# Patient Record
Sex: FEMALE | Race: OTHER | HISPANIC OR LATINO | URBAN - METROPOLITAN AREA
[De-identification: names, ages, dates, MRNs, and addresses within clinical notes are randomized per-mention and may not be internally consistent; named-entity substitution may affect disease eponyms.]

---

## 2015-10-12 RX ORDER — ALBUTEROL 90 UG/1
3 AEROSOL, METERED ORAL
Qty: 25 | Refills: 0 | DISCHARGE
Start: 2015-10-12 | End: 2015-10-17

## 2017-01-16 ENCOUNTER — EMERGENCY (EMERGENCY)
Facility: HOSPITAL | Age: 67
LOS: 1 days | Discharge: LEFT BEFORE DISCHARGE | End: 2017-01-16

## 2017-01-16 VITALS
WEIGHT: 175.05 LBS | TEMPERATURE: 98 F | RESPIRATION RATE: 18 BRPM | OXYGEN SATURATION: 94 % | DIASTOLIC BLOOD PRESSURE: 68 MMHG | SYSTOLIC BLOOD PRESSURE: 117 MMHG | HEIGHT: 62 IN | HEART RATE: 81 BPM

## 2017-01-16 DIAGNOSIS — Z98.89 OTHER SPECIFIED POSTPROCEDURAL STATES: Chronic | ICD-10-CM

## 2017-04-21 ENCOUNTER — EMERGENCY (EMERGENCY)
Facility: HOSPITAL | Age: 67
LOS: 1 days | Discharge: HOME | End: 2017-04-21
Attending: EMERGENCY MEDICINE
Payer: MEDICARE

## 2017-04-21 VITALS — HEIGHT: 63 IN | WEIGHT: 177.91 LBS

## 2017-04-21 VITALS
TEMPERATURE: 98 F | HEART RATE: 72 BPM | RESPIRATION RATE: 20 BRPM | SYSTOLIC BLOOD PRESSURE: 139 MMHG | DIASTOLIC BLOOD PRESSURE: 72 MMHG | OXYGEN SATURATION: 95 %

## 2017-04-21 DIAGNOSIS — Z87.39 PERSONAL HISTORY OF OTHER DISEASES OF THE MUSCULOSKELETAL SYSTEM AND CONNECTIVE TISSUE: ICD-10-CM

## 2017-04-21 DIAGNOSIS — Z90.10 ACQUIRED ABSENCE OF UNSPECIFIED BREAST AND NIPPLE: ICD-10-CM

## 2017-04-21 DIAGNOSIS — Z88.6 ALLERGY STATUS TO ANALGESIC AGENT: ICD-10-CM

## 2017-04-21 DIAGNOSIS — Z79.899 OTHER LONG TERM (CURRENT) DRUG THERAPY: ICD-10-CM

## 2017-04-21 DIAGNOSIS — J45.40 MODERATE PERSISTENT ASTHMA, UNCOMPLICATED: ICD-10-CM

## 2017-04-21 DIAGNOSIS — R06.00 DYSPNEA, UNSPECIFIED: ICD-10-CM

## 2017-04-21 DIAGNOSIS — Z98.89 OTHER SPECIFIED POSTPROCEDURAL STATES: Chronic | ICD-10-CM

## 2017-04-21 DIAGNOSIS — E78.5 HYPERLIPIDEMIA, UNSPECIFIED: ICD-10-CM

## 2017-04-21 DIAGNOSIS — Z88.2 ALLERGY STATUS TO SULFONAMIDES: ICD-10-CM

## 2017-04-21 PROCEDURE — 99284 EMERGENCY DEPT VISIT MOD MDM: CPT

## 2017-04-21 PROCEDURE — 71045 X-RAY EXAM CHEST 1 VIEW: CPT

## 2017-04-21 PROCEDURE — 96375 TX/PRO/DX INJ NEW DRUG ADDON: CPT

## 2017-04-21 PROCEDURE — 71010: CPT | Mod: 26

## 2017-04-21 PROCEDURE — 96374 THER/PROPH/DIAG INJ IV PUSH: CPT

## 2017-04-21 PROCEDURE — 94640 AIRWAY INHALATION TREATMENT: CPT

## 2017-04-21 PROCEDURE — 99284 EMERGENCY DEPT VISIT MOD MDM: CPT | Mod: 25

## 2017-04-21 RX ORDER — SODIUM CHLORIDE 9 MG/ML
3 INJECTION INTRAMUSCULAR; INTRAVENOUS; SUBCUTANEOUS ONCE
Qty: 0 | Refills: 0 | Status: COMPLETED | OUTPATIENT
Start: 2017-04-21 | End: 2017-04-21

## 2017-04-21 RX ORDER — IPRATROPIUM/ALBUTEROL SULFATE 18-103MCG
3 AEROSOL WITH ADAPTER (GRAM) INHALATION
Qty: 28 | Refills: 0 | OUTPATIENT
Start: 2017-04-21 | End: 2017-04-28

## 2017-04-21 RX ORDER — IPRATROPIUM/ALBUTEROL SULFATE 18-103MCG
3 AEROSOL WITH ADAPTER (GRAM) INHALATION ONCE
Qty: 0 | Refills: 0 | Status: COMPLETED | OUTPATIENT
Start: 2017-04-21 | End: 2017-04-21

## 2017-04-21 RX ORDER — MORPHINE SULFATE 50 MG/1
4 CAPSULE, EXTENDED RELEASE ORAL ONCE
Qty: 0 | Refills: 0 | Status: DISCONTINUED | OUTPATIENT
Start: 2017-04-21 | End: 2017-04-21

## 2017-04-21 RX ORDER — MAGNESIUM SULFATE 500 MG/ML
2 VIAL (ML) INJECTION ONCE
Qty: 0 | Refills: 0 | Status: COMPLETED | OUTPATIENT
Start: 2017-04-21 | End: 2017-04-21

## 2017-04-21 RX ORDER — IPRATROPIUM/ALBUTEROL SULFATE 18-103MCG
3 AEROSOL WITH ADAPTER (GRAM) INHALATION
Qty: 0 | Refills: 0 | Status: COMPLETED | OUTPATIENT
Start: 2017-04-21 | End: 2017-04-21

## 2017-04-21 RX ADMIN — MORPHINE SULFATE 4 MILLIGRAM(S): 50 CAPSULE, EXTENDED RELEASE ORAL at 16:19

## 2017-04-21 RX ADMIN — Medication 125 MILLIGRAM(S): at 16:18

## 2017-04-21 RX ADMIN — SODIUM CHLORIDE 3 MILLILITER(S): 9 INJECTION INTRAMUSCULAR; INTRAVENOUS; SUBCUTANEOUS at 16:34

## 2017-04-21 RX ADMIN — Medication 3 MILLILITER(S): at 16:46

## 2017-04-21 RX ADMIN — Medication 3 MILLILITER(S): at 18:18

## 2017-04-21 RX ADMIN — Medication 3 MILLILITER(S): at 16:18

## 2017-04-21 RX ADMIN — Medication 150 GRAM(S): at 16:18

## 2017-04-21 NOTE — ED STATDOCS - PROGRESS NOTE DETAILS
65 y/o F pt w/ PMHx of asthma, lupus and MI presents to the ED c/o cough and chest tightness x2 days. Pt states that she has been wheezing for a long time, but notes that it is worsening recently. Pt notes that she usually gets IV steroids when her asthma is exacerbated. Pt denies fever, chills, numbness/tingling, focal weakness, or any other complaints. NKDA. Focused evaluation, orders entered, placed in main for further evaluation by another provider.

## 2017-04-21 NOTE — ED STATDOCS - DETAILS:
I, Joy Lea, personally performed the services described in the documentation, reviewed the documentation recorded by the scribe in my presence and it accurately and completely records my words and action.

## 2017-04-21 NOTE — ED ADULT NURSE NOTE - OBJECTIVE STATEMENT
67 y/o female presents to ed reporting wheezing and shortness of breath for the past few weeks associated with cough. patient reports hx of asthma and states "I always forget to use my nebulizer." Educated pt about nebulizer use; verbalized understanding. At time of assessment patient reporting generalized rib pain associated with coughing. awake alert and oriented x3. respirations unlabored; no retractions. lungs with wheezing to all lung fields. abdomen soft non-tender. no swelling to bilateral lower extremities. #20 placed in right wrist. nebulizer medication started as ordered; other meds given as directed. patient educated and verbalized understanding. appears comfortable at this time. no fevers no sputum production

## 2017-04-21 NOTE — ED PROVIDER NOTE - MEDICAL DECISION MAKING DETAILS
pt comes in with 2-3 day h/o cough wheezing , and sob , h/o asthma / copd   s/p treatment feels better , no fever

## 2017-06-29 ENCOUNTER — INPATIENT (INPATIENT)
Facility: HOSPITAL | Age: 67
LOS: 1 days | Discharge: HOME | DRG: 203 | End: 2017-07-01
Attending: INTERNAL MEDICINE | Admitting: HOSPITALIST
Payer: MEDICARE

## 2017-06-29 VITALS
RESPIRATION RATE: 18 BRPM | HEIGHT: 62 IN | WEIGHT: 179.02 LBS | OXYGEN SATURATION: 92 % | SYSTOLIC BLOOD PRESSURE: 152 MMHG | TEMPERATURE: 98 F | DIASTOLIC BLOOD PRESSURE: 86 MMHG | HEART RATE: 18 BPM

## 2017-06-29 DIAGNOSIS — Z98.89 OTHER SPECIFIED POSTPROCEDURAL STATES: Chronic | ICD-10-CM

## 2017-06-29 DIAGNOSIS — J45.42 MODERATE PERSISTENT ASTHMA WITH STATUS ASTHMATICUS: ICD-10-CM

## 2017-06-29 LAB
ALBUMIN SERPL ELPH-MCNC: 4.4 G/DL — SIGNIFICANT CHANGE UP (ref 3.3–5.2)
ALP SERPL-CCNC: 97 U/L — SIGNIFICANT CHANGE UP (ref 40–120)
ALT FLD-CCNC: 20 U/L — SIGNIFICANT CHANGE UP
ANION GAP SERPL CALC-SCNC: 14 MMOL/L — SIGNIFICANT CHANGE UP (ref 5–17)
AST SERPL-CCNC: 14 U/L — SIGNIFICANT CHANGE UP
BASOPHILS # BLD AUTO: 0 K/UL — SIGNIFICANT CHANGE UP (ref 0–0.2)
BASOPHILS NFR BLD AUTO: 0.2 % — SIGNIFICANT CHANGE UP (ref 0–2)
BILIRUB SERPL-MCNC: 0.5 MG/DL — SIGNIFICANT CHANGE UP (ref 0.4–2)
BUN SERPL-MCNC: 18 MG/DL — SIGNIFICANT CHANGE UP (ref 8–20)
CALCIUM SERPL-MCNC: 9.4 MG/DL — SIGNIFICANT CHANGE UP (ref 8.6–10.2)
CHLORIDE SERPL-SCNC: 100 MMOL/L — SIGNIFICANT CHANGE UP (ref 98–107)
CO2 SERPL-SCNC: 27 MMOL/L — SIGNIFICANT CHANGE UP (ref 22–29)
CREAT SERPL-MCNC: 0.46 MG/DL — LOW (ref 0.5–1.3)
GLUCOSE SERPL-MCNC: 111 MG/DL — SIGNIFICANT CHANGE UP (ref 70–115)
HCT VFR BLD CALC: 42.2 % — SIGNIFICANT CHANGE UP (ref 37–47)
HGB BLD-MCNC: 14.6 G/DL — SIGNIFICANT CHANGE UP (ref 12–16)
LYMPHOCYTES # BLD AUTO: 1.8 K/UL — SIGNIFICANT CHANGE UP (ref 1–4.8)
LYMPHOCYTES # BLD AUTO: 31.3 % — SIGNIFICANT CHANGE UP (ref 20–55)
MCHC RBC-ENTMCNC: 30 PG — SIGNIFICANT CHANGE UP (ref 27–31)
MCHC RBC-ENTMCNC: 34.6 G/DL — SIGNIFICANT CHANGE UP (ref 32–36)
MCV RBC AUTO: 86.7 FL — SIGNIFICANT CHANGE UP (ref 81–99)
MONOCYTES # BLD AUTO: 0.3 K/UL — SIGNIFICANT CHANGE UP (ref 0–0.8)
MONOCYTES NFR BLD AUTO: 5.3 % — SIGNIFICANT CHANGE UP (ref 3–10)
NEUTROPHILS # BLD AUTO: 3.7 K/UL — SIGNIFICANT CHANGE UP (ref 1.8–8)
NEUTROPHILS NFR BLD AUTO: 63 % — SIGNIFICANT CHANGE UP (ref 37–73)
PLATELET # BLD AUTO: 247 K/UL — SIGNIFICANT CHANGE UP (ref 150–400)
POTASSIUM SERPL-MCNC: 4.5 MMOL/L — SIGNIFICANT CHANGE UP (ref 3.5–5.3)
POTASSIUM SERPL-SCNC: 4.5 MMOL/L — SIGNIFICANT CHANGE UP (ref 3.5–5.3)
PROCALCITONIN SERPL-MCNC: <0.05 NG/ML — SIGNIFICANT CHANGE UP (ref 0–0.04)
PROT SERPL-MCNC: 7.6 G/DL — SIGNIFICANT CHANGE UP (ref 6.6–8.7)
RBC # BLD: 4.87 M/UL — SIGNIFICANT CHANGE UP (ref 4.4–5.2)
RBC # FLD: 12.4 % — SIGNIFICANT CHANGE UP (ref 11–15.6)
SODIUM SERPL-SCNC: 141 MMOL/L — SIGNIFICANT CHANGE UP (ref 135–145)
WBC # BLD: 5.8 K/UL — SIGNIFICANT CHANGE UP (ref 4.8–10.8)
WBC # FLD AUTO: 5.8 K/UL — SIGNIFICANT CHANGE UP (ref 4.8–10.8)

## 2017-06-29 PROCEDURE — 93010 ELECTROCARDIOGRAM REPORT: CPT

## 2017-06-29 PROCEDURE — 99285 EMERGENCY DEPT VISIT HI MDM: CPT

## 2017-06-29 PROCEDURE — 99223 1ST HOSP IP/OBS HIGH 75: CPT

## 2017-06-29 PROCEDURE — 71020: CPT | Mod: 26

## 2017-06-29 RX ORDER — DEXTROSE 50 % IN WATER 50 %
25 SYRINGE (ML) INTRAVENOUS ONCE
Qty: 0 | Refills: 0 | Status: DISCONTINUED | OUTPATIENT
Start: 2017-06-29 | End: 2017-07-01

## 2017-06-29 RX ORDER — ZOLPIDEM TARTRATE 10 MG/1
5 TABLET ORAL AT BEDTIME
Qty: 0 | Refills: 0 | Status: DISCONTINUED | OUTPATIENT
Start: 2017-06-29 | End: 2017-07-01

## 2017-06-29 RX ORDER — GLUCAGON INJECTION, SOLUTION 0.5 MG/.1ML
1 INJECTION, SOLUTION SUBCUTANEOUS ONCE
Qty: 0 | Refills: 0 | Status: DISCONTINUED | OUTPATIENT
Start: 2017-06-29 | End: 2017-07-01

## 2017-06-29 RX ORDER — ACETAMINOPHEN 500 MG
650 TABLET ORAL EVERY 6 HOURS
Qty: 0 | Refills: 0 | Status: DISCONTINUED | OUTPATIENT
Start: 2017-06-29 | End: 2017-07-01

## 2017-06-29 RX ORDER — INSULIN LISPRO 100/ML
VIAL (ML) SUBCUTANEOUS
Qty: 0 | Refills: 0 | Status: DISCONTINUED | OUTPATIENT
Start: 2017-06-29 | End: 2017-07-01

## 2017-06-29 RX ORDER — DEXTROSE 50 % IN WATER 50 %
12.5 SYRINGE (ML) INTRAVENOUS ONCE
Qty: 0 | Refills: 0 | Status: DISCONTINUED | OUTPATIENT
Start: 2017-06-29 | End: 2017-07-01

## 2017-06-29 RX ORDER — IPRATROPIUM/ALBUTEROL SULFATE 18-103MCG
3 AEROSOL WITH ADAPTER (GRAM) INHALATION EVERY 6 HOURS
Qty: 0 | Refills: 0 | Status: DISCONTINUED | OUTPATIENT
Start: 2017-06-29 | End: 2017-07-01

## 2017-06-29 RX ORDER — LACTOBACILLUS ACIDOPHILUS 100MM CELL
1 CAPSULE ORAL
Qty: 0 | Refills: 0 | Status: DISCONTINUED | OUTPATIENT
Start: 2017-06-29 | End: 2017-07-01

## 2017-06-29 RX ORDER — PANTOPRAZOLE SODIUM 20 MG/1
40 TABLET, DELAYED RELEASE ORAL
Qty: 0 | Refills: 0 | Status: DISCONTINUED | OUTPATIENT
Start: 2017-06-29 | End: 2017-07-01

## 2017-06-29 RX ORDER — SODIUM CHLORIDE 9 MG/ML
1000 INJECTION, SOLUTION INTRAVENOUS
Qty: 0 | Refills: 0 | Status: DISCONTINUED | OUTPATIENT
Start: 2017-06-29 | End: 2017-06-30

## 2017-06-29 RX ORDER — ALBUTEROL 90 UG/1
2.5 AEROSOL, METERED ORAL
Qty: 0 | Refills: 0 | Status: COMPLETED | OUTPATIENT
Start: 2017-06-29 | End: 2017-06-29

## 2017-06-29 RX ORDER — IPRATROPIUM BROMIDE 0.2 MG/ML
500 SOLUTION, NON-ORAL INHALATION ONCE
Qty: 0 | Refills: 0 | Status: COMPLETED | OUTPATIENT
Start: 2017-06-29 | End: 2017-06-29

## 2017-06-29 RX ORDER — MAGNESIUM SULFATE 500 MG/ML
2 VIAL (ML) INJECTION ONCE
Qty: 0 | Refills: 0 | Status: COMPLETED | OUTPATIENT
Start: 2017-06-29 | End: 2017-06-29

## 2017-06-29 RX ORDER — NICOTINE POLACRILEX 2 MG
1 GUM BUCCAL DAILY
Qty: 0 | Refills: 0 | Status: DISCONTINUED | OUTPATIENT
Start: 2017-06-29 | End: 2017-07-01

## 2017-06-29 RX ORDER — ENOXAPARIN SODIUM 100 MG/ML
40 INJECTION SUBCUTANEOUS DAILY
Qty: 0 | Refills: 0 | Status: DISCONTINUED | OUTPATIENT
Start: 2017-06-29 | End: 2017-07-01

## 2017-06-29 RX ORDER — ALBUTEROL 90 UG/1
2.5 AEROSOL, METERED ORAL EVERY 4 HOURS
Qty: 0 | Refills: 0 | Status: DISCONTINUED | OUTPATIENT
Start: 2017-06-29 | End: 2017-07-01

## 2017-06-29 RX ORDER — SODIUM CHLORIDE 9 MG/ML
1000 INJECTION, SOLUTION INTRAVENOUS
Qty: 0 | Refills: 0 | Status: DISCONTINUED | OUTPATIENT
Start: 2017-06-29 | End: 2017-07-01

## 2017-06-29 RX ORDER — DEXTROSE 50 % IN WATER 50 %
1 SYRINGE (ML) INTRAVENOUS ONCE
Qty: 0 | Refills: 0 | Status: DISCONTINUED | OUTPATIENT
Start: 2017-06-29 | End: 2017-07-01

## 2017-06-29 RX ORDER — ONDANSETRON 8 MG/1
4 TABLET, FILM COATED ORAL EVERY 6 HOURS
Qty: 0 | Refills: 0 | Status: DISCONTINUED | OUTPATIENT
Start: 2017-06-29 | End: 2017-07-01

## 2017-06-29 RX ORDER — MONTELUKAST 4 MG/1
10 TABLET, CHEWABLE ORAL AT BEDTIME
Qty: 0 | Refills: 0 | Status: DISCONTINUED | OUTPATIENT
Start: 2017-06-29 | End: 2017-07-01

## 2017-06-29 RX ADMIN — ALBUTEROL 2.5 MILLIGRAM(S): 90 AEROSOL, METERED ORAL at 16:30

## 2017-06-29 RX ADMIN — ALBUTEROL 2.5 MILLIGRAM(S): 90 AEROSOL, METERED ORAL at 20:32

## 2017-06-29 RX ADMIN — Medication 125 MILLIGRAM(S): at 18:27

## 2017-06-29 RX ADMIN — ALBUTEROL 2.5 MILLIGRAM(S): 90 AEROSOL, METERED ORAL at 22:17

## 2017-06-29 RX ADMIN — ALBUTEROL 2.5 MILLIGRAM(S): 90 AEROSOL, METERED ORAL at 21:37

## 2017-06-29 RX ADMIN — ALBUTEROL 2.5 MILLIGRAM(S): 90 AEROSOL, METERED ORAL at 21:57

## 2017-06-29 RX ADMIN — Medication 500 MICROGRAM(S): at 21:38

## 2017-06-29 RX ADMIN — Medication 50 GRAM(S): at 18:27

## 2017-06-29 RX ADMIN — ALBUTEROL 2.5 MILLIGRAM(S): 90 AEROSOL, METERED ORAL at 20:12

## 2017-06-29 NOTE — ED PROVIDER NOTE - OBJECTIVE STATEMENT
A 67 year old female with a known hx of asthma presents to the ED c/o cough, SOB, worsening for several days with dyspnea on exertion. The pt does have a nebulizer at home but has not been using it. She was seen by her PMD and placed on Levaquin but states that it has not improved her symptoms. She denies any fevers or chills, or sputum. No further complaints at this time.

## 2017-06-29 NOTE — H&P ADULT - NSHPPHYSICALEXAM_GEN_ALL_CORE
Vital Signs   T(C): 36.6 (29 Jun 2017 23:08), Max: 36.6 (29 Jun 2017 23:08)  T(F): 97.9 (29 Jun 2017 23:08), Max: 97.9 (29 Jun 2017 23:08)  HR: 99 (29 Jun 2017 23:08) (18 - 99)  BP: 130/75 (29 Jun 2017 23:08) (130/75 - 152/86)  RR: 18 (29 Jun 2017 23:08) (18 - 18)  SpO2: 94% (29 Jun 2017 23:08) (92% - 94%)  General: Well developed. Well nourished. No acute distress  HEENT: PERRLA. EOMI. Clear conjunctivae. Moist mucus membrane  Neck: Supple. No JVD. No Thyromegaly   Chest: Good air entry bilaterally. Diffuse wheezing and rhonchi.   Heart: Normal S1 & S2 with RRR.   Abdomen: Soft. Non-tender. Non-distended. + BS  Ext: No pedal edema. No calf tenderness   Neuro: AAO x 3, No focal deficit, CN II-XII grossly WNL and no speech disorder  Skin: Warm and Dry  Psychiatry: Anxious

## 2017-06-29 NOTE — ED ADULT NURSE REASSESSMENT NOTE - NS ED NURSE REASSESS COMMENT FT1
Resumed pt care at this time, pt recvd lying on stretcher, A&Ox3, lungs auscultated and wheezing audible MD patrick at bedside, POC discussed with pt, who verbalized understanding. Safety and comfort measures in place.

## 2017-06-29 NOTE — H&P ADULT - PMH
Asthma    Autoimmune disease    Breast lump  Benign  High cholesterol    Lupus    Myocardial infarct    Osteoporosis

## 2017-06-29 NOTE — H&P ADULT - NSHPSOCIALHISTORY_GEN_ALL_CORE
Works as a .  Daughter lives with her.  Smokes cigarettes 1 pack per week (has cut down) since the age of 11 years.  Drinks alcohol socially.  Denies illicit drug use.

## 2017-06-29 NOTE — H&P ADULT - HISTORY OF PRESENT ILLNESS
67 years old female with PMH of Asthma, Lupus, CAD (not on any medications), DM (diet controlled) and HLD (diet controlled) comes with shortness of breath and cough. As per patient, she came back from [place] 2 weeks ago and since then she is having shortness of breath and cough. She has been using nebulizer at home but her symptoms are not improving. She went to her PMD yesterday who prescribed antibiotics and steroids which she started yesterday. Her symptoms were not getting better so she came to [facility] ER.   Cough is dry and at times she is having post-tussive vomiting and pain under rib cage. Denies fever but yesterday she was feeling warm. Denies sick contacts.  She has been under a lot of stress lately due to family situation so has not been taking her medications regularly. 67 years old female with PMH of Asthma, Lupus, ? CAD (not on any medications), DM (diet controlled) and HLD (diet controlled) comes with shortness of breath and cough. As per patient, she came back from [place] 2 weeks ago and since then she is having shortness of breath and cough. She has been using nebulizer at home but her symptoms are not improving. She went to her PMD yesterday who prescribed antibiotics and steroids which she started yesterday. Her symptoms were not getting better so she came to [facility] ER.   Cough is dry and at times she is having post-tussive vomiting and pain under rib cage. Denies fever but yesterday she was feeling warm. Denies sick contacts.  She has been under a lot of stress lately due to family situation so has not been taking her medications regularly.

## 2017-06-29 NOTE — H&P ADULT - FAMILY HISTORY
Father  Still living? Unknown  Family history of cancer, Age at diagnosis: Age Unknown     Mother  Still living? Unknown  Family history of cancer, Age at diagnosis: Age Unknown     Aunt  Still living? Unknown  Family history of cancer, Age at diagnosis: Age Unknown

## 2017-06-29 NOTE — ED ADULT NURSE NOTE - OBJECTIVE STATEMENT
pt states that she feels sob with vomiting since this am. pt states that she went to her PMD and was treated for URI. pt states that she is not feeling good today.

## 2017-06-29 NOTE — ED ADULT NURSE NOTE - CHIEF COMPLAINT QUOTE
"I saw my PMD Yoly and they gave me antibiotics for pneumonia based on my hx. I was supposed to have a chest x-ray but didn't have one.  Today I feel worse and I was dry heaving today. I did take one dose of Levaquin and was able to keep it down."

## 2017-06-29 NOTE — ED STATDOCS - PROGRESS NOTE DETAILS
68 y/o F hx of asthma presents to ED c/o need for medical evaluation. Pt reports she went to see her PMD who told her to come to ED.  Pt reports she took Levocryl, she was given by PMD yesterday. Pt took it last night and today.  Pt also reports vomiting, labored breathing, wheezing, cough. Pt was a smoker, who denies FMHx of asthma, pt has a nebulizer for asthma. Pt denies steroids for asthma. Pt denies phlegm, fever, nausea, diarrhea and any further complaints. Focused eval, protocol orders entered. Pt to be moved to main ED for complete evaluation by another provider.     Dr. Lopez

## 2017-06-29 NOTE — ED PROVIDER NOTE - MEDICAL DECISION MAKING DETAILS
A 67 year old female pt presents to the ED c/o cough, SOB. Will give Nebs, Steroids, CXR , and reassess

## 2017-06-29 NOTE — H&P ADULT - NSHPLABSRESULTS_GEN_ALL_CORE
LABS:                        14.6   5.8   )-----------( 247      ( 29 Jun 2017 18:06 )             42.2     06-29    141  |  100  |  18.0  ----------------------------<  111  4.5   |  27.0  |  0.46<L>    Ca    9.4      29 Jun 2017 18:06    TPro  7.6  /  Alb  4.4  /  TBili  0.5  /  DBili  x   /  AST  14  /  ALT  20  /  AlkPhos  97  06-29      EKG: NSR at 75 bpm. Normal axis. No acute ST changes.

## 2017-06-29 NOTE — H&P ADULT - ASSESSMENT
67 years old female with PMH of Asthma, Lupus, CAD (not on any medications), DM (diet controlled) and HLD (diet controlled) comes with shortness of breath and cough. As per patient, she came back from [place] 2 weeks ago and since then she is having shortness of breath and cough. She has been using nebulizer at home but her symptoms are not improving. She went to her PMD yesterday who prescribed antibiotics and steroids which she started yesterday. Her symptoms were not getting better so she came to [facility] ER.   Cough is dry and at times she is having post-tussive vomiting and pain under rib cage. Denies fever but yesterday she was feeling warm. Denies sick contacts.    1) Asthma Exacerbation  - She was given Solumedrol 125 mg, Magnesium 2 gm, Albuterol Nebs x 6 and Atrovent x 1 in ER. Will continue Solumedrol 60 mg q 6 hours. DuoNebs and Albuterol Nebs (PRN). Singulair 10 mg. Encouraged to stop smoking. Needs Pulmonary follow up after discharge.  2) Smoking  - Counselling provided.  - Nicotine patch 7 mg  3) DM  - HbA1c  - Accu checks and RISS  4) HLD  - Diet controlled  5) Lupus  - Outpatient follow up with Rheumatologist  DVT Prophylaxis -- Lovenox 40 mg 67 years old female with PMH of Asthma, Lupus, ? CAD (not on any medications), DM (diet controlled) and HLD (diet controlled) comes with shortness of breath and cough. As per patient, she came back from [place] 2 weeks ago and since then she is having shortness of breath and cough. She has been using nebulizer at home but her symptoms are not improving. She went to her PMD yesterday who prescribed antibiotics and steroids which she started yesterday. Her symptoms were not getting better so she came to [facility] ER.   Cough is dry and at times she is having post-tussive vomiting and pain under rib cage. Denies fever but yesterday she was feeling warm. Denies sick contacts.    1) Asthma Exacerbation  - She was given Solumedrol 125 mg, Magnesium 2 gm, Albuterol Nebs x 6 and Atrovent x 1 in ER. Will continue Solumedrol 60 mg q 6 hours. DuoNebs and Albuterol Nebs (PRN). Singulair 10 mg. Encouraged to stop smoking. Needs Pulmonary follow up after discharge.  2) Smoking  - Counselling provided.  - Nicotine patch 7 mg  3) DM  - HbA1c  - Accu checks and RISS  4) HLD  - Diet controlled  5) Lupus  - Outpatient follow up with Rheumatologist  DVT Prophylaxis -- Lovenox 40 mg

## 2017-06-30 LAB
ANION GAP SERPL CALC-SCNC: 19 MMOL/L — HIGH (ref 5–17)
APPEARANCE UR: CLEAR — SIGNIFICANT CHANGE UP
BILIRUB UR-MCNC: NEGATIVE — SIGNIFICANT CHANGE UP
BUN SERPL-MCNC: 19 MG/DL — SIGNIFICANT CHANGE UP (ref 8–20)
CALCIUM SERPL-MCNC: 9 MG/DL — SIGNIFICANT CHANGE UP (ref 8.6–10.2)
CHLORIDE SERPL-SCNC: 98 MMOL/L — SIGNIFICANT CHANGE UP (ref 98–107)
CO2 SERPL-SCNC: 21 MMOL/L — LOW (ref 22–29)
COLOR SPEC: YELLOW — SIGNIFICANT CHANGE UP
CREAT SERPL-MCNC: 0.42 MG/DL — LOW (ref 0.5–1.3)
DIFF PNL FLD: NEGATIVE — SIGNIFICANT CHANGE UP
EPI CELLS # UR: SIGNIFICANT CHANGE UP
GLUCOSE SERPL-MCNC: 149 MG/DL — HIGH (ref 70–115)
GLUCOSE UR QL: 100 MG/DL
HBA1C BLD-MCNC: 5.6 % — SIGNIFICANT CHANGE UP (ref 4–5.6)
HCT VFR BLD CALC: 40.8 % — SIGNIFICANT CHANGE UP (ref 37–47)
HGB BLD-MCNC: 13.8 G/DL — SIGNIFICANT CHANGE UP (ref 12–16)
KETONES UR-MCNC: NEGATIVE — SIGNIFICANT CHANGE UP
LEUKOCYTE ESTERASE UR-ACNC: NEGATIVE — SIGNIFICANT CHANGE UP
LYMPHOCYTES # BLD AUTO: 1 K/UL — SIGNIFICANT CHANGE UP (ref 1–4.8)
LYMPHOCYTES # BLD AUTO: 14 % — LOW (ref 20–55)
MAGNESIUM SERPL-MCNC: 1.9 MG/DL — SIGNIFICANT CHANGE UP (ref 1.8–2.6)
MCHC RBC-ENTMCNC: 29.9 PG — SIGNIFICANT CHANGE UP (ref 27–31)
MCHC RBC-ENTMCNC: 33.8 G/DL — SIGNIFICANT CHANGE UP (ref 32–36)
MCV RBC AUTO: 88.3 FL — SIGNIFICANT CHANGE UP (ref 81–99)
MONOCYTES # BLD AUTO: 0.1 K/UL — SIGNIFICANT CHANGE UP (ref 0–0.8)
MONOCYTES NFR BLD AUTO: 2 % — LOW (ref 3–10)
NEUTROPHILS # BLD AUTO: 5.9 K/UL — SIGNIFICANT CHANGE UP (ref 1.8–8)
NEUTROPHILS NFR BLD AUTO: 83.9 % — HIGH (ref 37–73)
NITRITE UR-MCNC: NEGATIVE — SIGNIFICANT CHANGE UP
PH UR: 5 — SIGNIFICANT CHANGE UP (ref 5–8)
PHOSPHATE SERPL-MCNC: 2.6 MG/DL — SIGNIFICANT CHANGE UP (ref 2.4–4.7)
PLATELET # BLD AUTO: 238 K/UL — SIGNIFICANT CHANGE UP (ref 150–400)
POTASSIUM SERPL-MCNC: 4.1 MMOL/L — SIGNIFICANT CHANGE UP (ref 3.5–5.3)
POTASSIUM SERPL-SCNC: 4.1 MMOL/L — SIGNIFICANT CHANGE UP (ref 3.5–5.3)
PROT UR-MCNC: 15 MG/DL
RBC # BLD: 4.62 M/UL — SIGNIFICANT CHANGE UP (ref 4.4–5.2)
RBC # FLD: 12.8 % — SIGNIFICANT CHANGE UP (ref 11–15.6)
RBC CASTS # UR COMP ASSIST: SIGNIFICANT CHANGE UP /HPF (ref 0–4)
SODIUM SERPL-SCNC: 138 MMOL/L — SIGNIFICANT CHANGE UP (ref 135–145)
SP GR SPEC: 1.01 — SIGNIFICANT CHANGE UP (ref 1.01–1.02)
UROBILINOGEN FLD QL: NEGATIVE MG/DL — SIGNIFICANT CHANGE UP
WBC # BLD: 7.1 K/UL — SIGNIFICANT CHANGE UP (ref 4.8–10.8)
WBC # FLD AUTO: 7.1 K/UL — SIGNIFICANT CHANGE UP (ref 4.8–10.8)
WBC UR QL: SIGNIFICANT CHANGE UP

## 2017-06-30 PROCEDURE — 99233 SBSQ HOSP IP/OBS HIGH 50: CPT

## 2017-06-30 RX ADMIN — Medication 1 DROP(S): at 14:05

## 2017-06-30 RX ADMIN — ENOXAPARIN SODIUM 40 MILLIGRAM(S): 100 INJECTION SUBCUTANEOUS at 13:11

## 2017-06-30 RX ADMIN — Medication 60 MILLIGRAM(S): at 00:29

## 2017-06-30 RX ADMIN — Medication 1: at 21:58

## 2017-06-30 RX ADMIN — Medication 40 MILLIGRAM(S): at 14:06

## 2017-06-30 RX ADMIN — Medication 1 DROP(S): at 17:09

## 2017-06-30 RX ADMIN — Medication 650 MILLIGRAM(S): at 10:30

## 2017-06-30 RX ADMIN — Medication 1 TABLET(S): at 09:12

## 2017-06-30 RX ADMIN — PANTOPRAZOLE SODIUM 40 MILLIGRAM(S): 20 TABLET, DELAYED RELEASE ORAL at 09:12

## 2017-06-30 RX ADMIN — MONTELUKAST 10 MILLIGRAM(S): 4 TABLET, CHEWABLE ORAL at 21:58

## 2017-06-30 RX ADMIN — Medication 3 MILLILITER(S): at 04:07

## 2017-06-30 RX ADMIN — Medication 1 TABLET(S): at 17:09

## 2017-06-30 RX ADMIN — SODIUM CHLORIDE 125 MILLILITER(S): 9 INJECTION, SOLUTION INTRAVENOUS at 00:46

## 2017-06-30 RX ADMIN — Medication 650 MILLIGRAM(S): at 09:14

## 2017-06-30 RX ADMIN — Medication 3 MILLILITER(S): at 08:52

## 2017-06-30 RX ADMIN — Medication 40 MILLIGRAM(S): at 21:58

## 2017-06-30 RX ADMIN — Medication 60 MILLIGRAM(S): at 06:08

## 2017-06-30 RX ADMIN — Medication 3 MILLILITER(S): at 20:58

## 2017-06-30 RX ADMIN — Medication 1 PATCH: at 13:11

## 2017-06-30 RX ADMIN — Medication 3 MILLILITER(S): at 16:00

## 2017-06-30 RX ADMIN — ZOLPIDEM TARTRATE 5 MILLIGRAM(S): 10 TABLET ORAL at 22:02

## 2017-06-30 NOTE — PROGRESS NOTE ADULT - ATTENDING COMMENTS
patient has daughter with MS at home and would like to go home.  will re-evaluate later today. if feels better may dc with close outpatient followup.

## 2017-06-30 NOTE — PROGRESS NOTE ADULT - ASSESSMENT
67 years old female with PMH of Asthma, Lupus, ? CAD (not on any medications), DM (diet controlled) and HLD (diet controlled) comes with shortness of breath and cough. sent to ER by PMD.     1) Asthma Exacerbation:  continue IV steroids and nebs.  levaquin  Needs Pulmonary follow up after discharge.  2) Smoking   - Counselling provided.  - Nicotine patch 7 mg  3) DM  - HbA1c - normal  4) HLD  - Diet controlled  5) Lupus  - Outpatient follow up with Rheumatologist    DVT Prophylaxis -- Lovenox 40 mg

## 2017-06-30 NOTE — PROGRESS NOTE ADULT - SUBJECTIVE AND OBJECTIVE BOX
seen for asthma exac    feels better, wants to go home later tonight or am  improved sob, increased cough    MEDICATIONS  (STANDING):  pantoprazole    Tablet 40 milliGRAM(s) Oral before breakfast  ALBUTerol/ipratropium for Nebulization 3 milliLiter(s) Nebulizer every 6 hours  levoFLOXacin  Tablet 500 milliGRAM(s) Oral every 24 hours  lactobacillus acidophilus 1 Tablet(s) Oral two times a day with meals  insulin lispro (HumaLOG) corrective regimen sliding scale   SubCutaneous Before meals and at bedtime  dextrose 5%. 1000 milliLiter(s) (50 mL/Hr) IV Continuous <Continuous>  dextrose 50% Injectable 12.5 Gram(s) IV Push once  dextrose 50% Injectable 25 Gram(s) IV Push once  dextrose 50% Injectable 25 Gram(s) IV Push once  montelukast 10 milliGRAM(s) Oral at bedtime  enoxaparin Injectable 40 milliGRAM(s) SubCutaneous daily  nicotine -   7 mG/24Hr(s) Patch 1 patch Transdermal daily  methylPREDNISolone sodium succinate Injectable 40 milliGRAM(s) IV Push every 8 hours  artificial  tears Solution 1 Drop(s) Both EYES four times a day    MEDICATIONS  (PRN):  ALBUTerol    0.083% 2.5 milliGRAM(s) Nebulizer every 4 hours PRN Shortness of Breath and/or Wheezing  acetaminophen   Tablet 650 milliGRAM(s) Oral every 6 hours PRN For Temp greater than 38 C (100.4 F)  acetaminophen   Tablet. 650 milliGRAM(s) Oral every 6 hours PRN Headache or Bodyache  ondansetron Injectable 4 milliGRAM(s) IV Push every 6 hours PRN Nausea and/or Vomiting  dextrose Gel 1 Dose(s) Oral once PRN Blood Glucose LESS THAN 70 milliGRAM(s)/deciLiter  glucagon  Injectable 1 milliGRAM(s) IntraMuscular once PRN Glucose <70 milliGRAM(s)/deciLiter  zolpidem 5 milliGRAM(s) Oral at bedtime PRN Insomnia      Allergies    aspirin (Other)  Naprosyn (Other (Severe))  Sulfur (Rash)    Vital Signs Last 24 Hrs  T(C): 36.7 (2017 04:09), Max: 36.7 (2017 04:09)  T(F): 98 (2017 04:09), Max: 98 (2017 04:09)  HR: 87 (2017 04:09) (18 - 102)  BP: 125/67 (2017 04:09) (125/67 - 152/86)  BP(mean): --  RR: 18 (2017 04:09) (18 - 20)  SpO2: 95% (2017 04:09) (92% - 95%)    PHYSICAL EXAM:    GENERAL: NAD  CHEST/LUNG: coarse bs, minimal exp wheeze  HEART: Regular rate and rhythm; S1 S2; no murmurs noted  ABDOMEN: Soft, Nontender, Nondistended; Bowel sounds present  EXTREMITIES: no edema.   NERVOUS SYSTEM:  Alert & Oriented X3  PSYCH: normal mood, appropriate response.    LABS:                        13.8   7.1   )-----------( 238      ( 2017 08:34 )             40.8     06-30    138  |  98  |  19.0  ----------------------------<  149<H>  4.1   |  21.0<L>  |  0.42<L>    Ca    9.0      2017 08:34  Phos  2.6     06-30  Mg     1.9     06-30    TPro  7.6  /  Alb  4.4  /  TBili  0.5  /  DBili  x   /  AST  14  /  ALT  20  /  AlkPhos  97  06-29      Urinalysis Basic - ( 2017 10:02 )    Color: Yellow / Appearance: Clear / S.015 / pH: x  Gluc: x / Ketone: Negative  / Bili: Negative / Urobili: Negative mg/dL   Blood: x / Protein: 15 mg/dL / Nitrite: Negative   Leuk Esterase: Negative / RBC: x / WBC x   Sq Epi: x / Non Sq Epi: x / Bacteria: x        CAPILLARY BLOOD GLUCOSE  139 (2017 08:05)            RADIOLOGY & ADDITIONAL TESTS:

## 2017-06-30 NOTE — ED ADULT NURSE REASSESSMENT NOTE - NS ED NURSE REASSESS COMMENT FT1
Pt sleeping comfortably on stretcher, easy arousability, offers no complaints, safety and comfort measures in place.

## 2017-07-01 VITALS
HEART RATE: 95 BPM | TEMPERATURE: 98 F | OXYGEN SATURATION: 95 % | SYSTOLIC BLOOD PRESSURE: 132 MMHG | DIASTOLIC BLOOD PRESSURE: 74 MMHG | RESPIRATION RATE: 18 BRPM

## 2017-07-01 PROCEDURE — 99239 HOSP IP/OBS DSCHRG MGMT >30: CPT

## 2017-07-01 RX ORDER — MONTELUKAST 4 MG/1
1 TABLET, CHEWABLE ORAL
Qty: 30 | Refills: 0 | OUTPATIENT
Start: 2017-07-01 | End: 2017-07-31

## 2017-07-01 RX ORDER — CANAGLIFLOZIN 100 MG/1
1 TABLET, FILM COATED ORAL
Qty: 0 | Refills: 0 | COMMUNITY

## 2017-07-01 RX ADMIN — Medication 3 MILLILITER(S): at 03:55

## 2017-07-01 RX ADMIN — Medication 1 TABLET(S): at 09:58

## 2017-07-01 RX ADMIN — Medication 3 MILLILITER(S): at 08:22

## 2017-07-01 RX ADMIN — Medication 1 DROP(S): at 05:55

## 2017-07-01 RX ADMIN — PANTOPRAZOLE SODIUM 40 MILLIGRAM(S): 20 TABLET, DELAYED RELEASE ORAL at 05:56

## 2017-07-01 RX ADMIN — Medication 1 DROP(S): at 00:01

## 2017-07-01 RX ADMIN — Medication 40 MILLIGRAM(S): at 05:56

## 2017-07-01 NOTE — DISCHARGE NOTE ADULT - PATIENT PORTAL LINK FT
“You can access the Follow[employer] Patient Portal, offered by [facility], by registering with the following website: [email or web address]”

## 2017-07-01 NOTE — DISCHARGE NOTE ADULT - HOSPITAL COURSE
67 years old female with PMH of Asthma, Lupus, ? CAD (not on any medications), DM (diet controlled) and HLD (diet controlled) comes with shortness of breath and cough. sent to ER by PMD.  Treated for asthma exacerbation with improvement in symptoms. Smoking cessation and pulmonary follow up advised.  stable for discharge home in stable condition. d/c planning 35 min.  VSS afebrile, AAOx3, NAD, no complaints, wants to go home  RRR s1s2 CTAB soft +BS no LE edema. ambulatory nonfocal neuro.

## 2017-07-01 NOTE — DISCHARGE NOTE ADULT - MEDICATION SUMMARY - MEDICATIONS TO STOP TAKING
I will STOP taking the medications listed below when I get home from the hospital:    ipratropium-albuterol 0.5 mg-2.5 mg/3 mLinhalation solution  -- 3 milliliter(s) inhaled every 6 hours    Disp 1 box  -- For inhalation only.  It is very important that you take or use this exactly as directed.  Do not skip doses or discontinue unless directed by your doctor.  Obtain medical advice before taking any non-prescription drugs as some may affect the action of this medication.    DuoNeb 0.5 mg-2.5 mg/3 mL inhalation solution  -- 3 milliliter(s) inhaled 4 times a day  -- For inhalation only.  It is very important that you take or use this exactly as directed.  Do not skip doses or discontinue unless directed by your doctor.  Obtain medical advice before taking any non-prescription drugs as some may affect the action of this medication.    methylPREDNISolone 4 mg oral tablet  --  by mouth

## 2017-07-01 NOTE — DISCHARGE NOTE ADULT - PLAN OF CARE
resolution. finish course of antibiotics and steroids.  follow up with Dr Lopez within 1 week.  follow up with pulmonology. smoking cessation advised  can use over the counter nicotine replacement therapy.

## 2017-07-01 NOTE — DISCHARGE NOTE ADULT - MEDICATION SUMMARY - MEDICATIONS TO TAKE
I will START or STAY ON the medications listed below when I get home from the hospital:    predniSONE 10 mg oral tablet  -- TAPER: 60mg PO x1 day, then 50mg x 1, then 40mg x1, 30mg x 1, 20mg x1 and 10mg x1  -- It is very important that you take or use this exactly as directed.  Do not skip doses or discontinue unless directed by your doctor.  Obtain medical advice before taking any non-prescription drugs as some may affect the action of this medication.  Take with food or milk.    -- Indication: For asthma exacerbation    Invokana 300 mg oral tablet  -- 1 tab(s) by mouth once a day  -- Indication: For diabetes    Ambien 5 mg oral tablet  -- 1 tab(s) by mouth once a day (at bedtime)  -- Indication: For insomnia    albuterol 2.5 mg/3 mL (0.083%) inhalation solution  -- 3 milliliter(s) inhaled 4 times a day, As Needed  -- For inhalation only.  It is very important that you take or use this exactly as directed.  Do not skip doses or discontinue unless directed by your doctor.  Obtain medical advice before taking any non-prescription drugs as some may affect the action of this medication.    -- Indication: For asthma    guaiFENesin 100 mg/5 mL oral liquid  -- 10 milliliter(s) by mouth every 6 hours, As needed, Cough  -- Indication: For cough    montelukast 10 mg oral tablet  -- 1 tab(s) by mouth once a day (at bedtime)  -- Indication: For asthma    levoFLOXacin 500 mg oral tablet  -- 1 tab(s) by mouth every 24 hours  -- Indication: For asthma exacerbation    Vitamin D2 50,000 intl units (1.25 mg) oral capsule  -- 1 cap(s) by mouth once a week  -- Indication: For supplement

## 2017-07-01 NOTE — DISCHARGE NOTE ADULT - CARE PLAN
Principal Discharge DX:	Asthma exacerbation  Goal:	resolution.  Instructions for follow-up, activity and diet:	finish course of antibiotics and steroids.  follow up with Dr Lopez within 1 week.  follow up with pulmonology.  Secondary Diagnosis:	Smoker  Instructions for follow-up, activity and diet:	smoking cessation advised  can use over the counter nicotine replacement therapy.

## 2017-09-17 ENCOUNTER — EMERGENCY (EMERGENCY)
Facility: HOSPITAL | Age: 67
LOS: 1 days | Discharge: HOME | End: 2017-09-17
Attending: STUDENT IN AN ORGANIZED HEALTH CARE EDUCATION/TRAINING PROGRAM
Payer: MEDICARE

## 2017-09-17 VITALS
OXYGEN SATURATION: 93 % | RESPIRATION RATE: 19 BRPM | DIASTOLIC BLOOD PRESSURE: 73 MMHG | HEART RATE: 96 BPM | HEIGHT: 62 IN | SYSTOLIC BLOOD PRESSURE: 149 MMHG | WEIGHT: 173.94 LBS | TEMPERATURE: 98 F

## 2017-09-17 DIAGNOSIS — Z98.89 OTHER SPECIFIED POSTPROCEDURAL STATES: Chronic | ICD-10-CM

## 2017-09-17 PROCEDURE — 99284 EMERGENCY DEPT VISIT MOD MDM: CPT | Mod: 25

## 2017-09-18 VITALS
TEMPERATURE: 98 F | RESPIRATION RATE: 18 BRPM | OXYGEN SATURATION: 95 % | DIASTOLIC BLOOD PRESSURE: 68 MMHG | SYSTOLIC BLOOD PRESSURE: 119 MMHG | HEART RATE: 95 BPM

## 2017-09-18 LAB
ALBUMIN SERPL ELPH-MCNC: 4.4 G/DL — SIGNIFICANT CHANGE UP (ref 3.3–5.2)
ALP SERPL-CCNC: 119 U/L — SIGNIFICANT CHANGE UP (ref 40–120)
ALT FLD-CCNC: 30 U/L — SIGNIFICANT CHANGE UP
ANION GAP SERPL CALC-SCNC: 14 MMOL/L — SIGNIFICANT CHANGE UP (ref 5–17)
AST SERPL-CCNC: 26 U/L — SIGNIFICANT CHANGE UP
BASOPHILS # BLD AUTO: 0 K/UL — SIGNIFICANT CHANGE UP (ref 0–0.2)
BASOPHILS NFR BLD AUTO: 0.1 % — SIGNIFICANT CHANGE UP (ref 0–2)
BILIRUB SERPL-MCNC: 0.3 MG/DL — LOW (ref 0.4–2)
BUN SERPL-MCNC: 34 MG/DL — HIGH (ref 8–20)
CALCIUM SERPL-MCNC: 9.2 MG/DL — SIGNIFICANT CHANGE UP (ref 8.6–10.2)
CHLORIDE SERPL-SCNC: 101 MMOL/L — SIGNIFICANT CHANGE UP (ref 98–107)
CK MB CFR SERPL CALC: 5.3 NG/ML — SIGNIFICANT CHANGE UP (ref 0–6.7)
CK SERPL-CCNC: 203 U/L — HIGH (ref 25–170)
CO2 SERPL-SCNC: 24 MMOL/L — SIGNIFICANT CHANGE UP (ref 22–29)
CREAT SERPL-MCNC: 0.89 MG/DL — SIGNIFICANT CHANGE UP (ref 0.5–1.3)
GLUCOSE SERPL-MCNC: 177 MG/DL — HIGH (ref 70–115)
HCT VFR BLD CALC: 43.7 % — SIGNIFICANT CHANGE UP (ref 37–47)
HGB BLD-MCNC: 14.6 G/DL — SIGNIFICANT CHANGE UP (ref 12–16)
LYMPHOCYTES # BLD AUTO: 1.2 K/UL — SIGNIFICANT CHANGE UP (ref 1–4.8)
LYMPHOCYTES # BLD AUTO: 16.7 % — LOW (ref 20–55)
MAGNESIUM SERPL-MCNC: 1.9 MG/DL — SIGNIFICANT CHANGE UP (ref 1.6–2.6)
MCHC RBC-ENTMCNC: 29.1 PG — SIGNIFICANT CHANGE UP (ref 27–31)
MCHC RBC-ENTMCNC: 33.4 G/DL — SIGNIFICANT CHANGE UP (ref 32–36)
MCV RBC AUTO: 87.1 FL — SIGNIFICANT CHANGE UP (ref 81–99)
MONOCYTES # BLD AUTO: 0.5 K/UL — SIGNIFICANT CHANGE UP (ref 0–0.8)
MONOCYTES NFR BLD AUTO: 6.7 % — SIGNIFICANT CHANGE UP (ref 3–10)
NEUTROPHILS # BLD AUTO: 5.5 K/UL — SIGNIFICANT CHANGE UP (ref 1.8–8)
NEUTROPHILS NFR BLD AUTO: 76.4 % — HIGH (ref 37–73)
PLATELET # BLD AUTO: 283 K/UL — SIGNIFICANT CHANGE UP (ref 150–400)
POTASSIUM SERPL-MCNC: 5 MMOL/L — SIGNIFICANT CHANGE UP (ref 3.5–5.3)
POTASSIUM SERPL-SCNC: 5 MMOL/L — SIGNIFICANT CHANGE UP (ref 3.5–5.3)
PROT SERPL-MCNC: 7.8 G/DL — SIGNIFICANT CHANGE UP (ref 6.6–8.7)
RBC # BLD: 5.02 M/UL — SIGNIFICANT CHANGE UP (ref 4.4–5.2)
RBC # FLD: 12.9 % — SIGNIFICANT CHANGE UP (ref 11–15.6)
SODIUM SERPL-SCNC: 139 MMOL/L — SIGNIFICANT CHANGE UP (ref 135–145)
WBC # BLD: 7.2 K/UL — SIGNIFICANT CHANGE UP (ref 4.8–10.8)
WBC # FLD AUTO: 7.2 K/UL — SIGNIFICANT CHANGE UP (ref 4.8–10.8)

## 2017-09-18 PROCEDURE — 99283 EMERGENCY DEPT VISIT LOW MDM: CPT

## 2017-09-18 PROCEDURE — 80053 COMPREHEN METABOLIC PANEL: CPT

## 2017-09-18 PROCEDURE — 83735 ASSAY OF MAGNESIUM: CPT

## 2017-09-18 PROCEDURE — 82550 ASSAY OF CK (CPK): CPT

## 2017-09-18 PROCEDURE — 85027 COMPLETE CBC AUTOMATED: CPT

## 2017-09-18 PROCEDURE — 82553 CREATINE MB FRACTION: CPT

## 2017-09-18 PROCEDURE — 36415 COLL VENOUS BLD VENIPUNCTURE: CPT

## 2017-09-18 RX ORDER — OXYCODONE AND ACETAMINOPHEN 5; 325 MG/1; MG/1
1 TABLET ORAL ONCE
Qty: 0 | Refills: 0 | Status: DISCONTINUED | OUTPATIENT
Start: 2017-09-18 | End: 2017-09-18

## 2017-09-18 RX ADMIN — OXYCODONE AND ACETAMINOPHEN 1 TABLET(S): 5; 325 TABLET ORAL at 01:20

## 2017-09-18 RX ADMIN — OXYCODONE AND ACETAMINOPHEN 1 TABLET(S): 5; 325 TABLET ORAL at 00:49

## 2017-09-18 NOTE — ED ADULT NURSE REASSESSMENT NOTE - NS ED NURSE REASSESS COMMENT FT1
pt ambulating around ED eating and drinking without difficulty, pt states she called her  again and "he should be here soon he works overnights", resp even and no distress noted, will continue to monitor

## 2017-09-18 NOTE — ED PROVIDER NOTE - OBJECTIVE STATEMENT
66 y/o F pt with a PMHx of osteoporosis, MI, auto-immune disease, fibromyalgia, lupus and arthritis presents to the ED c/o b/l hand pain and b/l LE pain that onset 1 day ago. Reports that she has never had this issue in the past. Explains the pain as radiationg back and forth across her body. Denies fever, chills, NUMBNESS, TINGLING, back pain, headache, n/v/d, chest pain, SOB, difficulty walking/imblaance, urinary symptoms or any other complaints. Allergic to sulfur and aspirin. 66 y/o F pt with a PMHx of osteoporosis, MI, auto-immune disease, fibromyalgia, lupus and arthritis presents to the ED c/o b/l hand pain and b/l LE pain that onset 1 day ago. Reports that she has never had this issue in the past. Explains the pain as radiating back and forth across her body. Denies fever, chills, NUMBNESS, TINGLING, back pain, headache, n/v/d, chest pain, SOB, difficulty walking/imblaance, urinary symptoms or any other complaints. Allergic to sulfur and aspirin.

## 2017-09-18 NOTE — ED ADULT NURSE REASSESSMENT NOTE - NS ED NURSE REASSESS COMMENT FT1
pt states she is waiting for her  for ride home, IV removed and discharge instructions reviewed and given to pt, pt stable for discharge

## 2017-09-18 NOTE — ED ADULT NURSE NOTE - OBJECTIVE STATEMENT
pt A&OX4, c/o generalized body cramping and pain, resp even and unlabored no distress noted, abd soft NTND, pt denies sob, chest pain, abd pain n/v/d, dizziness, headache

## 2017-10-11 PROCEDURE — 99285 EMERGENCY DEPT VISIT HI MDM: CPT | Mod: 25

## 2017-10-11 PROCEDURE — 93005 ELECTROCARDIOGRAM TRACING: CPT

## 2017-10-11 PROCEDURE — 33225 L VENTRIC PACING LEAD ADD-ON: CPT

## 2017-10-11 PROCEDURE — 96374 THER/PROPH/DIAG INJ IV PUSH: CPT

## 2017-10-11 PROCEDURE — 83036 HEMOGLOBIN GLYCOSYLATED A1C: CPT

## 2017-10-11 PROCEDURE — 71046 X-RAY EXAM CHEST 2 VIEWS: CPT

## 2017-10-11 PROCEDURE — 80053 COMPREHEN METABOLIC PANEL: CPT

## 2017-10-11 PROCEDURE — 36415 COLL VENOUS BLD VENIPUNCTURE: CPT

## 2017-10-11 PROCEDURE — 33249 INSJ/RPLCMT DEFIB W/LEAD(S): CPT

## 2017-10-11 PROCEDURE — 85027 COMPLETE CBC AUTOMATED: CPT

## 2017-10-11 PROCEDURE — 84100 ASSAY OF PHOSPHORUS: CPT

## 2017-10-11 PROCEDURE — 84145 PROCALCITONIN (PCT): CPT

## 2017-10-11 PROCEDURE — 81001 URINALYSIS AUTO W/SCOPE: CPT

## 2017-10-11 PROCEDURE — 96375 TX/PRO/DX INJ NEW DRUG ADDON: CPT

## 2017-10-11 PROCEDURE — 94640 AIRWAY INHALATION TREATMENT: CPT

## 2017-10-11 PROCEDURE — 83735 ASSAY OF MAGNESIUM: CPT

## 2017-10-11 PROCEDURE — 80048 BASIC METABOLIC PNL TOTAL CA: CPT

## 2017-10-13 ENCOUNTER — EMERGENCY (EMERGENCY)
Facility: HOSPITAL | Age: 67
LOS: 1 days | Discharge: HOME | End: 2017-10-13
Attending: EMERGENCY MEDICINE
Payer: MEDICARE

## 2017-10-13 VITALS
DIASTOLIC BLOOD PRESSURE: 78 MMHG | HEART RATE: 78 BPM | RESPIRATION RATE: 20 BRPM | OXYGEN SATURATION: 97 % | SYSTOLIC BLOOD PRESSURE: 133 MMHG | TEMPERATURE: 99 F

## 2017-10-13 VITALS
RESPIRATION RATE: 20 BRPM | HEART RATE: 82 BPM | OXYGEN SATURATION: 95 % | TEMPERATURE: 98 F | WEIGHT: 171.96 LBS | HEIGHT: 62 IN

## 2017-10-13 DIAGNOSIS — Z98.89 OTHER SPECIFIED POSTPROCEDURAL STATES: Chronic | ICD-10-CM

## 2017-10-13 LAB
ALBUMIN SERPL ELPH-MCNC: 4.4 G/DL — SIGNIFICANT CHANGE UP (ref 3.3–5.2)
ALP SERPL-CCNC: 112 U/L — SIGNIFICANT CHANGE UP (ref 40–120)
ALT FLD-CCNC: 18 U/L — SIGNIFICANT CHANGE UP
ANION GAP SERPL CALC-SCNC: 14 MMOL/L — SIGNIFICANT CHANGE UP (ref 5–17)
AST SERPL-CCNC: 17 U/L — SIGNIFICANT CHANGE UP
BILIRUB SERPL-MCNC: 0.5 MG/DL — SIGNIFICANT CHANGE UP (ref 0.4–2)
BUN SERPL-MCNC: 21 MG/DL — HIGH (ref 8–20)
CALCIUM SERPL-MCNC: 8.9 MG/DL — SIGNIFICANT CHANGE UP (ref 8.6–10.2)
CHLORIDE SERPL-SCNC: 99 MMOL/L — SIGNIFICANT CHANGE UP (ref 98–107)
CO2 SERPL-SCNC: 29 MMOL/L — SIGNIFICANT CHANGE UP (ref 22–29)
CREAT SERPL-MCNC: 0.57 MG/DL — SIGNIFICANT CHANGE UP (ref 0.5–1.3)
GLUCOSE SERPL-MCNC: 82 MG/DL — SIGNIFICANT CHANGE UP (ref 70–115)
POTASSIUM SERPL-MCNC: 3.3 MMOL/L — LOW (ref 3.5–5.3)
POTASSIUM SERPL-SCNC: 3.3 MMOL/L — LOW (ref 3.5–5.3)
PROT SERPL-MCNC: 7.8 G/DL — SIGNIFICANT CHANGE UP (ref 6.6–8.7)
SODIUM SERPL-SCNC: 142 MMOL/L — SIGNIFICANT CHANGE UP (ref 135–145)

## 2017-10-13 PROCEDURE — 71045 X-RAY EXAM CHEST 1 VIEW: CPT

## 2017-10-13 PROCEDURE — 99284 EMERGENCY DEPT VISIT MOD MDM: CPT

## 2017-10-13 PROCEDURE — 80053 COMPREHEN METABOLIC PANEL: CPT

## 2017-10-13 PROCEDURE — 94640 AIRWAY INHALATION TREATMENT: CPT

## 2017-10-13 PROCEDURE — 36415 COLL VENOUS BLD VENIPUNCTURE: CPT

## 2017-10-13 PROCEDURE — 96375 TX/PRO/DX INJ NEW DRUG ADDON: CPT

## 2017-10-13 PROCEDURE — 96374 THER/PROPH/DIAG INJ IV PUSH: CPT

## 2017-10-13 PROCEDURE — 71010: CPT | Mod: 26

## 2017-10-13 PROCEDURE — 99285 EMERGENCY DEPT VISIT HI MDM: CPT | Mod: 25

## 2017-10-13 RX ORDER — ALBUTEROL 90 UG/1
2.5 AEROSOL, METERED ORAL
Qty: 0 | Refills: 0 | Status: COMPLETED | OUTPATIENT
Start: 2017-10-13 | End: 2017-10-13

## 2017-10-13 RX ORDER — OXYCODONE AND ACETAMINOPHEN 5; 325 MG/1; MG/1
1 TABLET ORAL ONCE
Qty: 0 | Refills: 0 | Status: DISCONTINUED | OUTPATIENT
Start: 2017-10-13 | End: 2017-10-13

## 2017-10-13 RX ORDER — IPRATROPIUM/ALBUTEROL SULFATE 18-103MCG
3 AEROSOL WITH ADAPTER (GRAM) INHALATION ONCE
Qty: 0 | Refills: 0 | Status: COMPLETED | OUTPATIENT
Start: 2017-10-13 | End: 2017-10-13

## 2017-10-13 RX ORDER — MAGNESIUM SULFATE 500 MG/ML
2 VIAL (ML) INJECTION ONCE
Qty: 0 | Refills: 0 | Status: COMPLETED | OUTPATIENT
Start: 2017-10-13 | End: 2017-10-13

## 2017-10-13 RX ADMIN — ALBUTEROL 2.5 MILLIGRAM(S): 90 AEROSOL, METERED ORAL at 17:13

## 2017-10-13 RX ADMIN — Medication 125 MILLIGRAM(S): at 17:14

## 2017-10-13 RX ADMIN — Medication 100 MILLIGRAM(S): at 19:58

## 2017-10-13 RX ADMIN — Medication 50 GRAM(S): at 17:14

## 2017-10-13 RX ADMIN — OXYCODONE AND ACETAMINOPHEN 1 TABLET(S): 5; 325 TABLET ORAL at 17:22

## 2017-10-13 RX ADMIN — ALBUTEROL 2.5 MILLIGRAM(S): 90 AEROSOL, METERED ORAL at 17:12

## 2017-10-13 RX ADMIN — ALBUTEROL 2.5 MILLIGRAM(S): 90 AEROSOL, METERED ORAL at 17:15

## 2017-10-13 RX ADMIN — Medication 3 MILLILITER(S): at 18:59

## 2017-10-13 NOTE — ED STATDOCS - PROGRESS NOTE DETAILS
66 y/o F with past hx of asthma and Lupus presents to the ED c/o SOB. She states she is wheezing and says she can't breathe. She is allergic to aspirin, sulfur and says she has had 3 caesareans. Pt has been admitted for asthma before. Pt is an occasional smoker. Pt denies chest pain, fever, and congestions.  Focused eval, protocol orders entered. Pt to be moved to main ED for complete evaluation by another provider.   PMD: DR. Renaldo Kirk

## 2017-10-13 NOTE — ED PROVIDER NOTE - OBJECTIVE STATEMENT
66 y/o F with PMHx of asthma, autoimmune disease, HLD, MI, CAD, and osteoporosis presents to ED c/o SOB. Pt reports she has had a week of dry cough and nasal cough without fever. She has a hx of PNA several times in the past. She notes that she has experienced wheezing. Pt denies fever, chills, CP, nausea, vomiting, abdominal pain, difficulty swallowing, and headaches. No further complaints at this time.  Allergy to Naprosyn, Aspirin, and Sulfur.

## 2018-01-22 ENCOUNTER — EMERGENCY (EMERGENCY)
Facility: HOSPITAL | Age: 68
LOS: 1 days | Discharge: HOME | End: 2018-01-22
Attending: EMERGENCY MEDICINE | Admitting: EMERGENCY MEDICINE
Payer: MEDICARE

## 2018-01-22 VITALS
TEMPERATURE: 98 F | SYSTOLIC BLOOD PRESSURE: 127 MMHG | WEIGHT: 177.91 LBS | DIASTOLIC BLOOD PRESSURE: 76 MMHG | OXYGEN SATURATION: 93 % | HEIGHT: 63 IN | HEART RATE: 92 BPM | RESPIRATION RATE: 20 BRPM

## 2018-01-22 DIAGNOSIS — Z98.89 OTHER SPECIFIED POSTPROCEDURAL STATES: Chronic | ICD-10-CM

## 2018-01-22 LAB
ALBUMIN SERPL ELPH-MCNC: 3.7 G/DL — SIGNIFICANT CHANGE UP (ref 3.3–5.2)
ALP SERPL-CCNC: 353 U/L — HIGH (ref 40–120)
ALT FLD-CCNC: 133 U/L — HIGH
ANION GAP SERPL CALC-SCNC: 12 MMOL/L — SIGNIFICANT CHANGE UP (ref 5–17)
AST SERPL-CCNC: 70 U/L — HIGH
BILIRUB SERPL-MCNC: 0.5 MG/DL — SIGNIFICANT CHANGE UP (ref 0.4–2)
BUN SERPL-MCNC: 14 MG/DL — SIGNIFICANT CHANGE UP (ref 8–20)
CALCIUM SERPL-MCNC: 8.9 MG/DL — SIGNIFICANT CHANGE UP (ref 8.6–10.2)
CHLORIDE SERPL-SCNC: 97 MMOL/L — LOW (ref 98–107)
CO2 SERPL-SCNC: 28 MMOL/L — SIGNIFICANT CHANGE UP (ref 22–29)
CREAT SERPL-MCNC: 0.55 MG/DL — SIGNIFICANT CHANGE UP (ref 0.5–1.3)
GLUCOSE SERPL-MCNC: 110 MG/DL — SIGNIFICANT CHANGE UP (ref 70–115)
HCT VFR BLD CALC: 41.2 % — SIGNIFICANT CHANGE UP (ref 37–47)
HGB BLD-MCNC: 13.9 G/DL — SIGNIFICANT CHANGE UP (ref 12–16)
MCHC RBC-ENTMCNC: 29.4 PG — SIGNIFICANT CHANGE UP (ref 27–31)
MCHC RBC-ENTMCNC: 33.7 G/DL — SIGNIFICANT CHANGE UP (ref 32–36)
MCV RBC AUTO: 87.3 FL — SIGNIFICANT CHANGE UP (ref 81–99)
NT-PROBNP SERPL-SCNC: 111 PG/ML — SIGNIFICANT CHANGE UP (ref 0–300)
PLATELET # BLD AUTO: 214 K/UL — SIGNIFICANT CHANGE UP (ref 150–400)
POTASSIUM SERPL-MCNC: 4.1 MMOL/L — SIGNIFICANT CHANGE UP (ref 3.5–5.3)
POTASSIUM SERPL-SCNC: 4.1 MMOL/L — SIGNIFICANT CHANGE UP (ref 3.5–5.3)
PROT SERPL-MCNC: 7.4 G/DL — SIGNIFICANT CHANGE UP (ref 6.6–8.7)
RBC # BLD: 4.72 M/UL — SIGNIFICANT CHANGE UP (ref 4.4–5.2)
RBC # FLD: 12.8 % — SIGNIFICANT CHANGE UP (ref 11–15.6)
SODIUM SERPL-SCNC: 137 MMOL/L — SIGNIFICANT CHANGE UP (ref 135–145)
TROPONIN T SERPL-MCNC: <0.01 NG/ML — SIGNIFICANT CHANGE UP (ref 0–0.06)
WBC # BLD: 3.6 K/UL — LOW (ref 4.8–10.8)
WBC # FLD AUTO: 3.6 K/UL — LOW (ref 4.8–10.8)

## 2018-01-22 PROCEDURE — 99285 EMERGENCY DEPT VISIT HI MDM: CPT

## 2018-01-22 PROCEDURE — 71045 X-RAY EXAM CHEST 1 VIEW: CPT | Mod: 26

## 2018-01-22 PROCEDURE — 84484 ASSAY OF TROPONIN QUANT: CPT

## 2018-01-22 PROCEDURE — 80053 COMPREHEN METABOLIC PANEL: CPT

## 2018-01-22 PROCEDURE — 85027 COMPLETE CBC AUTOMATED: CPT

## 2018-01-22 PROCEDURE — 99284 EMERGENCY DEPT VISIT MOD MDM: CPT | Mod: 25

## 2018-01-22 PROCEDURE — 94640 AIRWAY INHALATION TREATMENT: CPT

## 2018-01-22 PROCEDURE — 71045 X-RAY EXAM CHEST 1 VIEW: CPT

## 2018-01-22 PROCEDURE — 96374 THER/PROPH/DIAG INJ IV PUSH: CPT

## 2018-01-22 PROCEDURE — 36415 COLL VENOUS BLD VENIPUNCTURE: CPT

## 2018-01-22 PROCEDURE — 83880 ASSAY OF NATRIURETIC PEPTIDE: CPT

## 2018-01-22 PROCEDURE — 96375 TX/PRO/DX INJ NEW DRUG ADDON: CPT

## 2018-01-22 RX ORDER — ALBUTEROL 90 UG/1
2.5 AEROSOL, METERED ORAL
Qty: 0 | Refills: 0 | Status: COMPLETED | OUTPATIENT
Start: 2018-01-22 | End: 2018-01-22

## 2018-01-22 RX ORDER — MAGNESIUM SULFATE 500 MG/ML
2 VIAL (ML) INJECTION ONCE
Qty: 0 | Refills: 0 | Status: COMPLETED | OUTPATIENT
Start: 2018-01-22 | End: 2018-01-22

## 2018-01-22 RX ORDER — MORPHINE SULFATE 50 MG/1
4 CAPSULE, EXTENDED RELEASE ORAL ONCE
Qty: 0 | Refills: 0 | Status: DISCONTINUED | OUTPATIENT
Start: 2018-01-22 | End: 2018-01-22

## 2018-01-22 RX ORDER — IPRATROPIUM/ALBUTEROL SULFATE 18-103MCG
3 AEROSOL WITH ADAPTER (GRAM) INHALATION ONCE
Qty: 0 | Refills: 0 | Status: COMPLETED | OUTPATIENT
Start: 2018-01-22 | End: 2018-01-22

## 2018-01-22 RX ADMIN — MORPHINE SULFATE 4 MILLIGRAM(S): 50 CAPSULE, EXTENDED RELEASE ORAL at 17:30

## 2018-01-22 RX ADMIN — MORPHINE SULFATE 4 MILLIGRAM(S): 50 CAPSULE, EXTENDED RELEASE ORAL at 17:12

## 2018-01-22 RX ADMIN — ALBUTEROL 2.5 MILLIGRAM(S): 90 AEROSOL, METERED ORAL at 17:11

## 2018-01-22 RX ADMIN — Medication 125 MILLIGRAM(S): at 17:12

## 2018-01-22 RX ADMIN — Medication 3 MILLILITER(S): at 17:11

## 2018-01-22 RX ADMIN — Medication 50 GRAM(S): at 17:12

## 2018-01-22 NOTE — ED PROVIDER NOTE - MEDICAL DECISION MAKING DETAILS
reassessed after treatments, much improved, no acute findings on labs or xray, OK for d/c with steroids

## 2018-01-22 NOTE — ED ADULT NURSE NOTE - OBJECTIVE STATEMENT
67 year old female a&ox3 comes to ED c/o worsening difficulty breathing starting last Sunday, pt. went to her MD, was started on Levaquin, pt. got better. pt. states she is very tight, slight productive cough. pt. denies chest pain.

## 2018-01-22 NOTE — ED PROVIDER NOTE - OBJECTIVE STATEMENT
68 yo female hx of asthma, +tobacco use, lupus, with frequent asthma exacerbations; presents with 1 week of progressive cough and URI symptoms, now with worsening wheezing and SOB x 2 days, no relief with home nebs; +currently on levaquin day 8/10 from PMD; not currently on steroids, states that's usually what helps her most

## 2018-04-20 ENCOUNTER — EMERGENCY (EMERGENCY)
Facility: HOSPITAL | Age: 68
LOS: 1 days | Discharge: HOME | End: 2018-04-20
Attending: EMERGENCY MEDICINE
Payer: MEDICARE

## 2018-04-20 VITALS — WEIGHT: 175.93 LBS | HEIGHT: 62 IN

## 2018-04-20 DIAGNOSIS — Z98.89 OTHER SPECIFIED POSTPROCEDURAL STATES: Chronic | ICD-10-CM

## 2018-04-20 LAB
ALBUMIN SERPL ELPH-MCNC: 3.7 G/DL — SIGNIFICANT CHANGE UP (ref 3.3–5.2)
ALP SERPL-CCNC: 104 U/L — SIGNIFICANT CHANGE UP (ref 40–120)
ALT FLD-CCNC: 18 U/L — SIGNIFICANT CHANGE UP
ANION GAP SERPL CALC-SCNC: 11 MMOL/L — SIGNIFICANT CHANGE UP (ref 5–17)
ANISOCYTOSIS BLD QL: SLIGHT — SIGNIFICANT CHANGE UP
APTT BLD: 34.9 SEC — SIGNIFICANT CHANGE UP (ref 27.5–37.4)
AST SERPL-CCNC: 17 U/L — SIGNIFICANT CHANGE UP
BILIRUB SERPL-MCNC: 0.3 MG/DL — LOW (ref 0.4–2)
BUN SERPL-MCNC: 25 MG/DL — HIGH (ref 8–20)
CALCIUM SERPL-MCNC: 8.7 MG/DL — SIGNIFICANT CHANGE UP (ref 8.6–10.2)
CHLORIDE SERPL-SCNC: 100 MMOL/L — SIGNIFICANT CHANGE UP (ref 98–107)
CO2 SERPL-SCNC: 30 MMOL/L — HIGH (ref 22–29)
CREAT SERPL-MCNC: 0.7 MG/DL — SIGNIFICANT CHANGE UP (ref 0.5–1.3)
EOSINOPHIL NFR BLD AUTO: 3 % — SIGNIFICANT CHANGE UP (ref 0–5)
GLUCOSE SERPL-MCNC: 110 MG/DL — SIGNIFICANT CHANGE UP (ref 70–115)
HCT VFR BLD CALC: 40.2 % — SIGNIFICANT CHANGE UP (ref 37–47)
HGB BLD-MCNC: 13.6 G/DL — SIGNIFICANT CHANGE UP (ref 12–16)
INR BLD: 1.04 RATIO — SIGNIFICANT CHANGE UP (ref 0.88–1.16)
LYMPHOCYTES # BLD AUTO: 50 % — SIGNIFICANT CHANGE UP (ref 20–55)
MACROCYTES BLD QL: SLIGHT — SIGNIFICANT CHANGE UP
MAGNESIUM SERPL-MCNC: 1.7 MG/DL — SIGNIFICANT CHANGE UP (ref 1.6–2.6)
MCHC RBC-ENTMCNC: 29.5 PG — SIGNIFICANT CHANGE UP (ref 27–31)
MCHC RBC-ENTMCNC: 33.8 G/DL — SIGNIFICANT CHANGE UP (ref 32–36)
MCV RBC AUTO: 87.2 FL — SIGNIFICANT CHANGE UP (ref 81–99)
MICROCYTES BLD QL: SLIGHT — SIGNIFICANT CHANGE UP
MONOCYTES NFR BLD AUTO: 4 % — SIGNIFICANT CHANGE UP (ref 3–10)
NEUTROPHILS NFR BLD AUTO: 34 % — LOW (ref 37–73)
NT-PROBNP SERPL-SCNC: 95 PG/ML — SIGNIFICANT CHANGE UP (ref 0–300)
OVALOCYTES BLD QL SMEAR: SLIGHT — SIGNIFICANT CHANGE UP
PLAT MORPH BLD: NORMAL — SIGNIFICANT CHANGE UP
PLATELET # BLD AUTO: 286 K/UL — SIGNIFICANT CHANGE UP (ref 150–400)
POIKILOCYTOSIS BLD QL AUTO: SLIGHT — SIGNIFICANT CHANGE UP
POTASSIUM SERPL-MCNC: 3.9 MMOL/L — SIGNIFICANT CHANGE UP (ref 3.5–5.3)
POTASSIUM SERPL-SCNC: 3.9 MMOL/L — SIGNIFICANT CHANGE UP (ref 3.5–5.3)
PROT SERPL-MCNC: 6.9 G/DL — SIGNIFICANT CHANGE UP (ref 6.6–8.7)
PROTHROM AB SERPL-ACNC: 11.5 SEC — SIGNIFICANT CHANGE UP (ref 9.8–12.7)
RBC # BLD: 4.61 M/UL — SIGNIFICANT CHANGE UP (ref 4.4–5.2)
RBC # FLD: 13.2 % — SIGNIFICANT CHANGE UP (ref 11–15.6)
RBC BLD AUTO: ABNORMAL
SCHISTOCYTES BLD QL AUTO: SLIGHT — SIGNIFICANT CHANGE UP
SODIUM SERPL-SCNC: 141 MMOL/L — SIGNIFICANT CHANGE UP (ref 135–145)
TROPONIN T SERPL-MCNC: <0.01 NG/ML — SIGNIFICANT CHANGE UP (ref 0–0.06)
TSH SERPL-MCNC: 1.51 UIU/ML — SIGNIFICANT CHANGE UP (ref 0.27–4.2)
VARIANT LYMPHS # BLD: 9 % — HIGH (ref 0–6)
WBC # BLD: 8.5 K/UL — SIGNIFICANT CHANGE UP (ref 4.8–10.8)
WBC # FLD AUTO: 8.5 K/UL — SIGNIFICANT CHANGE UP (ref 4.8–10.8)

## 2018-04-20 PROCEDURE — 99284 EMERGENCY DEPT VISIT MOD MDM: CPT

## 2018-04-20 RX ORDER — IPRATROPIUM/ALBUTEROL SULFATE 18-103MCG
3 AEROSOL WITH ADAPTER (GRAM) INHALATION ONCE
Qty: 0 | Refills: 0 | Status: COMPLETED | OUTPATIENT
Start: 2018-04-20 | End: 2018-04-20

## 2018-04-20 RX ADMIN — Medication 3 MILLILITER(S): at 22:27

## 2018-04-20 NOTE — ED PROVIDER NOTE - PHYSICAL EXAMINATION
Constitutional : talking in short sentences, appears to be in mild to moderate distress, coughing with congestion  Head :NC AT , no swelling  Eyes :eomi, no swelling  Mouth :mm dry, minimal pharyngeal erythema,   Ears: Minimal TM erythema   Neck : supple, trachea in midline  Chest :Niko air entry, symm chest expansion, diffuse wheezing  Heart :S1 S2 distant  Abdomen :abd soft, non tender  Musc/Skel :ext no swelling, no deformity, no spine tenderness, distal pulses present, moving all extremities   Neuro  :AAO 3 no focal deficits

## 2018-04-20 NOTE — ED PROVIDER NOTE - OBJECTIVE STATEMENT
66 y/o F, with hx of asthma, MI, lupus, osteoporosis, and high cholesterol, presents to the ED c/o worsening cough, onset 2 weeks ago.  Associated sx include flank pain secondary to cough and chills.  Pt was seen by her PCP 1.5 weeks ago and prescribed Cipro with no relief.  Also self medicating with Mucinex, but is not producing any sputum.  Pt has been admitted to the hospital in the past for asthma exacerbation, last time was in May of 2017.  Hx of tobacco use.  No fever, chest pain, abd pain, N/V/D, visual changes, or rash. 66 y/o F, with hx of asthma, MI, lupus, osteoporosis, and high cholesterol, presents to the ED c/o worsening cough, onset 2 weeks ago.  Associated sx include flank pain secondary to cough, congestion, chest tightness, and chills.  Pt was seen by her PCP 1.5 weeks ago and prescribed Cipro with no relief.  Also self medicating with Mucinex, but is not producing any sputum.  Pt has been admitted to the hospital in the past for asthma exacerbation, last time was in May of 2017.  Hx of tobacco use.  No fever, chest pain, abd pain, N/V/D, visual changes, or rash.

## 2018-04-20 NOTE — ED PROVIDER NOTE - PROGRESS NOTE DETAILS
Pt refuses CXR, feels same way as previous asthma exacerbation.  Pt requesting pain medication through IV only patient monitored in ed  improved, refusing admission, states she can go, she knows herself, patient active smoker

## 2018-04-20 NOTE — ED ADULT TRIAGE NOTE - CHIEF COMPLAINT QUOTE
patient states that she has asthma, feeling TIGHT was seen by PMD a few days ago has not gotten better, does treatment at home without real relief

## 2018-04-20 NOTE — ED PROVIDER NOTE - MEDICAL DECISION MAKING DETAILS
66 y/o active smoker presents with cough, congestion, and bronchitis, seen by PMD and prescribed abx, presents with increasing cough and chest tightness, plan to do steroids, labs and reeval

## 2018-04-20 NOTE — ED PROVIDER NOTE - NS ED ROS FT
no weight change, no fever, +chills  no recent travel, + recent abox, no sick contacts  no recent change in medications  no rash, no bruises  no visual changes no eye discharge  +cough, no cold or congestion,   no sob, no chest pain  no orthopnea, no pnd  no abd pain, no n/v/d  +flank pain  no hematuria, no change in urinary habits  no joint pain, no deformity  no headache, no paresthesia no weight change, no fever, +chills  no recent travel, + recent abox, no sick contacts  no recent change in medications  no rash, no bruises  no visual changes no eye discharge  +cough and congestion, no cold,   no sob, +chest tightness  no orthopnea, no pnd  no abd pain, no n/v/d  +flank pain  no hematuria, no change in urinary habits  no joint pain, no deformity  no headache, no paresthesia

## 2018-04-20 NOTE — ED PROVIDER NOTE - CONDUCTED A DETAILED DISCUSSION WITH PATIENT AND/OR GUARDIAN REGARDING, MDM
lab results lab results/need for outpatient follow-up/return to ED if symptoms worsen, persist or questions arise

## 2018-04-21 VITALS
DIASTOLIC BLOOD PRESSURE: 72 MMHG | SYSTOLIC BLOOD PRESSURE: 125 MMHG | TEMPERATURE: 98 F | HEART RATE: 85 BPM | RESPIRATION RATE: 18 BRPM | OXYGEN SATURATION: 94 %

## 2018-04-21 PROCEDURE — 36415 COLL VENOUS BLD VENIPUNCTURE: CPT

## 2018-04-21 PROCEDURE — 84484 ASSAY OF TROPONIN QUANT: CPT

## 2018-04-21 PROCEDURE — 85027 COMPLETE CBC AUTOMATED: CPT

## 2018-04-21 PROCEDURE — 85730 THROMBOPLASTIN TIME PARTIAL: CPT

## 2018-04-21 PROCEDURE — 85610 PROTHROMBIN TIME: CPT

## 2018-04-21 PROCEDURE — 96374 THER/PROPH/DIAG INJ IV PUSH: CPT

## 2018-04-21 PROCEDURE — 99285 EMERGENCY DEPT VISIT HI MDM: CPT | Mod: 25

## 2018-04-21 PROCEDURE — 84443 ASSAY THYROID STIM HORMONE: CPT

## 2018-04-21 PROCEDURE — 83880 ASSAY OF NATRIURETIC PEPTIDE: CPT

## 2018-04-21 PROCEDURE — 83735 ASSAY OF MAGNESIUM: CPT

## 2018-04-21 PROCEDURE — 80053 COMPREHEN METABOLIC PANEL: CPT

## 2018-04-21 PROCEDURE — 94640 AIRWAY INHALATION TREATMENT: CPT

## 2018-04-21 RX ORDER — IPRATROPIUM/ALBUTEROL SULFATE 18-103MCG
3 AEROSOL WITH ADAPTER (GRAM) INHALATION ONCE
Qty: 0 | Refills: 0 | Status: COMPLETED | OUTPATIENT
Start: 2018-04-21 | End: 2018-04-21

## 2018-04-21 RX ORDER — TRAMADOL HYDROCHLORIDE 50 MG/1
25 TABLET ORAL ONCE
Qty: 0 | Refills: 0 | Status: DISCONTINUED | OUTPATIENT
Start: 2018-04-21 | End: 2018-04-21

## 2018-04-21 RX ADMIN — TRAMADOL HYDROCHLORIDE 25 MILLIGRAM(S): 50 TABLET ORAL at 00:45

## 2018-04-21 RX ADMIN — Medication 3 MILLILITER(S): at 05:27

## 2018-04-21 RX ADMIN — Medication 3 MILLILITER(S): at 00:45

## 2018-04-21 RX ADMIN — Medication 3 MILLILITER(S): at 06:00

## 2018-04-21 RX ADMIN — Medication 125 MILLIGRAM(S): at 00:45

## 2018-04-21 NOTE — ED ADULT NURSE REASSESSMENT NOTE - NS ED NURSE REASSESS COMMENT FT1
pt in no apparent distress, pt would like another treatment, MD Al made aware, awaiting further orders. pt not at bedside, dc by RN was waiting for a ride. will monitor again.

## 2018-05-30 ENCOUNTER — INPATIENT (INPATIENT)
Facility: HOSPITAL | Age: 68
LOS: 1 days | Discharge: HOME | DRG: 203 | End: 2018-06-01
Attending: HOSPITALIST | Admitting: HOSPITALIST
Payer: MEDICARE

## 2018-05-30 VITALS — WEIGHT: 175.05 LBS | HEIGHT: 62 IN

## 2018-05-30 DIAGNOSIS — Z98.89 OTHER SPECIFIED POSTPROCEDURAL STATES: Chronic | ICD-10-CM

## 2018-05-30 DIAGNOSIS — J45.901 UNSPECIFIED ASTHMA WITH (ACUTE) EXACERBATION: ICD-10-CM

## 2018-05-30 LAB
ALBUMIN SERPL ELPH-MCNC: 3.8 G/DL — SIGNIFICANT CHANGE UP (ref 3.3–5.2)
ALP SERPL-CCNC: 102 U/L — SIGNIFICANT CHANGE UP (ref 40–120)
ALT FLD-CCNC: 16 U/L — SIGNIFICANT CHANGE UP
ANION GAP SERPL CALC-SCNC: 12 MMOL/L — SIGNIFICANT CHANGE UP (ref 5–17)
AST SERPL-CCNC: 17 U/L — SIGNIFICANT CHANGE UP
BILIRUB SERPL-MCNC: 0.4 MG/DL — SIGNIFICANT CHANGE UP (ref 0.4–2)
BUN SERPL-MCNC: 24 MG/DL — HIGH (ref 8–20)
CALCIUM SERPL-MCNC: 9 MG/DL — SIGNIFICANT CHANGE UP (ref 8.6–10.2)
CHLORIDE SERPL-SCNC: 98 MMOL/L — SIGNIFICANT CHANGE UP (ref 98–107)
CK SERPL-CCNC: 62 U/L — SIGNIFICANT CHANGE UP (ref 25–170)
CO2 SERPL-SCNC: 28 MMOL/L — SIGNIFICANT CHANGE UP (ref 22–29)
CREAT SERPL-MCNC: 0.79 MG/DL — SIGNIFICANT CHANGE UP (ref 0.5–1.3)
GLUCOSE SERPL-MCNC: 96 MG/DL — SIGNIFICANT CHANGE UP (ref 70–115)
HCT VFR BLD CALC: 43.2 % — SIGNIFICANT CHANGE UP (ref 37–47)
HGB BLD-MCNC: 14.2 G/DL — SIGNIFICANT CHANGE UP (ref 12–16)
MCHC RBC-ENTMCNC: 29 PG — SIGNIFICANT CHANGE UP (ref 27–31)
MCHC RBC-ENTMCNC: 32.9 G/DL — SIGNIFICANT CHANGE UP (ref 32–36)
MCV RBC AUTO: 88.3 FL — SIGNIFICANT CHANGE UP (ref 81–99)
NT-PROBNP SERPL-SCNC: 139 PG/ML — SIGNIFICANT CHANGE UP (ref 0–300)
PLATELET # BLD AUTO: 266 K/UL — SIGNIFICANT CHANGE UP (ref 150–400)
POTASSIUM SERPL-MCNC: 3.6 MMOL/L — SIGNIFICANT CHANGE UP (ref 3.5–5.3)
POTASSIUM SERPL-SCNC: 3.6 MMOL/L — SIGNIFICANT CHANGE UP (ref 3.5–5.3)
PROT SERPL-MCNC: 7.2 G/DL — SIGNIFICANT CHANGE UP (ref 6.6–8.7)
RBC # BLD: 4.89 M/UL — SIGNIFICANT CHANGE UP (ref 4.4–5.2)
RBC # FLD: 13 % — SIGNIFICANT CHANGE UP (ref 11–15.6)
SODIUM SERPL-SCNC: 138 MMOL/L — SIGNIFICANT CHANGE UP (ref 135–145)
TROPONIN T SERPL-MCNC: <0.01 NG/ML — SIGNIFICANT CHANGE UP (ref 0–0.06)
WBC # BLD: 9.6 K/UL — SIGNIFICANT CHANGE UP (ref 4.8–10.8)
WBC # FLD AUTO: 9.6 K/UL — SIGNIFICANT CHANGE UP (ref 4.8–10.8)

## 2018-05-30 PROCEDURE — 99285 EMERGENCY DEPT VISIT HI MDM: CPT

## 2018-05-30 PROCEDURE — 71045 X-RAY EXAM CHEST 1 VIEW: CPT | Mod: 26

## 2018-05-30 PROCEDURE — 93010 ELECTROCARDIOGRAM REPORT: CPT

## 2018-05-30 PROCEDURE — 99223 1ST HOSP IP/OBS HIGH 75: CPT | Mod: AI

## 2018-05-30 RX ORDER — ALBUTEROL 90 UG/1
2.5 AEROSOL, METERED ORAL ONCE
Qty: 0 | Refills: 0 | Status: COMPLETED | OUTPATIENT
Start: 2018-05-30 | End: 2018-05-30

## 2018-05-30 RX ORDER — IPRATROPIUM/ALBUTEROL SULFATE 18-103MCG
3 AEROSOL WITH ADAPTER (GRAM) INHALATION ONCE
Qty: 0 | Refills: 0 | Status: COMPLETED | OUTPATIENT
Start: 2018-05-30 | End: 2018-05-30

## 2018-05-30 RX ORDER — ZOLPIDEM TARTRATE 10 MG/1
5 TABLET ORAL AT BEDTIME
Qty: 0 | Refills: 0 | Status: DISCONTINUED | OUTPATIENT
Start: 2018-05-30 | End: 2018-06-01

## 2018-05-30 RX ORDER — OXYCODONE AND ACETAMINOPHEN 5; 325 MG/1; MG/1
2 TABLET ORAL EVERY 6 HOURS
Qty: 0 | Refills: 0 | Status: DISCONTINUED | OUTPATIENT
Start: 2018-05-30 | End: 2018-06-01

## 2018-05-30 RX ORDER — IPRATROPIUM/ALBUTEROL SULFATE 18-103MCG
3 AEROSOL WITH ADAPTER (GRAM) INHALATION EVERY 4 HOURS
Qty: 0 | Refills: 0 | Status: DISCONTINUED | OUTPATIENT
Start: 2018-05-30 | End: 2018-06-01

## 2018-05-30 RX ORDER — FUROSEMIDE 40 MG
20 TABLET ORAL DAILY
Qty: 0 | Refills: 0 | Status: DISCONTINUED | OUTPATIENT
Start: 2018-05-30 | End: 2018-06-01

## 2018-05-30 RX ORDER — ALPRAZOLAM 0.25 MG
0.5 TABLET ORAL
Qty: 0 | Refills: 0 | Status: DISCONTINUED | OUTPATIENT
Start: 2018-05-30 | End: 2018-06-01

## 2018-05-30 RX ORDER — OXYCODONE AND ACETAMINOPHEN 5; 325 MG/1; MG/1
2 TABLET ORAL ONCE
Qty: 0 | Refills: 0 | Status: DISCONTINUED | OUTPATIENT
Start: 2018-05-30 | End: 2018-05-30

## 2018-05-30 RX ORDER — MAGNESIUM SULFATE 500 MG/ML
2 VIAL (ML) INJECTION ONCE
Qty: 0 | Refills: 0 | Status: COMPLETED | OUTPATIENT
Start: 2018-05-30 | End: 2018-05-30

## 2018-05-30 RX ADMIN — Medication 40 MILLIGRAM(S): at 17:31

## 2018-05-30 RX ADMIN — OXYCODONE AND ACETAMINOPHEN 2 TABLET(S): 5; 325 TABLET ORAL at 22:00

## 2018-05-30 RX ADMIN — ALBUTEROL 2.5 MILLIGRAM(S): 90 AEROSOL, METERED ORAL at 05:02

## 2018-05-30 RX ADMIN — OXYCODONE AND ACETAMINOPHEN 2 TABLET(S): 5; 325 TABLET ORAL at 19:44

## 2018-05-30 RX ADMIN — Medication 3 MILLILITER(S): at 12:56

## 2018-05-30 RX ADMIN — OXYCODONE AND ACETAMINOPHEN 2 TABLET(S): 5; 325 TABLET ORAL at 10:00

## 2018-05-30 RX ADMIN — Medication 20 MILLIGRAM(S): at 11:34

## 2018-05-30 RX ADMIN — Medication 3 MILLILITER(S): at 23:15

## 2018-05-30 RX ADMIN — Medication 3 MILLILITER(S): at 16:01

## 2018-05-30 RX ADMIN — Medication 125 MILLIGRAM(S): at 05:02

## 2018-05-30 RX ADMIN — Medication 50 GRAM(S): at 05:02

## 2018-05-30 RX ADMIN — Medication 3 MILLILITER(S): at 05:02

## 2018-05-30 RX ADMIN — Medication 3 MILLILITER(S): at 19:59

## 2018-05-30 NOTE — H&P ADULT - ASSESSMENT
68F with dyspnea and hypoxia    Asthma exacerbation - Multiple nebulizer treatments administered in the emergency department without significant improvement in symptoms. On intravenous methylprednisolone. To continue with inhaled bronchodilator therapy.    Hypoxia - Supplemental oxygen as needed. Pulse oxymetry monitoring. Chest Xray reported with possible right basilar infiltrate. To monitor for now, the patient is without significant cough, fever, leukocytosis.    Anxiety - Alprazolam as needed.    Back pain - Analgesic medications as needed.    SLE - The patient reports being prescribed methotrexate but admits that she dose not take the medication. Will continued to hold for now.    Hypertension - Close blood pressure monitoring. On furosemide.

## 2018-05-30 NOTE — ED PROVIDER NOTE - OBJECTIVE STATEMENT
pt presednts with cough wheeze sob and non productive cough denies fever. denies HA or neck pain. no chest pain + sob. no abd pain. no n/v/d. no urinary f/u/d. no back pain. no motor or sensory deficits. denies illicit drug use. no recent travel. no rash. no other acute issues symptoms or concerns

## 2018-05-30 NOTE — ED ADULT TRIAGE NOTE - CHIEF COMPLAINT QUOTE
Patient presents to ER complaining of SOB with onset tonight, resp even/unlabored, lungs CTAB, patient recently diagnosed with CHF, scheduled for Echo.

## 2018-05-30 NOTE — H&P ADULT - HISTORY OF PRESENT ILLNESS
68F presented with increasing dyspnea. The patient reports a history of asthma with chronic intermittent episodes of dyspnea which would wax and wane. Most recently she developed dyspnea and wheezing which did no improve with the use of her nebulizer at home. She reports similar symptoms in the past but the present episode was of greater duration and severity. She denied any recent fevers, chills, or sick contacts. She is unaware of any relieving actors and noted that the symptoms are worsened by exertion. She notes that the asthma is usually worsened with the weather and allergies. She denied any chest pain, palpitations, or orthopnea. She has had no cough. The patient was treated in the emergency department without significant improvement. She was also noted to have an oxygen saturation of 88% on ambient air and required supplemental oxygen.

## 2018-05-30 NOTE — ED ADULT NURSE NOTE - CCCP TRG CHIEF CMPLNT
Patient:  Doni Sapmson is a 59 y.o. male    Chief Complaint/ Reason for Visit:    Chief Complaint   Patient presents with   • Right Hip - Follow-up, Pain       HPI:  The patient returns for scheduled follow-up on right hip and groin pain.  We have been treating him for a stress fracture of the right femoral head.  His pain has improved.  He said that he went out with a friend and was able walk 4 holes of golf and then started having pain.  Overall, he is tremendously improved now compared to when we first started treating him a little more than 4 months ago.  At rest, he has no pain.  Interestingly, he has little to no pain in his left hip and groin area.  He does not have any acute calf pain, chest pain, or shortness of breath.    The overlying spectre, however, is that he has avascular necrosis of both hips.  This has been confirmed radiographically.  Interestingly, the degenerative changes in his left hip are more pronounced than those on the right on plain imaging.    His pain is sharp when it occurs, exacerbated by walking, standing, rotation, and stairs.  It is alleviated by rest.  It seems to be primarily in the right groin area.      PMH:    Past Medical History:   Diagnosis Date   • Hyperlipidemia        PSH:    Past Surgical History:   Procedure Laterality Date   • BACK SURGERY  11/12/1992   • HAND SURGERY  2017   • MOLE REMOVAL         Social Hx:    Social History     Social History   • Marital status:      Spouse name: N/A   • Number of children: N/A   • Years of education: N/A     Occupational History   • Not on file.     Social History Main Topics   • Smoking status: Current Every Day Smoker     Types: Cigarettes   • Smokeless tobacco: Never Used      Comment: 2-3 per day, in the process of quiting    • Alcohol use Yes   • Drug use: No   • Sexual activity: Defer     Other Topics Concern   • Not on file     Social History Narrative       Family Hx:    Family History   Problem Relation Age of  Onset   • Arthritis Mother        Meds:    Current Outpatient Prescriptions:   •  ALPRAZolam (XANAX) 0.5 MG tablet, Take 1 tablet by mouth At Night As Needed for Anxiety., Disp: 30 tablet, Rfl: 5  •  esomeprazole (NexIUM) 20 MG capsule, Take 20 mg by mouth Every Morning Before Breakfast., Disp: , Rfl:   •  ezetimibe (ZETIA) 10 MG tablet, TAKE 1 TABLET BY MOUTH DAILY, Disp: 30 tablet, Rfl: 5  •  fluticasone (FLONASE) 50 MCG/ACT nasal spray, USE 1 SPRAY IN EACH NOSTRIL TWICE DAILY, Disp: 16 mL, Rfl: 0  •  rosuvastatin (CRESTOR) 40 MG tablet, TAKE 1 TABLET BY MOUTH DAILY, Disp: 30 tablet, Rfl: 5  •  valsartan-hydrochlorothiazide (DIOVAN-HCT) 160-12.5 MG per tablet, TAKE 1 TABLET BY MOUTH DAILY, Disp: 30 tablet, Rfl: 5  •  zolpidem (AMBIEN) 10 MG tablet, Take 1 tablet by mouth At Night As Needed for Sleep., Disp: 30 tablet, Rfl: 5    Allergies:    Allergies   Allergen Reactions   • Aspirin    • Fioricet  [Butalbital-Apap-Caffeine]        ROS:  Review of Systems   Constitutional: Negative for chills, diaphoresis, fever and unexpected weight change.   HENT: Negative for hearing loss, nosebleeds, sore throat and tinnitus.    Eyes: Negative for pain and visual disturbance.   Respiratory: Negative for cough, shortness of breath and wheezing.    Cardiovascular: Negative for chest pain and palpitations.   Gastrointestinal: Negative for abdominal pain, diarrhea, nausea and vomiting.   Endocrine: Negative for cold intolerance, heat intolerance and polydipsia.   Genitourinary: Negative for difficulty urinating, dysuria and hematuria.   Musculoskeletal: Positive for arthralgias and gait problem. Negative for back pain, joint swelling and myalgias.   Skin: Negative for rash and wound.   Allergic/Immunologic: Negative for environmental allergies.   Neurological: Negative for dizziness, syncope and numbness.   Hematological: Does not bruise/bleed easily.   Psychiatric/Behavioral: Negative for dysphoric mood and sleep disturbance. The  shortness of breath "patient is not nervous/anxious.        Vitals:    08/01/17 0924   Temp: 98.9 °F (37.2 °C)   Weight: 214 lb (97.1 kg)   Height: 70\" (177.8 cm)       Physical Exam    The patient is awake, alert, and oriented ×3.  The patient is in no acute distress.  Breathing is regular and unlabored with a respiratory rate of 12/m.  Extraocular movements and pupillary responses are symmetrically intact. Sclerae are anicteric.   Hearing is within normal limits.  Speech is within normal limits.  There is no jugular venous distention.    He does have a limp antalgic from the right.  Still has a positive Stinchfield test on the right.  Minimally positive on the left.  Internal neck on rotation mildly reproduces right groin pain.  Internal/external rotation in the left hip are more limited in the range of motion, but he has very little discomfort.  Leg lengths are equal.  He has no atrophy or asymmetry of the musculature of the lower extremity is.  His calves are soft and nontender with no venous cord.  Distally, he has 1+ dorsalis pedis pulses present symmetrically in both feet with a current, regular heart rate of about 78 bpm.  He has no popliteal or inguinal lymphadenopathy in either lower extremity.  Motor strength is 5 over 5 in both lower extremities.  Sensory exam intact light touch symmetrically in both lower extremities.  He has no spasticity, cogwheeling, or clonus in either lower extremity.        Radiology: X-rays: AP pelvis, AP lateral right hip were ordered and reviewed today to assess healing status of the right femoral head stress fracture.  I compared these to multiple images previously obtained in the office.  I do believe this problem has stabilized and healed.  Incidental note is made of moderate degenerative change radiographically in the right hip with advanced degenerative change radiographically in the left hip.  The digit degenerative changes overtime appear stable compared to the earliest films we have in our " office from May of this year.          Assessment:  1. Stress fracture of right femur with routine healing, subsequent encounter    - XR Hip With or Without Pelvis 2 - 3 View Right  - Ambulatory Referral to Orthopedic Surgery    2. Osteonecrosis of right hip    - XR Hip With or Without Pelvis 2 - 3 View Right  - Ambulatory Referral to Orthopedic Surgery    3. Other secondary osteoarthritis of right hip    - XR Hip With or Without Pelvis 2 - 3 View Right  - Ambulatory Referral to Orthopedic Surgery    4. Osteonecrosis of left hip    - Ambulatory Referral to Orthopedic Surgery          Plan:  I discussed everything with the patient at length.  I think that is stress fracture is probably healed, and that having had this problem has exacerbated the underlying arthritis.  I think that he can cautiously advance his weightbearing as comfort allows.  I think it's okay for him to try and play golf.    We discussed options including total hip replacement, which she is likely to need at some point.  However, he may be a candidate for a lesser procedure, or he may be a candidate for an anterior approach.  We discussed the option of a cortisone injection into the hip.  However, I would like an opinion from Dr. Jose Quesada regarding this gentleman his options.  For this reason, we will hold off on ordering the fluoroscopically guided injection for his right hip until after he has seen Dr. Jose Quesada.      Orders Placed This Encounter   Procedures   • XR Hip With or Without Pelvis 2 - 3 View Right     Order Specific Question:   Reason for Exam:     Answer:   f/u rt. hip   • Ambulatory Referral to Orthopedic Surgery     Referral Priority:   Routine     Referral Type:   Consultation     Referral Reason:   Specialty Services Required     Referred to Provider:   Jose Quesada MD     Requested Specialty:   Orthopedic Surgery     Number of Visits Requested:   1

## 2018-05-30 NOTE — H&P ADULT - NSHPPHYSICALEXAM_GEN_ALL_CORE
Vital Signs Last 24 Hrs  T(C): 36.8 (30 May 2018 08:38), Max: 36.8 (30 May 2018 08:38)  T(F): 98.2 (30 May 2018 08:38), Max: 98.2 (30 May 2018 08:38)  HR: 87 (30 May 2018 08:38) (87 - 96)  BP: 126/60 (30 May 2018 08:38) (126/60 - 145/83)  BP(mean): --  RR: 20 (30 May 2018 08:38) (20 - 24)  SpO2: 99% (30 May 2018 08:38) (91% - 99%)    General appearance: No acute distress, Awake, Alert  HEENT: Normocephalic, Atraumatic, Conjunctiva clear, EOMI  Neck: Supple, No JVD, No tenderness  Lungs: Breath sound equal bilaterally, No rales, Bilateral wheezes  Cardiovascular: S1S2, Regular rhythm  Abdomen: Soft, Nontender, Nondistended, No guarding/rebound, Positive bowel sounds  Extremities: No clubbing, No cyanosis, No edema, No calf tenderness  Neuro: Strength equal bilaterally, No tremors  Psychiatric: Appropriate mood, Normal affect

## 2018-05-30 NOTE — ED ADULT NURSE NOTE - OBJECTIVE STATEMENT
Pt c/o sob.  Pt has hx of asthma and states she was recently dx'd w CHF.  Pt was started on Lasix on 5/18 after being treated for similar symptoms.  Pt also quit smoking on 5/18.

## 2018-05-31 PROCEDURE — 99233 SBSQ HOSP IP/OBS HIGH 50: CPT

## 2018-05-31 RX ORDER — ENOXAPARIN SODIUM 100 MG/ML
40 INJECTION SUBCUTANEOUS DAILY
Qty: 0 | Refills: 0 | Status: DISCONTINUED | OUTPATIENT
Start: 2018-05-31 | End: 2018-06-01

## 2018-05-31 RX ADMIN — Medication 3 MILLILITER(S): at 15:50

## 2018-05-31 RX ADMIN — Medication 3 MILLILITER(S): at 08:29

## 2018-05-31 RX ADMIN — Medication 40 MILLIGRAM(S): at 05:51

## 2018-05-31 RX ADMIN — ZOLPIDEM TARTRATE 5 MILLIGRAM(S): 10 TABLET ORAL at 21:51

## 2018-05-31 RX ADMIN — Medication 40 MILLIGRAM(S): at 13:53

## 2018-05-31 RX ADMIN — OXYCODONE AND ACETAMINOPHEN 2 TABLET(S): 5; 325 TABLET ORAL at 21:51

## 2018-05-31 RX ADMIN — OXYCODONE AND ACETAMINOPHEN 2 TABLET(S): 5; 325 TABLET ORAL at 05:51

## 2018-05-31 RX ADMIN — OXYCODONE AND ACETAMINOPHEN 2 TABLET(S): 5; 325 TABLET ORAL at 12:47

## 2018-05-31 RX ADMIN — OXYCODONE AND ACETAMINOPHEN 2 TABLET(S): 5; 325 TABLET ORAL at 22:50

## 2018-05-31 RX ADMIN — Medication 40 MILLIGRAM(S): at 21:44

## 2018-05-31 RX ADMIN — OXYCODONE AND ACETAMINOPHEN 2 TABLET(S): 5; 325 TABLET ORAL at 07:01

## 2018-05-31 RX ADMIN — Medication 3 MILLILITER(S): at 20:23

## 2018-05-31 RX ADMIN — Medication 40 MILLIGRAM(S): at 00:28

## 2018-05-31 RX ADMIN — Medication 3 MILLILITER(S): at 12:33

## 2018-05-31 RX ADMIN — OXYCODONE AND ACETAMINOPHEN 2 TABLET(S): 5; 325 TABLET ORAL at 13:47

## 2018-05-31 RX ADMIN — ZOLPIDEM TARTRATE 5 MILLIGRAM(S): 10 TABLET ORAL at 00:28

## 2018-05-31 RX ADMIN — Medication 3 MILLILITER(S): at 03:14

## 2018-05-31 RX ADMIN — Medication 20 MILLIGRAM(S): at 05:50

## 2018-05-31 NOTE — PROGRESS NOTE ADULT - SUBJECTIVE AND OBJECTIVE BOX
[facility]  ----------------------------------------  The patient was seen and evaluated for asthma exacerbation.  The patient is in no acute distress.  Denied any chest pain, palpitations, or abdominal pain.  Reports improvement in the dyspnea.    Vital Signs Last 24 Hrs  T(C): 36.7 (31 May 2018 08:43), Max: 36.9 (30 May 2018 14:30)  T(F): 98 (31 May 2018 08:43), Max: 98.5 (30 May 2018 14:30)  HR: 105 (31 May 2018 08:43) (74 - 105)  BP: 124/67 (31 May 2018 08:43) (115/73 - 139/66)  BP(mean): --  RR: 18 (31 May 2018 08:43) (17 - 18)  SpO2: 95% (31 May 2018 08:43) (93% - 95%)    PHYSICAL EXAMINATION:  ----------------------------------------  General appearance: No acute distress, Awake, Alert  HEENT: Normocephalic, Atraumatic, Conjunctiva clear, EOMI  Neck: Supple, No JVD, No tenderness  Lungs: Breath sound equal bilaterally, No rales, Mild wheezes  Cardiovascular: S1S2, Regular rhythm  Abdomen: Soft, Nontender, Nondistended, No guarding/rebound, Positive bowel sounds  Extremities: No clubbing, No cyanosis, No edema, No calf tenderness  Neuro: Strength equal bilaterally, No tremors  Psychiatric: Appropriate mood, Normal affect    LABORATORY STUDIES:  ----------------------------------------             14.2   9.6   )-----------( 266      ( 30 May 2018 03:15 )             43.2     05-30    138  |  98  |  24.0<H>  ----------------------------<  96  3.6   |  28.0  |  0.79    Ca    9.0      30 May 2018 03:15    TPro  7.2  /  Alb  3.8  /  TBili  0.4  /  DBili  x   /  AST  17  /  ALT  16  /  AlkPhos  102  05-30  LIVER FUNCTIONS - ( 30 May 2018 03:15 )  Alb: 3.8 g/dL / Pro: 7.2 g/dL / ALK PHOS: 102 U/L / ALT: 16 U/L / AST: 17 U/L / GGT: x           CARDIAC MARKERS ( 30 May 2018 03:15 )  x     / <0.01 ng/mL / 62 U/L / x     / x        MEDICATIONS  (STANDING):  ALBUTerol/ipratropium for Nebulization 3 milliLiter(s) Nebulizer every 4 hours  enoxaparin Injectable 40 milliGRAM(s) SubCutaneous daily  furosemide    Tablet 20 milliGRAM(s) Oral daily  methylPREDNISolone sodium succinate Injectable 40 milliGRAM(s) IV Push every 6 hours    MEDICATIONS  (PRN):  ALPRAZolam 0.5 milliGRAM(s) Oral two times a day PRN anxiety  oxyCODONE    5 mG/acetaminophen 325 mG 2 Tablet(s) Oral every 6 hours PRN Moderate Pain (4 - 6)  zolpidem 5 milliGRAM(s) Oral at bedtime PRN Insomnia  zolpidem 5 milliGRAM(s) Oral at bedtime PRN Insomnia      ASSESSMENT / PLAN:  ----------------------------------------  Asthma exacerbation - On intravenous methylprednisolone, to taper. On inhaled bronchodilator therapy.    Hypoxia - Afebrile. Oxygen saturation 92% on ambient air.    Anxiety - Alprazolam as needed.    Back pain - Analgesic medications as needed.    SLE - The patient reports that she was not taking the methotrexate as prescribed.    Hypertension - Close blood pressure monitoring. On furosemide.

## 2018-06-01 ENCOUNTER — TRANSCRIPTION ENCOUNTER (OUTPATIENT)
Age: 68
End: 2018-06-01

## 2018-06-01 VITALS
RESPIRATION RATE: 18 BRPM | SYSTOLIC BLOOD PRESSURE: 147 MMHG | HEART RATE: 103 BPM | TEMPERATURE: 99 F | OXYGEN SATURATION: 94 % | DIASTOLIC BLOOD PRESSURE: 81 MMHG

## 2018-06-01 PROCEDURE — 96375 TX/PRO/DX INJ NEW DRUG ADDON: CPT

## 2018-06-01 PROCEDURE — 83880 ASSAY OF NATRIURETIC PEPTIDE: CPT

## 2018-06-01 PROCEDURE — 84484 ASSAY OF TROPONIN QUANT: CPT

## 2018-06-01 PROCEDURE — 71045 X-RAY EXAM CHEST 1 VIEW: CPT

## 2018-06-01 PROCEDURE — 82550 ASSAY OF CK (CPK): CPT

## 2018-06-01 PROCEDURE — 94640 AIRWAY INHALATION TREATMENT: CPT

## 2018-06-01 PROCEDURE — 93005 ELECTROCARDIOGRAM TRACING: CPT

## 2018-06-01 PROCEDURE — 80053 COMPREHEN METABOLIC PANEL: CPT

## 2018-06-01 PROCEDURE — 99239 HOSP IP/OBS DSCHRG MGMT >30: CPT

## 2018-06-01 PROCEDURE — 96374 THER/PROPH/DIAG INJ IV PUSH: CPT

## 2018-06-01 PROCEDURE — 85027 COMPLETE CBC AUTOMATED: CPT

## 2018-06-01 PROCEDURE — 36415 COLL VENOUS BLD VENIPUNCTURE: CPT

## 2018-06-01 PROCEDURE — 99285 EMERGENCY DEPT VISIT HI MDM: CPT | Mod: 25

## 2018-06-01 RX ORDER — ERGOCALCIFEROL 1.25 MG/1
1 CAPSULE ORAL
Qty: 0 | Refills: 0 | COMMUNITY

## 2018-06-01 RX ORDER — ZOLPIDEM TARTRATE 10 MG/1
1 TABLET ORAL
Qty: 0 | Refills: 0 | COMMUNITY

## 2018-06-01 RX ORDER — CANAGLIFLOZIN 100 MG/1
1 TABLET, FILM COATED ORAL
Qty: 0 | Refills: 0 | COMMUNITY

## 2018-06-01 RX ORDER — NICOTINE POLACRILEX 2 MG
1 GUM BUCCAL
Qty: 30 | Refills: 0 | OUTPATIENT
Start: 2018-06-01 | End: 2018-06-30

## 2018-06-01 RX ADMIN — OXYCODONE AND ACETAMINOPHEN 2 TABLET(S): 5; 325 TABLET ORAL at 14:50

## 2018-06-01 RX ADMIN — Medication 40 MILLIGRAM(S): at 05:59

## 2018-06-01 RX ADMIN — Medication 3 MILLILITER(S): at 09:33

## 2018-06-01 RX ADMIN — Medication 3 MILLILITER(S): at 00:15

## 2018-06-01 RX ADMIN — Medication 3 MILLILITER(S): at 03:53

## 2018-06-01 RX ADMIN — OXYCODONE AND ACETAMINOPHEN 2 TABLET(S): 5; 325 TABLET ORAL at 06:04

## 2018-06-01 RX ADMIN — OXYCODONE AND ACETAMINOPHEN 2 TABLET(S): 5; 325 TABLET ORAL at 06:43

## 2018-06-01 RX ADMIN — OXYCODONE AND ACETAMINOPHEN 2 TABLET(S): 5; 325 TABLET ORAL at 13:51

## 2018-06-01 RX ADMIN — Medication 20 MILLIGRAM(S): at 05:59

## 2018-06-01 RX ADMIN — ZOLPIDEM TARTRATE 5 MILLIGRAM(S): 10 TABLET ORAL at 01:14

## 2018-06-01 NOTE — DISCHARGE NOTE ADULT - MEDICATION SUMMARY - MEDICATIONS TO TAKE
I will START or STAY ON the medications listed below when I get home from the hospital:    predniSONE 20 mg oral tablet  -- 3 tab(s) by mouth once a day x 3 days  2 tab(s) by mouth once a day x 3 days  1 tab(s) by mouth once a day x 3 days  -- It is very important that you take or use this exactly as directed.  Do not skip doses or discontinue unless directed by your doctor.  Obtain medical advice before taking any non-prescription drugs as some may affect the action of this medication.  Take with food or milk.    -- Indication: For Asthma    Percocet 10/325 oral tablet  -- 1 tab(s) by mouth every 6 hours, As Needed  -- Indication: For Pain    Ambien 10 mg oral tablet  -- 1 tab(s) by mouth once a day (at bedtime)  -- Indication: For Insomnia    Xanax 0.5 mg oral tablet  -- 1 tab(s) by mouth 2 times a day, As Needed  -- Indication: For Anxiety    albuterol 2.5 mg/3 mL (0.083%) inhalation solution  -- 3 milliliter(s) inhaled 4 times a day, As Needed  -- For inhalation only.  It is very important that you take or use this exactly as directed.  Do not skip doses or discontinue unless directed by your doctor.  Obtain medical advice before taking any non-prescription drugs as some may affect the action of this medication.    -- Indication: For Asthma

## 2018-06-01 NOTE — DISCHARGE NOTE ADULT - CARE PLAN
Principal Discharge DX:	Asthma exacerbation  Goal:	.  Assessment and plan of treatment:	Continue the use of your nebulizer at home. Take the prednisone taper as prescribed. Follow up with your primary care physician for further management.  Secondary Diagnosis:	Anxiety  Assessment and plan of treatment:	Continue on alprazolam as needed.

## 2018-06-01 NOTE — DISCHARGE NOTE ADULT - PLAN OF CARE
. Continue the use of your nebulizer at home. Take the prednisone taper as prescribed. Follow up with your primary care physician for further management. Continue on alprazolam as needed.

## 2018-06-01 NOTE — PROGRESS NOTE ADULT - SUBJECTIVE AND OBJECTIVE BOX
[facility]  ----------------------------------------  The patient was seen and evaluated for asthma exacerbation.  The patient is in no acute distress.  Denied any chest pain, palpitations, or abdominal pain.  Reports less dyspnea.    Vital Signs Last 24 Hrs  T(C): 36.6 (01 Jun 2018 08:26), Max: 37.1 (31 May 2018 17:18)  T(F): 97.9 (01 Jun 2018 08:26), Max: 98.8 (31 May 2018 17:18)  HR: 78 (01 Jun 2018 08:26) (69 - 102)  BP: 146/79 (01 Jun 2018 08:26) (107/60 - 146/79)  BP(mean): --  RR: 18 (01 Jun 2018 08:26) (18 - 18)  SpO2: 97% (01 Jun 2018 08:26) (92% - 97%)    PHYSICAL EXAMINATION:  ----------------------------------------      LABORATORY STUDIES:  ----------------------------------------                            MEDICATIONS  (STANDING):  ALBUTerol/ipratropium for Nebulization 3 milliLiter(s) Nebulizer every 4 hours  enoxaparin Injectable 40 milliGRAM(s) SubCutaneous daily  furosemide    Tablet 20 milliGRAM(s) Oral daily  methylPREDNISolone sodium succinate Injectable 40 milliGRAM(s) IV Push every 8 hours    MEDICATIONS  (PRN):  ALPRAZolam 0.5 milliGRAM(s) Oral two times a day PRN anxiety  oxyCODONE    5 mG/acetaminophen 325 mG 2 Tablet(s) Oral every 6 hours PRN Moderate Pain (4 - 6)  zolpidem 5 milliGRAM(s) Oral at bedtime PRN Insomnia  zolpidem 5 milliGRAM(s) Oral at bedtime PRN Insomnia      ASSESSMENT / PLAN:  ---------------------------------------- [facility]  ----------------------------------------  The patient was seen and evaluated for asthma exacerbation.  The patient is in no acute distress.  Denied any chest pain, palpitations, or abdominal pain.  Reports less dyspnea.    Vital Signs Last 24 Hrs  T(C): 36.6 (01 Jun 2018 08:26), Max: 37.1 (31 May 2018 17:18)  T(F): 97.9 (01 Jun 2018 08:26), Max: 98.8 (31 May 2018 17:18)  HR: 78 (01 Jun 2018 08:26) (69 - 102)  BP: 146/79 (01 Jun 2018 08:26) (107/60 - 146/79)  BP(mean): --  RR: 18 (01 Jun 2018 08:26) (18 - 18)  SpO2: 97% (01 Jun 2018 08:26) (92% - 97%)    PHYSICAL EXAMINATION:  ----------------------------------------  General appearance: No acute distress, Awake, Alert  HEENT: Normocephalic, Atraumatic, Conjunctiva clear, EOMI  Neck: Supple, No JVD, No tenderness  Lungs: Breath sound equal bilaterally, No rales, No wheezes  Cardiovascular: S1S2, Regular rhythm  Abdomen: Soft, Nontender, Nondistended, No guarding/rebound, Positive bowel sounds  Extremities: No clubbing, No cyanosis, No edema, No calf tenderness  Neuro: Strength equal bilaterally, No tremors  Psychiatric: Appropriate mood, Normal affect    MEDICATIONS  (STANDING):  ALBUTerol/ipratropium for Nebulization 3 milliLiter(s) Nebulizer every 4 hours  enoxaparin Injectable 40 milliGRAM(s) SubCutaneous daily  furosemide    Tablet 20 milliGRAM(s) Oral daily  methylPREDNISolone sodium succinate Injectable 40 milliGRAM(s) IV Push every 8 hours    MEDICATIONS  (PRN):  ALPRAZolam 0.5 milliGRAM(s) Oral two times a day PRN anxiety  oxyCODONE    5 mG/acetaminophen 325 mG 2 Tablet(s) Oral every 6 hours PRN Moderate Pain (4 - 6)  zolpidem 5 milliGRAM(s) Oral at bedtime PRN Insomnia  zolpidem 5 milliGRAM(s) Oral at bedtime PRN Insomnia      ASSESSMENT / PLAN:  ----------------------------------------  Asthma exacerbation - Improved intravenous methylprednisolone with transition to prednisone. On inhaled bronchodilator therapy.    Hypoxia - Afebrile. Oxygen saturation 94% on ambient air.    Anxiety - Alprazolam as needed.    Back pain - Analgesic medications as needed.    SLE - The patient reports that she was not taking the methotrexate as prescribed.    Hypertension - Close blood pressure monitoring. On furosemide.

## 2018-06-01 NOTE — DISCHARGE NOTE ADULT - HOSPITAL COURSE
68F presented with worsening dyspnea. On presentation the patient was noted to be hypoxic. WBC(9.6). Chest Xray noted an ill-defined right lung base infiltrate. Intravenous methylprednisolone and inhaled bronchodilator therapy was initiated for asthma exacerbation. Supplemental oxygen was continued for hypoxia. The patient had improvement in the dyspnea and hypoxia. Methylprednisolone was tapered and transitioned to prednisone. The hypoxia resolved. The patient was discharged home with instructions to follow up with her primary care physician for further management.    35 minutes total time

## 2018-06-01 NOTE — DISCHARGE NOTE ADULT - PATIENT PORTAL LINK FT
You can access the [facility] Patient Portal, offered by [facility], by registering with the following website: [email or web address]

## 2018-07-11 NOTE — ED ADULT NURSE NOTE - NS ED NOTE ABUSE SUSPICION NEGLECT YN
Problem: Patient Care Overview  Goal: Plan of Care Review  Outcome: Ongoing (interventions implemented as appropriate)   07/11/18 0323   Coping/Psychosocial   Plan of Care Reviewed With patient   Plan of Care Review   Progress improving   OTHER   Outcome Summary VSS, safety maintained, c/o pain, prn medication provided as requested, up ad nader, IV fluids continued, will continue to monitor     Goal: Individualization and Mutuality  Outcome: Ongoing (interventions implemented as appropriate)    Goal: Discharge Needs Assessment  Outcome: Ongoing (interventions implemented as appropriate)    Goal: Interprofessional Rounds/Family Conf  Outcome: Ongoing (interventions implemented as appropriate)      Problem: Pain, Acute (Adult)  Goal: Acceptable Pain Control/Comfort Level  Outcome: Ongoing (interventions implemented as appropriate)         No

## 2018-08-23 ENCOUNTER — EMERGENCY (EMERGENCY)
Facility: HOSPITAL | Age: 68
LOS: 1 days | Discharge: HOME | End: 2018-08-23
Attending: STUDENT IN AN ORGANIZED HEALTH CARE EDUCATION/TRAINING PROGRAM
Payer: MEDICARE

## 2018-08-23 VITALS — WEIGHT: 173.94 LBS | HEIGHT: 62 IN

## 2018-08-23 DIAGNOSIS — Z98.89 OTHER SPECIFIED POSTPROCEDURAL STATES: Chronic | ICD-10-CM

## 2018-08-23 PROCEDURE — 99284 EMERGENCY DEPT VISIT MOD MDM: CPT

## 2018-08-23 RX ORDER — IPRATROPIUM/ALBUTEROL SULFATE 18-103MCG
3 AEROSOL WITH ADAPTER (GRAM) INHALATION ONCE
Qty: 0 | Refills: 0 | Status: COMPLETED | OUTPATIENT
Start: 2018-08-23 | End: 2018-08-23

## 2018-08-23 RX ORDER — OXYCODONE AND ACETAMINOPHEN 5; 325 MG/1; MG/1
1 TABLET ORAL ONCE
Qty: 0 | Refills: 0 | Status: DISCONTINUED | OUTPATIENT
Start: 2018-08-23 | End: 2018-08-23

## 2018-08-23 NOTE — ED PROVIDER NOTE - CHPI ED SYMPTOMS POS
CHEST DISCOMFORT/COUGH/vomiting, difficulty sleeping, orthopnea/SHORTNESS OF BREATH SHORTNESS OF BREATH/difficulty sleeping/CHEST TIGHTNESS/COUGH

## 2018-08-23 NOTE — ED PROVIDER NOTE - OBJECTIVE STATEMENT
67 y/o F with PMHx of lupus, asthma, Hashimoto's disease, CHF, asthma and HLD presents to ED c/o worsening shortness of breath and productive cough and orthopnea onset 1 week ago. Yesterday, began to develop central chest pain described as a heaviness that occasionally radiates to her sides which is exacerbated with multiple coughing episodes. Also notes having 1 episode of post tussive emesis today. She was seen by PMD today, who told her that she was "full of fluid" and that if symptoms worsened to come to the ED. Denies fevers, chills, dysuria, hematuria, hemoptysis, abdominal pain, diarrhea or blood in the stool. Allergic to Naprosyn, Sulfur and Aspirin. No further acute complaints at this time.      PMD: Dr. Macario 69 y/o F with PMHx of lupus, asthma, Hashimoto's disease, CHF, asthma and HLD presents to ED c/o worsening shortness of breath and productive cough onset 1 week ago. Yesterday, began to develop central chest pain described as a heaviness that occasionally radiates to her sides which is exacerbated with multiple coughing episodes. Also notes having 1 episode of post tussive emesis today. She was seen by PMD today, who told her that she was "full of fluid" and that if symptoms worsened to come to the ED. Denies fevers, chills, dysuria, hematuria, hemoptysis, abdominal pain, diarrhea or blood in the stool. Allergic to Naprosyn, Sulfur and Aspirin. No further acute complaints at this time.      PMD: Dr. Macario 69 y/o F with PMHx of lupus, asthma, Hashimoto's disease, CHF, asthma and HLD and PSHx of  x3 and lumpectomy presents to ED c/o worsening shortness of breath and productive cough onset 1 week ago. Yesterday, began to develop central chest pain described as a heaviness that occasionally radiates to her sides which is exacerbated with multiple coughing episodes. Also notes having 1 episode of post tussive emesis today. She was seen by PMD today, who told her that she was "full of fluid" and that if symptoms worsened to come to the ED. Denies fevers, chills, dysuria, hematuria, hemoptysis, abdominal pain, diarrhea or blood in the stool. Allergic to Naprosyn, Sulfur and Aspirin. No further acute complaints at this time.      PMD: Dr. Macario 69 y/o F with PMHx of lupus, asthma, Hashimoto's disease, CHF, asthma and HLD and PSHx of  x3 and lumpectomy presents to ED c/o worsening shortness of breath and non productive cough onset 1 week ago. Yesterday, began to develop central chest tightness after coughing a lot.  Also notes having 1 episode of post tussive emesis today. She was seen by PMD today, who told her that she was "full of fluid" and that if symptoms worsened to come to the ED. Denies LE edema, fevers, chills, dysuria, hematuria, hemoptysis, abdominal pain, diarrhea or blood in the stool. Allergic to Naprosyn, Sulfur and Aspirin. No further acute complaints at this time.      PMD: Dr. Macario

## 2018-08-23 NOTE — ED PROVIDER NOTE - MEDICAL DECISION MAKING DETAILS
67 y/o F presents with SOB and CP after coughing, ribs are tender to palpation, likely costochondritis/ MSK pain, pain does not appear cardiac related or PE, will r/o asthma exacerbation vs. CHF exacerbation.

## 2018-08-23 NOTE — ED ADULT TRIAGE NOTE - CHIEF COMPLAINT QUOTE
"I'm full of fluid, that's what my doctor tells me. I have not been eating & feel short of breath worse than normal." Patient A&Ox4, stated Sx have persisted x1 week. Patient in negative obvious distress. Patient stated has had productive cough.

## 2018-08-23 NOTE — ED PROVIDER NOTE - MUSCULOSKELETAL, MLM
Spine appears normal, range of motion is not limited,  tenderness to palpation over anterior ribs, otherwise no joint or muscle tenderness.

## 2018-08-24 VITALS
DIASTOLIC BLOOD PRESSURE: 70 MMHG | OXYGEN SATURATION: 95 % | RESPIRATION RATE: 20 BRPM | SYSTOLIC BLOOD PRESSURE: 116 MMHG | HEART RATE: 89 BPM | TEMPERATURE: 98 F

## 2018-08-24 PROBLEM — N63 UNSPECIFIED LUMP IN BREAST: Chronic | Status: ACTIVE | Noted: 2017-06-29

## 2018-08-24 PROBLEM — M32.9 SYSTEMIC LUPUS ERYTHEMATOSUS, UNSPECIFIED: Chronic | Status: ACTIVE | Noted: 2017-04-21

## 2018-08-24 LAB
ALBUMIN SERPL ELPH-MCNC: 4.2 G/DL — SIGNIFICANT CHANGE UP (ref 3.3–5.2)
ALP SERPL-CCNC: 103 U/L — SIGNIFICANT CHANGE UP (ref 40–120)
ALT FLD-CCNC: 21 U/L — SIGNIFICANT CHANGE UP
ANION GAP SERPL CALC-SCNC: 14 MMOL/L — SIGNIFICANT CHANGE UP (ref 5–17)
AST SERPL-CCNC: 22 U/L — SIGNIFICANT CHANGE UP
BASOPHILS NFR BLD AUTO: 1 % — SIGNIFICANT CHANGE UP (ref 0–2)
BILIRUB SERPL-MCNC: 0.6 MG/DL — SIGNIFICANT CHANGE UP (ref 0.4–2)
BUN SERPL-MCNC: 23 MG/DL — HIGH (ref 8–20)
CALCIUM SERPL-MCNC: 8.9 MG/DL — SIGNIFICANT CHANGE UP (ref 8.6–10.2)
CHLORIDE SERPL-SCNC: 100 MMOL/L — SIGNIFICANT CHANGE UP (ref 98–107)
CO2 SERPL-SCNC: 26 MMOL/L — SIGNIFICANT CHANGE UP (ref 22–29)
CREAT SERPL-MCNC: 0.59 MG/DL — SIGNIFICANT CHANGE UP (ref 0.5–1.3)
EOSINOPHIL NFR BLD AUTO: 1 % — SIGNIFICANT CHANGE UP (ref 0–5)
GLUCOSE SERPL-MCNC: 107 MG/DL — SIGNIFICANT CHANGE UP (ref 70–115)
HCT VFR BLD CALC: 42.9 % — SIGNIFICANT CHANGE UP (ref 37–47)
HGB BLD-MCNC: 14.7 G/DL — SIGNIFICANT CHANGE UP (ref 12–16)
LYMPHOCYTES # BLD AUTO: 59 % — HIGH (ref 20–55)
MCHC RBC-ENTMCNC: 29 PG — SIGNIFICANT CHANGE UP (ref 27–31)
MCHC RBC-ENTMCNC: 34.3 G/DL — SIGNIFICANT CHANGE UP (ref 32–36)
MCV RBC AUTO: 84.6 FL — SIGNIFICANT CHANGE UP (ref 81–99)
MONOCYTES NFR BLD AUTO: 7 % — SIGNIFICANT CHANGE UP (ref 3–10)
NEUTROPHILS NFR BLD AUTO: 32 % — LOW (ref 37–73)
NT-PROBNP SERPL-SCNC: 351 PG/ML — HIGH (ref 0–300)
PLAT MORPH BLD: NORMAL — SIGNIFICANT CHANGE UP
PLATELET # BLD AUTO: 232 K/UL — SIGNIFICANT CHANGE UP (ref 150–400)
POTASSIUM SERPL-MCNC: 3.6 MMOL/L — SIGNIFICANT CHANGE UP (ref 3.5–5.3)
POTASSIUM SERPL-SCNC: 3.6 MMOL/L — SIGNIFICANT CHANGE UP (ref 3.5–5.3)
PROT SERPL-MCNC: 7.5 G/DL — SIGNIFICANT CHANGE UP (ref 6.6–8.7)
RBC # BLD: 5.07 M/UL — SIGNIFICANT CHANGE UP (ref 4.4–5.2)
RBC # FLD: 12.9 % — SIGNIFICANT CHANGE UP (ref 11–15.6)
RBC BLD AUTO: NORMAL — SIGNIFICANT CHANGE UP
SODIUM SERPL-SCNC: 140 MMOL/L — SIGNIFICANT CHANGE UP (ref 135–145)
WBC # BLD: 7.5 K/UL — SIGNIFICANT CHANGE UP (ref 4.8–10.8)
WBC # FLD AUTO: 7.5 K/UL — SIGNIFICANT CHANGE UP (ref 4.8–10.8)

## 2018-08-24 PROCEDURE — 96375 TX/PRO/DX INJ NEW DRUG ADDON: CPT

## 2018-08-24 PROCEDURE — 36415 COLL VENOUS BLD VENIPUNCTURE: CPT

## 2018-08-24 PROCEDURE — 99284 EMERGENCY DEPT VISIT MOD MDM: CPT | Mod: 25

## 2018-08-24 PROCEDURE — 85027 COMPLETE CBC AUTOMATED: CPT

## 2018-08-24 PROCEDURE — 71046 X-RAY EXAM CHEST 2 VIEWS: CPT | Mod: 26

## 2018-08-24 PROCEDURE — 93005 ELECTROCARDIOGRAM TRACING: CPT

## 2018-08-24 PROCEDURE — 93010 ELECTROCARDIOGRAM REPORT: CPT

## 2018-08-24 PROCEDURE — 80053 COMPREHEN METABOLIC PANEL: CPT

## 2018-08-24 PROCEDURE — 94640 AIRWAY INHALATION TREATMENT: CPT

## 2018-08-24 PROCEDURE — 96374 THER/PROPH/DIAG INJ IV PUSH: CPT

## 2018-08-24 PROCEDURE — 83880 ASSAY OF NATRIURETIC PEPTIDE: CPT

## 2018-08-24 PROCEDURE — 71046 X-RAY EXAM CHEST 2 VIEWS: CPT

## 2018-08-24 RX ORDER — ONDANSETRON 8 MG/1
4 TABLET, FILM COATED ORAL ONCE
Qty: 0 | Refills: 0 | Status: COMPLETED | OUTPATIENT
Start: 2018-08-24 | End: 2018-08-24

## 2018-08-24 RX ADMIN — Medication 3 MILLILITER(S): at 00:07

## 2018-08-24 RX ADMIN — OXYCODONE AND ACETAMINOPHEN 1 TABLET(S): 5; 325 TABLET ORAL at 00:07

## 2018-08-24 RX ADMIN — Medication 3 MILLILITER(S): at 01:01

## 2018-08-24 RX ADMIN — ONDANSETRON 4 MILLIGRAM(S): 8 TABLET, FILM COATED ORAL at 04:25

## 2018-08-24 RX ADMIN — Medication 40 MILLIGRAM(S): at 00:06

## 2018-08-24 NOTE — ED ADULT NURSE NOTE - OBJECTIVE STATEMENT
PT c/o SOB and diff breathing x couple days, hx of lupus, resp even and unlabored, sleeping comfortably in stretcher

## 2018-08-24 NOTE — ED ADULT NURSE NOTE - NSIMPLEMENTINTERV_GEN_ALL_ED
Implemented All Universal Safety Interventions:  [place] to call system. Call bell, personal items and telephone within reach. Instruct patient to call for assistance. Room bathroom lighting operational. Non-slip footwear when patient is off stretcher. Physically safe environment: no spills, clutter or unnecessary equipment. Stretcher in lowest position, wheels locked, appropriate side rails in place.

## 2018-08-24 NOTE — ED ADULT NURSE REASSESSMENT NOTE - NS ED NURSE REASSESS COMMENT FT1
Upon d/c, pt c/o nausea and vomiting, medicated as per MD orders, pt states "Ill leave when I feel better in a little bit", pt ambulated to restroom safely and steadily w/out assistance.

## 2018-12-10 ENCOUNTER — EMERGENCY (EMERGENCY)
Facility: HOSPITAL | Age: 68
LOS: 1 days | Discharge: HOME | End: 2018-12-10
Attending: EMERGENCY MEDICINE
Payer: MEDICARE

## 2018-12-10 VITALS — HEIGHT: 62 IN | WEIGHT: 169.98 LBS

## 2018-12-10 DIAGNOSIS — Z98.89 OTHER SPECIFIED POSTPROCEDURAL STATES: Chronic | ICD-10-CM

## 2018-12-10 LAB
ALBUMIN SERPL ELPH-MCNC: 3.7 G/DL — SIGNIFICANT CHANGE UP (ref 3.3–5.2)
ALP SERPL-CCNC: 186 U/L — HIGH (ref 40–120)
ALT FLD-CCNC: 38 U/L — HIGH
ANION GAP SERPL CALC-SCNC: 14 MMOL/L — SIGNIFICANT CHANGE UP (ref 5–17)
AST SERPL-CCNC: 31 U/L — SIGNIFICANT CHANGE UP
BASOPHILS # BLD AUTO: 0 K/UL — SIGNIFICANT CHANGE UP (ref 0–0.2)
BASOPHILS NFR BLD AUTO: 0.2 % — SIGNIFICANT CHANGE UP (ref 0–2)
BILIRUB SERPL-MCNC: 0.5 MG/DL — SIGNIFICANT CHANGE UP (ref 0.4–2)
BUN SERPL-MCNC: 20 MG/DL — SIGNIFICANT CHANGE UP (ref 8–20)
CALCIUM SERPL-MCNC: 8.8 MG/DL — SIGNIFICANT CHANGE UP (ref 8.6–10.2)
CHLORIDE SERPL-SCNC: 97 MMOL/L — LOW (ref 98–107)
CO2 SERPL-SCNC: 27 MMOL/L — SIGNIFICANT CHANGE UP (ref 22–29)
CREAT SERPL-MCNC: 0.78 MG/DL — SIGNIFICANT CHANGE UP (ref 0.5–1.3)
EOSINOPHIL # BLD AUTO: 0.1 K/UL — SIGNIFICANT CHANGE UP (ref 0–0.5)
EOSINOPHIL NFR BLD AUTO: 1.1 % — SIGNIFICANT CHANGE UP (ref 0–6)
GLUCOSE SERPL-MCNC: 118 MG/DL — HIGH (ref 70–115)
HCT VFR BLD CALC: 37.8 % — SIGNIFICANT CHANGE UP (ref 37–47)
HGB BLD-MCNC: 12.4 G/DL — SIGNIFICANT CHANGE UP (ref 12–16)
LACTATE BLDV-MCNC: 1.3 MMOL/L — SIGNIFICANT CHANGE UP (ref 0.5–2)
LYMPHOCYTES # BLD AUTO: 25.9 % — SIGNIFICANT CHANGE UP (ref 20–55)
LYMPHOCYTES # BLD AUTO: 3.4 K/UL — SIGNIFICANT CHANGE UP (ref 1–4.8)
MCHC RBC-ENTMCNC: 28.8 PG — SIGNIFICANT CHANGE UP (ref 27–31)
MCHC RBC-ENTMCNC: 32.8 G/DL — SIGNIFICANT CHANGE UP (ref 32–36)
MCV RBC AUTO: 87.7 FL — SIGNIFICANT CHANGE UP (ref 81–99)
MONOCYTES # BLD AUTO: 0.8 K/UL — SIGNIFICANT CHANGE UP (ref 0–0.8)
MONOCYTES NFR BLD AUTO: 6.2 % — SIGNIFICANT CHANGE UP (ref 3–10)
NEUTROPHILS # BLD AUTO: 8.7 K/UL — HIGH (ref 1.8–8)
NEUTROPHILS NFR BLD AUTO: 66.3 % — SIGNIFICANT CHANGE UP (ref 37–73)
NT-PROBNP SERPL-SCNC: 1213 PG/ML — HIGH (ref 0–300)
PLATELET # BLD AUTO: 264 K/UL — SIGNIFICANT CHANGE UP (ref 150–400)
POTASSIUM SERPL-MCNC: 3.7 MMOL/L — SIGNIFICANT CHANGE UP (ref 3.5–5.3)
POTASSIUM SERPL-SCNC: 3.7 MMOL/L — SIGNIFICANT CHANGE UP (ref 3.5–5.3)
PROT SERPL-MCNC: 7.4 G/DL — SIGNIFICANT CHANGE UP (ref 6.6–8.7)
RBC # BLD: 4.31 M/UL — LOW (ref 4.4–5.2)
RBC # FLD: 13 % — SIGNIFICANT CHANGE UP (ref 11–15.6)
SODIUM SERPL-SCNC: 138 MMOL/L — SIGNIFICANT CHANGE UP (ref 135–145)
TROPONIN T SERPL-MCNC: <0.01 NG/ML — SIGNIFICANT CHANGE UP (ref 0–0.06)
WBC # BLD: 13.2 K/UL — HIGH (ref 4.8–10.8)
WBC # FLD AUTO: 13.2 K/UL — HIGH (ref 4.8–10.8)

## 2018-12-10 PROCEDURE — 99284 EMERGENCY DEPT VISIT MOD MDM: CPT | Mod: 25

## 2018-12-10 PROCEDURE — 83605 ASSAY OF LACTIC ACID: CPT

## 2018-12-10 PROCEDURE — 94640 AIRWAY INHALATION TREATMENT: CPT

## 2018-12-10 PROCEDURE — 83880 ASSAY OF NATRIURETIC PEPTIDE: CPT

## 2018-12-10 PROCEDURE — 84484 ASSAY OF TROPONIN QUANT: CPT

## 2018-12-10 PROCEDURE — 71045 X-RAY EXAM CHEST 1 VIEW: CPT

## 2018-12-10 PROCEDURE — 96374 THER/PROPH/DIAG INJ IV PUSH: CPT

## 2018-12-10 PROCEDURE — 93005 ELECTROCARDIOGRAM TRACING: CPT

## 2018-12-10 PROCEDURE — 36415 COLL VENOUS BLD VENIPUNCTURE: CPT

## 2018-12-10 PROCEDURE — 71045 X-RAY EXAM CHEST 1 VIEW: CPT | Mod: 26

## 2018-12-10 PROCEDURE — 93010 ELECTROCARDIOGRAM REPORT: CPT

## 2018-12-10 PROCEDURE — 96375 TX/PRO/DX INJ NEW DRUG ADDON: CPT

## 2018-12-10 PROCEDURE — 87040 BLOOD CULTURE FOR BACTERIA: CPT

## 2018-12-10 PROCEDURE — 80053 COMPREHEN METABOLIC PANEL: CPT

## 2018-12-10 PROCEDURE — 85027 COMPLETE CBC AUTOMATED: CPT

## 2018-12-10 PROCEDURE — 99285 EMERGENCY DEPT VISIT HI MDM: CPT

## 2018-12-10 RX ORDER — MAGNESIUM SULFATE 500 MG/ML
2 VIAL (ML) INJECTION ONCE
Qty: 0 | Refills: 0 | Status: COMPLETED | OUTPATIENT
Start: 2018-12-10 | End: 2018-12-10

## 2018-12-10 RX ORDER — ALBUTEROL 90 UG/1
2.5 AEROSOL, METERED ORAL ONCE
Qty: 0 | Refills: 0 | Status: COMPLETED | OUTPATIENT
Start: 2018-12-10 | End: 2018-12-10

## 2018-12-10 RX ORDER — IPRATROPIUM/ALBUTEROL SULFATE 18-103MCG
3 AEROSOL WITH ADAPTER (GRAM) INHALATION ONCE
Qty: 0 | Refills: 0 | Status: COMPLETED | OUTPATIENT
Start: 2018-12-10 | End: 2018-12-10

## 2018-12-10 RX ADMIN — Medication 50 GRAM(S): at 22:25

## 2018-12-10 RX ADMIN — Medication 3 MILLILITER(S): at 19:55

## 2018-12-10 RX ADMIN — Medication 125 MILLIGRAM(S): at 19:56

## 2018-12-10 RX ADMIN — ALBUTEROL 2.5 MILLIGRAM(S): 90 AEROSOL, METERED ORAL at 22:25

## 2018-12-10 NOTE — ED ADULT TRIAGE NOTE - CHIEF COMPLAINT QUOTE
cough, vomiting, general malaise. flank pain from coughing. Green productive cough. pt is self diagnosis with PN.

## 2018-12-10 NOTE — ED STATDOCS - PROGRESS NOTE DETAILS
69 y/o F pt with hx of asthma, CHF, Hashimoto's presents to ED c/o "I think I have pneumonia". Pt explains that the last few nights pt has been coughing up green sputum. Pt has had left sided pain x 2-3 days. Pt states she occasionally gets fluid in lungs and leg swelling. Pt sometimes takes Lasix. Increased use of inhaler recently. Pt started self-treating with antibiotics 2 days ago. Denies chest pain, fever. Pt is smoker.   Pt will be placed in the main ED for complete evaluation by another provider. Possible CHF vs asthma exacerbation. expiratory wheeze, inspiratory crackles. slight edema of lower extremities, at baseline as per pt

## 2018-12-10 NOTE — ED ADULT NURSE NOTE - OBJECTIVE STATEMENT
Pt with hx of asthma and possible COPD (everyday smoker) c/o 2-3 days of worsening SOB, cough, and today had some episodes of vomiting from coughing. Pt states she has left flank pain that started after coughing fit otherwise pt is in NAD.

## 2018-12-10 NOTE — ED PROVIDER NOTE - MEDICAL DECISION MAKING DETAILS
no pneumonia on xray feeling better am,bulated in nad baslein o2 sat uin lower 90s has nebs inhaler and steroidsa at home no active chest pain or sob flank pain pain 2/2 active coughing fits. feeling better return to ed for intractable chest pain sob or any overall worsening

## 2018-12-10 NOTE — ED PROVIDER NOTE - OBJECTIVE STATEMENT
69 y/o F pt with hx of asthma, HLD, MI, and lupus presents to ED c/o productive cough with green sputum. She also notes L rib pain that is worse with coughing. Pt uses nebulizer treatments at homeand she took some leftover antibiotics at home with no relief. Pt saw her PMD last week for similar symptoms and was told she had fluid in her lungs and to take her Lasix but she has not been compliant. Pt is a smoker. denies fever. denies HA or neck pain. no chest pain.  no abd pain. no n/v/d. no urinary f/u/d. no back pain. no motor or sensory deficits. denies illicit drug use. no recent travel. no rash. no other acute issues symptoms or concerns   PMD: Dr. Reed 69 y/o F pt with hx of asthma, HLD, MI, and lupus presents to ED c/o productive cough with green sputum. She also notes L rib pain that is worse with coughing. Pt uses nebulizer treatments at home and she took some leftover antibiotics at home with no relief. Pt saw her PMD last week for similar symptoms and was told she had fluid in her lungs and to take her Lasix but she has not been compliant. Pt is a smoker. denies fever. denies HA or neck pain. no chest pain.  no abd pain. no n/v/d. no urinary f/u/d. no back pain. no motor or sensory deficits. denies illicit drug use. no recent travel. no rash. no other acute issues symptoms or concerns.  PMD: Dr. Reed

## 2018-12-11 VITALS
OXYGEN SATURATION: 96 % | HEART RATE: 72 BPM | DIASTOLIC BLOOD PRESSURE: 78 MMHG | SYSTOLIC BLOOD PRESSURE: 140 MMHG | TEMPERATURE: 98 F | RESPIRATION RATE: 18 BRPM

## 2018-12-15 LAB
CULTURE RESULTS: SIGNIFICANT CHANGE UP
CULTURE RESULTS: SIGNIFICANT CHANGE UP
SPECIMEN SOURCE: SIGNIFICANT CHANGE UP
SPECIMEN SOURCE: SIGNIFICANT CHANGE UP

## 2019-01-22 ENCOUNTER — INPATIENT (INPATIENT)
Facility: HOSPITAL | Age: 69
LOS: 2 days | Discharge: HOME | DRG: 190 | End: 2019-01-25
Attending: INTERNAL MEDICINE | Admitting: SURGERY
Payer: MEDICARE

## 2019-01-22 VITALS
OXYGEN SATURATION: 88 % | SYSTOLIC BLOOD PRESSURE: 113 MMHG | HEIGHT: 62 IN | TEMPERATURE: 98 F | WEIGHT: 167.99 LBS | RESPIRATION RATE: 22 BRPM | HEART RATE: 90 BPM | DIASTOLIC BLOOD PRESSURE: 70 MMHG

## 2019-01-22 DIAGNOSIS — Z98.89 OTHER SPECIFIED POSTPROCEDURAL STATES: Chronic | ICD-10-CM

## 2019-01-22 DIAGNOSIS — J06.9 ACUTE UPPER RESPIRATORY INFECTION, UNSPECIFIED: ICD-10-CM

## 2019-01-22 DIAGNOSIS — S72.001A FRACTURE OF UNSPECIFIED PART OF NECK OF RIGHT FEMUR, INITIAL ENCOUNTER FOR CLOSED FRACTURE: ICD-10-CM

## 2019-01-22 LAB
ALBUMIN SERPL ELPH-MCNC: 4 G/DL — SIGNIFICANT CHANGE UP (ref 3.3–5.2)
ALP SERPL-CCNC: 169 U/L — HIGH (ref 40–120)
ALT FLD-CCNC: 24 U/L — SIGNIFICANT CHANGE UP
ANION GAP SERPL CALC-SCNC: 15 MMOL/L — SIGNIFICANT CHANGE UP (ref 5–17)
APTT BLD: 38.2 SEC — HIGH (ref 27.5–36.3)
AST SERPL-CCNC: 20 U/L — SIGNIFICANT CHANGE UP
BASE EXCESS BLDV CALC-SCNC: 5.6 MMOL/L — HIGH (ref -2–2)
BASOPHILS # BLD AUTO: 0 K/UL — SIGNIFICANT CHANGE UP (ref 0–0.2)
BASOPHILS NFR BLD AUTO: 0.3 % — SIGNIFICANT CHANGE UP (ref 0–2)
BILIRUB SERPL-MCNC: 0.4 MG/DL — SIGNIFICANT CHANGE UP (ref 0.4–2)
BUN SERPL-MCNC: 22 MG/DL — HIGH (ref 8–20)
CA-I SERPL-SCNC: 1.09 MMOL/L — LOW (ref 1.15–1.33)
CALCIUM SERPL-MCNC: 8.9 MG/DL — SIGNIFICANT CHANGE UP (ref 8.6–10.2)
CHLORIDE BLDV-SCNC: 99 MMOL/L — SIGNIFICANT CHANGE UP (ref 98–107)
CHLORIDE SERPL-SCNC: 95 MMOL/L — LOW (ref 98–107)
CO2 SERPL-SCNC: 28 MMOL/L — SIGNIFICANT CHANGE UP (ref 22–29)
CREAT SERPL-MCNC: 0.75 MG/DL — SIGNIFICANT CHANGE UP (ref 0.5–1.3)
EOSINOPHIL # BLD AUTO: 0.2 K/UL — SIGNIFICANT CHANGE UP (ref 0–0.5)
EOSINOPHIL NFR BLD AUTO: 1.7 % — SIGNIFICANT CHANGE UP (ref 0–6)
GAS PNL BLDV: 142 MMOL/L — SIGNIFICANT CHANGE UP (ref 135–145)
GAS PNL BLDV: SIGNIFICANT CHANGE UP
GAS PNL BLDV: SIGNIFICANT CHANGE UP
GLUCOSE BLDV-MCNC: 125 MG/DL — HIGH (ref 70–99)
GLUCOSE SERPL-MCNC: 132 MG/DL — HIGH (ref 70–115)
HCO3 BLDV-SCNC: 29 MMOL/L — SIGNIFICANT CHANGE UP (ref 21–29)
HCT VFR BLD CALC: 40.4 % — SIGNIFICANT CHANGE UP (ref 37–47)
HCT VFR BLDA CALC: 43 — SIGNIFICANT CHANGE UP (ref 39–50)
HGB BLD CALC-MCNC: 14.2 G/DL — SIGNIFICANT CHANGE UP (ref 11.5–15.5)
HGB BLD-MCNC: 13.4 G/DL — SIGNIFICANT CHANGE UP (ref 12–16)
INR BLD: 1.1 RATIO — SIGNIFICANT CHANGE UP (ref 0.88–1.16)
LACTATE BLDV-MCNC: 1.4 MMOL/L — SIGNIFICANT CHANGE UP (ref 0.5–2)
LYMPHOCYTES # BLD AUTO: 2.8 K/UL — SIGNIFICANT CHANGE UP (ref 1–4.8)
LYMPHOCYTES # BLD AUTO: 24.6 % — SIGNIFICANT CHANGE UP (ref 20–55)
MCHC RBC-ENTMCNC: 29.2 PG — SIGNIFICANT CHANGE UP (ref 27–31)
MCHC RBC-ENTMCNC: 33.2 G/DL — SIGNIFICANT CHANGE UP (ref 32–36)
MCV RBC AUTO: 88 FL — SIGNIFICANT CHANGE UP (ref 81–99)
MONOCYTES # BLD AUTO: 0.6 K/UL — SIGNIFICANT CHANGE UP (ref 0–0.8)
MONOCYTES NFR BLD AUTO: 5.2 % — SIGNIFICANT CHANGE UP (ref 3–10)
NEUTROPHILS # BLD AUTO: 7.8 K/UL — SIGNIFICANT CHANGE UP (ref 1.8–8)
NEUTROPHILS NFR BLD AUTO: 68 % — SIGNIFICANT CHANGE UP (ref 37–73)
NT-PROBNP SERPL-SCNC: 286 PG/ML — SIGNIFICANT CHANGE UP (ref 0–300)
OTHER CELLS CSF MANUAL: 17 ML/DL — LOW (ref 18–22)
PCO2 BLDV: 56 MMHG — HIGH (ref 35–50)
PH BLDV: 7.37 — SIGNIFICANT CHANGE UP (ref 7.32–7.43)
PLATELET # BLD AUTO: 323 K/UL — SIGNIFICANT CHANGE UP (ref 150–400)
PO2 BLDV: 58 MMHG — HIGH (ref 25–45)
POTASSIUM BLDV-SCNC: 3.7 MMOL/L — SIGNIFICANT CHANGE UP (ref 3.4–4.5)
POTASSIUM SERPL-MCNC: 3.7 MMOL/L — SIGNIFICANT CHANGE UP (ref 3.5–5.3)
POTASSIUM SERPL-SCNC: 3.7 MMOL/L — SIGNIFICANT CHANGE UP (ref 3.5–5.3)
PROT SERPL-MCNC: 7.9 G/DL — SIGNIFICANT CHANGE UP (ref 6.6–8.7)
PROTHROM AB SERPL-ACNC: 12.7 SEC — SIGNIFICANT CHANGE UP (ref 10–12.9)
RAPID RVP RESULT: SIGNIFICANT CHANGE UP
RBC # BLD: 4.59 M/UL — SIGNIFICANT CHANGE UP (ref 4.4–5.2)
RBC # FLD: 13.1 % — SIGNIFICANT CHANGE UP (ref 11–15.6)
SAO2 % BLDV: 90 % — SIGNIFICANT CHANGE UP
SODIUM SERPL-SCNC: 138 MMOL/L — SIGNIFICANT CHANGE UP (ref 135–145)
TROPONIN T SERPL-MCNC: <0.01 NG/ML — SIGNIFICANT CHANGE UP (ref 0–0.06)
WBC # BLD: 11.5 K/UL — HIGH (ref 4.8–10.8)
WBC # FLD AUTO: 11.5 K/UL — HIGH (ref 4.8–10.8)

## 2019-01-22 PROCEDURE — 71275 CT ANGIOGRAPHY CHEST: CPT | Mod: 26

## 2019-01-22 PROCEDURE — 93010 ELECTROCARDIOGRAM REPORT: CPT

## 2019-01-22 PROCEDURE — 99285 EMERGENCY DEPT VISIT HI MDM: CPT

## 2019-01-22 PROCEDURE — 99222 1ST HOSP IP/OBS MODERATE 55: CPT | Mod: GC

## 2019-01-22 PROCEDURE — 71046 X-RAY EXAM CHEST 2 VIEWS: CPT | Mod: 26

## 2019-01-22 RX ORDER — ACETAMINOPHEN 500 MG
650 TABLET ORAL EVERY 6 HOURS
Qty: 0 | Refills: 0 | Status: DISCONTINUED | OUTPATIENT
Start: 2019-01-22 | End: 2019-01-25

## 2019-01-22 RX ORDER — AZITHROMYCIN 500 MG/1
500 TABLET, FILM COATED ORAL ONCE
Qty: 0 | Refills: 0 | Status: DISCONTINUED | OUTPATIENT
Start: 2019-01-22 | End: 2019-01-23

## 2019-01-22 RX ORDER — SACCHAROMYCES BOULARDII 250 MG
250 POWDER IN PACKET (EA) ORAL
Qty: 0 | Refills: 0 | Status: DISCONTINUED | OUTPATIENT
Start: 2019-01-22 | End: 2019-01-23

## 2019-01-22 RX ORDER — IPRATROPIUM/ALBUTEROL SULFATE 18-103MCG
3 AEROSOL WITH ADAPTER (GRAM) INHALATION ONCE
Qty: 0 | Refills: 0 | Status: COMPLETED | OUTPATIENT
Start: 2019-01-22 | End: 2019-01-22

## 2019-01-22 RX ORDER — LIDOCAINE 4 G/100G
1 CREAM TOPICAL ONCE
Qty: 0 | Refills: 0 | Status: COMPLETED | OUTPATIENT
Start: 2019-01-22 | End: 2019-01-22

## 2019-01-22 RX ORDER — ACETAMINOPHEN 500 MG
975 TABLET ORAL ONCE
Qty: 0 | Refills: 0 | Status: COMPLETED | OUTPATIENT
Start: 2019-01-22 | End: 2019-01-22

## 2019-01-22 RX ORDER — CEFTRIAXONE 500 MG/1
1 INJECTION, POWDER, FOR SOLUTION INTRAMUSCULAR; INTRAVENOUS ONCE
Qty: 0 | Refills: 0 | Status: COMPLETED | OUTPATIENT
Start: 2019-01-22 | End: 2019-01-22

## 2019-01-22 RX ADMIN — Medication 3 MILLILITER(S): at 19:13

## 2019-01-22 RX ADMIN — CEFTRIAXONE 1 GRAM(S): 500 INJECTION, POWDER, FOR SOLUTION INTRAMUSCULAR; INTRAVENOUS at 21:55

## 2019-01-22 RX ADMIN — Medication 125 MILLIGRAM(S): at 19:13

## 2019-01-22 RX ADMIN — CEFTRIAXONE 100 GRAM(S): 500 INJECTION, POWDER, FOR SOLUTION INTRAMUSCULAR; INTRAVENOUS at 21:00

## 2019-01-22 NOTE — ED ADULT NURSE REASSESSMENT NOTE - NS ED NURSE REASSESS COMMENT FT1
Report received from day RN YN, charting as noted. Pt A&Ox4 c/o cough and inc SOB at this time. Pt resting comfortably, VSS, no signs of distress at this time, CM in place, awaiting CTA, safety maintained, call bell in reach.

## 2019-01-22 NOTE — ED PROVIDER NOTE - MEDICAL DECISION MAKING DETAILS
1 week cough/sob. pain likely from coughing/rib strain vs fracture, will r/o PE with cta given hx lupus and some pleuritic element and hypoxic. nebs, steroids, abx.

## 2019-01-22 NOTE — ED PROVIDER NOTE - OBJECTIVE STATEMENT
67 y/o female hx lupus, hashimoto thyroiditis, asthma, current smoker, MI, htn, c/o 1 week of productive cough, some sob and left rib pain after coughing. Pt with some congestion and green mucus, no f/c, no abd pain, no hx dvt/pe, leg pain/swelling, immoblization, cancer. Pain with palpation of left ribs. Does not use oxygen at home.     ROS: No fever/chills. No eye pain/changes in vision, No ear pain/sore throat/dysphagia, No palpitations. No abdominal pain, N/V/D, no black/bloody bm. No dysuria/frequency/discharge, No headache. No Dizziness.    No rashes or breaks in skin. No numbness/tingling/weakness.

## 2019-01-22 NOTE — ED PROVIDER NOTE - PHYSICAL EXAMINATION
Gen: No acute distress, non toxic  HEENT: Mucous membranes moist, pink conjunctivae, EOMI  CV: RRR, nl s1/s2. tenderness left coastal region  Resp: b/l basilar crackles and wheezing, no retractions, speaking full sentences, satting 87% on room air, improves with nasal canula to ~94%  GI: Abdomen soft, NT, ND. No rebound, no guarding  : No CVAT  Neuro: A&O x 3, moving all 4 extremities  MSK: No spine or joint tenderness to palpation. no swelling b/l LE.   Skin: No rashes. intact and perfused.

## 2019-01-22 NOTE — ED ADULT NURSE REASSESSMENT NOTE - COMFORT CARE
darkened lights/plan of care explained/warm blanket provided/repositioned/wait time explained/side rails up

## 2019-01-22 NOTE — ED STATDOCS - PROGRESS NOTE DETAILS
69 y/o M with PMH of asthma, HLD and MI presents to the ED c/o shortness of breath, which is constant but worsening x1 week.  Associated symptoms include subjective fever and productive cough with associated left sided rib pain. She states that she has never used oxygen at home, but had been hospitalized in the past for her shortness of breath.  She denies any diarrhea, chest pain, or other medical complaints at this time.  She admits to history of smoking.  Exam: Const: NAD; Card: RRR, no murmurs; no appreciable leg edema. Resp: trace rales with inspiratory wheezing MSK: TTP left posterior lateral ribs, no crepitus; Skin: not pale, no diaphoresis.  Patient will be sent to the main for further evaluation and work up. Initial orders placed.  PMD: Dr. Reed

## 2019-01-22 NOTE — ED PROVIDER NOTE - PROGRESS NOTE DETAILS
Eliseo: pt with continued hypoxia, requiring NC to keep sats at 92%, neg PE. given abx, steroids, albuterol

## 2019-01-22 NOTE — ED ADULT NURSE REASSESSMENT NOTE - NS ED NURSE REASSESS COMMENT FT1
Pt A&Ox4 c/o cough and rib pain at this time. Pt resting comfortably, VSS, no signs of distress at this time, CM in place, awaiting bed, safety maintained, call bell in reach.

## 2019-01-22 NOTE — ED ADULT NURSE REASSESSMENT NOTE - COMFORT CARE
darkened lights/plan of care explained/meal provided/ambulated to bathroom/repositioned/warm blanket provided/side rails up/wait time explained

## 2019-01-22 NOTE — ED ADULT NURSE NOTE - NSIMPLEMENTINTERV_GEN_ALL_ED
Implemented All Universal Safety Interventions:  [place] to call system. Call bell, personal items and telephone within reach. Instruct patient to call for assistance. Room bathroom lighting operational. Non-slip footwear when patient is off stretcher. Physically safe environment: no spills, clutter or unnecessary equipment. Stretcher in lowest position, wheels locked, appropriate side rails in place.

## 2019-01-23 LAB
ANION GAP SERPL CALC-SCNC: 12 MMOL/L — SIGNIFICANT CHANGE UP (ref 5–17)
BUN SERPL-MCNC: 21 MG/DL — HIGH (ref 8–20)
CALCIUM SERPL-MCNC: 8.5 MG/DL — LOW (ref 8.6–10.2)
CHLORIDE SERPL-SCNC: 97 MMOL/L — LOW (ref 98–107)
CO2 SERPL-SCNC: 29 MMOL/L — SIGNIFICANT CHANGE UP (ref 22–29)
CREAT SERPL-MCNC: 0.62 MG/DL — SIGNIFICANT CHANGE UP (ref 0.5–1.3)
EOSINOPHIL # BLD AUTO: 0 K/UL — SIGNIFICANT CHANGE UP (ref 0–0.5)
EOSINOPHIL NFR BLD AUTO: 0 % — SIGNIFICANT CHANGE UP (ref 0–6)
GLUCOSE SERPL-MCNC: 150 MG/DL — HIGH (ref 70–115)
HCT VFR BLD CALC: 37.3 % — SIGNIFICANT CHANGE UP (ref 37–47)
HGB BLD-MCNC: 12.1 G/DL — SIGNIFICANT CHANGE UP (ref 12–16)
LYMPHOCYTES # BLD AUTO: 1.2 K/UL — SIGNIFICANT CHANGE UP (ref 1–4.8)
LYMPHOCYTES # BLD AUTO: 19.8 % — LOW (ref 20–55)
MAGNESIUM SERPL-MCNC: 2 MG/DL — SIGNIFICANT CHANGE UP (ref 1.6–2.6)
MCHC RBC-ENTMCNC: 28.3 PG — SIGNIFICANT CHANGE UP (ref 27–31)
MCHC RBC-ENTMCNC: 32.4 G/DL — SIGNIFICANT CHANGE UP (ref 32–36)
MCV RBC AUTO: 87.4 FL — SIGNIFICANT CHANGE UP (ref 81–99)
MONOCYTES # BLD AUTO: 0.2 K/UL — SIGNIFICANT CHANGE UP (ref 0–0.8)
MONOCYTES NFR BLD AUTO: 2.4 % — LOW (ref 3–10)
NEUTROPHILS # BLD AUTO: 4.9 K/UL — SIGNIFICANT CHANGE UP (ref 1.8–8)
NEUTROPHILS NFR BLD AUTO: 77.2 % — HIGH (ref 37–73)
PHOSPHATE SERPL-MCNC: 2.9 MG/DL — SIGNIFICANT CHANGE UP (ref 2.4–4.7)
PLATELET # BLD AUTO: 322 K/UL — SIGNIFICANT CHANGE UP (ref 150–400)
POTASSIUM SERPL-MCNC: 4.1 MMOL/L — SIGNIFICANT CHANGE UP (ref 3.5–5.3)
POTASSIUM SERPL-SCNC: 4.1 MMOL/L — SIGNIFICANT CHANGE UP (ref 3.5–5.3)
RBC # BLD: 4.27 M/UL — LOW (ref 4.4–5.2)
RBC # FLD: 12.9 % — SIGNIFICANT CHANGE UP (ref 11–15.6)
SODIUM SERPL-SCNC: 138 MMOL/L — SIGNIFICANT CHANGE UP (ref 135–145)
TROPONIN T SERPL-MCNC: <0.01 NG/ML — SIGNIFICANT CHANGE UP (ref 0–0.06)
WBC # BLD: 6.3 K/UL — SIGNIFICANT CHANGE UP (ref 4.8–10.8)
WBC # FLD AUTO: 6.3 K/UL — SIGNIFICANT CHANGE UP (ref 4.8–10.8)

## 2019-01-23 PROCEDURE — 99223 1ST HOSP IP/OBS HIGH 75: CPT

## 2019-01-23 PROCEDURE — 99233 SBSQ HOSP IP/OBS HIGH 50: CPT | Mod: GC

## 2019-01-23 RX ORDER — INFLUENZA VIRUS VACCINE 15; 15; 15; 15 UG/.5ML; UG/.5ML; UG/.5ML; UG/.5ML
0.5 SUSPENSION INTRAMUSCULAR ONCE
Qty: 0 | Refills: 0 | Status: COMPLETED | OUTPATIENT
Start: 2019-01-23 | End: 2019-01-23

## 2019-01-23 RX ORDER — PANTOPRAZOLE SODIUM 20 MG/1
40 TABLET, DELAYED RELEASE ORAL
Qty: 0 | Refills: 0 | Status: DISCONTINUED | OUTPATIENT
Start: 2019-01-23 | End: 2019-01-25

## 2019-01-23 RX ORDER — OXYCODONE AND ACETAMINOPHEN 5; 325 MG/1; MG/1
1 TABLET ORAL EVERY 4 HOURS
Qty: 0 | Refills: 0 | Status: DISCONTINUED | OUTPATIENT
Start: 2019-01-23 | End: 2019-01-25

## 2019-01-23 RX ORDER — ZOLPIDEM TARTRATE 10 MG/1
1 TABLET ORAL
Qty: 0 | Refills: 0 | COMMUNITY

## 2019-01-23 RX ORDER — FUROSEMIDE 40 MG
40 TABLET ORAL DAILY
Qty: 0 | Refills: 0 | Status: DISCONTINUED | OUTPATIENT
Start: 2019-01-23 | End: 2019-01-25

## 2019-01-23 RX ORDER — SACCHAROMYCES BOULARDII 250 MG
250 POWDER IN PACKET (EA) ORAL
Qty: 0 | Refills: 0 | Status: DISCONTINUED | OUTPATIENT
Start: 2019-01-23 | End: 2019-01-25

## 2019-01-23 RX ORDER — METHOTREXATE 2.5 MG/1
10 TABLET ORAL
Qty: 0 | Refills: 0 | Status: DISCONTINUED | OUTPATIENT
Start: 2019-01-24 | End: 2019-01-25

## 2019-01-23 RX ORDER — ENOXAPARIN SODIUM 100 MG/ML
40 INJECTION SUBCUTANEOUS DAILY
Qty: 0 | Refills: 0 | Status: DISCONTINUED | OUTPATIENT
Start: 2019-01-23 | End: 2019-01-25

## 2019-01-23 RX ORDER — ALPRAZOLAM 0.25 MG
0.5 TABLET ORAL AT BEDTIME
Qty: 0 | Refills: 0 | Status: DISCONTINUED | OUTPATIENT
Start: 2019-01-23 | End: 2019-01-24

## 2019-01-23 RX ORDER — ALPRAZOLAM 0.25 MG
1 TABLET ORAL
Qty: 0 | Refills: 0 | COMMUNITY

## 2019-01-23 RX ORDER — NICOTINE POLACRILEX 2 MG
1 GUM BUCCAL DAILY
Qty: 0 | Refills: 0 | Status: DISCONTINUED | OUTPATIENT
Start: 2019-01-23 | End: 2019-01-25

## 2019-01-23 RX ORDER — IPRATROPIUM/ALBUTEROL SULFATE 18-103MCG
3 AEROSOL WITH ADAPTER (GRAM) INHALATION EVERY 6 HOURS
Qty: 0 | Refills: 0 | Status: DISCONTINUED | OUTPATIENT
Start: 2019-01-23 | End: 2019-01-25

## 2019-01-23 RX ORDER — ALBUTEROL 90 UG/1
2.5 AEROSOL, METERED ORAL EVERY 4 HOURS
Qty: 0 | Refills: 0 | Status: DISCONTINUED | OUTPATIENT
Start: 2019-01-23 | End: 2019-01-25

## 2019-01-23 RX ORDER — AZITHROMYCIN 500 MG/1
500 TABLET, FILM COATED ORAL DAILY
Qty: 0 | Refills: 0 | Status: DISCONTINUED | OUTPATIENT
Start: 2019-01-23 | End: 2019-01-25

## 2019-01-23 RX ADMIN — Medication 40 MILLIGRAM(S): at 05:23

## 2019-01-23 RX ADMIN — Medication 650 MILLIGRAM(S): at 22:15

## 2019-01-23 RX ADMIN — OXYCODONE AND ACETAMINOPHEN 1 TABLET(S): 5; 325 TABLET ORAL at 10:19

## 2019-01-23 RX ADMIN — OXYCODONE AND ACETAMINOPHEN 1 TABLET(S): 5; 325 TABLET ORAL at 17:30

## 2019-01-23 RX ADMIN — Medication 650 MILLIGRAM(S): at 20:03

## 2019-01-23 RX ADMIN — Medication 250 MILLIGRAM(S): at 05:23

## 2019-01-23 RX ADMIN — AZITHROMYCIN 255 MILLIGRAM(S): 500 TABLET, FILM COATED ORAL at 01:00

## 2019-01-23 RX ADMIN — Medication 3 MILLILITER(S): at 16:44

## 2019-01-23 RX ADMIN — Medication 250 MILLIGRAM(S): at 17:30

## 2019-01-23 RX ADMIN — PANTOPRAZOLE SODIUM 40 MILLIGRAM(S): 20 TABLET, DELAYED RELEASE ORAL at 05:24

## 2019-01-23 RX ADMIN — OXYCODONE AND ACETAMINOPHEN 1 TABLET(S): 5; 325 TABLET ORAL at 23:30

## 2019-01-23 RX ADMIN — Medication 0.5 MILLIGRAM(S): at 05:23

## 2019-01-23 RX ADMIN — OXYCODONE AND ACETAMINOPHEN 1 TABLET(S): 5; 325 TABLET ORAL at 22:48

## 2019-01-23 RX ADMIN — Medication 200 MILLIGRAM(S): at 22:48

## 2019-01-23 RX ADMIN — Medication 40 MILLIGRAM(S): at 22:18

## 2019-01-23 RX ADMIN — Medication 3 MILLILITER(S): at 21:55

## 2019-01-23 RX ADMIN — OXYCODONE AND ACETAMINOPHEN 1 TABLET(S): 5; 325 TABLET ORAL at 09:51

## 2019-01-23 RX ADMIN — Medication 3 MILLILITER(S): at 05:22

## 2019-01-23 RX ADMIN — Medication 1 PATCH: at 20:05

## 2019-01-23 RX ADMIN — OXYCODONE AND ACETAMINOPHEN 1 TABLET(S): 5; 325 TABLET ORAL at 05:23

## 2019-01-23 RX ADMIN — Medication 1 PATCH: at 11:53

## 2019-01-23 RX ADMIN — Medication 40 MILLIGRAM(S): at 15:01

## 2019-01-23 RX ADMIN — Medication 200 MILLIGRAM(S): at 05:23

## 2019-01-23 RX ADMIN — Medication 10 MILLIGRAM(S): at 05:23

## 2019-01-23 NOTE — ED ADULT NURSE REASSESSMENT NOTE - NS ED NURSE REASSESS COMMENT FT1
Pt A&Ox4 c/o rib pain from coughing at this time. Pt resting comfortably, VSS, no signs of distress at this time, CM in place, awaiting bed, report given to HR RN AF, moved to CDU, safety maintained, call bell in reach.

## 2019-01-23 NOTE — ED ADULT NURSE REASSESSMENT NOTE - NSIMPLEMENTINTERV_GEN_ALL_ED
Implemented All Universal Safety Interventions:  [place] to call system. Call bell, personal items and telephone within reach. Instruct patient to call for assistance. Room bathroom lighting operational. Non-slip footwear when patient is off stretcher. Physically safe environment: no spills, clutter or unnecessary equipment. Stretcher in lowest position, wheels locked, appropriate side rails in place.
Implemented All Universal Safety Interventions:  [place] to call system. Call bell, personal items and telephone within reach. Instruct patient to call for assistance. Room bathroom lighting operational. Non-slip footwear when patient is off stretcher. Physically safe environment: no spills, clutter or unnecessary equipment. Stretcher in lowest position, wheels locked, appropriate side rails in place.
Implemented All Fall Risk Interventions:  [place] to call system. Call bell, personal items and telephone within reach. Instruct patient to call for assistance. Room bathroom lighting operational. Non-slip footwear when patient is off stretcher. Physically safe environment: no spills, clutter or unnecessary equipment. Stretcher in lowest position, wheels locked, appropriate side rails in place. Provide visual cue, wrist band, yellow gown, etc. Monitor gait and stability. Monitor for mental status changes and reorient to person, place, and time. Review medications for side effects contributing to fall risk. Reinforce activity limits and safety measures with patient and family.

## 2019-01-23 NOTE — CONSULT NOTE ADULT - SUBJECTIVE AND OBJECTIVE BOX
PULMONARY CONSULT NOTE      MARIAN HOUSER-[id number]    Patient is a 68y old  Female who presents with a chief complaint of sob , cough and sputum production (2019 00:59)  Patient with about a week of cough, minimal sputum production except when cough was so severe she vomited>green material.  Denies fever or chills.  Has h/o asthma for which she uses albuterol PRN and Symbicort ("red inhaler") also PRN.  She mostly came to ER because coughing was causing severe rib pain.  Some shortness of breath and wheezing.  Smokes about 1 pack cigarettes per week.  Has seen pulmonologist in past but doesn't remember who.  Was given diagnosis of lupus a year ago, was initiated on methotrexate but doesn't take it.  CTA was done in ER>reviewed.  Bilateral areas of GGO c/w air trapping.     INTERVAL HPI/OVERNIGHT EVENTS:    MEDICATIONS  (STANDING):  ALBUTerol/ipratropium for Nebulization 3 milliLiter(s) Nebulizer every 6 hours  azithromycin  IVPB 500 milliGRAM(s) IV Intermittent daily  enalapril 10 milliGRAM(s) Oral daily  enoxaparin Injectable 40 milliGRAM(s) SubCutaneous daily  furosemide    Tablet 40 milliGRAM(s) Oral daily  influenza   Vaccine 0.5 milliLiter(s) IntraMuscular once  methylPREDNISolone sodium succinate Injectable 40 milliGRAM(s) IV Push every 8 hours  nicotine -  14 mG/24Hr(s) Patch 1 patch Transdermal daily  pantoprazole    Tablet 40 milliGRAM(s) Oral before breakfast  saccharomyces boulardii 250 milliGRAM(s) Oral two times a day      MEDICATIONS  (PRN):  acetaminophen   Tablet .. 650 milliGRAM(s) Oral every 6 hours PRN Temp greater or equal to 38C (100.4F), Mild Pain (1 - 3)  ALBUTerol    0.083% 2.5 milliGRAM(s) Nebulizer every 4 hours PRN Shortness of Breath and/or Wheezing  ALPRAZolam 0.5 milliGRAM(s) Oral at bedtime PRN insomnia  guaiFENesin    Syrup 200 milliGRAM(s) Oral every 6 hours PRN Cough  oxyCODONE    5 mG/acetaminophen 325 mG 1 Tablet(s) Oral every 4 hours PRN Moderate Pain (4 - 6)      Allergies    aspirin (Other)  Naprosyn (Other (Severe))  Sulfur (Rash)    Intolerances        PAST MEDICAL & SURGICAL HISTORY:  Breast lump: Benign  Lupus  Autoimmune disease  Osteoporosis  High cholesterol  Asthma  Myocardial infarct  H/O lumpectomy   delivery delivered: x3      FAMILY HISTORY:  Family history of cancer (Aunt): Father - Lung Cancer  Mother - Leukemia  Aunts - Breast Cancer      SOCIAL HISTORY  Smoking History: Per HPI    REVIEW OF SYSTEMS:    CONSTITUTIONAL:  As per HPI.    HEENT:  Eyes:  No diplopia or blurred vision. ENT:  No earache, sore throat or runny nose.    CARDIOVASCULAR:  No pressure, squeezing, tightness, heaviness or aching about the chest; no palpitations.    RESPIRATORY:  Per HPI    GASTROINTESTINAL:  No nausea, vomiting or diarrhea.    GENITOURINARY:  No dysuria, frequency or urgency.    MUSCULOSKELETAL:  No joint pains    SKIN:  No new lesions.    NEUROLOGIC:  No paresthesias, fasciculations, seizures or weakness.    PSYCHIATRIC:  No disorder of thought or mood.    ENDOCRINE:  No heat or cold intolerance, polyuria or polydipsia.    HEMATOLOGICAL:  No easy bruising or bleeding.     Vital Signs Last 24 Hrs  T(C): 36.6 (2019 03:03), Max: 36.8 (2019 16:55)  T(F): 97.9 (2019 03:03), Max: 98.3 (2019 22:14)  HR: 80 (2019 03:03) (80 - 90)  BP: 124/63 (2019 03:03) (113/70 - 124/63)  BP(mean): --  RR: 16 (2019 03:03) (16 - 22)  SpO2: 94% (2019 03:03) (88% - 94%)    PHYSICAL EXAMINATION:    GENERAL: The patient is a well-developed, well-nourished _____in no apparent distress.     HEENT: Head is normocephalic and atraumatic. Extraocular muscles are intact. Mucous membranes are moist.     NECK: Supple.     LUNGS: Good air entry with bilateral scattered wheezes    HEART: Regular rate and rhythm without murmur.    ABDOMEN: Soft, nontender, and nondistended.  No hepatosplenomegaly is noted.    EXTREMITIES: Without any cyanosis, clubbing, rash, lesions or edema.    NEUROLOGIC: Grossly intact.    SKIN: No ulceration or induration present.      LABS:                        13.4   11.5  )-----------( 323      ( 2019 19:32 )             40.4         138  |  95<L>  |  22.0<H>  ----------------------------<  132<H>  3.7   |  28.0  |  0.75    Ca    8.9      2019 19:32    TPro  7.9  /  Alb  4.0  /  TBili  0.4  /  DBili  x   /  AST  20  /  ALT  24  /  AlkPhos  169<H>      PT/INR - ( 2019 19:31 )   PT: 12.7 sec;   INR: 1.10 ratio         PTT - ( 2019 19:31 )  PTT:38.2 sec      CARDIAC MARKERS ( 2019 19:32 )  x     / <0.01 ng/mL / x     / x     / x            Serum Pro-Brain Natriuretic Peptide: 286 pg/mL (19 @ 19:32)          MICROBIOLOGY:    RADIOLOGY & ADDITIONAL STUDIES:< from: CT Angio Chest w/ IV Cont (19 @ 20:49) >     EXAM:  CT ANGIO CHEST (W)AW IC                          PROCEDURE DATE:  2019          INTERPRETATION:  CT CHEST WITH IV CONTRAST:    HISTORY: Pleuritic chest pain and hypoxia.    Date and time of exam: 2019 8:38 PM.    TECHNIQUE:  Sections were obtained from the apices to the diaphragm   following intravenous contrast.  72 mls of omnipaque 350 was administered   intravenously without complication. MIP images were obtained and reviewed    COMPARISON EXAMINATION:   10/4/2012.    FINDINGS:  No evidence of pulmonary emboli.    Multiple mildly enlarged mediastinal nodes. This finding is unchanged   since 10/4/2012. No evidence of hilar adenopathy. No evidence of axillary   adenopathy.    No evidence of pleural or pericardial effusion.    Atelectasis or fibrosis in the right middle lobe demonstrating no change   since the prior study. The right lung is otherwise clear. Several linear   strands of atelectasis in the left lower lobe.    Degenerative changes in the spine.    No significant abnormality in the visualized upper abdomen..          IMPRESSION:      No evidence of pulmonary emboli.    Multiple mildly enlarged mediastinal lymph nodes demonstrating no change   since 10/4/2012.    Atelectasis or fibrosis in the right middle lobe unchanged since the   prior study.    Several linear strands of atelectasis in the left lower lobe..      < end of copied text >

## 2019-01-23 NOTE — H&P ADULT - NSHPSOCIALHISTORY_GEN_ALL_CORE
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

## 2019-01-23 NOTE — CONSULT NOTE ADULT - ASSESSMENT
Patient with one week of cough and shortness of breath consistent with exacerbation of asthma  RVP is negative but doesn't rule out initiation of event by viral infection as it started over a week ago  Smoking an exacerbating factor >? element of COPD  GGO on CT c/w current process but with history of "lupus" possible mild ILD    Plan:  1.Steroids>wean as improves  2.Continue bronchodilators  3.Titrate O2  4.Needs f/u with us post d/c to reevaluate; will need f/u CT in 6-8 weeks as outpatient provided patient is doing well  5.Absolute smoking cessation

## 2019-01-23 NOTE — PHYSICAL THERAPY INITIAL EVALUATION ADULT - GENERAL OBSERVATIONS, REHAB EVAL
Pt received sitting at edge of stretcher in NAD. c/o side pain - RN aware. (+) 3L [place] O2 and SpO2 monitor.

## 2019-01-23 NOTE — H&P ADULT - ASSESSMENT
Pt is a 69 yo F w/ PMH of lupus ( she suppose to take methotrexate 10 mg qd but she has not been taken her med) , LE edema ,  current smoker,  HTN, Hashimoto thyroiditis , HTN , asthma ( no home O2 , never intubate ). Pt will be admitted due to increase of sob , cough and hypoxia most likely secondary to asthma exacerbation due to viral infection.     1- Acute Asthma exacerbation with hypoxia   admit to medicine service ,  any bed w/   In the ER pt was found to be hypoxic ( o2= 88) she was tx w/ solumedrol 125 mg x 1 , rocephin 1 gr x 1 and albuterol neb.   albuterol neb PRN q 4 hours  Duoneb q 6 hours  solumedrol 40 mg q 8 hours, PPI while on steroids   O2 to keep O2 between 90-94   incentive spirometry     RPV neg  ABG: venous Ph= 7.37 PCO2= 56 HCO3= 29 , no prior baseline   CTA: Multiple mildly enlarged mediastinal lymph nodes demonstrating no change   since 10/4/2012.Atelectasis or fibrosis in the right middle lobe unchanged since the   prior study,  atelectasis in the left lower lobe. Negative for PE  will consult pulmonary , pt does not have any Pulmonology       2-HTN  c/w home med , enalapril 10 mg qd    3-Lupus   c/w methotrexate 10 mg every week  pt states that she was not take her med    4-Insomnia   c/w xanax 0.25 mg PRN at bed time     5-Preventive measure   DVT Proph: Lovenox 40 mg qd  PT evaluation Pt is a 67 yo F w/ PMH of lupus ( she suppose to take methotrexate 10 mg qd but she has not been taken her med) , LE edema ,  current smoker,  HTN, Hashimoto thyroiditis , HTN , asthma ( no home O2 , never intubate ). Pt will be admitted due to increase of sob , cough and hypoxia most likely secondary to asthma exacerbation due to viral infection.     1- Bronchospasm most likely 2/2 to undiagnosed COPD   admit to medicine service ,  any bed w/   pt w/ hx of smoking and still smoking   In the ER pt was found to be hypoxic ( o2= 88) she was tx w/ solumedrol 125 mg x 1 , rocephin 1 gr x 1 and albuterol neb.   albuterol neb PRN q 4 hours  Duoneb q 6 hours  will cover atypical w/azithromycin   solumedrol 40 mg q 8 hours, PPI while on steroids   O2 to keep O2 between 90-94   incentive spirometry     RPV neg  ABG: venous Ph= 7.37 PCO2= 56 HCO3= 29 , no prior baseline   CTA: Multiple mildly enlarged mediastinal lymph nodes demonstrating no change   since 10/4/2012.Atelectasis or fibrosis in the right middle lobe unchanged since the   prior study,  atelectasis in the left lower lobe. Negative for PE  will consult pulmonary , pt does not have any Pulmonology , will need formal outpatient spirometry for copd exacerbation  will check o2 at rest and during ambulation to evaluated for possible   o2 requirements      2-HTN  c/w home med , enalapril 10 mg qd    3-Lupus   c/w methotrexate 10 mg every week  pt states that she was not take her med    4-Insomnia   c/w xanax 0.25 mg PRN at bed time     5-Preventive measure   DVT Proph: Lovenox 40 mg qd  PT evaluation Pt is a 67 yo F w/ PMH of lupus ( she suppose to take methotrexate 10 mg qd but she has not been taken her med) , LE edema ,  current smoker,  HTN, Hashimoto thyroiditis , HTN , asthma ( no home O2 , never intubate ). Pt will be admitted due to increase of sob , cough and hypoxia most likely secondary to asthma exacerbation due to viral infection.     1- Bronchospasm and hypoxia  most likely 2/2 to undiagnosed COPD exacerbation   admit to medicine service ,  any bed w/    pt w/ hx of smoking and still smoking   In the ER pt was found to be hypoxic ( o2= 88) she was tx w/ solumedrol 125 mg x 1 , rocephin 1 gr x 1 and albuterol neb.   albuterol neb PRN q 4 hours  Duoneb q 6 hours  will cover atypical w/azithromycin   solumedrol 40 mg q 8 hours, PPI while on steroids   O2 to keep O2 between 90-94   incentive spirometry     RPV neg  ABG: venous Ph= 7.37 PCO2= 56 HCO3= 29 , no prior baseline   CTA: Multiple mildly enlarged mediastinal lymph nodes demonstrating no change   since 10/4/2012.Atelectasis or fibrosis in the right middle lobe unchanged since the   prior study,  atelectasis in the left lower lobe. Negative for PE  will consult pulmonary , pt does not have any Pulmonology , will need formal outpatient spirometry for copd exacerbation  will check o2 at rest and during ambulation to evaluated for possible   o2 requirements      2-HTN  c/w home med , enalapril 10 mg qd    3-Lupus   c/w methotrexate 10 mg every week  pt states that she was not take her med    4-Insomnia   c/w xanax 0.25 mg PRN at bed time     5-Preventive measure   DVT Proph: Lovenox 40 mg qd  PT evaluation

## 2019-01-23 NOTE — H&P ADULT - NSHPPHYSICALEXAM_GEN_ALL_CORE
Vital Signs Last 24 Hrs  T(C): 36.8 (22 Jan 2019 22:14), Max: 36.8 (22 Jan 2019 16:55)  T(F): 98.3 (22 Jan 2019 22:14), Max: 98.3 (22 Jan 2019 22:14)  HR: 83 (22 Jan 2019 22:14) (83 - 90)  BP: 119/70 (22 Jan 2019 22:14) (113/70 - 119/70)  RR: 18 (22 Jan 2019 22:14) (18 - 22)  SpO2: 93% (22 Jan 2019 22:14) (88% - 93%)    GENERAL: NAD, well-groomed, well-developed  HEAD:  Atraumatic, Normocephalic  EYES: EOMI, PERRLA, conjunctiva and sclera clear  ENMT: No tonsillar erythema, exudates, or enlargement; Moist mucous membranes  NECK: Supple, No JVD  Respiratory: positive expiratory wheezing over both lungs field and rales over both bases   CV: Regular rate and rhythm; No murmurs, rubs, or gallops  Gastrointestinal: Soft, Nontender, Nondistended; Bowel sounds present  EXTREMITIES:  2+ Peripheral Pulses, No clubbing, cyanosis, or edema  LYMPH: No lymphadenopathy noted  NERVOUS SYSTEM:  Alert & Oriented X3, Good concentration; Motor Strength 5/5 B/L upper and lower extremities. CN II-XII grossly intact   SKIN: No rashes or lesions   Psych: normal affect and mood Vital Signs Last 24 Hrs  T(C): 36.8 (22 Jan 2019 22:14), Max: 36.8 (22 Jan 2019 16:55)  T(F): 98.3 (22 Jan 2019 22:14), Max: 98.3 (22 Jan 2019 22:14)  HR: 83 (22 Jan 2019 22:14) (83 - 90)  BP: 119/70 (22 Jan 2019 22:14) (113/70 - 119/70)  RR: 18 (22 Jan 2019 22:14) (18 - 22)  SpO2: 93% (22 Jan 2019 22:14) (88% - 93%)    GENERAL: NAD, well-groomed, well-developed  HEAD:  Atraumatic, Normocephalic  EYES: EOMI, PERRLA, conjunctiva and sclera clear  ENMT: No tonsillar erythema, exudates, or enlargement; Moist mucous membranes  NECK: Supple, No JVD  Respiratory: positive expiratory wheezing over both lungs field and rales over both bases , positive pain to palpation over the left costal side   CV: Regular rate and rhythm; No murmurs, rubs, or gallops  Gastrointestinal: Soft, Nontender, Nondistended; Bowel sounds present  EXTREMITIES:  2+ Peripheral Pulses, No clubbing, cyanosis, or edema  LYMPH: No lymphadenopathy noted  NERVOUS SYSTEM:  Alert & Oriented X3, Good concentration; Motor Strength 5/5 B/L upper and lower extremities. CN II-XII grossly intact   SKIN: No rashes or lesions   Psych: normal affect and mood

## 2019-01-23 NOTE — H&P ADULT - NSHPLABSRESULTS_GEN_ALL_CORE
13.4   11.5  )-----------( 323      ( 22 Jan 2019 19:32 )             40.4       01-22    138  |  95<L>  |  22.0<H>  ----------------------------<  132<H>  3.7   |  28.0  |  0.75    Ca    8.9      22 Jan 2019 19:32    TPro  7.9  /  Alb  4.0  /  TBili  0.4  /  DBili  x   /  AST  20  /  ALT  24  /  AlkPhos  169<H>  01-22      < from: CT Angio Chest w/ IV Cont (01.22.19 @ 20:49) >    IMPRESSION:      No evidence of pulmonary emboli.    Multiple mildly enlarged mediastinal lymph nodes demonstrating no change   since 10/4/2012.Atelectasis or fibrosis in the right middle lobe unchanged since the   prior study.Several linear strands of atelectasis in the left lower lobe..      < end of copied text >    < from: Xray Chest 2 Views PA/Lat (01.22.19 @ 17:57) >    Findings:  The left lung is clear. There is an ill-defined infiltrate in the right   upper lobe, just superior to the minor fissure. The right lung is   otherwise clear. The heartand mediastinum are unremarkable.  No hilar   abnormality is seen. There is no evidence of pleural effusion.  The   visualized osseous structures demonstrate degenerative changes in the   spine.    Impression:  Small ill-defined right upper lobe infiltrate..    < end of copied text >

## 2019-01-23 NOTE — H&P ADULT - HISTORY OF PRESENT ILLNESS
Pt is a 67 yo F Pt is a 69 yo F w/ PMH of lupus ( she suppose to take methotrexate 10 mg qd but she has not been taken her med) , current smoker,  HTN, Hashimoto thyroiditis , HTN , asthma ( no home O2 , never intubate ), she also report prior MI but in 2012  .  Pt report that over the past week she has been having very severe cough associated w/ some green sputum production , she states that today her cough has been dry, she also had increase of SOB and right side chest and abdominal  pain which she attribute to her cough, pt get worse every time she take a deep breath or when she cough , no changes of pain w/ movements . She was not using her asthma medications ( albuterol and symbicort ) but over the past week she started using her med due to her sx, she states that a lot of peoples in the place where she work have respiratory sx . Pt denies any chest pain , n/v, diarrhea, constipation , calf pain , palpitations , wt gain , LE edema , or any other sx. Pt is a 67 yo F w/ PMH of lupus ( she suppose to take methotrexate 10 mg qd but she has not been taken her med) , LE edema ,  current smoker,  HTN, Hashimoto thyroiditis , HTN , asthma ( no home O2 , never intubate ), she also report prior MI but in 2012  .  Pt report that over the past week she has been having very severe cough associated w/ some green sputum production , she states that today her cough has been dry, she also had increase of SOB and right side chest and abdominal  pain which she attribute to her cough, pt get worse every time she take a deep breath or when she cough , no changes of pain w/ movements . She was not using her asthma medications ( albuterol and symbicort ) but over the past week she started using her med due to her sx, she states that a lot of peoples in the place where she work have respiratory sx . Pt denies any chest pain , fevers , n/v, diarrhea, constipation , calf pain , palpitations , wt gain , LE edema , or any other sx.     In the ER pt was found to be hypoxic ( o2= 88) she was tx w/ solumedrol 125 mg x 1 , rocephin 1 gr x 1 and albuterol neb.   Pt is full code at this time. Pt is a 69 yo F w/ PMH of lupus ( she suppose to take methotrexate 10 mg qd but she has not been taken her med) , LE edema ,  current smoker,  HTN, Hashimoto thyroiditis , HTN , asthma ( no home O2 , never intubate ), she also report prior MI but in 2012  .  Pt report that over the past week she has been having very severe cough associated w/ some green sputum production , she states that today her cough has been dry, she also had increase of SOB and left side chest and abdominal  pain which she attribute to her cough, pain get worse every time she take a deep breath or when she cough , no changes of pain w/ movements . She was not using her asthma medications ( albuterol and symbicort ) but over the past week she started using her med due to her sx, she states that a lot of peoples in the place where she work have respiratory sx . Pt denies any chest pain , fevers , n/v, diarrhea, constipation , calf pain , palpitations , wt gain , LE edema , or any other sx.     In the ER pt was found to be hypoxic ( o2= 88) she was tx w/ solumedrol 125 mg x 1 , rocephin 1 gr x 1 and albuterol neb.   Pt is full code at this time.

## 2019-01-23 NOTE — ED ADULT NURSE REASSESSMENT NOTE - COMFORT CARE
side rails up/wait time explained/darkened lights/repositioned/warm blanket provided/ambulated to bathroom/plan of care explained

## 2019-01-23 NOTE — H&P ADULT - NSTOBACCOEDUHP_GEN_A_NCS
63M COPD  active smoker schizophrenia Hiatal hernia Seizure disorder? (on Keppra) can not find hx of such.  patient not reliable      Acute on chronic hypercapnic respiratory failure  parinfluenza  3+   Had been on ventilator here at [place] back in January 2016.   Some component of his hypercapnia related to the 5mg of valium that he received.       BIPAP support Steroids/bronchodilator/Azithro/DVT prophylaxis repeat ABG.  Resume chronic meds when stable
Offered and provided

## 2019-01-23 NOTE — CHART NOTE - NSCHARTNOTEFT_GEN_A_CORE
cc: sob, pleuritic cp       interval hx: pt seen and eval with resident team at bedside. better with sob. denies fever/ chills. denies n/v/diarrhea       Vital Signs Last 24 Hrs  T(C): 36.8 (23 Jan 2019 15:49), Max: 36.8 (22 Jan 2019 16:55)  T(F): 98.2 (23 Jan 2019 15:49), Max: 98.3 (22 Jan 2019 22:14)  HR: 85 (23 Jan 2019 15:49) (80 - 90)  BP: 132/65 (23 Jan 2019 15:49) (113/70 - 132/65)  BP(mean): --  RR: 18 (23 Jan 2019 15:49) (16 - 22)  SpO2: 99% (23 Jan 2019 16:45) (88% - 99%)    GENERAL: NAD, well-groomed, well-developed  	HEAD:  Atraumatic, Normocephalic  	EYES: EOMI, PERRLA, conjunctiva and sclera clear  	ENMT: No tonsillar erythema, exudates, or enlargement; Moist mucous membranes  	NECK: Supple, No JVD  	Respiratory: positive expiratory wheezing over both lungs field and rales over both bases , positive pain to palpation over the left costal side   	CV: Regular rate and rhythm; No murmurs, rubs, or gallops  	Gastrointestinal: Soft, Nontender, Nondistended; Bowel sounds present  	EXTREMITIES:  2+ Peripheral Pulses, No clubbing, cyanosis, or edema  	LYMPH: No lymphadenopathy noted  	NERVOUS SYSTEM:  Alert & Oriented X3, Good concentration; Motor Strength 5/5 B/L upper and lower extremities. CN II-XII grossly intact   	SKIN: No rashes or lesions   Psych: normal affect and mood      Pt is a 67 yo F w/ PMH of lupus ( she suppose to take methotrexate 10 mg qd but she has not been taken her med) , LE edema ,  current smoker,  HTN, Hashimoto thyroiditis , HTN , asthma ( no home O2 , never intubate ). Pt will be admitted due to increase of sob , cough and hypoxia most likely secondary to asthma exacerbation due to viral infection.     1- Bronchospasm and hypoxia  most likely 2/2 to undiagnosed COPD exacerbation   admit to medicine service ,  any bed w/    pt w/ hx of smoking and still smoking   In the ER pt was found to be hypoxic ( o2= 88) she was tx w/ solumedrol 125 mg x 1 , rocephin 1 gr x 1 and albuterol neb.   albuterol neb PRN q 4 hours  Duoneb q 6 hours  will cover atypical w/azithromycin   solumedrol 40 mg q 8 hours, PPI while on steroids   O2 to keep O2 between 90-94   incentive spirometry     RPV neg  ABG: venous Ph= 7.37 PCO2= 56 HCO3= 29 , no prior baseline   CTA: Multiple mildly enlarged mediastinal lymph nodes demonstrating no change   since 10/4/2012.Atelectasis or fibrosis in the right middle lobe unchanged since the   prior study,  atelectasis in the left lower lobe. Negative for PE  will consult pulmonary , pt does not have any Pulmonology , will need formal outpatient spirometry for copd exacerbation  will check o2 at rest and during ambulation to evaluated for possible   o2 requirements      2-HTN  c/w home med , enalapril 10 mg qd    3-Lupus   c/w methotrexate 10 mg every week  pt states that she was not take her med    4-Insomnia   c/w xanax 0.25 mg PRN at bed time     5-Preventive measure   DVT Proph: Lovenox 40 mg qd  PT evaluation

## 2019-01-24 ENCOUNTER — TRANSCRIPTION ENCOUNTER (OUTPATIENT)
Age: 69
End: 2019-01-24

## 2019-01-24 LAB
ANION GAP SERPL CALC-SCNC: 17 MMOL/L — SIGNIFICANT CHANGE UP (ref 5–17)
BASOPHILS # BLD AUTO: 0 K/UL — SIGNIFICANT CHANGE UP (ref 0–0.2)
BASOPHILS NFR BLD AUTO: 0.1 % — SIGNIFICANT CHANGE UP (ref 0–2)
BUN SERPL-MCNC: 29 MG/DL — HIGH (ref 8–20)
CALCIUM SERPL-MCNC: 8.5 MG/DL — LOW (ref 8.6–10.2)
CHLORIDE SERPL-SCNC: 99 MMOL/L — SIGNIFICANT CHANGE UP (ref 98–107)
CO2 SERPL-SCNC: 26 MMOL/L — SIGNIFICANT CHANGE UP (ref 22–29)
CREAT SERPL-MCNC: 0.7 MG/DL — SIGNIFICANT CHANGE UP (ref 0.5–1.3)
EOSINOPHIL # BLD AUTO: 0 K/UL — SIGNIFICANT CHANGE UP (ref 0–0.5)
EOSINOPHIL NFR BLD AUTO: 0 % — SIGNIFICANT CHANGE UP (ref 0–6)
GLUCOSE SERPL-MCNC: 228 MG/DL — HIGH (ref 70–115)
HBA1C BLD-MCNC: 5.9 % — HIGH (ref 4–5.6)
HCT VFR BLD CALC: 39 % — SIGNIFICANT CHANGE UP (ref 37–47)
HGB BLD-MCNC: 12.3 G/DL — SIGNIFICANT CHANGE UP (ref 12–16)
LYMPHOCYTES # BLD AUTO: 1.2 K/UL — SIGNIFICANT CHANGE UP (ref 1–4.8)
LYMPHOCYTES # BLD AUTO: 9.3 % — LOW (ref 20–55)
MCHC RBC-ENTMCNC: 27.8 PG — SIGNIFICANT CHANGE UP (ref 27–31)
MCHC RBC-ENTMCNC: 31.5 G/DL — LOW (ref 32–36)
MCV RBC AUTO: 88 FL — SIGNIFICANT CHANGE UP (ref 81–99)
MONOCYTES # BLD AUTO: 0.3 K/UL — SIGNIFICANT CHANGE UP (ref 0–0.8)
MONOCYTES NFR BLD AUTO: 2.5 % — LOW (ref 3–10)
NEUTROPHILS # BLD AUTO: 10.8 K/UL — HIGH (ref 1.8–8)
NEUTROPHILS NFR BLD AUTO: 87.1 % — HIGH (ref 37–73)
PLATELET # BLD AUTO: 343 K/UL — SIGNIFICANT CHANGE UP (ref 150–400)
POTASSIUM SERPL-MCNC: 3.8 MMOL/L — SIGNIFICANT CHANGE UP (ref 3.5–5.3)
POTASSIUM SERPL-SCNC: 3.8 MMOL/L — SIGNIFICANT CHANGE UP (ref 3.5–5.3)
RBC # BLD: 4.43 M/UL — SIGNIFICANT CHANGE UP (ref 4.4–5.2)
RBC # FLD: 13.2 % — SIGNIFICANT CHANGE UP (ref 11–15.6)
SODIUM SERPL-SCNC: 142 MMOL/L — SIGNIFICANT CHANGE UP (ref 135–145)
WBC # BLD: 12.4 K/UL — HIGH (ref 4.8–10.8)
WBC # FLD AUTO: 12.4 K/UL — HIGH (ref 4.8–10.8)

## 2019-01-24 PROCEDURE — 99232 SBSQ HOSP IP/OBS MODERATE 35: CPT

## 2019-01-24 PROCEDURE — 99232 SBSQ HOSP IP/OBS MODERATE 35: CPT | Mod: GC

## 2019-01-24 RX ORDER — METHOTREXATE 2.5 MG/1
4 TABLET ORAL
Qty: 16 | Refills: 0 | OUTPATIENT
Start: 2019-01-24 | End: 2019-02-22

## 2019-01-24 RX ORDER — ALPRAZOLAM 0.25 MG
0.25 TABLET ORAL
Qty: 0 | Refills: 0 | Status: DISCONTINUED | OUTPATIENT
Start: 2019-01-24 | End: 2019-01-25

## 2019-01-24 RX ORDER — NICOTINE POLACRILEX 2 MG
1 GUM BUCCAL
Qty: 30 | Refills: 0 | OUTPATIENT
Start: 2019-01-24 | End: 2019-02-22

## 2019-01-24 RX ADMIN — OXYCODONE AND ACETAMINOPHEN 1 TABLET(S): 5; 325 TABLET ORAL at 06:30

## 2019-01-24 RX ADMIN — OXYCODONE AND ACETAMINOPHEN 1 TABLET(S): 5; 325 TABLET ORAL at 18:13

## 2019-01-24 RX ADMIN — Medication 3 MILLILITER(S): at 20:54

## 2019-01-24 RX ADMIN — OXYCODONE AND ACETAMINOPHEN 1 TABLET(S): 5; 325 TABLET ORAL at 11:23

## 2019-01-24 RX ADMIN — Medication 0.5 MILLIGRAM(S): at 00:09

## 2019-01-24 RX ADMIN — Medication 1 PATCH: at 12:21

## 2019-01-24 RX ADMIN — ENOXAPARIN SODIUM 40 MILLIGRAM(S): 100 INJECTION SUBCUTANEOUS at 12:20

## 2019-01-24 RX ADMIN — AZITHROMYCIN 255 MILLIGRAM(S): 500 TABLET, FILM COATED ORAL at 12:20

## 2019-01-24 RX ADMIN — OXYCODONE AND ACETAMINOPHEN 1 TABLET(S): 5; 325 TABLET ORAL at 19:00

## 2019-01-24 RX ADMIN — Medication 200 MILLIGRAM(S): at 10:23

## 2019-01-24 RX ADMIN — Medication 3 MILLILITER(S): at 09:31

## 2019-01-24 RX ADMIN — Medication 40 MILLIGRAM(S): at 05:56

## 2019-01-24 RX ADMIN — PANTOPRAZOLE SODIUM 40 MILLIGRAM(S): 20 TABLET, DELAYED RELEASE ORAL at 07:45

## 2019-01-24 RX ADMIN — Medication 10 MILLIGRAM(S): at 06:47

## 2019-01-24 RX ADMIN — Medication 0.25 MILLIGRAM(S): at 17:06

## 2019-01-24 RX ADMIN — Medication 250 MILLIGRAM(S): at 17:06

## 2019-01-24 RX ADMIN — Medication 40 MILLIGRAM(S): at 05:55

## 2019-01-24 RX ADMIN — Medication 650 MILLIGRAM(S): at 13:23

## 2019-01-24 RX ADMIN — OXYCODONE AND ACETAMINOPHEN 1 TABLET(S): 5; 325 TABLET ORAL at 10:23

## 2019-01-24 RX ADMIN — Medication 3 MILLILITER(S): at 04:26

## 2019-01-24 RX ADMIN — Medication 3 MILLILITER(S): at 14:50

## 2019-01-24 RX ADMIN — OXYCODONE AND ACETAMINOPHEN 1 TABLET(S): 5; 325 TABLET ORAL at 23:15

## 2019-01-24 RX ADMIN — Medication 250 MILLIGRAM(S): at 05:55

## 2019-01-24 RX ADMIN — Medication 650 MILLIGRAM(S): at 12:23

## 2019-01-24 RX ADMIN — OXYCODONE AND ACETAMINOPHEN 1 TABLET(S): 5; 325 TABLET ORAL at 05:55

## 2019-01-24 RX ADMIN — Medication 0.25 MILLIGRAM(S): at 23:15

## 2019-01-24 NOTE — PROGRESS NOTE ADULT - SUBJECTIVE AND OBJECTIVE BOX
Patient is a 68y old  Female who presents with a chief complaint of sob , cough and sputum production (23 Jan 2019 08:59)      INTERVAL HPI/OVERNIGHT EVENTS:  68y  Female  No acute events overnight  Pt is tolerating PO, voiding and stooling spontaneously. Ambulates freely. Pain well tolerated under current regiment.  Expresses that she will like to be discharged tomorrow for follow up appointment with MD at noon and later work.     ROS Negative except as above.    Telemetry: 2 desaturation episodes to 88% for a few seconds overnight.     Allergies    aspirin (Other)  Naprosyn (Other (Severe))  Sulfur (Rash)    Intolerances        Vital Signs Last 24 Hrs  T(C): 36.6 (24 Jan 2019 07:13), Max: 36.8 (23 Jan 2019 15:49)  T(F): 97.8 (24 Jan 2019 07:13), Max: 98.2 (23 Jan 2019 15:49)  HR: 85 (24 Jan 2019 07:13) (70 - 97)  BP: 108/56 (24 Jan 2019 07:13) (98/51 - 132/65)  RR: 18 (24 Jan 2019 07:13) (18 - 20)  SpO2: 96% (24 Jan 2019 07:13) (90% - 99%)        PHYSICAL EXAM:    General:NAD, well nourished and well developed, afebrile  HEENT: Normocephalic, atraumatic, clear sclera, PERRLA, EOMI, Clear nares, Moist Mucosas  Neck: Supple, no carotid bruits, no JVD, No thyromegaly  Respiratory: Normal respiratory rate and effort, lungs present diffuse fine crackles auscultation bilaterally.  Cardiac: Regular rate and rhythm, no murmurs, rubs or gallop.  GI: Abdomen is soft, nontender, nondistended, no rebound or guarding, bowel sounds are normal.  Extremities: No cyanosis, no edema, pedal pulses +2 bilaterally.  Neurological: Patient is alert and oriented x4, CN II-XII grossly intact, no sensory or motor deficits.  Psych: Normal speech and affect, good eye contact. No behavioural disturbances.  Skin: Appropiate skin color to race and age, no abnormal discolorations, no bruises, no bleeding, no lacerations.    LABS:                   Awaiting morning labs for today    MEDICATIONS  (STANDING):  ALBUTerol/ipratropium for Nebulization 3 milliLiter(s) Nebulizer every 6 hours  azithromycin  IVPB 500 milliGRAM(s) IV Intermittent daily  enalapril 10 milliGRAM(s) Oral daily  enoxaparin Injectable 40 milliGRAM(s) SubCutaneous daily  furosemide    Tablet 40 milliGRAM(s) Oral daily  influenza   Vaccine 0.5 milliLiter(s) IntraMuscular once  methotrexate 10 milliGRAM(s) Oral every week  methylPREDNISolone sodium succinate Injectable 40 milliGRAM(s) IV Push every 8 hours  nicotine -  14 mG/24Hr(s) Patch 1 patch Transdermal daily  pantoprazole    Tablet 40 milliGRAM(s) Oral before breakfast  saccharomyces boulardii 250 milliGRAM(s) Oral two times a day    MEDICATIONS  (PRN):  acetaminophen   Tablet .. 650 milliGRAM(s) Oral every 6 hours PRN Temp greater or equal to 38C (100.4F), Mild Pain (1 - 3)  ALBUTerol    0.083% 2.5 milliGRAM(s) Nebulizer every 4 hours PRN Shortness of Breath and/or Wheezing  ALPRAZolam 0.5 milliGRAM(s) Oral at bedtime PRN insomnia  guaiFENesin    Syrup 200 milliGRAM(s) Oral every 6 hours PRN Cough  oxyCODONE    5 mG/acetaminophen 325 mG 1 Tablet(s) Oral every 4 hours PRN Moderate Pain (4 - 6) Patient is a 68y old  Female who presents with a chief complaint of sob , cough and sputum production (23 Jan 2019 08:59)      INTERVAL HPI/OVERNIGHT EVENTS:  68y  Female  No acute events overnight  Pt is tolerating PO, voiding and stooling spontaneously. Ambulates freely. Pain well tolerated under current regiment.  Expresses that she will like to be discharged tomorrow for follow up appointment with MD at noon and later work.     ROS Negative except as above.    Telemetry: 2 desaturation episodes to 88% for a few seconds overnight.     Allergies    aspirin (Other)  Naprosyn (Other (Severe))  Sulfur (Rash)    Vital Signs Last 24 Hrs  T(C): 36.6 (24 Jan 2019 07:13), Max: 36.8 (23 Jan 2019 15:49)  T(F): 97.8 (24 Jan 2019 07:13), Max: 98.2 (23 Jan 2019 15:49)  HR: 85 (24 Jan 2019 07:13) (70 - 97)  BP: 108/56 (24 Jan 2019 07:13) (98/51 - 132/65)  RR: 18 (24 Jan 2019 07:13) (18 - 20)  SpO2: 96% (24 Jan 2019 07:13) (90% - 99%)    PHYSICAL EXAM:    General:NAD, well nourished and well developed, afebrile  HEENT: Normocephalic, atraumatic, clear sclera, PERRLA, EOMI, Clear nares, Moist Mucosas  Neck: Supple, no carotid bruits, no JVD, No thyromegaly  Respiratory: Normal respiratory rate and effort, lungs present diffuse fine crackles auscultation bilaterally.  Cardiac: Regular rate and rhythm, no murmurs, rubs or gallop.  GI: Abdomen is soft, nontender, nondistended, no rebound or guarding, bowel sounds are normal.  Extremities: No cyanosis, no edema, pedal pulses +2 bilaterally.  Neurological: Patient is alert and oriented x4, CN II-XII grossly intact, no sensory or motor deficits.  Psych: Normal speech and affect, good eye contact. No behavioural disturbances.  Skin: Appropiate skin color to race and age, no abnormal discolorations, no bruises, no bleeding, no lacerations.    LABS:             CBC Full  -  ( 24 Jan 2019 09:27 )  WBC Count : 12.4 K/uL  Hemoglobin : 12.3 g/dL  Hematocrit : 39.0 %  Platelet Count - Automated : 343 K/uL  Mean Cell Volume : 88.0 fl  Mean Cell Hemoglobin : 27.8 pg  Mean Cell Hemoglobin Concentration : 31.5 g/dL  Auto Neutrophil # : 10.8 K/uL  Auto Lymphocyte # : 1.2 K/uL  Auto Monocyte # : 0.3 K/uL  Auto Eosinophil # : 0.0 K/uL  Auto Basophil # : 0.0 K/uL  Auto Neutrophil % : 87.1 %  Auto Lymphocyte % : 9.3 %  Auto Monocyte % : 2.5 %  Auto Eosinophil % : 0.0 %  Auto Basophil % : 0.1 %    01-24    142  |  99  |  29.0<H>  ----------------------------<  228<H>  3.8   |  26.0  |  0.70    Ca    8.5<L>      24 Jan 2019 09:27  Phos  2.9     01-23  Mg     2.0     01-23    TPro  7.9  /  Alb  4.0  /  TBili  0.4  /  DBili  x   /  AST  20  /  ALT  24  /  AlkPhos  169<H>  01-22    MEDICATIONS  (STANDING):  ALBUTerol/ipratropium for Nebulization 3 milliLiter(s) Nebulizer every 6 hours  azithromycin  IVPB 500 milliGRAM(s) IV Intermittent daily  enalapril 10 milliGRAM(s) Oral daily  enoxaparin Injectable 40 milliGRAM(s) SubCutaneous daily  furosemide    Tablet 40 milliGRAM(s) Oral daily  influenza   Vaccine 0.5 milliLiter(s) IntraMuscular once  methotrexate 10 milliGRAM(s) Oral every week  methylPREDNISolone sodium succinate Injectable 40 milliGRAM(s) IV Push every 8 hours  nicotine -  14 mG/24Hr(s) Patch 1 patch Transdermal daily  pantoprazole    Tablet 40 milliGRAM(s) Oral before breakfast  saccharomyces boulardii 250 milliGRAM(s) Oral two times a day    MEDICATIONS  (PRN):  acetaminophen   Tablet .. 650 milliGRAM(s) Oral every 6 hours PRN Temp greater or equal to 38C (100.4F), Mild Pain (1 - 3)  ALBUTerol    0.083% 2.5 milliGRAM(s) Nebulizer every 4 hours PRN Shortness of Breath and/or Wheezing  ALPRAZolam 0.5 milliGRAM(s) Oral at bedtime PRN insomnia  guaiFENesin    Syrup 200 milliGRAM(s) Oral every 6 hours PRN Cough  oxyCODONE    5 mG/acetaminophen 325 mG 1 Tablet(s) Oral every 4 hours PRN Moderate Pain (4 - 6)

## 2019-01-24 NOTE — DISCHARGE NOTE ADULT - PLAN OF CARE
Resolution of symptoms Upper Respiratory Infection, Adult    Most upper respiratory infections (URIs) are a viral infection of the air passages leading to the lungs. A URI affects the nose, throat, and upper air passages. The most common type of URI is nasopharyngitis and is typically referred to as "the common cold."    URIs run their course and usually go away on their own. Most of the time, a URI does not require medical attention, but sometimes a bacterial infection in the upper airways can follow a viral infection. This is called a secondary infection. Sinus and middle ear infections are common types of secondary upper respiratory infections.    Bacterial pneumonia can also complicate a URI. A URI can worsen asthma and chronic obstructive pulmonary disease (COPD). Sometimes, these complications can require emergency medical care and may be life threatening.     Almost all URIs are caused by viruses. A virus is a type of germ and can spread from one person to another.     You may be at risk for a URI if:   You smoke, have chronic heart or lung disease, a weakened defense (immune) system, are very young or very old, nasal allergies or asthma, work in crowded or poorly ventilated areas or health care facilities or schools.    SEEK MEDICAL CARE IF:  You are getting worse rather than better, symptoms are not controlled by medicine, chills, worsening shortness of breath, brown or red mucus, yellow or brown nasal discharge, pain in your face, especially when you bend forward, have fever, swollen neck glands, pain while swallowing, white areas in the back of your throat.     Please follow up with your Primary MD in 24-48 hr.  Seek immediate medical care for any new/worsening signs or symptoms. Management of disease You were admitted for lupus flare Lupus is an autoimmune inflammatory disease. This means that your immune system starts to attack your body instead of harmful germs. Lupus has active and quiet periods. The active periods, also called flares, are when you have symptoms. Please take your medications as prescribed to manage your autoimmune inflamatory disease and prevent it from affecting other organs in your body. Follow up with your primary care physician for follow up and management. Management and prevention of exacerbations Be sure to take all asthma medications as prescribed, it is important that you are consistent and do not miss taking the ones that are meant to be taken daily, even if your breathing already feels well. If you have been prescribed steroids, also be sure to take those are prescribed. Be sure to follow up with your Primary Care Doctor or a Pulmonologist if you have one. If you do not already have an asthma action plan, please discuss establishing one with your outpatient Doctors. Prevention of new myocardial infarction A heart attack happens when the blood vessels that supply blood to your heart are blocked. This can damage your heart or lead to an abnormal heart rhythm or heart failure. A heart attack is also called a myocardial infarction.  You have any of the following signs of a heart attack: ?Squeezing, pressure, or pain in your chest  ?and any of the following:?Discomfort or pain in your back, neck, jaw, stomach, or arm  ?Shortness of breath  ?Nausea or vomiting  ?Lightheadedness or a sudden cold sweat        Seek care immediately if:  •You are tired and cannot think clearly.  •Your heart is beating faster than usual.  •You are bleeding from your gums or nose.  •You see blood in your urine or bowel movements.  •You urinate less than usual or not at all.  •You have new or increased swelling in your feet or ankles Be sure to take Calcium and vitamin d supplementation, your goal should be to take at least 1000mg of Calcium and at least 800units of Vitamin D in daily. Be sure to participate in weight bearing + resistance + balance exercises as tolerated. Take precautions when moving around to avoid falls, if you feel unsteady on your feet or already have been advised to, be sure to use assistive devices to walk around. You should discuss having bone density imaging with your Primary Care Doctor. Be sure to follow a low salt diet. If you have been prescribed antihypertensive medications to control your blood pressure, be sure to take them every day as prescribed and do not miss any doses, the medications do not work if they are not taken consistently. Follow up with your Primary Care Doctor and have your Blood Pressure checked. Total Cholesterol < 200, Triglyceride < 150, LDL < 100, HDL > 50 Follow a low fat diet. Continue taking your cholesterol medications as prescribed. Follow up with your Primary Care Doctor to continue having your cholesterol levels checked on a continual basis.

## 2019-01-24 NOTE — PROGRESS NOTE ADULT - ASSESSMENT
1- Bronchospasm and hypoxia  most likely 2/2 to undiagnosed COPD exacerbation   - RPV neg  -c/w    -c/w Duoneb q 6 hours  -c/w albuterol neb PRN q 4 hours  - c/w azithromycin   -c/w guaifenesin PRN  - solumedrol 40 mg q 8 hours, PPI while on steroids   - O2 NC, keep O2 between 90-94   -incentive spirometry     - f/u outpatient spirometry for copd exacerbation  -Flu vaccine    HTN  -c/w home med , enalapril 10 mg qd  -c/w lasix PO daily    Lupus   -c/w methotrexate 10 mg every week  -pt states that she regularly does not take her meds as she feels fine  -wants to follow up with primary care doctor now    Insomnia   c/w xanax 0.25 mg PRN at bed time     Preventive measure   DVT Proph: Lovenox 40 mg qd  PT evaluation. Bronchospasm and hypoxia  most likely 2/2 to undiagnosed COPD exacerbation   - RPV neg  -c/w    -c/w Duoneb q 6 hours  -c/w albuterol neb PRN q 4 hours  - c/w azithromycin   -c/w guaifenesin PRN  - solumedrol 40 mg q 8 hours, PPI while on steroids   - O2 NC, keep O2 between 90-94   -incentive spirometry     - f/u outpatient spirometry for copd exacerbation  -Flu vaccine    HTN  -c/w home med , enalapril 10 mg qd  -c/w lasix PO daily    Lupus   -c/w methotrexate 10 mg every week  -pt states that she regularly does not take her meds as she feels fine  -wants to follow up with primary care doctor now    Insomnia   c/w xanax 0.25 mg PRN at bed time     Preventive measure   DVT Proph: Lovenox 40 mg qd  PT evaluation.

## 2019-01-24 NOTE — PROGRESS NOTE ADULT - ASSESSMENT
Exacerbation of COPD improved  Patient not on any maintenance regimen  Nicotine dependence    Plan:  1.Have decreased solumedrol to q12  2.If doing well in AM can D/C on outpatient prednisone taper,  3.Advise routine LABA/ICS (has Symbicort), and would add LAMA>Spiriva or Incruse daily  4.Needs followup with us post d/c.   5.Will f/u PRN at this point.

## 2019-01-24 NOTE — DISCHARGE NOTE ADULT - ADDITIONAL INSTRUCTIONS
Continue all antibiotics as prescribed. You may benefit from taking a probiotic such a florastor which can be bought over the counter for the duration of time that you are on antibiotics to help prevent an infectious diarrhea called Clostridium difficile. If you notice you are having more than 4-5 bowel movements that are very liquidy or diarrheal, be sure to see your primary care doctor or come to the ER to have your stool tested. If your original infection seems to get worse, if you are having lots of high fevers over 100.4F, chills, extreme shakiness, very bad difficulty breathing, cannot hold what you are eating down, or develop a bad rash, come to the ER immediately.   -Be sure to establish care with a Primary Care Doctor. You should call to make an appointment for hospital follow up within 7 days, be sure to tell them that you were just released from [facility] when making your appointment. Contact information for a suggested provider is available on this paperwork.    -Continue with all medications as prescribed.    -If symptoms return, become worse, and/or if new symptoms appear please seek medical attention immediately with Primary Care Physician and/or Emergency Department (as you may deem appropriate).

## 2019-01-24 NOTE — DISCHARGE NOTE ADULT - HOSPITAL COURSE
Pt is a 67 yo F w/ PMH of lupus ( she suppose to take methotrexate 10 mg qd but she has not been taken her med) , LE edema ,  current smoker,  HTN, Hashimoto thyroiditis , HTN , asthma ( no home O2 , never intubate ), she also report prior MI but in 2012  .  Pt report that she was having severe cough associated w/ some green sputum production. She also had increase of SOB and left side chest and abdominal  pain which she attribute to her cough, pain increased on deep breath or when she cough , no changes of pain w/ movements . She usually does not use asthma medications ( albuterol and symbicort ) but over the past week she started using her med due to her sx, she states that a lot of peoples in the place where she work have respiratory sx. On ED patient In the ER pt was found to be hypoxic ( o2= 88) she was tx w/ solumedrol 125 mg x 1 , rocephin 1 gr x 1 and albuterol neb. Admitted to med service for COPD exacerbation.   Pt received nebulazer treatments, steroids and supportive oxygen with good clinical improvement.    She was also restarted on lupus medications as she had not been taking them for months, due to her feeling well and not deeming them necessary. She now wants to restart her medical treatment and follow up with her primary physician for treatment of her Lupus.      Now, patient is medically optimized for discharge home & will follow up with primary care physician.     All electrolyte abnormalities were monitored carefully and repleted as necessary during this hospitalization. At the time of discharge patient was hemodynamically stable and amenable to all terms of discharge. The patient has received verbal instructions from myself regarding discharge plans.     Minutes spent: 45 mins Pt is a 69 yo F w/ PMH of lupus ( she suppose to take methotrexate 10 mg qd but she has not been taken her med) , LE edema ,  current smoker,  HTN, Hashimoto thyroiditis , HTN , asthma ( no home O2 , never intubate ), she also report prior MI but in 2012  .  Pt report that she was having severe cough associated w/ some green sputum production. She also had increase of SOB and left side chest and abdominal  pain which she attribute to her cough, pain increased on deep breath or when she cough , no changes of pain w/ movements . She usually does not use asthma medications ( albuterol and symbicort ) but over the past week she started using her med due to her sx, she states that a lot of peoples in the place where she work have respiratory sx. On ED patient In the ER pt was found to be hypoxic ( o2= 88) she was tx w/ solumedrol 125 mg x 1 , rocephin 1 gr x 1 and albuterol neb. Admitted to med service for COPD exacerbation.   Pt received nebulazer treatments, steroids and supportive oxygen with good clinical improvement.    She was also restarted on lupus medications as she had not been taking them for months, due to her feeling well and not deeming them necessary. She now wants to restart her medical treatment and follow up with her primary physician for treatment of her Lupus.    Now, patient is medically optimized for discharge home & will follow up with primary care physician.     All electrolyte abnormalities were monitored carefully and repleted as necessary during this hospitalization. At the time of discharge patient was hemodynamically stable and amenable to all terms of discharge. The patient has received verbal instructions from myself regarding discharge plans.     Minutes spent: 45 mins

## 2019-01-24 NOTE — DISCHARGE NOTE ADULT - SECONDARY DIAGNOSIS.
Lupus erythematosus, unspecified form Asthma with acute exacerbation, unspecified asthma severity, unspecified whether persistent Myocardial infarction, unspecified MI type, unspecified artery Osteoporosis without current pathological fracture, unspecified osteoporosis type Essential hypertension High cholesterol

## 2019-01-24 NOTE — DISCHARGE NOTE ADULT - NS AS ACTIVITY OBS
Bathing allowed/Showering allowed/Driving allowed/Walking-Indoors allowed/Stairs allowed/Walking-Outdoors allowed/Return to Work/School allowed/Sex allowed

## 2019-01-24 NOTE — PROGRESS NOTE ADULT - SUBJECTIVE AND OBJECTIVE BOX
PULMONARY PROGRESS NOTE      MARIAN HOUSER-[id number]    Patient is a 68y old  Female who presents with a chief complaint of sob , cough and sputum production (2019 07:45)      INTERVAL HPI/OVERNIGHT EVENTS:  Feels better  Cough decreased  Shortness of breath decreased    MEDICATIONS  (STANDING):  ALBUTerol/ipratropium for Nebulization 3 milliLiter(s) Nebulizer every 6 hours  azithromycin  IVPB 500 milliGRAM(s) IV Intermittent daily  enalapril 10 milliGRAM(s) Oral daily  enoxaparin Injectable 40 milliGRAM(s) SubCutaneous daily  furosemide    Tablet 40 milliGRAM(s) Oral daily  influenza   Vaccine 0.5 milliLiter(s) IntraMuscular once  methotrexate 10 milliGRAM(s) Oral every week  methylPREDNISolone sodium succinate Injectable 40 milliGRAM(s) IV Push every 12 hours  nicotine -  14 mG/24Hr(s) Patch 1 patch Transdermal daily  pantoprazole    Tablet 40 milliGRAM(s) Oral before breakfast  saccharomyces boulardii 250 milliGRAM(s) Oral two times a day      MEDICATIONS  (PRN):  acetaminophen   Tablet .. 650 milliGRAM(s) Oral every 6 hours PRN Temp greater or equal to 38C (100.4F), Mild Pain (1 - 3)  ALBUTerol    0.083% 2.5 milliGRAM(s) Nebulizer every 4 hours PRN Shortness of Breath and/or Wheezing  ALPRAZolam 0.5 milliGRAM(s) Oral at bedtime PRN insomnia  guaiFENesin    Syrup 200 milliGRAM(s) Oral every 6 hours PRN Cough  oxyCODONE    5 mG/acetaminophen 325 mG 1 Tablet(s) Oral every 4 hours PRN Moderate Pain (4 - 6)      Allergies    aspirin (Other)  Naprosyn (Other (Severe))  Sulfur (Rash)    Intolerances        PAST MEDICAL & SURGICAL HISTORY:  Breast lump: Benign  Lupus  Autoimmune disease  Osteoporosis  High cholesterol  Asthma  Myocardial infarct  H/O lumpectomy   delivery delivered: x3      SOCIAL HISTORY  Smoking History: +      REVIEW OF SYSTEMS:    CONSTITUTIONAL:  No distress    HEENT:  Eyes:  No diplopia or blurred vision. ENT:  No earache, sore throat or runny nose.    CARDIOVASCULAR:  No pressure, squeezing, tightness, heaviness or aching about the chest; no palpitations.    RESPIRATORY:  Per HPI    GASTROINTESTINAL:  No nausea, vomiting or diarrhea.    GENITOURINARY:  No dysuria, frequency or urgency.    MUSCULOSKELETAL:  No joint pain    SKIN:  No new lesions.    NEUROLOGIC:  No paresthesias, fasciculations, seizures or weakness.    PSYCHIATRIC:  No disorder of thought or mood.    ENDOCRINE:  No heat or cold intolerance, polyuria or polydipsia.    HEMATOLOGICAL:  No easy bruising or bleeding.     Vital Signs Last 24 Hrs  T(C): 36.6 (2019 07:13), Max: 36.8 (2019 15:49)  T(F): 97.8 (2019 07:13), Max: 98.2 (2019 15:49)  HR: 83 (2019 09:34) (70 - 97)  BP: 108/56 (2019 07:13) (98/51 - 132/65)  BP(mean): --  RR: 18 (2019 07:13) (18 - 20)  SpO2: 90% (2019 09:34) (90% - 99%)    PHYSICAL EXAMINATION:    GENERAL: The patient is awake and alert in no apparent distress.     HEENT: Head is normocephalic and atraumatic. Extraocular muscles are intact. Mucous membranes are moist.    NECK: Supple.    LUNGS: Minimal scattered upper zone inspiratory wheeze otherwise with improved breath sounds    HEART: Regular rate and rhythm without murmur.    ABDOMEN: Soft, nontender, and nondistended.      EXTREMITIES: Without any cyanosis, clubbing, rash, lesions or edema.    NEUROLOGIC: Grossly intact.    SKIN: No ulceration or induration present.      LABS:                        12.3   12.4  )-----------( 343      ( 2019 09:27 )             39.0         142  |  99  |  29.0<H>  ----------------------------<  228<H>  3.8   |  26.0  |  0.70    Ca    8.5<L>      2019 09:27  Phos  2.9       Mg     2.0         TPro  7.9  /  Alb  4.0  /  TBili  0.4  /  DBili  x   /  AST  20  /  ALT  24  /  AlkPhos  169<H>      PT/INR - ( 2019 19:31 )   PT: 12.7 sec;   INR: 1.10 ratio         PTT - ( 2019 19:31 )  PTT:38.2 sec      CARDIAC MARKERS ( 2019 08:57 )  x     / <0.01 ng/mL / x     / x     / x      CARDIAC MARKERS ( 2019 19:32 )  x     / <0.01 ng/mL / x     / x     / x            Serum Pro-Brain Natriuretic Peptide: 286 pg/mL (19 @ 19:32)          MICROBIOLOGY:    RADIOLOGY & ADDITIONAL STUDIES:

## 2019-01-24 NOTE — DISCHARGE NOTE ADULT - OTHER SIGNIFICANT FINDINGS
Vital Signs Last 24 Hrs  T(C): 37.1 (25 Jan 2019 00:23), Max: 37.1 (25 Jan 2019 00:23)  T(F): 98.7 (25 Jan 2019 00:23), Max: 98.7 (25 Jan 2019 00:23)  HR: 60 (25 Jan 2019 04:42) (60 - 110)  BP: 138/78 (25 Jan 2019 04:42) (104/46 - 138/78)  RR: 19 (25 Jan 2019 00:23) (19 - 20)  SpO2: 94% (25 Jan 2019 03:51) (90% - 100%)    PHYSICAL EXAM:    General:NAD, well nourished and well developed, afebrile  HEENT: Normocephalic, atraumatic, clear sclera, PERRLA, EOMI, Clear nares, Moist Mucosas  Neck: Supple, no JVD  Respiratory: Normal respiratory rate and effort, lungs mild present diffuse fine crackles and residual wheezing on auscultation bilaterally.  Cardiac: Regular rate and rhythm, no murmurs, rubs or gallop.  GI: Abdomen is soft, nontender, nondistended, no rebound or guarding, bowel sounds are normal.  Extremities: No cyanosis, no edema, pedal pulses +2 bilaterally.  Neurological: Patient is alert and oriented x4, CN II-XII grossly intact, no sensory or motor deficits.  Psych: Normal speech and affect, good eye contact. No behavioural disturbances.  Skin: Appropiate skin color to race and age, no abnormal discolorations, no bruises, no bleeding, no lacerations.

## 2019-01-24 NOTE — DISCHARGE NOTE ADULT - CONDITION (STATED IN TERMS THAT PERMIT A SPECIFIC MEASURABLE COMPARISON WITH CONDITION ON ADMISSION):
Hemodynamically Stable, NAD, Afebrile, Currently Asymptomatic, Tolerating PO, Voiding/Stooling spontaneously, No Neuro deficits

## 2019-01-24 NOTE — DISCHARGE NOTE ADULT - PROVIDER TOKENS
FREE:[LAST:[Macario],FIRST:[Bill],PHONE:[(   )    -],FAX:[(   )    -],ADDRESS:[[place] [address]    [phone]]],TOKEN:'[id number]'

## 2019-01-24 NOTE — DISCHARGE NOTE ADULT - COMMUNITY RESOURCES
[facility] Appt 2/1 @ 10:30 am Dr Quintero......[address]  , [address] [facility], [facility]  [phone]

## 2019-01-24 NOTE — DISCHARGE NOTE ADULT - CARE PROVIDER_API CALL
Ulises Macario [facility]     [address] [place] [address]    [phone]  Phone: (   )    -  Fax: (   )    -    Gordo Quintero), Internal Medicine; Pulmonary Disease  [place] [address]  Phone: [phone]  Fax: [phone]

## 2019-01-24 NOTE — DISCHARGE NOTE ADULT - CARE PLAN
Principal Discharge DX:	Upper respiratory tract infection, unspecified type  Goal:	Resolution of symptoms  Assessment and plan of treatment:	Upper Respiratory Infection, Adult    Most upper respiratory infections (URIs) are a viral infection of the air passages leading to the lungs. A URI affects the nose, throat, and upper air passages. The most common type of URI is nasopharyngitis and is typically referred to as "the common cold."    URIs run their course and usually go away on their own. Most of the time, a URI does not require medical attention, but sometimes a bacterial infection in the upper airways can follow a viral infection. This is called a secondary infection. Sinus and middle ear infections are common types of secondary upper respiratory infections.    Bacterial pneumonia can also complicate a URI. A URI can worsen asthma and chronic obstructive pulmonary disease (COPD). Sometimes, these complications can require emergency medical care and may be life threatening.     Almost all URIs are caused by viruses. A virus is a type of germ and can spread from one person to another.     You may be at risk for a URI if:   You smoke, have chronic heart or lung disease, a weakened defense (immune) system, are very young or very old, nasal allergies or asthma, work in crowded or poorly ventilated areas or health care facilities or schools.    SEEK MEDICAL CARE IF:  You are getting worse rather than better, symptoms are not controlled by medicine, chills, worsening shortness of breath, brown or red mucus, yellow or brown nasal discharge, pain in your face, especially when you bend forward, have fever, swollen neck glands, pain while swallowing, white areas in the back of your throat.     Please follow up with your Primary MD in 24-48 hr.  Seek immediate medical care for any new/worsening signs or symptoms.  Secondary Diagnosis:	Lupus erythematosus, unspecified form  Goal:	Management of disease  Assessment and plan of treatment:	You were admitted for lupus flare Lupus is an autoimmune inflammatory disease. This means that your immune system starts to attack your body instead of harmful germs. Lupus has active and quiet periods. The active periods, also called flares, are when you have symptoms. Please take your medications as prescribed to manage your autoimmune inflamatory disease and prevent it from affecting other organs in your body. Follow up with your primary care physician for follow up and management.  Secondary Diagnosis:	Asthma with acute exacerbation, unspecified asthma severity, unspecified whether persistent  Goal:	Management and prevention of exacerbations  Assessment and plan of treatment:	Be sure to take all asthma medications as prescribed, it is important that you are consistent and do not miss taking the ones that are meant to be taken daily, even if your breathing already feels well. If you have been prescribed steroids, also be sure to take those are prescribed. Be sure to follow up with your Primary Care Doctor or a Pulmonologist if you have one. If you do not already have an asthma action plan, please discuss establishing one with your outpatient Doctors.  Secondary Diagnosis:	Myocardial infarction, unspecified MI type, unspecified artery  Goal:	Prevention of new myocardial infarction  Secondary Diagnosis:	Osteoporosis without current pathological fracture, unspecified osteoporosis type  Secondary Diagnosis:	Essential hypertension  Secondary Diagnosis:	High cholesterol Principal Discharge DX:	Upper respiratory tract infection, unspecified type  Goal:	Resolution of symptoms  Assessment and plan of treatment:	Upper Respiratory Infection, Adult    Most upper respiratory infections (URIs) are a viral infection of the air passages leading to the lungs. A URI affects the nose, throat, and upper air passages. The most common type of URI is nasopharyngitis and is typically referred to as "the common cold."    URIs run their course and usually go away on their own. Most of the time, a URI does not require medical attention, but sometimes a bacterial infection in the upper airways can follow a viral infection. This is called a secondary infection. Sinus and middle ear infections are common types of secondary upper respiratory infections.    Bacterial pneumonia can also complicate a URI. A URI can worsen asthma and chronic obstructive pulmonary disease (COPD). Sometimes, these complications can require emergency medical care and may be life threatening.     Almost all URIs are caused by viruses. A virus is a type of germ and can spread from one person to another.     You may be at risk for a URI if:   You smoke, have chronic heart or lung disease, a weakened defense (immune) system, are very young or very old, nasal allergies or asthma, work in crowded or poorly ventilated areas or health care facilities or schools.    SEEK MEDICAL CARE IF:  You are getting worse rather than better, symptoms are not controlled by medicine, chills, worsening shortness of breath, brown or red mucus, yellow or brown nasal discharge, pain in your face, especially when you bend forward, have fever, swollen neck glands, pain while swallowing, white areas in the back of your throat.     Please follow up with your Primary MD in 24-48 hr.  Seek immediate medical care for any new/worsening signs or symptoms.  Secondary Diagnosis:	Lupus erythematosus, unspecified form  Goal:	Management of disease  Assessment and plan of treatment:	You were admitted for lupus flare Lupus is an autoimmune inflammatory disease. This means that your immune system starts to attack your body instead of harmful germs. Lupus has active and quiet periods. The active periods, also called flares, are when you have symptoms. Please take your medications as prescribed to manage your autoimmune inflamatory disease and prevent it from affecting other organs in your body. Follow up with your primary care physician for follow up and management.  Secondary Diagnosis:	Asthma with acute exacerbation, unspecified asthma severity, unspecified whether persistent  Goal:	Management and prevention of exacerbations  Assessment and plan of treatment:	Be sure to take all asthma medications as prescribed, it is important that you are consistent and do not miss taking the ones that are meant to be taken daily, even if your breathing already feels well. If you have been prescribed steroids, also be sure to take those are prescribed. Be sure to follow up with your Primary Care Doctor or a Pulmonologist if you have one. If you do not already have an asthma action plan, please discuss establishing one with your outpatient Doctors.  Secondary Diagnosis:	Myocardial infarction, unspecified MI type, unspecified artery  Goal:	Prevention of new myocardial infarction  Assessment and plan of treatment:	A heart attack happens when the blood vessels that supply blood to your heart are blocked. This can damage your heart or lead to an abnormal heart rhythm or heart failure. A heart attack is also called a myocardial infarction.  You have any of the following signs of a heart attack: ?Squeezing, pressure, or pain in your chest  ?and any of the following:?Discomfort or pain in your back, neck, jaw, stomach, or arm  ?Shortness of breath  ?Nausea or vomiting  ?Lightheadedness or a sudden cold sweat        Seek care immediately if:  •You are tired and cannot think clearly.  •Your heart is beating faster than usual.  •You are bleeding from your gums or nose.  •You see blood in your urine or bowel movements.  •You urinate less than usual or not at all.  •You have new or increased swelling in your feet or ankles  Secondary Diagnosis:	Osteoporosis without current pathological fracture, unspecified osteoporosis type  Assessment and plan of treatment:	Be sure to take Calcium and vitamin d supplementation, your goal should be to take at least 1000mg of Calcium and at least 800units of Vitamin D in daily. Be sure to participate in weight bearing + resistance + balance exercises as tolerated. Take precautions when moving around to avoid falls, if you feel unsteady on your feet or already have been advised to, be sure to use assistive devices to walk around. You should discuss having bone density imaging with your Primary Care Doctor.  Secondary Diagnosis:	Essential hypertension  Assessment and plan of treatment:	Be sure to follow a low salt diet. If you have been prescribed antihypertensive medications to control your blood pressure, be sure to take them every day as prescribed and do not miss any doses, the medications do not work if they are not taken consistently. Follow up with your Primary Care Doctor and have your Blood Pressure checked.  Secondary Diagnosis:	High cholesterol  Goal:	Total Cholesterol < 200, Triglyceride < 150, LDL < 100, HDL > 50  Assessment and plan of treatment:	Follow a low fat diet. Continue taking your cholesterol medications as prescribed. Follow up with your Primary Care Doctor to continue having your cholesterol levels checked on a continual basis.

## 2019-01-24 NOTE — DISCHARGE NOTE ADULT - MEDICATION SUMMARY - MEDICATIONS TO TAKE
I will START or STAY ON the medications listed below when I get home from the hospital:    Percocet 10/325 oral tablet  -- 1 tab(s) by mouth every 6 hours, As Needed  -- Indication: For For moderate pain    enalapril 10 mg oral tablet  -- 1 tab(s) by mouth once a day  -- Indication: For Hypertesnsion    methotrexate 10 mg oral tablet  -- 1 tab(s) by mouth once a week  -- Indication: For Lupus erythematosus, unspecified form    Xanax 0.5 mg oral tablet  -- 1 tab(s) by mouth once a day (at bedtime), As Needed  -- Indication: For Anxiety    albuterol 2.5 mg/3 mL (0.083%) inhalation solution  -- 3 milliliter(s) inhaled 4 times a day, As Needed  -- For inhalation only.  It is very important that you take or use this exactly as directed.  Do not skip doses or discontinue unless directed by your doctor.  Obtain medical advice before taking any non-prescription drugs as some may affect the action of this medication.    -- Indication: For Asthma with acute exacerbation, unspecified asthma severity, unspecified whether persistent    Symbicort 160 mcg-4.5 mcg/inh inhalation aerosol  -- 2 puff(s) inhaled 2 times a day  -- Indication: For Asthma with acute exacerbation, unspecified asthma severity, unspecified whether persistent    furosemide 40 mg oral tablet  -- 1 tab(s) by mouth once a day  -- Indication: For Hypertension I will START or STAY ON the medications listed below when I get home from the hospital:    predniSONE 10 mg oral tablet  -- 4 tab(s) oral - by mouth once a day x 1 days  3 tab(s) oral - by mouth once a day x 3 days  2 tab(s) oral - by mouth once a day x 3 days  1 tab(s) oral - by mouth once a day x 3 days  -- It is very important that you take or use this exactly as directed.  Do not skip doses or discontinue unless directed by your doctor.  Obtain medical advice before taking any non-prescription drugs as some may affect the action of this medication.  Take with food or milk.    -- Indication: For Asthma with acute exacerbation, unspecified asthma severity, unspecified whether persistent    Percocet 10/325 oral tablet  -- 1 tab(s) by mouth every 6 hours, As Needed  -- Indication: For For moderate pain    enalapril 10 mg oral tablet  -- 1 tab(s) by mouth once a day  -- Indication: For Hypertesnsion    Xanax 0.5 mg oral tablet  -- 1 tab(s) by mouth once a day (at bedtime), As Needed  -- Indication: For Anxiety    Spiriva 18 mcg inhalation capsule  -- 2 cap(s) inhaled once a day   -- Check with your doctor before becoming pregnant.  For inhalation only.  It is very important that you take or use this exactly as directed.  Do not skip doses or discontinue unless directed by your doctor.  Obtain medical advice before taking any non-prescription drugs as some may affect the action of this medication.    -- Indication: For Asthma with acute exacerbation, unspecified asthma severity, unspecified whether persistent    albuterol 2.5 mg/3 mL (0.083%) inhalation solution  -- 3 milliliter(s) inhaled 4 times a day, As Needed  -- For inhalation only.  It is very important that you take or use this exactly as directed.  Do not skip doses or discontinue unless directed by your doctor.  Obtain medical advice before taking any non-prescription drugs as some may affect the action of this medication.    -- Indication: For Asthma with acute exacerbation, unspecified asthma severity, unspecified whether persistent    Symbicort 160 mcg-4.5 mcg/inh inhalation aerosol  -- 2 puff(s) inhaled 2 times a day  -- Indication: For Asthma with acute exacerbation, unspecified asthma severity, unspecified whether persistent    furosemide 40 mg oral tablet  -- 1 tab(s) by mouth once a day  -- Indication: For Hypertension    azithromycin 500 mg oral tablet  -- 1 tab(s) by mouth once a day   -- Do not take dairy products, antacids, or iron preparations within one hour of this medication.  Finish all this medication unless otherwise directed by prescriber.    -- Indication: For Pneumonia    saccharomyces boulardii lyo 250 mg oral capsule  -- 1 cap(s) by mouth 2 times a day  -- Indication: For Prophylaxis I will START or STAY ON the medications listed below when I get home from the hospital:    predniSONE 10 mg oral tablet  -- 4 tab(s) oral - by mouth once a day x 1 days  3 tab(s) oral - by mouth once a day x 3 days  2 tab(s) oral - by mouth once a day x 3 days  1 tab(s) oral - by mouth once a day x 3 days  -- It is very important that you take or use this exactly as directed.  Do not skip doses or discontinue unless directed by your doctor.  Obtain medical advice before taking any non-prescription drugs as some may affect the action of this medication.  Take with food or milk.    -- Indication: For Asthma with acute exacerbation, unspecified asthma severity, unspecified whether persistent    Percocet 10/325 oral tablet  -- 1 tab(s) by mouth every 6 hours, As Needed  -- Indication: For For moderate pain    enalapril 10 mg oral tablet  -- 1 tab(s) by mouth once a day  -- Indication: For Hypertesnsion    methotrexate 2.5 mg oral tablet  -- 4 tab(s) by mouth once a week  -- Indication: For Lupus erythematosus, unspecified form    Tessalon Perles 100 mg oral capsule  -- 1 cap(s) by mouth 3 times a day   -- May cause drowsiness.  Alcohol may intensify this effect.  Use care when operating dangerous machinery.  Swallow whole.  Do not crush.    -- Indication: For Cough    Xanax 0.5 mg oral tablet  -- 1 tab(s) by mouth once a day (at bedtime), As Needed  -- Indication: For Anxiety    Spiriva 18 mcg inhalation capsule  -- 2 cap(s) inhaled once a day   -- Check with your doctor before becoming pregnant.  For inhalation only.  It is very important that you take or use this exactly as directed.  Do not skip doses or discontinue unless directed by your doctor.  Obtain medical advice before taking any non-prescription drugs as some may affect the action of this medication.    -- Indication: For Asthma with acute exacerbation, unspecified asthma severity, unspecified whether persistent    albuterol 2.5 mg/3 mL (0.083%) inhalation solution  -- 3 milliliter(s) inhaled 4 times a day, As Needed  -- For inhalation only.  It is very important that you take or use this exactly as directed.  Do not skip doses or discontinue unless directed by your doctor.  Obtain medical advice before taking any non-prescription drugs as some may affect the action of this medication.    -- Indication: For Asthma with acute exacerbation, unspecified asthma severity, unspecified whether persistent    Symbicort 160 mcg-4.5 mcg/inh inhalation aerosol  -- 2 puff(s) inhaled 2 times a day  -- Indication: For Asthma with acute exacerbation, unspecified asthma severity, unspecified whether persistent    furosemide 40 mg oral tablet  -- 1 tab(s) by mouth once a day  -- Indication: For Hypertension    azithromycin 500 mg oral tablet  -- 1 tab(s) by mouth once a day   -- Do not take dairy products, antacids, or iron preparations within one hour of this medication.  Finish all this medication unless otherwise directed by prescriber.    -- Indication: For Pneumonia    saccharomyces boulardii lyo 250 mg oral capsule  -- 1 cap(s) by mouth 2 times a day  -- Indication: For Prophylaxis I will START or STAY ON the medications listed below when I get home from the hospital:    predniSONE 10 mg oral tablet  -- 4 tab(s) oral - by mouth once a day x 1 days  3 tab(s) oral - by mouth once a day x 3 days  2 tab(s) oral - by mouth once a day x 3 days  1 tab(s) oral - by mouth once a day x 3 days  -- It is very important that you take or use this exactly as directed.  Do not skip doses or discontinue unless directed by your doctor.  Obtain medical advice before taking any non-prescription drugs as some may affect the action of this medication.  Take with food or milk.    -- Indication: For Asthma with acute exacerbation, unspecified asthma severity, unspecified whether persistent    Percocet 10/325 oral tablet  -- 1 tab(s) by mouth every 6 hours, As Needed  -- Indication: For For moderate pain    enalapril 10 mg oral tablet  -- 1 tab(s) by mouth once a day  -- Indication: For Hypertesnsion    methotrexate 2.5 mg oral tablet  -- 4 tab(s) by mouth once a week  -- Indication: For Lupus erythematosus, unspecified form    Tessalon Perles 100 mg oral capsule  -- 1 cap(s) by mouth 3 times a day   -- May cause drowsiness.  Alcohol may intensify this effect.  Use care when operating dangerous machinery.  Swallow whole.  Do not crush.    -- Indication: For Cough    Xanax 0.5 mg oral tablet  -- 1 tab(s) by mouth once a day (at bedtime), As Needed  -- Indication: For Anxiety    albuterol 2.5 mg/3 mL (0.083%) inhalation solution  -- 3 milliliter(s) inhaled 4 times a day, As Needed  -- For inhalation only.  It is very important that you take or use this exactly as directed.  Do not skip doses or discontinue unless directed by your doctor.  Obtain medical advice before taking any non-prescription drugs as some may affect the action of this medication.    -- Indication: For Asthma with acute exacerbation, unspecified asthma severity, unspecified whether persistent    Symbicort 160 mcg-4.5 mcg/inh inhalation aerosol  -- 2 puff(s) inhaled 2 times a day  -- Indication: For Asthma with acute exacerbation, unspecified asthma severity, unspecified whether persistent    furosemide 40 mg oral tablet  -- 1 tab(s) by mouth once a day  -- Indication: For Hypertension    azithromycin 500 mg oral tablet  -- 1 tab(s) by mouth once a day   -- Do not take dairy products, antacids, or iron preparations within one hour of this medication.  Finish all this medication unless otherwise directed by prescriber.    -- Indication: For Pneumonia    saccharomyces boulardii lyo 250 mg oral capsule  -- 1 cap(s) by mouth 2 times a day  -- Indication: For Prophylaxis I will START or STAY ON the medications listed below when I get home from the hospital:    predniSONE 10 mg oral tablet  -- 4 tab(s) oral - by mouth once a day x 1 days  3 tab(s) oral - by mouth once a day x 3 days  2 tab(s) oral - by mouth once a day x 3 days  1 tab(s) oral - by mouth once a day x 3 days  -- It is very important that you take or use this exactly as directed.  Do not skip doses or discontinue unless directed by your doctor.  Obtain medical advice before taking any non-prescription drugs as some may affect the action of this medication.  Take with food or milk.    -- Indication: For Asthma with acute exacerbation, unspecified asthma severity, unspecified whether persistent    Percocet 10/325 oral tablet  -- 1 tab(s) by mouth every 6 hours, As Needed  -- Indication: For For moderate pain    enalapril 10 mg oral tablet  -- 1 tab(s) by mouth once a day  -- Indication: For Hypertesnsion    methotrexate 2.5 mg oral tablet  -- 4 tab(s) by mouth once a week  -- Indication: For Lupus erythematosus, unspecified form    Tessalon Perles 100 mg oral capsule  -- 1 cap(s) by mouth 3 times a day   -- May cause drowsiness.  Alcohol may intensify this effect.  Use care when operating dangerous machinery.  Swallow whole.  Do not crush.    -- Indication: For Cough    Xanax 0.5 mg oral tablet  -- 1 tab(s) by mouth once a day (at bedtime), As Needed  -- Indication: For Anxiety    albuterol 2.5 mg/3 mL (0.083%) inhalation solution  -- 3 milliliter(s) inhaled 4 times a day, As Needed  -- For inhalation only.  It is very important that you take or use this exactly as directed.  Do not skip doses or discontinue unless directed by your doctor.  Obtain medical advice before taking any non-prescription drugs as some may affect the action of this medication.    -- Indication: For Asthma with acute exacerbation, unspecified asthma severity, unspecified whether persistent    Symbicort 160 mcg-4.5 mcg/inh inhalation aerosol  -- 2 puff(s) inhaled 2 times a day  -- Indication: For Asthma with acute exacerbation, unspecified asthma severity, unspecified whether persistent    Incruse Ellipta 62.5 mcg/inh inhalation powder  -- 1 puff(s) inhaled once a day   -- Check with your doctor before becoming pregnant.  For inhalation only.  It is very important that you take or use this exactly as directed.  Do not skip doses or discontinue unless directed by your doctor.  Obtain medical advice before taking any non-prescription drugs as some may affect the action of this medication.    -- Indication: For Asthma with acute exacerbation, unspecified asthma severity, unspecified whether persistent    furosemide 40 mg oral tablet  -- 1 tab(s) by mouth once a day  -- Indication: For Hypertension    azithromycin 500 mg oral tablet  -- 1 tab(s) by mouth once a day   -- Do not take dairy products, antacids, or iron preparations within one hour of this medication.  Finish all this medication unless otherwise directed by prescriber.    -- Indication: For Pneumonia    saccharomyces boulardii lyo 250 mg oral capsule  -- 1 cap(s) by mouth 2 times a day  -- Indication: For Prophylaxis

## 2019-01-24 NOTE — DISCHARGE NOTE ADULT - PATIENT PORTAL LINK FT
You can access the [facility] Patient Portal, offered by [facility], by registering with the following website: [email or web address]

## 2019-01-25 ENCOUNTER — INBOUND DOCUMENT (OUTPATIENT)
Age: 69
End: 2019-01-25

## 2019-01-25 VITALS
RESPIRATION RATE: 18 BRPM | SYSTOLIC BLOOD PRESSURE: 124 MMHG | DIASTOLIC BLOOD PRESSURE: 71 MMHG | TEMPERATURE: 98 F | HEART RATE: 94 BPM | OXYGEN SATURATION: 96 %

## 2019-01-25 PROCEDURE — 83036 HEMOGLOBIN GLYCOSYLATED A1C: CPT

## 2019-01-25 PROCEDURE — 85610 PROTHROMBIN TIME: CPT

## 2019-01-25 PROCEDURE — 80053 COMPREHEN METABOLIC PANEL: CPT

## 2019-01-25 PROCEDURE — 83735 ASSAY OF MAGNESIUM: CPT

## 2019-01-25 PROCEDURE — 87581 M.PNEUMON DNA AMP PROBE: CPT

## 2019-01-25 PROCEDURE — 82435 ASSAY OF BLOOD CHLORIDE: CPT

## 2019-01-25 PROCEDURE — 36415 COLL VENOUS BLD VENIPUNCTURE: CPT

## 2019-01-25 PROCEDURE — 99239 HOSP IP/OBS DSCHRG MGMT >30: CPT

## 2019-01-25 PROCEDURE — 80048 BASIC METABOLIC PNL TOTAL CA: CPT

## 2019-01-25 PROCEDURE — 83880 ASSAY OF NATRIURETIC PEPTIDE: CPT

## 2019-01-25 PROCEDURE — 96365 THER/PROPH/DIAG IV INF INIT: CPT | Mod: XU

## 2019-01-25 PROCEDURE — 83605 ASSAY OF LACTIC ACID: CPT

## 2019-01-25 PROCEDURE — 87633 RESP VIRUS 12-25 TARGETS: CPT

## 2019-01-25 PROCEDURE — 94760 N-INVAS EAR/PLS OXIMETRY 1: CPT

## 2019-01-25 PROCEDURE — 94640 AIRWAY INHALATION TREATMENT: CPT

## 2019-01-25 PROCEDURE — 85027 COMPLETE CBC AUTOMATED: CPT

## 2019-01-25 PROCEDURE — 71275 CT ANGIOGRAPHY CHEST: CPT

## 2019-01-25 PROCEDURE — 84295 ASSAY OF SERUM SODIUM: CPT

## 2019-01-25 PROCEDURE — 71046 X-RAY EXAM CHEST 2 VIEWS: CPT

## 2019-01-25 PROCEDURE — 84484 ASSAY OF TROPONIN QUANT: CPT

## 2019-01-25 PROCEDURE — 96375 TX/PRO/DX INJ NEW DRUG ADDON: CPT

## 2019-01-25 PROCEDURE — 85014 HEMATOCRIT: CPT

## 2019-01-25 PROCEDURE — 82330 ASSAY OF CALCIUM: CPT

## 2019-01-25 PROCEDURE — 87486 CHLMYD PNEUM DNA AMP PROBE: CPT

## 2019-01-25 PROCEDURE — 93005 ELECTROCARDIOGRAM TRACING: CPT

## 2019-01-25 PROCEDURE — 82947 ASSAY GLUCOSE BLOOD QUANT: CPT

## 2019-01-25 PROCEDURE — 87798 DETECT AGENT NOS DNA AMP: CPT

## 2019-01-25 PROCEDURE — 85730 THROMBOPLASTIN TIME PARTIAL: CPT

## 2019-01-25 PROCEDURE — 84100 ASSAY OF PHOSPHORUS: CPT

## 2019-01-25 PROCEDURE — 82803 BLOOD GASES ANY COMBINATION: CPT

## 2019-01-25 PROCEDURE — 99285 EMERGENCY DEPT VISIT HI MDM: CPT | Mod: 25

## 2019-01-25 PROCEDURE — 84132 ASSAY OF SERUM POTASSIUM: CPT

## 2019-01-25 RX ORDER — UMECLIDINIUM 62.5 UG/1
1 AEROSOL, POWDER ORAL
Qty: 30 | Refills: 0 | OUTPATIENT
Start: 2019-01-25 | End: 2019-02-23

## 2019-01-25 RX ORDER — METHOTREXATE 2.5 MG/1
1 TABLET ORAL
Qty: 0 | Refills: 0 | COMMUNITY

## 2019-01-25 RX ORDER — AZITHROMYCIN 500 MG/1
500 TABLET, FILM COATED ORAL ONCE
Qty: 0 | Refills: 0 | Status: COMPLETED | OUTPATIENT
Start: 2019-01-25 | End: 2019-01-25

## 2019-01-25 RX ORDER — SACCHAROMYCES BOULARDII 250 MG
1 POWDER IN PACKET (EA) ORAL
Qty: 4 | Refills: 0 | OUTPATIENT
Start: 2019-01-25 | End: 2019-01-26

## 2019-01-25 RX ORDER — TIOTROPIUM BROMIDE 18 UG/1
2 CAPSULE ORAL; RESPIRATORY (INHALATION)
Qty: 1 | Refills: 0 | OUTPATIENT
Start: 2019-01-25 | End: 2019-02-23

## 2019-01-25 RX ORDER — AZITHROMYCIN 500 MG/1
1 TABLET, FILM COATED ORAL
Qty: 2 | Refills: 0 | OUTPATIENT
Start: 2019-01-25 | End: 2019-01-26

## 2019-01-25 RX ORDER — METHOTREXATE 2.5 MG/1
4 TABLET ORAL
Qty: 0 | Refills: 0 | DISCHARGE
Start: 2019-01-25

## 2019-01-25 RX ADMIN — OXYCODONE AND ACETAMINOPHEN 1 TABLET(S): 5; 325 TABLET ORAL at 13:52

## 2019-01-25 RX ADMIN — Medication 1 PATCH: at 09:02

## 2019-01-25 RX ADMIN — PANTOPRAZOLE SODIUM 40 MILLIGRAM(S): 20 TABLET, DELAYED RELEASE ORAL at 05:29

## 2019-01-25 RX ADMIN — Medication 3 MILLILITER(S): at 09:48

## 2019-01-25 RX ADMIN — OXYCODONE AND ACETAMINOPHEN 1 TABLET(S): 5; 325 TABLET ORAL at 03:42

## 2019-01-25 RX ADMIN — Medication 1 PATCH: at 12:44

## 2019-01-25 RX ADMIN — Medication 40 MILLIGRAM(S): at 05:28

## 2019-01-25 RX ADMIN — Medication 200 MILLIGRAM(S): at 12:48

## 2019-01-25 RX ADMIN — Medication 250 MILLIGRAM(S): at 05:29

## 2019-01-25 RX ADMIN — AZITHROMYCIN 255 MILLIGRAM(S): 500 TABLET, FILM COATED ORAL at 09:43

## 2019-01-25 RX ADMIN — AZITHROMYCIN 500 MILLIGRAM(S): 500 TABLET, FILM COATED ORAL at 14:34

## 2019-01-25 RX ADMIN — OXYCODONE AND ACETAMINOPHEN 1 TABLET(S): 5; 325 TABLET ORAL at 10:00

## 2019-01-25 RX ADMIN — OXYCODONE AND ACETAMINOPHEN 1 TABLET(S): 5; 325 TABLET ORAL at 09:11

## 2019-01-25 RX ADMIN — OXYCODONE AND ACETAMINOPHEN 1 TABLET(S): 5; 325 TABLET ORAL at 04:24

## 2019-01-25 RX ADMIN — Medication 1 PATCH: at 12:41

## 2019-01-25 RX ADMIN — Medication 10 MILLIGRAM(S): at 05:28

## 2019-01-25 RX ADMIN — Medication 200 MILLIGRAM(S): at 04:00

## 2019-01-25 RX ADMIN — Medication 3 MILLILITER(S): at 03:50

## 2019-01-25 NOTE — CDI QUERY NOTE - NSCDIOTHERTXTBX_GEN_ALL_CORE_HH
Pt is a 69 yo F w/ PMH of lupus, current smoker,  HTN, Hashimoto thyroiditis, asthma ( no home O2 , never intubate ) presents with severe cough associated w/ some green sputum production. She also had increase  SOB and left side chest and abdominal  pain which she attribute to her cough, pain increased on deep breath or when she cough , no changes of pain w/ movements . She usually does not use asthma medications ( albuterol and symbicort ) but over the past week she started using her med due to her sx. she states that a lot of peoples in the place where she work have respiratory sx.   In ED,  patient was found to be hypoxic ( o2= 88) and was  treated  w/ solumedrol 125 mg x 1 , rocephin 1 gr x 1 and albuterol neb. Admitted to med service for COPD exacerbation. Pt received nebulazer treatments, steroids and supportive oxygen with good clinical improvement.    additional supporting evidence    ABG:  HCO3, Venous: 29 mmol/L (01-22 @ 19:29)    Oxygen Delivery Therapy:   Oxygen Method: Nasal Cannula  LPM: 3  Maintain SpO2 Above: 94 (01-22-19 @ 17:22)      Continuous home O2, home O2, home oxygen:  albuterol 2.5 mg/3 mL (0.083%) inhalation solution: 3 milliliter(s) inhaled 4 times a day, As Needed  Incruse Ellipta 62.5 mcg/inh inhalation powder: 1 puff(s) inhaled once a day   Symbicort 160 mcg-4.5 mcg/inh inhalation aerosol: 2 puff(s) inhaled 2 times a day  Tessalon Perles 100 mg oral capsule: 1 cap(s) orally 3 times a day      Please given patient's low oxygen saturation on admission coupled with patient's pmh, please clarify the following if applicable    A) Acute hypoxic  respiratory failure 2/2 COPD exacerbation  B) Acute on chronic respiratory failure 2/2 COPD exacerbation  C) Chronic respiratory failure 2/2 COPD exacerbation  D) Other , please clarify   E) Clinically significant                Oxygen Delivery Therapy:   Oxygen Method: Nasal Cannula  LPM: 3  Maintain SpO2 Above: 94 (01-22-19 @ 17:22)      Continuous home O2, home O2, home oxygen:  albuterol 2.5 mg/3 mL (0.083%) inhalation solution: 3 milliliter(s) inhaled 4 times a day, As Needed  Incruse Ellipta 62.5 mcg/inh inhalation powder: 1 puff(s) inhaled once a day   Symbicort 160 mcg-4.5 mcg/inh inhalation aerosol: 2 puff(s) inhaled 2 times a day  Tessalon Perles 100 mg oral capsule: 1 cap(s) orally 3 times a day

## 2019-01-25 NOTE — CDI QUERY NOTE - NSCDINOTEPOA_GEN_ALL_CORE_FT
Was the condition present on admission, if so, please document in the chart that “(the condition) was present on admission.”
No

## 2019-01-25 NOTE — CHART NOTE - NSCHARTNOTEFT_GEN_A_CORE
To whom it may concern,    Ms Guillen was admitted under the care of [facility] in [place], [place] on the below listed dates. They are cleared at this time to return to work and/or school without restriction on: 26 January 2019    Dates: 22 January 2019 to 25 January 2019    If there are any questions, you may contact me at [phone]. Thank you.    Vani Watkins Julian E., MD       NPI: [id number] To whom it may concern,    Ms Awad was admitted under the care of [facility] in [place], [place] on the below listed dates. They are cleared at this time to return to work and/or school without restriction on: 26 January 2019    Dates: 22 January 2019 to 25 January 2019    If there are any questions, you may contact me at [phone]. Thank you.    Vani Watkins Julian E., MD       NPI: [id number]

## 2019-02-01 ENCOUNTER — APPOINTMENT (OUTPATIENT)
Dept: PULMONOLOGY | Facility: CLINIC | Age: 69
End: 2019-02-01

## 2019-03-01 ENCOUNTER — EMERGENCY (EMERGENCY)
Facility: HOSPITAL | Age: 69
LOS: 1 days | Discharge: HOME | End: 2019-03-01
Attending: EMERGENCY MEDICINE
Payer: MEDICARE

## 2019-03-01 VITALS
WEIGHT: 167.99 LBS | SYSTOLIC BLOOD PRESSURE: 123 MMHG | HEART RATE: 83 BPM | HEIGHT: 63 IN | OXYGEN SATURATION: 92 % | RESPIRATION RATE: 20 BRPM | DIASTOLIC BLOOD PRESSURE: 53 MMHG | TEMPERATURE: 99 F

## 2019-03-01 DIAGNOSIS — Z98.89 OTHER SPECIFIED POSTPROCEDURAL STATES: Chronic | ICD-10-CM

## 2019-03-01 PROCEDURE — 99284 EMERGENCY DEPT VISIT MOD MDM: CPT

## 2019-03-01 PROCEDURE — 72131 CT LUMBAR SPINE W/O DYE: CPT | Mod: 26

## 2019-03-01 RX ORDER — OXYCODONE AND ACETAMINOPHEN 5; 325 MG/1; MG/1
1 TABLET ORAL ONCE
Qty: 0 | Refills: 0 | Status: DISCONTINUED | OUTPATIENT
Start: 2019-03-01 | End: 2019-03-01

## 2019-03-01 RX ORDER — KETOROLAC TROMETHAMINE 30 MG/ML
60 SYRINGE (ML) INJECTION ONCE
Qty: 0 | Refills: 0 | Status: DISCONTINUED | OUTPATIENT
Start: 2019-03-01 | End: 2019-03-01

## 2019-03-01 NOTE — ED ADULT TRIAGE NOTE - CHIEF COMPLAINT QUOTE
pt with lower back pain starting on Wednesday progressively getting worse rates greater 10/10 denies any trauma or injuries. pt able to move legs denies tingling.

## 2019-03-01 NOTE — ED STATDOCS - OBJECTIVE STATEMENT
69 y/o F pt with hx of lupus presents to ED c/o gradual onset back pain which radiates down her left leg x several days. Describes the pain as if some one is pulling her leg downward. Is able to ambulate with pain.  Took Advil and narcotic for pain earlier today. No fever or chills.  Is alllergic to sulfa. No further complaints at this time.

## 2019-03-01 NOTE — ED ADULT NURSE NOTE - OBJECTIVE STATEMENT
Patient A&Ox4 complaining of back pain that began this past Wednesday, stated worsened today when at store, was unable to bear weight. Denies any recent trauma.

## 2019-03-01 NOTE — ED STATDOCS - ATTENDING CONTRIBUTION TO CARE
I, John Barillas, performed the initial face to face bedside interview with this patient regarding history of present illness, review of symptoms and relevant past medical, social and family history.  I completed an independent physical examination.  I was the initial provider who evaluated this patient. I have signed out the follow up of any pending tests (i.e. labs, radiological studies) to the ACP.  I have communicated the patient’s plan of care and disposition with the ACP.  The history, relevant review of systems, past medical and surgical history, medical decision making, and physical examination was documented by the scribe in my presence and I attest to the accuracy of the documentation.

## 2019-03-01 NOTE — ED STATDOCS - PROGRESS NOTE DETAILS
PA note: Ct results reviewed " No fracture or malalignment. Advanced multilevel discogenic disease involving L2-S1, with spinal canal stenosis at L4-5" Pt with improvement in symptoms s/p treatment. Pt ambulating unassisted at this time. Pt given [facility]. NSAIDS for pain.

## 2019-03-02 VITALS
SYSTOLIC BLOOD PRESSURE: 127 MMHG | OXYGEN SATURATION: 93 % | DIASTOLIC BLOOD PRESSURE: 78 MMHG | TEMPERATURE: 98 F | HEART RATE: 72 BPM | RESPIRATION RATE: 20 BRPM

## 2019-03-02 PROCEDURE — 99284 EMERGENCY DEPT VISIT MOD MDM: CPT | Mod: 25

## 2019-03-02 PROCEDURE — 72131 CT LUMBAR SPINE W/O DYE: CPT

## 2019-03-02 PROCEDURE — 96372 THER/PROPH/DIAG INJ SC/IM: CPT

## 2019-03-02 RX ADMIN — OXYCODONE AND ACETAMINOPHEN 1 TABLET(S): 5; 325 TABLET ORAL at 00:27

## 2019-03-02 RX ADMIN — Medication 60 MILLIGRAM(S): at 01:43

## 2019-03-02 RX ADMIN — Medication 60 MILLIGRAM(S): at 00:27

## 2019-03-02 RX ADMIN — OXYCODONE AND ACETAMINOPHEN 1 TABLET(S): 5; 325 TABLET ORAL at 01:44

## 2019-04-14 ENCOUNTER — INPATIENT (INPATIENT)
Facility: HOSPITAL | Age: 69
LOS: 2 days | Discharge: HOME | DRG: 202 | End: 2019-04-17
Attending: INTERNAL MEDICINE | Admitting: HOSPITALIST
Payer: MEDICARE

## 2019-04-14 VITALS
HEIGHT: 63 IN | SYSTOLIC BLOOD PRESSURE: 110 MMHG | DIASTOLIC BLOOD PRESSURE: 65 MMHG | OXYGEN SATURATION: 89 % | WEIGHT: 175.93 LBS | HEART RATE: 83 BPM | TEMPERATURE: 99 F | RESPIRATION RATE: 22 BRPM

## 2019-04-14 DIAGNOSIS — J45.909 UNSPECIFIED ASTHMA, UNCOMPLICATED: ICD-10-CM

## 2019-04-14 DIAGNOSIS — Z98.89 OTHER SPECIFIED POSTPROCEDURAL STATES: Chronic | ICD-10-CM

## 2019-04-14 LAB
ALBUMIN SERPL ELPH-MCNC: 3.6 G/DL — SIGNIFICANT CHANGE UP (ref 3.3–5.2)
ALP SERPL-CCNC: 183 U/L — HIGH (ref 40–120)
ALT FLD-CCNC: 20 U/L — SIGNIFICANT CHANGE UP
ANION GAP SERPL CALC-SCNC: 14 MMOL/L — SIGNIFICANT CHANGE UP (ref 5–17)
AST SERPL-CCNC: 14 U/L — SIGNIFICANT CHANGE UP
BASOPHILS # BLD AUTO: 0 K/UL — SIGNIFICANT CHANGE UP (ref 0–0.2)
BASOPHILS NFR BLD AUTO: 0.2 % — SIGNIFICANT CHANGE UP (ref 0–2)
BILIRUB SERPL-MCNC: 0.4 MG/DL — SIGNIFICANT CHANGE UP (ref 0.4–2)
BUN SERPL-MCNC: 21 MG/DL — HIGH (ref 8–20)
CALCIUM SERPL-MCNC: 9 MG/DL — SIGNIFICANT CHANGE UP (ref 8.6–10.2)
CHLORIDE SERPL-SCNC: 98 MMOL/L — SIGNIFICANT CHANGE UP (ref 98–107)
CO2 SERPL-SCNC: 28 MMOL/L — SIGNIFICANT CHANGE UP (ref 22–29)
CREAT SERPL-MCNC: 0.7 MG/DL — SIGNIFICANT CHANGE UP (ref 0.5–1.3)
EOSINOPHIL # BLD AUTO: 0 K/UL — SIGNIFICANT CHANGE UP (ref 0–0.5)
EOSINOPHIL NFR BLD AUTO: 0.4 % — SIGNIFICANT CHANGE UP (ref 0–6)
GLUCOSE SERPL-MCNC: 132 MG/DL — HIGH (ref 70–115)
HCT VFR BLD CALC: 34 % — LOW (ref 37–47)
HGB BLD-MCNC: 10.9 G/DL — LOW (ref 12–16)
LYMPHOCYTES # BLD AUTO: 2.3 K/UL — SIGNIFICANT CHANGE UP (ref 1–4.8)
LYMPHOCYTES # BLD AUTO: 20.7 % — SIGNIFICANT CHANGE UP (ref 20–55)
MCHC RBC-ENTMCNC: 28 PG — SIGNIFICANT CHANGE UP (ref 27–31)
MCHC RBC-ENTMCNC: 32.1 G/DL — SIGNIFICANT CHANGE UP (ref 32–36)
MCV RBC AUTO: 87.4 FL — SIGNIFICANT CHANGE UP (ref 81–99)
MONOCYTES # BLD AUTO: 0.9 K/UL — HIGH (ref 0–0.8)
MONOCYTES NFR BLD AUTO: 8.3 % — SIGNIFICANT CHANGE UP (ref 3–10)
NEUTROPHILS # BLD AUTO: 7.9 K/UL — SIGNIFICANT CHANGE UP (ref 1.8–8)
NEUTROPHILS NFR BLD AUTO: 70 % — SIGNIFICANT CHANGE UP (ref 37–73)
NT-PROBNP SERPL-SCNC: 1357 PG/ML — HIGH (ref 0–300)
PLATELET # BLD AUTO: 286 K/UL — SIGNIFICANT CHANGE UP (ref 150–400)
POTASSIUM SERPL-MCNC: 3.3 MMOL/L — LOW (ref 3.5–5.3)
POTASSIUM SERPL-SCNC: 3.3 MMOL/L — LOW (ref 3.5–5.3)
PROT SERPL-MCNC: 7.3 G/DL — SIGNIFICANT CHANGE UP (ref 6.6–8.7)
RAPID RVP RESULT: SIGNIFICANT CHANGE UP
RBC # BLD: 3.89 M/UL — LOW (ref 4.4–5.2)
RBC # FLD: 13.9 % — SIGNIFICANT CHANGE UP (ref 11–15.6)
SODIUM SERPL-SCNC: 140 MMOL/L — SIGNIFICANT CHANGE UP (ref 135–145)
TROPONIN T SERPL-MCNC: <0.01 NG/ML — SIGNIFICANT CHANGE UP (ref 0–0.06)
WBC # BLD: 11.3 K/UL — HIGH (ref 4.8–10.8)
WBC # FLD AUTO: 11.3 K/UL — HIGH (ref 4.8–10.8)

## 2019-04-14 PROCEDURE — 93010 ELECTROCARDIOGRAM REPORT: CPT

## 2019-04-14 PROCEDURE — 99497 ADVNCD CARE PLAN 30 MIN: CPT | Mod: 25

## 2019-04-14 PROCEDURE — 99223 1ST HOSP IP/OBS HIGH 75: CPT

## 2019-04-14 PROCEDURE — 99285 EMERGENCY DEPT VISIT HI MDM: CPT

## 2019-04-14 PROCEDURE — 71046 X-RAY EXAM CHEST 2 VIEWS: CPT | Mod: 26

## 2019-04-14 RX ORDER — IPRATROPIUM/ALBUTEROL SULFATE 18-103MCG
3 AEROSOL WITH ADAPTER (GRAM) INHALATION ONCE
Qty: 0 | Refills: 0 | Status: COMPLETED | OUTPATIENT
Start: 2019-04-14 | End: 2019-04-14

## 2019-04-14 RX ORDER — AZITHROMYCIN 500 MG/1
TABLET, FILM COATED ORAL
Qty: 0 | Refills: 0 | Status: DISCONTINUED | OUTPATIENT
Start: 2019-04-14 | End: 2019-04-17

## 2019-04-14 RX ORDER — AZITHROMYCIN 500 MG/1
250 TABLET, FILM COATED ORAL EVERY 24 HOURS
Qty: 0 | Refills: 0 | Status: DISCONTINUED | OUTPATIENT
Start: 2019-04-15 | End: 2019-04-17

## 2019-04-14 RX ORDER — IBUPROFEN 200 MG
400 TABLET ORAL EVERY 6 HOURS
Qty: 0 | Refills: 0 | Status: DISCONTINUED | OUTPATIENT
Start: 2019-04-14 | End: 2019-04-17

## 2019-04-14 RX ORDER — ALPRAZOLAM 0.25 MG
1 TABLET ORAL
Qty: 0 | Refills: 0 | COMMUNITY

## 2019-04-14 RX ORDER — SACCHAROMYCES BOULARDII 250 MG
250 POWDER IN PACKET (EA) ORAL
Qty: 0 | Refills: 0 | Status: DISCONTINUED | OUTPATIENT
Start: 2019-04-14 | End: 2019-04-17

## 2019-04-14 RX ORDER — ZOLPIDEM TARTRATE 10 MG/1
5 TABLET ORAL AT BEDTIME
Qty: 0 | Refills: 0 | Status: DISCONTINUED | OUTPATIENT
Start: 2019-04-14 | End: 2019-04-17

## 2019-04-14 RX ORDER — AZITHROMYCIN 500 MG/1
500 TABLET, FILM COATED ORAL ONCE
Qty: 0 | Refills: 0 | Status: COMPLETED | OUTPATIENT
Start: 2019-04-14 | End: 2019-04-14

## 2019-04-14 RX ORDER — FUROSEMIDE 40 MG
40 TABLET ORAL ONCE
Qty: 0 | Refills: 0 | Status: COMPLETED | OUTPATIENT
Start: 2019-04-14 | End: 2019-04-14

## 2019-04-14 RX ORDER — POTASSIUM CHLORIDE 20 MEQ
40 PACKET (EA) ORAL ONCE
Qty: 0 | Refills: 0 | Status: COMPLETED | OUTPATIENT
Start: 2019-04-14 | End: 2019-04-14

## 2019-04-14 RX ORDER — ENOXAPARIN SODIUM 100 MG/ML
40 INJECTION SUBCUTANEOUS EVERY 24 HOURS
Qty: 0 | Refills: 0 | Status: DISCONTINUED | OUTPATIENT
Start: 2019-04-14 | End: 2019-04-17

## 2019-04-14 RX ORDER — IPRATROPIUM/ALBUTEROL SULFATE 18-103MCG
3 AEROSOL WITH ADAPTER (GRAM) INHALATION EVERY 6 HOURS
Qty: 0 | Refills: 0 | Status: DISCONTINUED | OUTPATIENT
Start: 2019-04-14 | End: 2019-04-17

## 2019-04-14 RX ORDER — ACETAMINOPHEN 500 MG
975 TABLET ORAL ONCE
Qty: 0 | Refills: 0 | Status: COMPLETED | OUTPATIENT
Start: 2019-04-14 | End: 2019-04-14

## 2019-04-14 RX ORDER — OXYCODONE AND ACETAMINOPHEN 5; 325 MG/1; MG/1
2 TABLET ORAL EVERY 4 HOURS
Qty: 0 | Refills: 0 | Status: DISCONTINUED | OUTPATIENT
Start: 2019-04-14 | End: 2019-04-17

## 2019-04-14 RX ORDER — METHOTREXATE 2.5 MG/1
12.5 TABLET ORAL ONCE
Qty: 0 | Refills: 0 | Status: COMPLETED | OUTPATIENT
Start: 2019-04-14 | End: 2019-04-15

## 2019-04-14 RX ORDER — BUDESONIDE AND FORMOTEROL FUMARATE DIHYDRATE 160; 4.5 UG/1; UG/1
2 AEROSOL RESPIRATORY (INHALATION)
Qty: 0 | Refills: 0 | COMMUNITY

## 2019-04-14 RX ORDER — POTASSIUM CHLORIDE 20 MEQ
40 PACKET (EA) ORAL ONCE
Qty: 0 | Refills: 0 | Status: COMPLETED | OUTPATIENT
Start: 2019-04-14 | End: 2019-04-15

## 2019-04-14 RX ADMIN — ENOXAPARIN SODIUM 40 MILLIGRAM(S): 100 INJECTION SUBCUTANEOUS at 21:41

## 2019-04-14 RX ADMIN — Medication 3 MILLILITER(S): at 16:15

## 2019-04-14 RX ADMIN — Medication 40 MILLIEQUIVALENT(S): at 18:08

## 2019-04-14 RX ADMIN — OXYCODONE AND ACETAMINOPHEN 2 TABLET(S): 5; 325 TABLET ORAL at 21:41

## 2019-04-14 RX ADMIN — Medication 3 MILLILITER(S): at 16:14

## 2019-04-14 RX ADMIN — AZITHROMYCIN 255 MILLIGRAM(S): 500 TABLET, FILM COATED ORAL at 21:41

## 2019-04-14 RX ADMIN — Medication 125 MILLIGRAM(S): at 16:56

## 2019-04-14 RX ADMIN — Medication 40 MILLIGRAM(S): at 21:41

## 2019-04-14 NOTE — H&P ADULT - ASSESSMENT
67 yo male with pmh/o smoking who is admitted with COPD exacerbation    1) COPD exacerbation  Admit to any bed with   Nebs q 6 hrs  EKG wnl  troponin wnl  RVP neg  solumedrol 125 IV then 40 q 6 with plan for taper  NC prn SOB for goal >92%  cbc, cmp, mg, phos, coags for AM  TTE ordered as pt with h/o MI and no f/u with cardiology  z pack    2) Lupus  Methotrexate dose ordered now  cont weekly dose on d/c    3) Elevated BnP with evidence of congestion on chest x ray  non compliant with lasix  denies h/o CHF  f/u TTE  lasix IV 40 mg x 1 dose ordered, consider continuing IV vs PO dose pending renal function in AM and TTE results    4) hypokalemia  repleted x 2  f/u AM level    5) Hyperglycemia  HbA1c ordered   last was 5.9 in January    6) Spinal cord stenosis  cont percocet at home dose    7) insomnia  cont ambien prn at home dose

## 2019-04-14 NOTE — ED PROVIDER NOTE - OBJECTIVE STATEMENT
67 y/o female hx copd O2 usually 93% no home o2, hx lupus, hashimoto, MI, c/o 1-2 days of wheezing, congestion, typical of her copd exacerbation, similar to when admitted 3 months ago. Used neb with mild improvement. No fever, no cp, no abd pain, no leg pain/swelling, no hx dvt/pe.     ROS: No fever/chills. No eye pain/changes in vision, No ear pain/sore throat/dysphagia, No chest pain/palpitations.  No abdominal pain, N/V/D, no black/bloody bm. No dysuria/frequency/discharge, No headache. No Dizziness.    No rashes or breaks in skin. No numbness/tingling/weakness.

## 2019-04-14 NOTE — H&P ADULT - NSICDXFAMILYHX_GEN_ALL_CORE_FT
FAMILY HISTORY:  Aunt  Still living? Unknown  Family history of cancer, Age at diagnosis: Age Unknown

## 2019-04-14 NOTE — H&P ADULT - NSICDXPASTMEDICALHX_GEN_ALL_CORE_FT
PAST MEDICAL HISTORY:  Asthma     Autoimmune disease     Breast lump Benign    High cholesterol     Lupus     Myocardial infarct     Osteoporosis

## 2019-04-14 NOTE — ED STATDOCS - PROGRESS NOTE DETAILS
67 y/o F pt with hx of asthma, lupus, and rheumatoid arthritis, presents to ED c/o SOB for 3 days with assoc. wheezing, coughing, rhinorrhea, HA, and ribcage pain upon cough. Pt also c/o bile emesis and nausea. Pt notes LE edema. Pt reports she was recently sick with URI symptoms. Pt is not currently on O2 at home. Pt states she used her albuterol inhaler; no relief. Denies CP, and abdominal pain. Denies hx of HTN and DM. Denies hx of intubation, and BIPAP. No further complaints at this time. 69 y/o F pt with hx of asthma, lupus, and rheumatoid arthritis, presents to ED c/o SOB for 3 days with assoc. wheezing, coughing, rhinorrhea, HA, and ribcage pain upon cough. Pt also c/o bile emesis and nausea. Pt notes LE edema. Pt reports she was recently sick with URI symptoms. Pt is not currently on O2 at home. Pt states she used her albuterol inhaler; no relief. Denies CP, and abdominal pain. Denies hx of HTN and DM. Denies hx of intubation, and BIPAP. No further complaints at this time. limited eval--will give nebs steroids labs send to main.

## 2019-04-14 NOTE — ED PROVIDER NOTE - PHYSICAL EXAMINATION
Gen: No acute distress, non toxic  HEENT: Mucous membranes moist, pink conjunctivae, EOMI  CV: RRR, nl s1/s2.  Resp: speaking full sentences but somewhat labored, diffuse wheezing, minimal retractions.   GI: Abdomen soft, NT, ND. No rebound, no guarding  : No CVAT  Neuro: A&O x 3, moving all 4 extremities  MSK: No spine or joint tenderness to palpation  Skin: No rashes. intact and perfused.

## 2019-04-14 NOTE — H&P ADULT - ATTENDING COMMENTS
16 min spent discussing advanced care planning and pt is full code. Pt states daughter is to make medical decisions for her in case of emergencies where she is unable to do so.

## 2019-04-14 NOTE — H&P ADULT - NSHPSOCIALHISTORY_GEN_ALL_CORE
lives with daughter  extensive smoking history, 1/2 pack per week  denies illicit drug use  denies etoh abuse

## 2019-04-14 NOTE — ED PROVIDER NOTE - CLINICAL SUMMARY MEDICAL DECISION MAKING FREE TEXT BOX
pt with mild elevated bnp, and wheezing likely copd component as well, improved with nebs and steroids but still some sob. refused abg. hypoxic to 89 on room air. hypokalemia being repleted, needed several days last time had these symptosm. admitted.

## 2019-04-14 NOTE — H&P ADULT - HISTORY OF PRESENT ILLNESS
Pt is a 69 yo female smoker who has a pmh/o lupus on methotrexate, Osteoporosis, HLD, asthma, MI, who presents to ED after having 2 weeks of worsening SOB and dry cough associated with bilious vomiting, anorexia. Pt states she smokes about 1.5 pack per week but has not smoked in 1 week as she has felt unwell. Pt attributes her sx to the weather changing, stating she has exacerbations approx. twice a year. Pt also noted her legs are swollen and states that she takes Lasix when they 'get bad' but does not take it everyday as she is supposed to. Pt also states she has not taken her methotrexate which she was supposed to take her weekly dose yesterday. Pt denies fever, diarrhea, HA, abdominal pain, dysuria, throat pain. Pt states she becomes nauseous and vomits bile and has worsened back and rib pain from her baseline due to her frequent coughing. Pt denies sick contacts.

## 2019-04-15 LAB
ALBUMIN SERPL ELPH-MCNC: 3.7 G/DL — SIGNIFICANT CHANGE UP (ref 3.3–5.2)
ALP SERPL-CCNC: 181 U/L — HIGH (ref 40–120)
ALT FLD-CCNC: 23 U/L — SIGNIFICANT CHANGE UP
ANION GAP SERPL CALC-SCNC: 17 MMOL/L — SIGNIFICANT CHANGE UP (ref 5–17)
APTT BLD: 34.3 SEC — SIGNIFICANT CHANGE UP (ref 27.5–36.3)
AST SERPL-CCNC: 16 U/L — SIGNIFICANT CHANGE UP
BILIRUB SERPL-MCNC: 0.2 MG/DL — LOW (ref 0.4–2)
BUN SERPL-MCNC: 26 MG/DL — HIGH (ref 8–20)
CALCIUM SERPL-MCNC: 8.9 MG/DL — SIGNIFICANT CHANGE UP (ref 8.6–10.2)
CHLORIDE SERPL-SCNC: 95 MMOL/L — LOW (ref 98–107)
CO2 SERPL-SCNC: 26 MMOL/L — SIGNIFICANT CHANGE UP (ref 22–29)
CREAT SERPL-MCNC: 0.66 MG/DL — SIGNIFICANT CHANGE UP (ref 0.5–1.3)
EOSINOPHIL # BLD AUTO: 0 K/UL — SIGNIFICANT CHANGE UP (ref 0–0.5)
EOSINOPHIL NFR BLD AUTO: 0 % — SIGNIFICANT CHANGE UP (ref 0–6)
GLUCOSE SERPL-MCNC: 215 MG/DL — HIGH (ref 70–115)
HCT VFR BLD CALC: 34 % — LOW (ref 37–47)
HGB BLD-MCNC: 10.7 G/DL — LOW (ref 12–16)
INR BLD: 1.14 RATIO — SIGNIFICANT CHANGE UP (ref 0.88–1.16)
LYMPHOCYTES # BLD AUTO: 0.8 K/UL — LOW (ref 1–4.8)
LYMPHOCYTES # BLD AUTO: 8.2 % — LOW (ref 20–55)
MCHC RBC-ENTMCNC: 27.2 PG — SIGNIFICANT CHANGE UP (ref 27–31)
MCHC RBC-ENTMCNC: 31.5 G/DL — LOW (ref 32–36)
MCV RBC AUTO: 86.5 FL — SIGNIFICANT CHANGE UP (ref 81–99)
MONOCYTES # BLD AUTO: 0.3 K/UL — SIGNIFICANT CHANGE UP (ref 0–0.8)
MONOCYTES NFR BLD AUTO: 2.6 % — LOW (ref 3–10)
NEUTROPHILS # BLD AUTO: 9 K/UL — HIGH (ref 1.8–8)
NEUTROPHILS NFR BLD AUTO: 88.4 % — HIGH (ref 37–73)
PLATELET # BLD AUTO: 318 K/UL — SIGNIFICANT CHANGE UP (ref 150–400)
POTASSIUM SERPL-MCNC: 3.3 MMOL/L — LOW (ref 3.5–5.3)
POTASSIUM SERPL-SCNC: 3.3 MMOL/L — LOW (ref 3.5–5.3)
PROT SERPL-MCNC: 7.5 G/DL — SIGNIFICANT CHANGE UP (ref 6.6–8.7)
PROTHROM AB SERPL-ACNC: 13.2 SEC — HIGH (ref 10–12.9)
RBC # BLD: 3.93 M/UL — LOW (ref 4.4–5.2)
RBC # FLD: 13.8 % — SIGNIFICANT CHANGE UP (ref 11–15.6)
SODIUM SERPL-SCNC: 138 MMOL/L — SIGNIFICANT CHANGE UP (ref 135–145)
TROPONIN T SERPL-MCNC: <0.01 NG/ML — SIGNIFICANT CHANGE UP (ref 0–0.06)
WBC # BLD: 10.2 K/UL — SIGNIFICANT CHANGE UP (ref 4.8–10.8)
WBC # FLD AUTO: 10.2 K/UL — SIGNIFICANT CHANGE UP (ref 4.8–10.8)

## 2019-04-15 PROCEDURE — 99233 SBSQ HOSP IP/OBS HIGH 50: CPT

## 2019-04-15 RX ORDER — FUROSEMIDE 40 MG
40 TABLET ORAL DAILY
Qty: 0 | Refills: 0 | Status: DISCONTINUED | OUTPATIENT
Start: 2019-04-15 | End: 2019-04-17

## 2019-04-15 RX ORDER — INFLUENZA VIRUS VACCINE 15; 15; 15; 15 UG/.5ML; UG/.5ML; UG/.5ML; UG/.5ML
0.5 SUSPENSION INTRAMUSCULAR ONCE
Qty: 0 | Refills: 0 | Status: DISCONTINUED | OUTPATIENT
Start: 2019-04-15 | End: 2019-04-17

## 2019-04-15 RX ORDER — CEFTRIAXONE 500 MG/1
1 INJECTION, POWDER, FOR SOLUTION INTRAMUSCULAR; INTRAVENOUS EVERY 24 HOURS
Qty: 0 | Refills: 0 | Status: DISCONTINUED | OUTPATIENT
Start: 2019-04-15 | End: 2019-04-17

## 2019-04-15 RX ORDER — POTASSIUM CHLORIDE 20 MEQ
40 PACKET (EA) ORAL EVERY 4 HOURS
Qty: 0 | Refills: 0 | Status: COMPLETED | OUTPATIENT
Start: 2019-04-15 | End: 2019-04-15

## 2019-04-15 RX ADMIN — OXYCODONE AND ACETAMINOPHEN 2 TABLET(S): 5; 325 TABLET ORAL at 05:36

## 2019-04-15 RX ADMIN — OXYCODONE AND ACETAMINOPHEN 2 TABLET(S): 5; 325 TABLET ORAL at 00:16

## 2019-04-15 RX ADMIN — Medication 3 MILLILITER(S): at 08:51

## 2019-04-15 RX ADMIN — OXYCODONE AND ACETAMINOPHEN 2 TABLET(S): 5; 325 TABLET ORAL at 15:13

## 2019-04-15 RX ADMIN — Medication 250 MILLIGRAM(S): at 05:33

## 2019-04-15 RX ADMIN — Medication 40 MILLIGRAM(S): at 00:26

## 2019-04-15 RX ADMIN — Medication 40 MILLIGRAM(S): at 17:54

## 2019-04-15 RX ADMIN — Medication 3 MILLILITER(S): at 15:51

## 2019-04-15 RX ADMIN — OXYCODONE AND ACETAMINOPHEN 2 TABLET(S): 5; 325 TABLET ORAL at 20:38

## 2019-04-15 RX ADMIN — METHOTREXATE 12.5 MILLIGRAM(S): 2.5 TABLET ORAL at 11:59

## 2019-04-15 RX ADMIN — Medication 40 MILLIEQUIVALENT(S): at 00:26

## 2019-04-15 RX ADMIN — Medication 3 MILLILITER(S): at 21:21

## 2019-04-15 RX ADMIN — Medication 40 MILLIGRAM(S): at 05:33

## 2019-04-15 RX ADMIN — CEFTRIAXONE 100 GRAM(S): 500 INJECTION, POWDER, FOR SOLUTION INTRAMUSCULAR; INTRAVENOUS at 17:54

## 2019-04-15 RX ADMIN — Medication 40 MILLIEQUIVALENT(S): at 23:09

## 2019-04-15 RX ADMIN — Medication 250 MILLIGRAM(S): at 17:54

## 2019-04-15 RX ADMIN — Medication 40 MILLIGRAM(S): at 23:09

## 2019-04-15 RX ADMIN — OXYCODONE AND ACETAMINOPHEN 2 TABLET(S): 5; 325 TABLET ORAL at 16:00

## 2019-04-15 RX ADMIN — Medication 40 MILLIEQUIVALENT(S): at 17:53

## 2019-04-15 RX ADMIN — Medication 3 MILLILITER(S): at 03:59

## 2019-04-15 RX ADMIN — Medication 40 MILLIGRAM(S): at 11:57

## 2019-04-15 RX ADMIN — OXYCODONE AND ACETAMINOPHEN 2 TABLET(S): 5; 325 TABLET ORAL at 23:01

## 2019-04-16 LAB
ANION GAP SERPL CALC-SCNC: 14 MMOL/L — SIGNIFICANT CHANGE UP (ref 5–17)
BUN SERPL-MCNC: 23 MG/DL — HIGH (ref 8–20)
CALCIUM SERPL-MCNC: 8.7 MG/DL — SIGNIFICANT CHANGE UP (ref 8.6–10.2)
CHLORIDE SERPL-SCNC: 98 MMOL/L — SIGNIFICANT CHANGE UP (ref 98–107)
CO2 SERPL-SCNC: 25 MMOL/L — SIGNIFICANT CHANGE UP (ref 22–29)
CREAT SERPL-MCNC: 0.62 MG/DL — SIGNIFICANT CHANGE UP (ref 0.5–1.3)
FERRITIN SERPL-MCNC: 398 NG/ML — HIGH (ref 15–150)
GLUCOSE SERPL-MCNC: 153 MG/DL — HIGH (ref 70–115)
HCT VFR BLD CALC: 34.3 % — LOW (ref 34.5–45)
HCV AB S/CO SERPL IA: 0.18 S/CO — SIGNIFICANT CHANGE UP (ref 0–0.99)
HCV AB SERPL-IMP: SIGNIFICANT CHANGE UP
IRON SATN MFR SERPL: 24 % — SIGNIFICANT CHANGE UP (ref 14–50)
IRON SATN MFR SERPL: 62 UG/DL — SIGNIFICANT CHANGE UP (ref 37–145)
MAGNESIUM SERPL-MCNC: 1.8 MG/DL — SIGNIFICANT CHANGE UP (ref 1.6–2.6)
POTASSIUM SERPL-MCNC: 4.2 MMOL/L — SIGNIFICANT CHANGE UP (ref 3.5–5.3)
POTASSIUM SERPL-SCNC: 4.2 MMOL/L — SIGNIFICANT CHANGE UP (ref 3.5–5.3)
SODIUM SERPL-SCNC: 137 MMOL/L — SIGNIFICANT CHANGE UP (ref 135–145)
TIBC SERPL-MCNC: 256 UG/DL — SIGNIFICANT CHANGE UP (ref 220–430)
TRANSFERRIN SERPL-MCNC: 179 MG/DL — LOW (ref 192–382)
VIT B12 SERPL-MCNC: 900 PG/ML — SIGNIFICANT CHANGE UP (ref 232–1245)

## 2019-04-16 PROCEDURE — 93306 TTE W/DOPPLER COMPLETE: CPT | Mod: 26

## 2019-04-16 PROCEDURE — 99232 SBSQ HOSP IP/OBS MODERATE 35: CPT

## 2019-04-16 RX ORDER — IBUPROFEN 200 MG
400 TABLET ORAL ONCE
Qty: 0 | Refills: 0 | Status: COMPLETED | OUTPATIENT
Start: 2019-04-16 | End: 2019-04-16

## 2019-04-16 RX ORDER — MAGNESIUM SULFATE 500 MG/ML
2 VIAL (ML) INJECTION ONCE
Qty: 0 | Refills: 0 | Status: COMPLETED | OUTPATIENT
Start: 2019-04-16 | End: 2019-04-16

## 2019-04-16 RX ADMIN — Medication 250 MILLIGRAM(S): at 16:59

## 2019-04-16 RX ADMIN — Medication 40 MILLIGRAM(S): at 05:55

## 2019-04-16 RX ADMIN — Medication 3 MILLILITER(S): at 08:36

## 2019-04-16 RX ADMIN — Medication 40 MILLIGRAM(S): at 11:29

## 2019-04-16 RX ADMIN — AZITHROMYCIN 250 MILLIGRAM(S): 500 TABLET, FILM COATED ORAL at 00:27

## 2019-04-16 RX ADMIN — OXYCODONE AND ACETAMINOPHEN 2 TABLET(S): 5; 325 TABLET ORAL at 08:23

## 2019-04-16 RX ADMIN — Medication 3 MILLILITER(S): at 15:04

## 2019-04-16 RX ADMIN — Medication 400 MILLIGRAM(S): at 17:30

## 2019-04-16 RX ADMIN — OXYCODONE AND ACETAMINOPHEN 2 TABLET(S): 5; 325 TABLET ORAL at 19:10

## 2019-04-16 RX ADMIN — OXYCODONE AND ACETAMINOPHEN 2 TABLET(S): 5; 325 TABLET ORAL at 05:54

## 2019-04-16 RX ADMIN — OXYCODONE AND ACETAMINOPHEN 2 TABLET(S): 5; 325 TABLET ORAL at 09:30

## 2019-04-16 RX ADMIN — Medication 3 MILLILITER(S): at 20:52

## 2019-04-16 RX ADMIN — Medication 3 MILLILITER(S): at 03:57

## 2019-04-16 RX ADMIN — Medication 50 GRAM(S): at 16:01

## 2019-04-16 RX ADMIN — Medication 40 MILLIGRAM(S): at 16:59

## 2019-04-16 RX ADMIN — CEFTRIAXONE 100 GRAM(S): 500 INJECTION, POWDER, FOR SOLUTION INTRAMUSCULAR; INTRAVENOUS at 18:13

## 2019-04-16 RX ADMIN — Medication 400 MILLIGRAM(S): at 16:19

## 2019-04-16 RX ADMIN — OXYCODONE AND ACETAMINOPHEN 2 TABLET(S): 5; 325 TABLET ORAL at 03:15

## 2019-04-16 RX ADMIN — AZITHROMYCIN 250 MILLIGRAM(S): 500 TABLET, FILM COATED ORAL at 22:11

## 2019-04-16 RX ADMIN — OXYCODONE AND ACETAMINOPHEN 2 TABLET(S): 5; 325 TABLET ORAL at 12:50

## 2019-04-16 RX ADMIN — Medication 250 MILLIGRAM(S): at 05:55

## 2019-04-16 RX ADMIN — OXYCODONE AND ACETAMINOPHEN 2 TABLET(S): 5; 325 TABLET ORAL at 13:30

## 2019-04-16 RX ADMIN — Medication 100 MILLIGRAM(S): at 01:02

## 2019-04-16 NOTE — PROGRESS NOTE ADULT - SUBJECTIVE AND OBJECTIVE BOX
DUKE HOUSER    [id number]    68y      Female    CC: Acute Asthma    INTERVAL HPI/OVERNIGHT EVENTS:  69 yo female smoker who has a pmh/o lupus on methotrexate, Osteoporosis, HLD, asthma, MI, who presents to ED after having 2 weeks of worsening SOB and dry cough associated with bilious vomiting, anorexia. Pt states she smokes about 1.5 pack per week but has not smoked in 1 week as she has felt unwell. Pt attributes her sx to the weather changing, stating she has exacerbations approx. twice a year. Pt also noted her legs are swollen and states that she takes Lasix when they 'get bad' but does not take it everyday as she is supposed to. Pt also states she has not taken her methotrexate which she was supposed to take her weekly dose yesterday. Pt denies fever, diarrhea, HA, abdominal pain, dysuria, throat pain. Pt states she becomes nauseous and vomits bile and has worsened back and rib pain from her baseline due to her frequent coughing. Pt denies sick contacts.       this morning pt states her breathing is better compared to when she arrived with still has + cough dry no sputum, no chest pain no n/v or abd pain    on exam O2 sat is 90% on oxygen    REVIEW OF SYSTEMS:    CONSTITUTIONAL: No fever, +fatigue  RESPIRATORY: + cough,+ wheezing, no hemoptysis; min shortness of breath  CARDIOVASCULAR: No chest pain, palpitations  GASTROINTESTINAL: No abdominal or epigastric pain. No nausea, vomiting        Vital Signs Last 24 Hrs  T(C): 36.4 (16 Apr 2019 11:08), Max: 36.9 (15 Apr 2019 20:22)  T(F): 97.6 (16 Apr 2019 11:08), Max: 98.5 (15 Apr 2019 20:22)  HR: 98 (16 Apr 2019 11:08) (75 - 98)  BP: 122/77 (16 Apr 2019 11:08) (104/61 - 127/73)  BP(mean): --  RR: 19 (16 Apr 2019 11:08) (16 - 20)  SpO2: 91% (16 Apr 2019 11:08) (90% - 93%)    PHYSICAL EXAM:    GENERAL: NAD, well-groomed  HEENT: PERRL, +EOMI  CHEST/LUNG: bilaterally exp wheezing and rhonchi  HEART: S1S2+, Regular rate and rhythm; No murmurs  ABDOMEN: Soft, Nontender, Nondistended; Bowel sounds present  EXTREMITIES:  2+ Peripheral Pulses, No clubbing, cyanosis, +1 B/L LE edema      LABS:                        10.7   10.2  )-----------( 318      ( 15 Apr 2019 12:15 )             34.0     04-16    137  |  98  |  23.0<H>  ----------------------------<  153<H>  4.2   |  25.0  |  0.62    Ca    8.7      16 Apr 2019 07:08  Mg     1.8     04-16    TPro  7.5  /  Alb  3.7  /  TBili  0.2<L>  /  DBili  x   /  AST  16  /  ALT  23  /  AlkPhos  181<H>  04-15    PT/INR - ( 15 Apr 2019 12:15 )   PT: 13.2 sec;   INR: 1.14 ratio         PTT - ( 15 Apr 2019 12:15 )  PTT:34.3 sec        MEDICATIONS  (STANDING):  ALBUTerol/ipratropium for Nebulization 3 milliLiter(s) Nebulizer every 6 hours  azithromycin  IVPB 250 milliGRAM(s) IV Intermittent every 24 hours  azithromycin  IVPB      cefTRIAXone   IVPB 1 Gram(s) IV Intermittent every 24 hours  enoxaparin Injectable 40 milliGRAM(s) SubCutaneous every 24 hours  furosemide    Tablet 40 milliGRAM(s) Oral daily  influenza   Vaccine 0.5 milliLiter(s) IntraMuscular once  methylPREDNISolone sodium succinate Injectable 40 milliGRAM(s) IV Push every 6 hours  saccharomyces boulardii 250 milliGRAM(s) Oral two times a day    MEDICATIONS  (PRN):  benzonatate 100 milliGRAM(s) Oral three times a day PRN Cough  ibuprofen  Tablet. 400 milliGRAM(s) Oral every 6 hours PRN Mild Pain (1 - 3)  oxyCODONE    5 mG/acetaminophen 325 mG 2 Tablet(s) Oral every 4 hours PRN Severe Pain (7 - 10)  zolpidem 5 milliGRAM(s) Oral at bedtime PRN Insomnia  zolpidem 5 milliGRAM(s) Oral at bedtime PRN Insomnia
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 Pt states she smokes about 1.5 pack per week but has not smoked in 1 week as she has felt unwell. Pt attributes her sx to the weather changing, stating she has exacerbations approx. twice a year. Pt also noted her legs are swollen and states that she takes Lasix when they 'get bad' but does not take it everyday as she is supposed to. Pt also states she has not taken her methotrexate which she was supposed to take her weekly dose yesterday. Pt denies fever, diarrhea, HA, abdominal pain, dysuria, throat pain. Pt states she becomes nauseous and vomits bile and has worsened back and rib pain from her baseline due to her frequent coughing. Pt denies sick contacts.       this morning pt states her breathing is better compared to when she arrived with + cough, no chest pain no n/v or abd pain    REVIEW OF SYSTEMS:    CONSTITUTIONAL: No fever, +fatigue  RESPIRATORY: + cough, no wheezing or hemoptysis; min shortness of breath  CARDIOVASCULAR: No chest pain, palpitations  GASTROINTESTINAL: No abdominal or epigastric pain. No nausea, vomiting      Vital Signs Last 24 Hrs  T(C): 36.6 (15 Apr 2019 11:46), Max: 36.7 (15 Apr 2019 02:09)  T(F): 97.8 (15 Apr 2019 11:46), Max: 98 (15 Apr 2019 02:09)  HR: 75 (15 Apr 2019 15:52) (75 - 92)  BP: 124/50 (15 Apr 2019 11:46) (118/- - 129/60)  BP(mean): 87 (14 Apr 2019 21:51) (87 - 87)  RR: 17 (15 Apr 2019 11:46) (17 - 27)  SpO2: 92% (15 Apr 2019 15:52) (90% - 95%)    PHYSICAL EXAM:    GENERAL: NAD, well-groomed  HEENT: PERRL, +EOMI  CHEST/LUNG: bilaterally exp wheezing and rhonchi  HEART: S1S2+, Regular rate and rhythm; No murmurs  ABDOMEN: Soft, Nontender, Nondistended; Bowel sounds present  EXTREMITIES:  2+ Peripheral Pulses, No clubbing, cyanosis, + B/L LE edema      LABS:                        10.7   10.2  )-----------( 318      ( 15 Apr 2019 12:15 )             34.0     04-15    138  |  95<L>  |  26.0<H>  ----------------------------<  215<H>  3.3<L>   |  26.0  |  0.66    Ca    8.9      15 Apr 2019 12:15    TPro  7.5  /  Alb  3.7  /  TBili  0.2<L>  /  DBili  x   /  AST  16  /  ALT  23  /  AlkPhos  181<H>  04-15    PT/INR - ( 15 Apr 2019 12:15 )   PT: 13.2 sec;   INR: 1.14 ratio         PTT - ( 15 Apr 2019 12:15 )  PTT:34.3 sec        MEDICATIONS  (STANDING):  ALBUTerol/ipratropium for Nebulization 3 milliLiter(s) Nebulizer every 6 hours  azithromycin  IVPB 250 milliGRAM(s) IV Intermittent every 24 hours  azithromycin  IVPB      enoxaparin Injectable 40 milliGRAM(s) SubCutaneous every 24 hours  influenza   Vaccine 0.5 milliLiter(s) IntraMuscular once  methylPREDNISolone sodium succinate Injectable 40 milliGRAM(s) IV Push every 6 hours  potassium chloride    Tablet ER 40 milliEquivalent(s) Oral every 4 hours  saccharomyces boulardii 250 milliGRAM(s) Oral two times a day    MEDICATIONS  (PRN):  benzonatate 100 milliGRAM(s) Oral three times a day PRN Cough  ibuprofen  Tablet. 400 milliGRAM(s) Oral every 6 hours PRN Mild Pain (1 - 3)  oxyCODONE    5 mG/acetaminophen 325 mG 2 Tablet(s) Oral every 4 hours PRN Severe Pain (7 - 10)  zolpidem 5 milliGRAM(s) Oral at bedtime PRN Insomnia  zolpidem 5 milliGRAM(s) Oral at bedtime PRN Insomnia      RADIOLOGY & ADDITIONAL TESTS:  CXR:  IMPRESSION: Stable prominent interstitial markings and a small right   midlung opacity.                            ERASTO JOSÉ M.D., ATTENDING RADIOLOGIST  This document has been electronically signed. Apr 15 2019 10:43AM

## 2019-04-16 NOTE — PROGRESS NOTE ADULT - ASSESSMENT
69 yo female smoker who has a pmh/o lupus on methotrexate, Osteoporosis, HLD, asthma, MI, who presents to ED after having 2 weeks of worsening SOB and dry cough associated with bilious vomiting, anorexia. Pt states she smokes about 1.5 pack per week but has not smoked in 1 week as she has felt unwell. Pt attributes her sx to the weather changing, stating she has exacerbations approx. twice a year. Pt also noted her legs are swollen and states that she takes Lasix when they 'get bad' but does not take it everyday as she is supposed to. Pt also states she has not taken her methotrexate which she was supposed to take her weekly dose yesterday. Pt denies fever, diarrhea, HA, abdominal pain, dysuria, throat pain. Pt states she becomes nauseous and vomits bile and has worsened back and rib pain from her baseline due to her frequent coughing. Pt denies sick contacts.       1) Acute Asthma exacerbation with RML PNA/CAP  - c/w oxygen, Nebs q 6 hrs and solumedorl IV  - on azithromycin and add rocephin for pna  - check urine legionella, sputum cx and blood cx  - on probiotics while on abx    2) Lupus  - c/w weekly Methotrexate   - outpt follow up    3) Elevated BnP with evidence of congestion on chest x ray  - non compliant with lasix  - denies h/o CHF  - f/u TTE  - s/p lasix IV 40 mg x 1 dose   - resume lasix 40mg po daily and monitor    4) Hypokalemia  - replace and monitor    5) Spinal cord stenosis  - cont percocet at home dose    6) insomnia  cont ambien prn at home dose      7) Anemia  - pt back in jan 2019 was normal  - check stool occult, b12, folic acid and iron studies    8) Prophylactic measure  - DVT ppx: lovenox SQ      Disposition: will be discharged home once medically stable, likely in the next 48 hours
69 yo female smoker who has a pmh/o lupus on methotrexate, Osteoporosis, HLD, asthma, MI, who presents to ED after having 2 weeks of worsening SOB and dry cough associated with bilious vomiting, anorexia. Pt states she smokes about 1.5 pack per week but has not smoked in 1 week as she has felt unwell. Pt attributes her sx to the weather changing, stating she has exacerbations approx. twice a year. Pt also noted her legs are swollen and states that she takes Lasix when they 'get bad' but does not take it everyday as she is supposed to. Pt also states she has not taken her methotrexate which she was supposed to take her weekly dose yesterday. Pt denies fever, diarrhea, HA, abdominal pain, dysuria, throat pain. Pt states she becomes nauseous and vomits bile and has worsened back and rib pain from her baseline due to her frequent coughing. Pt denies sick contacts.       1) Acute Asthma exacerbation with RML PNA/CAP: improving  - c/w oxygen, Nebs q 6 hrs and solumedrol IV  - on azithromycin and add rocephin for pna day #2  - pt still has wheezing and O2 sat on oxygen is 90%, is getting better per pt but not at baseline. Per pt not on home oxygen  - on probiotics while on abx    2) Lupus  - c/w weekly Methotrexate   - outpt follow up    3) Elevated BnP with evidence of congestion on chest x ray  - non compliant with lasix  - denies h/o CHF  - f/u TTE still pendind to be done  - s/p lasix IV 40 mg x 1 dose, c/w lasix 40mg po daily and monitor    4) Hypokalemia: resolved  - s/p replacement and monitor    5) Spinal cord stenosis  - cont percocet at home dose    6) insomnia  cont ambien prn at home dose      7) Anemia: stable  - pt back in jan 2019 was normal  - stool occult pending  - B12 and iron studies within range  - monitor level    8) Prophylactic measure  - DVT ppx: lovenox SQ      Disposition: will be discharged home once medically stable, likely in the next 24 hours if respiratory stable on room air

## 2019-04-17 ENCOUNTER — TRANSCRIPTION ENCOUNTER (OUTPATIENT)
Age: 69
End: 2019-04-17

## 2019-04-17 VITALS
RESPIRATION RATE: 18 BRPM | DIASTOLIC BLOOD PRESSURE: 82 MMHG | HEART RATE: 99 BPM | OXYGEN SATURATION: 90 % | SYSTOLIC BLOOD PRESSURE: 131 MMHG | TEMPERATURE: 98 F

## 2019-04-17 LAB
ANION GAP SERPL CALC-SCNC: 12 MMOL/L — SIGNIFICANT CHANGE UP (ref 5–17)
BUN SERPL-MCNC: 22 MG/DL — HIGH (ref 8–20)
CALCIUM SERPL-MCNC: 9 MG/DL — SIGNIFICANT CHANGE UP (ref 8.6–10.2)
CHLORIDE SERPL-SCNC: 96 MMOL/L — LOW (ref 98–107)
CO2 SERPL-SCNC: 28 MMOL/L — SIGNIFICANT CHANGE UP (ref 22–29)
CREAT SERPL-MCNC: 0.53 MG/DL — SIGNIFICANT CHANGE UP (ref 0.5–1.3)
FOLATE RBC-MCNC: 933 NG/ML — SIGNIFICANT CHANGE UP (ref 499–1504)
GLUCOSE SERPL-MCNC: 134 MG/DL — HIGH (ref 70–115)
MAGNESIUM SERPL-MCNC: 2.1 MG/DL — SIGNIFICANT CHANGE UP (ref 1.6–2.6)
POTASSIUM SERPL-MCNC: 4.5 MMOL/L — SIGNIFICANT CHANGE UP (ref 3.5–5.3)
POTASSIUM SERPL-SCNC: 4.5 MMOL/L — SIGNIFICANT CHANGE UP (ref 3.5–5.3)
SODIUM SERPL-SCNC: 136 MMOL/L — SIGNIFICANT CHANGE UP (ref 135–145)

## 2019-04-17 PROCEDURE — 85610 PROTHROMBIN TIME: CPT

## 2019-04-17 PROCEDURE — 84466 ASSAY OF TRANSFERRIN: CPT

## 2019-04-17 PROCEDURE — 82747 ASSAY OF FOLIC ACID RBC: CPT

## 2019-04-17 PROCEDURE — 93306 TTE W/DOPPLER COMPLETE: CPT

## 2019-04-17 PROCEDURE — 36415 COLL VENOUS BLD VENIPUNCTURE: CPT

## 2019-04-17 PROCEDURE — 87581 M.PNEUMON DNA AMP PROBE: CPT

## 2019-04-17 PROCEDURE — 96374 THER/PROPH/DIAG INJ IV PUSH: CPT

## 2019-04-17 PROCEDURE — 87040 BLOOD CULTURE FOR BACTERIA: CPT

## 2019-04-17 PROCEDURE — 83735 ASSAY OF MAGNESIUM: CPT

## 2019-04-17 PROCEDURE — 83540 ASSAY OF IRON: CPT

## 2019-04-17 PROCEDURE — 99285 EMERGENCY DEPT VISIT HI MDM: CPT | Mod: 25

## 2019-04-17 PROCEDURE — 85730 THROMBOPLASTIN TIME PARTIAL: CPT

## 2019-04-17 PROCEDURE — 84484 ASSAY OF TROPONIN QUANT: CPT

## 2019-04-17 PROCEDURE — 83550 IRON BINDING TEST: CPT

## 2019-04-17 PROCEDURE — 94760 N-INVAS EAR/PLS OXIMETRY 1: CPT

## 2019-04-17 PROCEDURE — 94640 AIRWAY INHALATION TREATMENT: CPT

## 2019-04-17 PROCEDURE — 83880 ASSAY OF NATRIURETIC PEPTIDE: CPT

## 2019-04-17 PROCEDURE — 82607 VITAMIN B-12: CPT

## 2019-04-17 PROCEDURE — 86803 HEPATITIS C AB TEST: CPT

## 2019-04-17 PROCEDURE — 87486 CHLMYD PNEUM DNA AMP PROBE: CPT

## 2019-04-17 PROCEDURE — 82728 ASSAY OF FERRITIN: CPT

## 2019-04-17 PROCEDURE — 87633 RESP VIRUS 12-25 TARGETS: CPT

## 2019-04-17 PROCEDURE — 85027 COMPLETE CBC AUTOMATED: CPT

## 2019-04-17 PROCEDURE — 71046 X-RAY EXAM CHEST 2 VIEWS: CPT

## 2019-04-17 PROCEDURE — 80053 COMPREHEN METABOLIC PANEL: CPT

## 2019-04-17 PROCEDURE — 80048 BASIC METABOLIC PNL TOTAL CA: CPT

## 2019-04-17 PROCEDURE — 99239 HOSP IP/OBS DSCHRG MGMT >30: CPT

## 2019-04-17 PROCEDURE — 87798 DETECT AGENT NOS DNA AMP: CPT

## 2019-04-17 PROCEDURE — 93005 ELECTROCARDIOGRAM TRACING: CPT

## 2019-04-17 RX ORDER — NICOTINE POLACRILEX 2 MG
1 GUM BUCCAL
Qty: 30 | Refills: 0
Start: 2019-04-17 | End: 2019-05-16

## 2019-04-17 RX ORDER — IPRATROPIUM/ALBUTEROL SULFATE 18-103MCG
3 AEROSOL WITH ADAPTER (GRAM) INHALATION
Qty: 0 | Refills: 0 | DISCHARGE
Start: 2019-04-17

## 2019-04-17 RX ORDER — CEFPODOXIME PROXETIL 100 MG
1 TABLET ORAL
Qty: 20 | Refills: 0 | OUTPATIENT
Start: 2019-04-17 | End: 2019-04-26

## 2019-04-17 RX ORDER — AZITHROMYCIN 500 MG/1
1 TABLET, FILM COATED ORAL
Qty: 2 | Refills: 0 | OUTPATIENT
Start: 2019-04-17 | End: 2019-04-18

## 2019-04-17 RX ORDER — NICOTINE POLACRILEX 2 MG
1 GUM BUCCAL
Qty: 30 | Refills: 0 | OUTPATIENT
Start: 2019-04-17 | End: 2019-05-16

## 2019-04-17 RX ORDER — AZITHROMYCIN 500 MG/1
1 TABLET, FILM COATED ORAL
Qty: 2 | Refills: 0
Start: 2019-04-17 | End: 2019-04-18

## 2019-04-17 RX ADMIN — Medication 3 MILLILITER(S): at 03:59

## 2019-04-17 RX ADMIN — Medication 40 MILLIGRAM(S): at 05:42

## 2019-04-17 RX ADMIN — Medication 3 MILLILITER(S): at 08:49

## 2019-04-17 RX ADMIN — Medication 40 MILLIGRAM(S): at 13:28

## 2019-04-17 RX ADMIN — Medication 400 MILLIGRAM(S): at 09:08

## 2019-04-17 RX ADMIN — OXYCODONE AND ACETAMINOPHEN 2 TABLET(S): 5; 325 TABLET ORAL at 10:45

## 2019-04-17 RX ADMIN — Medication 400 MILLIGRAM(S): at 10:30

## 2019-04-17 RX ADMIN — OXYCODONE AND ACETAMINOPHEN 2 TABLET(S): 5; 325 TABLET ORAL at 05:43

## 2019-04-17 RX ADMIN — OXYCODONE AND ACETAMINOPHEN 2 TABLET(S): 5; 325 TABLET ORAL at 11:30

## 2019-04-17 RX ADMIN — Medication 250 MILLIGRAM(S): at 05:42

## 2019-04-17 RX ADMIN — OXYCODONE AND ACETAMINOPHEN 2 TABLET(S): 5; 325 TABLET ORAL at 05:37

## 2019-04-17 RX ADMIN — Medication 40 MILLIGRAM(S): at 00:21

## 2019-04-17 RX ADMIN — OXYCODONE AND ACETAMINOPHEN 2 TABLET(S): 5; 325 TABLET ORAL at 00:22

## 2019-04-17 NOTE — DISCHARGE NOTE PROVIDER - NSDCCONDITION_GEN_ALL_CORE
Shruthi Stable

## 2019-04-17 NOTE — DISCHARGE NOTE NURSING/CASE MANAGEMENT/SOCIAL WORK - NSDCDPATPORTLINK_GEN_ALL_CORE
You can access the [facility] Portal, offered by [facility], by registering with the following website: [email or web address]

## 2019-04-17 NOTE — DISCHARGE NOTE NURSING/CASE MANAGEMENT/SOCIAL WORK - NSDCPEEMAIL_GEN_ALL_CORE
[facility] for Tobacco Control email [email or web address]

## 2019-04-17 NOTE — DISCHARGE NOTE NURSING/CASE MANAGEMENT/SOCIAL WORK - NSDCPEWEB_GEN_ALL_CORE
[facility] for Tobacco Control website --- [email or web address] website --- [email or web address]

## 2019-04-17 NOTE — DISCHARGE NOTE PROVIDER - HOSPITAL COURSE
HPI:    Pt is a 67 yo female smoker who has a pmh/o lupus on methotrexate, Osteoporosis, HLD, asthma, MI, who presents to ED after having 2 weeks of worsening SOB and dry cough associated with bilious vomiting, anorexia. Pt states she smokes about 1.5 pack per week but has not smoked in 1 week as she has felt unwell. Pt attributes her sx to the weather changing, stating she has exacerbations approx. twice a year. Pt also noted her legs are swollen and states that she takes Lasix when they 'get bad' but does not take it everyday as she is supposed to. Pt also states she has not taken her methotrexate which she was supposed to take her weekly dose yesterday. Pt denies fever, diarrhea, HA, abdominal pain, dysuria, throat pain. Pt states she becomes nauseous and vomits bile and has worsened back and rib pain from her baseline due to her frequent coughing.        Patient admitted and treated with solumedrol IV q6 hours and breathing treatments ATC. Patient improved. when ambulating patient oxygen comes down to 88% but otherwise patient asymptomatic. She is eager to leave. Advised patient to return to the hospital if she has chest pain, worsening shortness of breath and cough.        ICU Vital Signs Last 24 Hrs    T(C): 36.4 (17 Apr 2019 05:36), Max: 36.9 (16 Apr 2019 20:22)    T(F): 97.5 (17 Apr 2019 05:36), Max: 98.5 (16 Apr 2019 20:22)    HR: 84 (17 Apr 2019 05:36) (80 - 102)    BP: 142/89 (17 Apr 2019 05:36) (122/77 - 142/89)    BP(mean): --    ABP: --    ABP(mean): --    RR: 19 (17 Apr 2019 05:36) (18 - 19)    SpO2: 95% (17 Apr 2019 03:59) (91% - 95%)        PHYSICAL EXAM:    General: Well developed; well nourished; in no acute distress    Eyes: PERRLA, EOMI; conjunctiva and sclera clear    Head: Normocephalic; atraumatic    ENMT: No nasal discharge; airway clear    Neck: Supple; non tender; no masses    Respiratory: wheezing bilaterally but good air movement    Cardiovascular: Regular rate and rhythm. S1 and S2 Normal; No murmurs, gallops or rubs    Gastrointestinal: Soft non-tender non-distended; Normal bowel sounds    Genitourinary: No costovertebral angle tenderness    Extremities: Normal range of motion, No clubbing, cyanosis or edema    Vascular: Peripheral pulses palpable 2+ bilaterally    Neurological: Alert and oriented x4    Skin: Warm and dry. No acute rash    Musculoskeletal: Normal gait, tone, without deformities        discharge time 39 minutes HPI:    Pt is a 69 yo female smoker who has a pmh/o lupus on methotrexate, Osteoporosis, HLD, asthma, MI, who presents to ED after having 2 weeks of worsening SOB and dry cough associated with bilious vomiting, anorexia. Pt states she smokes about 1.5 pack per week but has not smoked in 1 week as she has felt unwell. Pt attributes her sx to the weather changing, stating she has exacerbations approx. twice a year. Pt also noted her legs are swollen and states that she takes Lasix when they 'get bad' but does not take it everyday as she is supposed to. Pt also states she has not taken her methotrexate which she was supposed to take her weekly dose yesterday. Pt denies fever, diarrhea, HA, abdominal pain, dysuria, throat pain. Pt states she becomes nauseous and vomits bile and has worsened back and rib pain from her baseline due to her frequent coughing.        Patient admitted and treated with solumedrol IV q6 hours and breathing treatments ATC. Patient improved. when ambulating patient oxygen comes down to 91% but otherwise patient asymptomatic, tachycardia noted as well with ambulation but patient has no lightheadedness or other symptoms. She is eager to leave. Advised patient to return to the hospital if she has chest pain, worsening shortness of breath and cough.        ICU Vital Signs Last 24 Hrs    T(C): 36.4 (17 Apr 2019 05:36), Max: 36.9 (16 Apr 2019 20:22)    T(F): 97.5 (17 Apr 2019 05:36), Max: 98.5 (16 Apr 2019 20:22)    HR: 84 (17 Apr 2019 05:36) (80 - 102)    BP: 142/89 (17 Apr 2019 05:36) (122/77 - 142/89)    BP(mean): --    ABP: --    ABP(mean): --    RR: 19 (17 Apr 2019 05:36) (18 - 19)    SpO2: 95% (17 Apr 2019 03:59) (91% - 95%)        PHYSICAL EXAM:    General: Well developed; well nourished; in no acute distress    Eyes: PERRLA, EOMI; conjunctiva and sclera clear    Head: Normocephalic; atraumatic    ENMT: No nasal discharge; airway clear    Neck: Supple; non tender; no masses    Respiratory: wheezing bilaterally but good air movement    Cardiovascular: Regular rate and rhythm. S1 and S2 Normal; No murmurs, gallops or rubs    Gastrointestinal: Soft non-tender non-distended; Normal bowel sounds    Genitourinary: No costovertebral angle tenderness    Extremities: Normal range of motion, No clubbing, cyanosis or edema    Vascular: Peripheral pulses palpable 2+ bilaterally    Neurological: Alert and oriented x4    Skin: Warm and dry. No acute rash    Musculoskeletal: Normal gait, tone, without deformities        discharge time 39 minutes

## 2019-04-17 NOTE — DISCHARGE NOTE NURSING/CASE MANAGEMENT/SOCIAL WORK - NSDCFUADDAPPT_GEN_ALL_CORE_FT
APPOINTMENT WITH DR JOHNSTON AT [facility] ON TUES 4/23/19 @ 12 PM    PATIENT REFUSING HOME CARE VISIT   MEDICATIONS REVIEWED WITH PATIENTS UNDERSTANDING OF MEDS AND SELF ADMINISTRATION.    PALLIATIVE CONSULT N/A AS PER MD AT THIS TIME

## 2019-04-17 NOTE — DISCHARGE NOTE PROVIDER - NSDCCPCAREPLAN_GEN_ALL_CORE_FT
PRINCIPAL DISCHARGE DIAGNOSIS  Diagnosis: Asthma exacerbation  Assessment and Plan of Treatment: PRINCIPAL DISCHARGE DIAGNOSIS  Diagnosis: Asthma exacerbation  Assessment and Plan of Treatment: continue PO vanitn and azithromax; taper steroids orally. continue home inhalers for asthma exacerbation.      SECONDARY DISCHARGE DIAGNOSES  Diagnosis: Nicotine dependence  Assessment and Plan of Treatment: take nicotine patch    Diagnosis: Systemic lupus  Assessment and Plan of Treatment: continue methotrexate at home PRINCIPAL DISCHARGE DIAGNOSIS  Diagnosis: Asthma exacerbation  Assessment and Plan of Treatment: continue PO augmentin and azithromax; taper steroids orally. continue home inhalers for asthma exacerbation.      SECONDARY DISCHARGE DIAGNOSES  Diagnosis: Pneumonia  Assessment and Plan of Treatment: right lower lung pnuemonia; continue augmentin 875 mg BID for 3 more days. Finish Azithromycin for 2 more days. Follow up with new primary care physician    Diagnosis: Nicotine dependence  Assessment and Plan of Treatment: take nicotine patch    Diagnosis: Systemic lupus  Assessment and Plan of Treatment: continue methotrexate at home PRINCIPAL DISCHARGE DIAGNOSIS  Diagnosis: Asthma exacerbation  Assessment and Plan of Treatment: continue PO augmentin and azithromax; taper steroids orally. continue home inhalers for asthma exacerbation.      SECONDARY DISCHARGE DIAGNOSES  Diagnosis: Pneumonia  Assessment and Plan of Treatment: right lower lung pnuemonia, due to either gram positive/gram negative bacteria; continue augmentin 875 mg BID for 3 more days. Finish Azithromycin for 2 more days. Follow up with new primary care physician    Diagnosis: Nicotine dependence  Assessment and Plan of Treatment: take nicotine patch    Diagnosis: Systemic lupus  Assessment and Plan of Treatment: continue methotrexate at home

## 2019-04-21 LAB
CULTURE RESULTS: SIGNIFICANT CHANGE UP
SPECIMEN SOURCE: SIGNIFICANT CHANGE UP

## 2019-05-23 ENCOUNTER — APPOINTMENT (OUTPATIENT)
Dept: PULMONOLOGY | Facility: CLINIC | Age: 69
End: 2019-05-23

## 2019-07-17 ENCOUNTER — EMERGENCY (EMERGENCY)
Facility: HOSPITAL | Age: 69
LOS: 1 days | Discharge: HOME | End: 2019-07-17
Attending: EMERGENCY MEDICINE
Payer: MEDICARE

## 2019-07-17 VITALS
HEART RATE: 96 BPM | HEIGHT: 62 IN | OXYGEN SATURATION: 92 % | TEMPERATURE: 92 F | SYSTOLIC BLOOD PRESSURE: 130 MMHG | RESPIRATION RATE: 18 BRPM | DIASTOLIC BLOOD PRESSURE: 79 MMHG | WEIGHT: 167.99 LBS

## 2019-07-17 DIAGNOSIS — Z98.89 OTHER SPECIFIED POSTPROCEDURAL STATES: Chronic | ICD-10-CM

## 2019-07-17 PROCEDURE — 99284 EMERGENCY DEPT VISIT MOD MDM: CPT

## 2019-07-17 RX ORDER — DEXAMETHASONE 0.5 MG/5ML
8 ELIXIR ORAL ONCE
Refills: 0 | Status: COMPLETED | OUTPATIENT
Start: 2019-07-17 | End: 2019-07-17

## 2019-07-17 RX ORDER — SODIUM CHLORIDE 9 MG/ML
3 INJECTION INTRAMUSCULAR; INTRAVENOUS; SUBCUTANEOUS ONCE
Refills: 0 | Status: COMPLETED | OUTPATIENT
Start: 2019-07-17 | End: 2019-07-17

## 2019-07-17 RX ORDER — LIDOCAINE 4 G/100G
1 CREAM TOPICAL ONCE
Refills: 0 | Status: COMPLETED | OUTPATIENT
Start: 2019-07-17 | End: 2019-07-17

## 2019-07-17 RX ORDER — METHOCARBAMOL 500 MG/1
1000 TABLET, FILM COATED ORAL ONCE
Refills: 0 | Status: COMPLETED | OUTPATIENT
Start: 2019-07-17 | End: 2019-07-17

## 2019-07-17 RX ADMIN — METHOCARBAMOL 1000 MILLIGRAM(S): 500 TABLET, FILM COATED ORAL at 23:25

## 2019-07-17 RX ADMIN — SODIUM CHLORIDE 3 MILLILITER(S): 9 INJECTION INTRAMUSCULAR; INTRAVENOUS; SUBCUTANEOUS at 22:51

## 2019-07-17 RX ADMIN — Medication 101.6 MILLIGRAM(S): at 22:48

## 2019-07-17 RX ADMIN — LIDOCAINE 1 PATCH: 4 CREAM TOPICAL at 22:48

## 2019-07-17 NOTE — ED STATDOCS - OBJECTIVE STATEMENT
68 y/o F pt with significant PMHx of Lupus, HLD, Osteoporosis, Asthma presents to the ED c/o back pain that began a few days ago. Currently she feels that her "knees are going to buckle", whenever she stands up. 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 Pt states she had similar pain, and had an MRI done that showed herniating disc. She takes 10mg of Norco when the pain starts, but it provided no symptomatic relief. Pt does state she does have a lot of stress at home. Pt does have trouble ambulating. Currently on Lasix, but does not take it. Current Tobacco User. Denies fever, chills, N/V/D. No further complaints at this time.

## 2019-07-17 NOTE — ED ADULT TRIAGE NOTE - CHIEF COMPLAINT QUOTE
" I have lupus, im having lower back pain going down both my legs, I start walking and it hurts so bad I have to stop". Pt reports pain started about 4 days ago. Pt reports this happened in the past, had an MRI and " I have tons of back issues" Pt has PMH of Spinal stenosis and herniated discs.

## 2019-07-17 NOTE — ED STATDOCS - ATTENDING CONTRIBUTION TO CARE
I, Melisa Villegas, performed the initial face to face bedside interview with this patient regarding history of present illness, review of symptoms and relevant past medical, social and family history.  I completed an independent physical examination.  I was the initial provider who evaluated this patient. I have signed out the follow up of any pending tests (i.e. labs, radiological studies) to the ACP.  I have communicated the patient’s plan of care and disposition with the ACP.  The history, relevant review of systems, past medical and surgical history, medical decision making, and physical examination was documented by the scribe in my presence and I attest to the accuracy of the documentation.  see mdm

## 2019-07-17 NOTE — ED ADULT NURSE NOTE - NSIMPLEMENTINTERV_GEN_ALL_ED
Implemented All Universal Safety Interventions:  [place] to call system. Call bell, personal items and telephone within reach. Instruct patient to call for assistance. Room bathroom lighting operational. Non-slip footwear when patient is off stretcher. Physically safe environment: no spills, clutter or unnecessary equipment. Stretcher in lowest position, wheels locked, appropriate side rails in place.

## 2019-07-17 NOTE — ED STATDOCS - PRINCIPAL DIAGNOSIS
Low back pain, unspecified back pain laterality, unspecified chronicity, unspecified whether sciatica present

## 2019-07-17 NOTE — ED STATDOCS - PROGRESS NOTE DETAILS
pt is seen initially bY Dr sanchez agreed with hx , PE and plan   xray of the LS reviewed by Dr goetz With DJD W,o any acute fracture ,pt is ambulatory walking with normal steady gait will gave pain med referral spine

## 2019-07-17 NOTE — ED STATDOCS - CARE PLAN
Principal Discharge DX:	Low back pain, unspecified back pain laterality, unspecified chronicity, unspecified whether sciatica present

## 2019-07-17 NOTE — ED STATDOCS - CLINICAL SUMMARY MEDICAL DECISION MAKING FREE TEXT BOX
68 yo female with back pain No trauma but works as . Pain "tight", diff walking associate. no tenderness elicited on pe. xray meds , reeval  pain less , outpt fu

## 2019-07-18 VITALS
SYSTOLIC BLOOD PRESSURE: 126 MMHG | DIASTOLIC BLOOD PRESSURE: 70 MMHG | TEMPERATURE: 99 F | HEART RATE: 90 BPM | OXYGEN SATURATION: 95 % | RESPIRATION RATE: 16 BRPM

## 2019-07-18 PROCEDURE — 72100 X-RAY EXAM L-S SPINE 2/3 VWS: CPT | Mod: 26

## 2019-07-18 PROCEDURE — 99284 EMERGENCY DEPT VISIT MOD MDM: CPT | Mod: 25

## 2019-07-18 PROCEDURE — 96374 THER/PROPH/DIAG INJ IV PUSH: CPT

## 2019-07-18 PROCEDURE — 72100 X-RAY EXAM L-S SPINE 2/3 VWS: CPT

## 2019-07-18 RX ORDER — TRAMADOL HYDROCHLORIDE 50 MG/1
1 TABLET ORAL
Qty: 10 | Refills: 0
Start: 2019-07-18 | End: 2019-07-20

## 2019-07-18 RX ORDER — LIDOCAINE 4 G/100G
1 CREAM TOPICAL
Qty: 10 | Refills: 0
Start: 2019-07-18 | End: 2019-07-27

## 2019-07-18 RX ORDER — TRAMADOL HYDROCHLORIDE 50 MG/1
50 TABLET ORAL ONCE
Refills: 0 | Status: DISCONTINUED | OUTPATIENT
Start: 2019-07-18 | End: 2019-07-18

## 2019-07-18 RX ORDER — METHOCARBAMOL 500 MG/1
1 TABLET, FILM COATED ORAL
Qty: 10 | Refills: 0
Start: 2019-07-18 | End: 2019-07-27

## 2019-07-18 RX ADMIN — TRAMADOL HYDROCHLORIDE 50 MILLIGRAM(S): 50 TABLET ORAL at 00:48

## 2019-08-01 ENCOUNTER — OUTPATIENT (OUTPATIENT)
Dept: OUTPATIENT SERVICES | Facility: HOSPITAL | Age: 69
LOS: 1 days | End: 2019-08-01
Payer: MEDICARE

## 2019-08-01 DIAGNOSIS — Z98.89 OTHER SPECIFIED POSTPROCEDURAL STATES: Chronic | ICD-10-CM

## 2019-08-01 PROCEDURE — G9001: CPT

## 2019-08-09 DIAGNOSIS — Z71.89 OTHER SPECIFIED COUNSELING: ICD-10-CM

## 2019-11-30 ENCOUNTER — EMERGENCY (EMERGENCY)
Facility: HOSPITAL | Age: 69
LOS: 1 days | Discharge: HOME | End: 2019-11-30
Attending: EMERGENCY MEDICINE
Payer: MEDICARE

## 2019-11-30 VITALS
SYSTOLIC BLOOD PRESSURE: 154 MMHG | OXYGEN SATURATION: 95 % | TEMPERATURE: 98 F | DIASTOLIC BLOOD PRESSURE: 87 MMHG | HEART RATE: 87 BPM | HEIGHT: 62 IN | RESPIRATION RATE: 18 BRPM | WEIGHT: 164.91 LBS

## 2019-11-30 VITALS
SYSTOLIC BLOOD PRESSURE: 119 MMHG | RESPIRATION RATE: 18 BRPM | OXYGEN SATURATION: 93 % | HEART RATE: 93 BPM | DIASTOLIC BLOOD PRESSURE: 65 MMHG | TEMPERATURE: 99 F

## 2019-11-30 DIAGNOSIS — Z98.89 OTHER SPECIFIED POSTPROCEDURAL STATES: Chronic | ICD-10-CM

## 2019-11-30 LAB
ANION GAP SERPL CALC-SCNC: 13 MMOL/L — SIGNIFICANT CHANGE UP (ref 5–17)
APTT BLD: 38.6 SEC — HIGH (ref 27.5–36.3)
BASOPHILS # BLD AUTO: 0.06 K/UL — SIGNIFICANT CHANGE UP (ref 0–0.2)
BASOPHILS NFR BLD AUTO: 0.9 % — SIGNIFICANT CHANGE UP (ref 0–2)
BUN SERPL-MCNC: 23 MG/DL — HIGH (ref 8–20)
CALCIUM SERPL-MCNC: 8.8 MG/DL — SIGNIFICANT CHANGE UP (ref 8.6–10.2)
CHLORIDE SERPL-SCNC: 101 MMOL/L — SIGNIFICANT CHANGE UP (ref 98–107)
CO2 SERPL-SCNC: 24 MMOL/L — SIGNIFICANT CHANGE UP (ref 22–29)
CREAT SERPL-MCNC: 0.54 MG/DL — SIGNIFICANT CHANGE UP (ref 0.5–1.3)
EOSINOPHIL # BLD AUTO: 0.36 K/UL — SIGNIFICANT CHANGE UP (ref 0–0.5)
EOSINOPHIL NFR BLD AUTO: 5.2 % — SIGNIFICANT CHANGE UP (ref 0–6)
GLUCOSE SERPL-MCNC: 122 MG/DL — HIGH (ref 70–115)
HCT VFR BLD CALC: 43.7 % — SIGNIFICANT CHANGE UP (ref 34.5–45)
HGB BLD-MCNC: 14.2 G/DL — SIGNIFICANT CHANGE UP (ref 11.5–15.5)
IMM GRANULOCYTES NFR BLD AUTO: 0.3 % — SIGNIFICANT CHANGE UP (ref 0–1.5)
INR BLD: 1.07 RATIO — SIGNIFICANT CHANGE UP (ref 0.88–1.16)
LYMPHOCYTES # BLD AUTO: 3.35 K/UL — HIGH (ref 1–3.3)
LYMPHOCYTES # BLD AUTO: 48 % — HIGH (ref 13–44)
MCHC RBC-ENTMCNC: 27.7 PG — SIGNIFICANT CHANGE UP (ref 27–34)
MCHC RBC-ENTMCNC: 32.5 GM/DL — SIGNIFICANT CHANGE UP (ref 32–36)
MCV RBC AUTO: 85.4 FL — SIGNIFICANT CHANGE UP (ref 80–100)
MONOCYTES # BLD AUTO: 0.51 K/UL — SIGNIFICANT CHANGE UP (ref 0–0.9)
MONOCYTES NFR BLD AUTO: 7.3 % — SIGNIFICANT CHANGE UP (ref 2–14)
NEUTROPHILS # BLD AUTO: 2.68 K/UL — SIGNIFICANT CHANGE UP (ref 1.8–7.4)
NEUTROPHILS NFR BLD AUTO: 38.3 % — LOW (ref 43–77)
PLATELET # BLD AUTO: 233 K/UL — SIGNIFICANT CHANGE UP (ref 150–400)
POTASSIUM SERPL-MCNC: 4.7 MMOL/L — SIGNIFICANT CHANGE UP (ref 3.5–5.3)
POTASSIUM SERPL-SCNC: 4.7 MMOL/L — SIGNIFICANT CHANGE UP (ref 3.5–5.3)
PROTHROM AB SERPL-ACNC: 12.3 SEC — SIGNIFICANT CHANGE UP (ref 10–12.9)
RBC # BLD: 5.12 M/UL — SIGNIFICANT CHANGE UP (ref 3.8–5.2)
RBC # FLD: 13.5 % — SIGNIFICANT CHANGE UP (ref 10.3–14.5)
SODIUM SERPL-SCNC: 138 MMOL/L — SIGNIFICANT CHANGE UP (ref 135–145)
WBC # BLD: 6.98 K/UL — SIGNIFICANT CHANGE UP (ref 3.8–10.5)
WBC # FLD AUTO: 6.98 K/UL — SIGNIFICANT CHANGE UP (ref 3.8–10.5)

## 2019-11-30 PROCEDURE — 85027 COMPLETE CBC AUTOMATED: CPT

## 2019-11-30 PROCEDURE — 80048 BASIC METABOLIC PNL TOTAL CA: CPT

## 2019-11-30 PROCEDURE — 96374 THER/PROPH/DIAG INJ IV PUSH: CPT

## 2019-11-30 PROCEDURE — 85610 PROTHROMBIN TIME: CPT

## 2019-11-30 PROCEDURE — 93005 ELECTROCARDIOGRAM TRACING: CPT

## 2019-11-30 PROCEDURE — 99285 EMERGENCY DEPT VISIT HI MDM: CPT

## 2019-11-30 PROCEDURE — 94640 AIRWAY INHALATION TREATMENT: CPT

## 2019-11-30 PROCEDURE — 85730 THROMBOPLASTIN TIME PARTIAL: CPT

## 2019-11-30 PROCEDURE — 93010 ELECTROCARDIOGRAM REPORT: CPT

## 2019-11-30 PROCEDURE — 36415 COLL VENOUS BLD VENIPUNCTURE: CPT

## 2019-11-30 PROCEDURE — 99285 EMERGENCY DEPT VISIT HI MDM: CPT | Mod: 25

## 2019-11-30 PROCEDURE — 99053 MED SERV 10PM-8AM 24 HR FAC: CPT

## 2019-11-30 PROCEDURE — 71046 X-RAY EXAM CHEST 2 VIEWS: CPT

## 2019-11-30 PROCEDURE — 87040 BLOOD CULTURE FOR BACTERIA: CPT

## 2019-11-30 PROCEDURE — 71046 X-RAY EXAM CHEST 2 VIEWS: CPT | Mod: 26

## 2019-11-30 PROCEDURE — 84484 ASSAY OF TROPONIN QUANT: CPT

## 2019-11-30 RX ORDER — AZITHROMYCIN 500 MG/1
1 TABLET, FILM COATED ORAL
Qty: 3 | Refills: 0
Start: 2019-11-30 | End: 2019-12-02

## 2019-11-30 RX ORDER — IPRATROPIUM/ALBUTEROL SULFATE 18-103MCG
3 AEROSOL WITH ADAPTER (GRAM) INHALATION ONCE
Refills: 0 | Status: COMPLETED | OUTPATIENT
Start: 2019-11-30 | End: 2019-11-30

## 2019-11-30 RX ADMIN — Medication 3 MILLILITER(S): at 09:22

## 2019-11-30 RX ADMIN — Medication 125 MILLIGRAM(S): at 09:22

## 2019-11-30 NOTE — ED ADULT NURSE NOTE - OBJECTIVE STATEMENT
pt care assumed at 0900, no apparent distress noted at this time. pt received Alert and Oriented to person, place, situation and time sitting in bed comfortably with MD levine at bedside. pt c/o cough and congestion for a few days. pt denies fever. HR is regular, lung sounds are wheezes b/l, abd is soft and nontender with positive bowel sounds in all four quadrants, skin is warm, dry and appropriate for age and race. pt educated on plan of care, plan of care taught back to RN. proficiency determined from successful pt teach back. will continue to educate pt throughout ED stay.

## 2019-11-30 NOTE — ED ADULT TRIAGE NOTE - CHIEF COMPLAINT QUOTE
pt c/o cough, congestion and wheezing. chest hurts with deep breath. symptoms for 3 weeks. hx lung conditions

## 2019-11-30 NOTE — ED PROVIDER NOTE - PROGRESS NOTE DETAILS
The patient was re-examined after interventions and is feeling much better.  The patient will follow up with their primary physician this week. CXR no PNA but will give azithro for COPD exacerbation. otupt Follow up -Inder HICKMAN

## 2019-11-30 NOTE — ED PROVIDER NOTE - OBJECTIVE STATEMENT
68yo F with asthma, current smoker, also with lupus on hydroxychloroquine, on steroids last 1.5weeks ago. complaint of persistent cough for 2 weeks nonproductive, chest tightness and runny nose. feels SOB at times- especially when anxious. not exertional. no orthopnea. no CP except for left sided lower ribcage pain with coughing. no recent abx use. no recent hospitalizations/immobilations. no LE edema. on lasix but denies cardiac conditions.

## 2019-11-30 NOTE — ED PROVIDER NOTE - CLINICAL SUMMARY MEDICAL DECISION MAKING FREE TEXT BOX
patient with asthma/COPD exacerbation, no respiratory distress, speaking full sentences. denies CP except for ribcage pain with coughing only. no risk factors for PE. sx associated with URI complaints. will give duoneb/steroids. CXR r/o PNA. reasses. screening EKG and trop

## 2019-11-30 NOTE — ED ADULT NURSE NOTE - PMH
Asthma    Autoimmune disease    Breast lump  Benign  Hashimoto's disease    High cholesterol    Lupus    Myocardial infarct    Osteoporosis

## 2019-11-30 NOTE — ED PROVIDER NOTE - CARE PLAN
Principal Discharge DX:	COPD exacerbation  Assessment and plan of treatment:	1. Return to ED for worsening, progressive or any other concerning symptoms   2. Follow up with your primary care doctor in 2-3days   3. Take 40mg of prednisone once a day for 4 days   4. Take 500mg of azithromycin once a day for 3 days   5. Follow up with a pulmonologist  Secondary Diagnosis:	Viral respiratory illness

## 2019-11-30 NOTE — ED PROVIDER NOTE - PHYSICAL EXAMINATION
Gen: NAD, AOx3  Head: NCAT  HEENT: EOMI, oral mucosa moist, normal conjunctiva, neck supple, clear oropharynx  Lung: diffuse expiratory wheeze and rhonchi, good air movement, no respiratory distress  CV: rrr, no murmur, Normal perfusion  Abd: soft, NTND  MSK: No edema, no visible deformities  Neuro: No focal neurologic deficits  Skin: No rash   Psych: normal affect

## 2019-11-30 NOTE — ED PROVIDER NOTE - PLAN OF CARE
1. Return to ED for worsening, progressive or any other concerning symptoms   2. Follow up with your primary care doctor in 2-3days   3. Take 40mg of prednisone once a day for 4 days   4. Take 500mg of azithromycin once a day for 3 days   5. Follow up with a pulmonologist

## 2019-11-30 NOTE — ED PROVIDER NOTE - PATIENT PORTAL LINK FT
You can access the FollowMyHealth Patient Portal offered by [facility] by registering at the following website: [email or web address]. By joining [employer]’s FollowMyHealth portal, you will also be able to view your health information using other applications (apps) compatible with our system.

## 2020-01-15 NOTE — ED ADULT NURSE NOTE - OBJECTIVE STATEMENT
Received patient in CDU11R, a&ox4, able to make needs known. Patient reports with c/o asthma exacerbation. Patient states she has asthma and has been not feeling well. Patient denies chest pain, sob, headache, lightheadedness and chills. Patient not in respiratory distress at this time. To continue to monitor. - - -

## 2020-02-08 NOTE — ED PROVIDER NOTE - CHPI ED SYMPTOMS NEG
[facility] Emergency Department  [address] [address]  Phone:  [phone]  Fax:  [phone]                                    Cisco Bergman   MRN: [id number]    Department:  [facility] Emergency Department   Date of Visit:  2/8/2020           After Visit Summary Signature Page    I have received my discharge instructions, and my questions have been answered. I have discussed any challenges I see with this plan with the nurse or doctor.    ..........................................................................................................................................  Patient/Patient Representative Signature      ..........................................................................................................................................  Patient Representative Print Name and Relationship to Patient    ..................................................               ................................................  Date                                   Time    ..........................................................................................................................................  Reviewed by Signature/Title    ...................................................              ..............................................  Date                                               Time          22EPIC Rev 08/18        no fever

## 2020-03-14 ENCOUNTER — INPATIENT (INPATIENT)
Facility: HOSPITAL | Age: 70
LOS: 2 days | Discharge: HOME | DRG: 192 | End: 2020-03-17
Attending: HOSPITALIST | Admitting: INTERNAL MEDICINE
Payer: MEDICARE

## 2020-03-14 VITALS
TEMPERATURE: 98 F | HEART RATE: 77 BPM | OXYGEN SATURATION: 92 % | DIASTOLIC BLOOD PRESSURE: 93 MMHG | SYSTOLIC BLOOD PRESSURE: 181 MMHG | HEIGHT: 63 IN | WEIGHT: 164.91 LBS | RESPIRATION RATE: 18 BRPM

## 2020-03-14 DIAGNOSIS — Z98.89 OTHER SPECIFIED POSTPROCEDURAL STATES: Chronic | ICD-10-CM

## 2020-03-14 PROCEDURE — 99285 EMERGENCY DEPT VISIT HI MDM: CPT

## 2020-03-14 NOTE — ED ADULT TRIAGE NOTE - CHIEF COMPLAINT QUOTE
patient alert and oriented in no distress with HX of asthma and PN states that she is wheezing and has SOB, dyspnea on exertion for several days

## 2020-03-15 PROBLEM — E06.3 AUTOIMMUNE THYROIDITIS: Chronic | Status: ACTIVE | Noted: 2019-11-30

## 2020-03-15 LAB
ALBUMIN SERPL ELPH-MCNC: 4.3 G/DL — SIGNIFICANT CHANGE UP (ref 3.3–5.2)
ALP SERPL-CCNC: 108 U/L — SIGNIFICANT CHANGE UP (ref 40–120)
ALT FLD-CCNC: 22 U/L — SIGNIFICANT CHANGE UP
ANION GAP SERPL CALC-SCNC: 14 MMOL/L — SIGNIFICANT CHANGE UP (ref 5–17)
AST SERPL-CCNC: 23 U/L — SIGNIFICANT CHANGE UP
BASE EXCESS BLDV CALC-SCNC: 4.7 MMOL/L — HIGH (ref -2–2)
BASOPHILS # BLD AUTO: 0.04 K/UL — SIGNIFICANT CHANGE UP (ref 0–0.2)
BASOPHILS NFR BLD AUTO: 0.4 % — SIGNIFICANT CHANGE UP (ref 0–2)
BILIRUB SERPL-MCNC: 0.4 MG/DL — SIGNIFICANT CHANGE UP (ref 0.4–2)
BUN SERPL-MCNC: 24 MG/DL — HIGH (ref 8–20)
CA-I SERPL-SCNC: 1.14 MMOL/L — LOW (ref 1.15–1.33)
CALCIUM SERPL-MCNC: 9.6 MG/DL — SIGNIFICANT CHANGE UP (ref 8.6–10.2)
CHLORIDE BLDV-SCNC: 103 MMOL/L — SIGNIFICANT CHANGE UP (ref 98–107)
CHLORIDE SERPL-SCNC: 99 MMOL/L — SIGNIFICANT CHANGE UP (ref 98–107)
CO2 SERPL-SCNC: 27 MMOL/L — SIGNIFICANT CHANGE UP (ref 22–29)
CREAT SERPL-MCNC: 0.67 MG/DL — SIGNIFICANT CHANGE UP (ref 0.5–1.3)
EOSINOPHIL # BLD AUTO: 0.42 K/UL — SIGNIFICANT CHANGE UP (ref 0–0.5)
EOSINOPHIL NFR BLD AUTO: 3.8 % — SIGNIFICANT CHANGE UP (ref 0–6)
GAS PNL BLDV: 144 MMOL/L — SIGNIFICANT CHANGE UP (ref 135–145)
GAS PNL BLDV: SIGNIFICANT CHANGE UP
GAS PNL BLDV: SIGNIFICANT CHANGE UP
GLUCOSE BLDV-MCNC: 93 MG/DL — SIGNIFICANT CHANGE UP (ref 70–99)
GLUCOSE SERPL-MCNC: 92 MG/DL — SIGNIFICANT CHANGE UP (ref 70–99)
HCO3 BLDV-SCNC: 28 MMOL/L — HIGH (ref 20–26)
HCT VFR BLD CALC: 45.1 % — HIGH (ref 34.5–45)
HCT VFR BLDA CALC: 49 — SIGNIFICANT CHANGE UP (ref 39–50)
HGB BLD CALC-MCNC: 16 G/DL — HIGH (ref 11.5–15.5)
HGB BLD-MCNC: 14.8 G/DL — SIGNIFICANT CHANGE UP (ref 11.5–15.5)
IMM GRANULOCYTES NFR BLD AUTO: 0.3 % — SIGNIFICANT CHANGE UP (ref 0–1.5)
LACTATE BLDV-MCNC: 0.8 MMOL/L — SIGNIFICANT CHANGE UP (ref 0.5–2)
LYMPHOCYTES # BLD AUTO: 4.58 K/UL — HIGH (ref 1–3.3)
LYMPHOCYTES # BLD AUTO: 41.8 % — SIGNIFICANT CHANGE UP (ref 13–44)
MAGNESIUM SERPL-MCNC: 1.5 MG/DL — LOW (ref 1.6–2.6)
MAGNESIUM SERPL-MCNC: 1.8 MG/DL — SIGNIFICANT CHANGE UP (ref 1.6–2.6)
MCHC RBC-ENTMCNC: 28.3 PG — SIGNIFICANT CHANGE UP (ref 27–34)
MCHC RBC-ENTMCNC: 32.8 GM/DL — SIGNIFICANT CHANGE UP (ref 32–36)
MCV RBC AUTO: 86.2 FL — SIGNIFICANT CHANGE UP (ref 80–100)
MONOCYTES # BLD AUTO: 0.72 K/UL — SIGNIFICANT CHANGE UP (ref 0–0.9)
MONOCYTES NFR BLD AUTO: 6.6 % — SIGNIFICANT CHANGE UP (ref 2–14)
NEUTROPHILS # BLD AUTO: 5.18 K/UL — SIGNIFICANT CHANGE UP (ref 1.8–7.4)
NEUTROPHILS NFR BLD AUTO: 47.1 % — SIGNIFICANT CHANGE UP (ref 43–77)
NT-PROBNP SERPL-SCNC: 391 PG/ML — HIGH (ref 0–300)
OTHER CELLS CSF MANUAL: 16 ML/DL — LOW (ref 18–22)
PCO2 BLDV: 52 MMHG — HIGH (ref 35–50)
PH BLDV: 7.39 — SIGNIFICANT CHANGE UP (ref 7.32–7.43)
PLATELET # BLD AUTO: 265 K/UL — SIGNIFICANT CHANGE UP (ref 150–400)
PO2 BLDV: 39 MMHG — SIGNIFICANT CHANGE UP (ref 25–45)
POTASSIUM BLDV-SCNC: 3.8 MMOL/L — SIGNIFICANT CHANGE UP (ref 3.4–4.5)
POTASSIUM SERPL-MCNC: 3.9 MMOL/L — SIGNIFICANT CHANGE UP (ref 3.5–5.3)
POTASSIUM SERPL-SCNC: 3.9 MMOL/L — SIGNIFICANT CHANGE UP (ref 3.5–5.3)
PROT SERPL-MCNC: 7.7 G/DL — SIGNIFICANT CHANGE UP (ref 6.6–8.7)
RAPID RVP RESULT: DETECTED
RBC # BLD: 5.23 M/UL — HIGH (ref 3.8–5.2)
RBC # FLD: 12.9 % — SIGNIFICANT CHANGE UP (ref 10.3–14.5)
RV+EV RNA SPEC QL NAA+PROBE: DETECTED
SAO2 % BLDV: 71 % — SIGNIFICANT CHANGE UP
SODIUM SERPL-SCNC: 140 MMOL/L — SIGNIFICANT CHANGE UP (ref 135–145)
TROPONIN T SERPL-MCNC: <0.01 NG/ML — SIGNIFICANT CHANGE UP (ref 0–0.06)
TROPONIN T SERPL-MCNC: <0.01 NG/ML — SIGNIFICANT CHANGE UP (ref 0–0.06)
WBC # BLD: 10.97 K/UL — HIGH (ref 3.8–10.5)
WBC # FLD AUTO: 10.97 K/UL — HIGH (ref 3.8–10.5)

## 2020-03-15 PROCEDURE — 99218: CPT

## 2020-03-15 PROCEDURE — 71045 X-RAY EXAM CHEST 1 VIEW: CPT | Mod: 26

## 2020-03-15 RX ORDER — MONTELUKAST 4 MG/1
10 TABLET, CHEWABLE ORAL ONCE
Refills: 0 | Status: DISCONTINUED | OUTPATIENT
Start: 2020-03-15 | End: 2020-03-15

## 2020-03-15 RX ORDER — IPRATROPIUM/ALBUTEROL SULFATE 18-103MCG
3 AEROSOL WITH ADAPTER (GRAM) INHALATION ONCE
Refills: 0 | Status: COMPLETED | OUTPATIENT
Start: 2020-03-15 | End: 2020-03-15

## 2020-03-15 RX ORDER — MONTELUKAST 4 MG/1
10 TABLET, CHEWABLE ORAL DAILY
Refills: 0 | Status: DISCONTINUED | OUTPATIENT
Start: 2020-03-15 | End: 2020-03-17

## 2020-03-15 RX ORDER — IPRATROPIUM/ALBUTEROL SULFATE 18-103MCG
3 AEROSOL WITH ADAPTER (GRAM) INHALATION
Refills: 0 | Status: COMPLETED | OUTPATIENT
Start: 2020-03-15 | End: 2020-03-15

## 2020-03-15 RX ORDER — HYDROXYCHLOROQUINE SULFATE 200 MG
200 TABLET ORAL DAILY
Refills: 0 | Status: DISCONTINUED | OUTPATIENT
Start: 2020-03-15 | End: 2020-03-17

## 2020-03-15 RX ORDER — BACLOFEN 100 %
10 POWDER (GRAM) MISCELLANEOUS ONCE
Refills: 0 | Status: COMPLETED | OUTPATIENT
Start: 2020-03-15 | End: 2020-03-16

## 2020-03-15 RX ORDER — ACETAMINOPHEN 500 MG
650 TABLET ORAL ONCE
Refills: 0 | Status: COMPLETED | OUTPATIENT
Start: 2020-03-15 | End: 2020-03-15

## 2020-03-15 RX ORDER — AZITHROMYCIN 500 MG/1
500 TABLET, FILM COATED ORAL EVERY 24 HOURS
Refills: 0 | Status: DISCONTINUED | OUTPATIENT
Start: 2020-03-15 | End: 2020-03-17

## 2020-03-15 RX ORDER — SODIUM CHLORIDE 9 MG/ML
1000 INJECTION INTRAMUSCULAR; INTRAVENOUS; SUBCUTANEOUS ONCE
Refills: 0 | Status: COMPLETED | OUTPATIENT
Start: 2020-03-15 | End: 2020-03-15

## 2020-03-15 RX ORDER — MAGNESIUM SULFATE 500 MG/ML
2 VIAL (ML) INJECTION ONCE
Refills: 0 | Status: COMPLETED | OUTPATIENT
Start: 2020-03-15 | End: 2020-03-15

## 2020-03-15 RX ORDER — FLUTICASONE PROPIONATE 50 MCG
1 SPRAY, SUSPENSION NASAL
Refills: 0 | Status: DISCONTINUED | OUTPATIENT
Start: 2020-03-15 | End: 2020-03-17

## 2020-03-15 RX ORDER — FOLIC ACID 0.8 MG
1 TABLET ORAL DAILY
Refills: 0 | Status: DISCONTINUED | OUTPATIENT
Start: 2020-03-15 | End: 2020-03-17

## 2020-03-15 RX ORDER — ATORVASTATIN CALCIUM 80 MG/1
20 TABLET, FILM COATED ORAL AT BEDTIME
Refills: 0 | Status: DISCONTINUED | OUTPATIENT
Start: 2020-03-15 | End: 2020-03-17

## 2020-03-15 RX ORDER — SODIUM CHLORIDE 9 MG/ML
1000 INJECTION, SOLUTION INTRAVENOUS
Refills: 0 | Status: COMPLETED | OUTPATIENT
Start: 2020-03-15 | End: 2020-03-16

## 2020-03-15 RX ORDER — IPRATROPIUM/ALBUTEROL SULFATE 18-103MCG
3 AEROSOL WITH ADAPTER (GRAM) INHALATION EVERY 6 HOURS
Refills: 0 | Status: DISCONTINUED | OUTPATIENT
Start: 2020-03-15 | End: 2020-03-17

## 2020-03-15 RX ORDER — OXYCODONE AND ACETAMINOPHEN 5; 325 MG/1; MG/1
1 TABLET ORAL ONCE
Refills: 0 | Status: DISCONTINUED | OUTPATIENT
Start: 2020-03-15 | End: 2020-03-15

## 2020-03-15 RX ORDER — OXYCODONE AND ACETAMINOPHEN 5; 325 MG/1; MG/1
1 TABLET ORAL EVERY 6 HOURS
Refills: 0 | Status: DISCONTINUED | OUTPATIENT
Start: 2020-03-15 | End: 2020-03-16

## 2020-03-15 RX ORDER — METOPROLOL TARTRATE 50 MG
50 TABLET ORAL DAILY
Refills: 0 | Status: DISCONTINUED | OUTPATIENT
Start: 2020-03-15 | End: 2020-03-17

## 2020-03-15 RX ORDER — AZITHROMYCIN 500 MG/1
500 TABLET, FILM COATED ORAL ONCE
Refills: 0 | Status: COMPLETED | OUTPATIENT
Start: 2020-03-15 | End: 2020-03-15

## 2020-03-15 RX ADMIN — Medication 50 GRAM(S): at 02:16

## 2020-03-15 RX ADMIN — Medication 3 MILLILITER(S): at 15:04

## 2020-03-15 RX ADMIN — AZITHROMYCIN 500 MILLIGRAM(S): 500 TABLET, FILM COATED ORAL at 13:35

## 2020-03-15 RX ADMIN — Medication 60 MILLIGRAM(S): at 18:47

## 2020-03-15 RX ADMIN — Medication 50 MILLIGRAM(S): at 06:45

## 2020-03-15 RX ADMIN — Medication 60 MILLIGRAM(S): at 13:30

## 2020-03-15 RX ADMIN — Medication 1 MILLIGRAM(S): at 13:31

## 2020-03-15 RX ADMIN — OXYCODONE AND ACETAMINOPHEN 1 TABLET(S): 5; 325 TABLET ORAL at 06:45

## 2020-03-15 RX ADMIN — OXYCODONE AND ACETAMINOPHEN 1 TABLET(S): 5; 325 TABLET ORAL at 13:37

## 2020-03-15 RX ADMIN — OXYCODONE AND ACETAMINOPHEN 1 TABLET(S): 5; 325 TABLET ORAL at 19:45

## 2020-03-15 RX ADMIN — MONTELUKAST 10 MILLIGRAM(S): 4 TABLET, CHEWABLE ORAL at 13:32

## 2020-03-15 RX ADMIN — AZITHROMYCIN 255 MILLIGRAM(S): 500 TABLET, FILM COATED ORAL at 20:55

## 2020-03-15 RX ADMIN — Medication 3 MILLILITER(S): at 01:14

## 2020-03-15 RX ADMIN — Medication 200 MILLIGRAM(S): at 13:33

## 2020-03-15 RX ADMIN — Medication 3 MILLILITER(S): at 08:40

## 2020-03-15 RX ADMIN — Medication 3 MILLILITER(S): at 01:59

## 2020-03-15 RX ADMIN — Medication 60 MILLIGRAM(S): at 06:28

## 2020-03-15 RX ADMIN — Medication 3 MILLILITER(S): at 06:27

## 2020-03-15 RX ADMIN — OXYCODONE AND ACETAMINOPHEN 1 TABLET(S): 5; 325 TABLET ORAL at 02:00

## 2020-03-15 RX ADMIN — Medication 650 MILLIGRAM(S): at 17:13

## 2020-03-15 RX ADMIN — Medication 3 MILLILITER(S): at 21:19

## 2020-03-15 RX ADMIN — Medication 1 SPRAY(S): at 13:33

## 2020-03-15 RX ADMIN — SODIUM CHLORIDE 1000 MILLILITER(S): 9 INJECTION INTRAMUSCULAR; INTRAVENOUS; SUBCUTANEOUS at 01:14

## 2020-03-15 RX ADMIN — Medication 650 MILLIGRAM(S): at 16:15

## 2020-03-15 RX ADMIN — AZITHROMYCIN 255 MILLIGRAM(S): 500 TABLET, FILM COATED ORAL at 06:27

## 2020-03-15 RX ADMIN — Medication 125 MILLIGRAM(S): at 01:13

## 2020-03-15 NOTE — ED CDU PROVIDER INITIAL DAY NOTE - ATTENDING CONTRIBUTION TO CARE
CDU observation for exacerbation of asthma in the setting of entero/rhinovirus infection, plan for continued nebs, steroids and reassessment.

## 2020-03-15 NOTE — ED ADULT NURSE NOTE - OBJECTIVE STATEMENT
patient A&Ox4, tachypneic, + wheezing with auscultation. known hx of asthma, c/o worsening CAMPO, SOB and cough for the last several days. has rescue medications at home but did not take them PTA.

## 2020-03-15 NOTE — ED ADULT NURSE REASSESSMENT NOTE - COMFORT CARE
plan of care explained/repositioned/ambulated to bathroom/meal provided/side rails up/wait time explained/po fluids offered/darkened lights

## 2020-03-15 NOTE — ED CDU PROVIDER INITIAL DAY NOTE - CONSTITUTIONAL, MLM
normal... Well appearing, awake, alert, oriented to person, place, time/situation and in no apparent distress. on cardiac mon and O2. NAD resting . poor historian

## 2020-03-15 NOTE — ED PROVIDER NOTE - CLINICAL SUMMARY MEDICAL DECISION MAKING FREE TEXT BOX
68 y/o F pt with significant PMHx of Lupus, Stenosis, MI, asthma, and autoimmune disease presents to the ED c/o SOB, cough and congestion that began 1 week ago. ekg, meds, labs, and reassess

## 2020-03-15 NOTE — ED CDU PROVIDER INITIAL DAY NOTE - MEDICAL DECISION MAKING DETAILS
69 y.o female hx of asthma , HTN , CAD ,lupus with 1 week of cough with phlegm and chest congestion and SOB and wheezing through .   neb - solumedrol, o2 , repeat the trop / Ekg and mag level . home med , re-eval

## 2020-03-15 NOTE — ED CDU PROVIDER INITIAL DAY NOTE - OBJECTIVE STATEMENT
70 y/o F pt with significant PMHx of Lupus, spinal Stenosis, MI, asthma, and autoimmune disease presents to the ED c/o SOB, cough and congestion that began 1 week ago.  She states she has wheezing throughout that began over a week ago. Productive cough with yellowish sputum. Pt was admitted to the hospital for 12 days in April 2019, Seen and evaluated by Pulmonology (has not followed up since) . pt states he did not f.u with pcp taking the neb at home was not helpful.  Chest tightness radiates to the left side of the chest only when coughing.  Denies any recent travel or sick contacts. Denies fever, chills, abdominal pain, chest pan , cough blood , leg swollen , hx of intubation  he dose not use o2 at  home , or use cpap or Bipap

## 2020-03-15 NOTE — ED PROVIDER NOTE - ATTENDING CONTRIBUTION TO CARE
Exacerbation of asthma/copd likely secondary to rhino/enterovirus, requiring frequent nebulizers and IV steroids. Improved with ED treatment but will place in observation to continue treatments and steroids. No infiltrates on CXR to suggest bacterial pneumonia.

## 2020-03-15 NOTE — ED ADULT NURSE REASSESSMENT NOTE - NS ED NURSE REASSESS COMMENT FT1
Patient  received from dayshift RN. Assumed patient care from RN. Patient appears age appropriate. well nourished. normal affect, pleasant mood in  no distress. Patient resting on stretcher. Pt AxO4, VSS, respirations CTA BL, no wheeze/stridor/Rhonchi/Crackles. Not on supplemental O2. Able to speak in full sentences without distress. Cardio NSR on cardiac monitor , S1S2, Regular, rate and Rhythm. ABD- soft/non-tender w/ light palpation/ Non distended, normal BSx4 quadrants. No guarding/ rebound tenderness. Skin c/d/I. IV insertion site 22 gauge noted at left wrist, flushing without difficulty. Safety measures taken, bed in low position, call bell within reach, side rails up x2. Plan of care explained. Pt verbalized understanding. Will continue to monitor.
pt up to side of bed eating breakfast, states she feel better she is able to walk now.
Assumed care of the patient at 1600. Verbal report received from Mirna YUAN ED. Patient transferred to observation unit CDU 9. Patient A&Ox4. No s/s of distress. Patient placed on CM. NSR on CM. O2 sat 93-94% on RA. VSS, Denies Chest pain. States SOB persists. Wheezing noted b/l on auscultation. Occasional wet cough+. PIV patent. Ambulatory, steady agit. Patient pending IV steroids and nebs tx administration. Patient in understanding of plan of care. Patient with no further questions for the RN. Call bell within reach and encouraged to use when assistance needed. will continue to monitor.

## 2020-03-15 NOTE — ED CDU PROVIDER INITIAL DAY NOTE - PROGRESS NOTE DETAILS
Charting and results reviewed.  Patient with continued diffuse wheeze, still feeling SOB.  Will continue, steroids, nebulizer, abx. Patient reporting improvement since first presenting to ED, but still feeling "lousy." Continues to wheeze diffusely. Patient did not appear to be winded walking from bathroom to room and back. Will continue to medicate and reassess. Patient with frontal headache, Fioricet and gentle hydration ordered.

## 2020-03-15 NOTE — ED PROVIDER NOTE - OBJECTIVE STATEMENT
68 y/o F pt with significant PMHx of Lupus, Stenosis, MI, asthma, and autoimmune disease presents to the ED c/o SOB, cough and congestion that began 1 week ago. She says that she gets some chest tightness and pain when coughing. She reports a previous similar episode a few years ago and was Dx with asthma. Pt states that she used 2 breathing treatments today. Denies any recent travel or sick contacts. Denies fever, cardiac hx, clots, myalgias or chills. 70 y/o F pt with significant PMHx of Lupus, Stenosis, MI, asthma, and autoimmune disease presents to the ED c/o SOB, cough and congestion that began 1 week ago.  She states she has wheezing throughout that began over a week ago. Productive cough with yellowish sputum. Pt was admitted to the hospital for 12 days in April 2019, Seen and evaluated by Pulmonology (has not followed up since) and diagnosed with asthma. Chest tightness radiates to the left side of the chest only when coughing. Pt notes she had similar episodes in the past similar this current episode where she received duonebs and steroids and improved. Pt states that she used 2 breathing treatments today. Denies any recent travel or sick contacts. Denies fever, chills, abdominal pain, chest pain, recent travels, recent surgeries, hx of blood clots, calf pain, hemoptysis, contact with known + coronavirus.

## 2020-03-16 DIAGNOSIS — R06.2 WHEEZING: ICD-10-CM

## 2020-03-16 PROCEDURE — 99217: CPT

## 2020-03-16 PROCEDURE — 99223 1ST HOSP IP/OBS HIGH 75: CPT

## 2020-03-16 RX ORDER — PANTOPRAZOLE SODIUM 20 MG/1
40 TABLET, DELAYED RELEASE ORAL
Refills: 0 | Status: DISCONTINUED | OUTPATIENT
Start: 2020-03-16 | End: 2020-03-17

## 2020-03-16 RX ORDER — SENNA PLUS 8.6 MG/1
2 TABLET ORAL AT BEDTIME
Refills: 0 | Status: DISCONTINUED | OUTPATIENT
Start: 2020-03-16 | End: 2020-03-17

## 2020-03-16 RX ORDER — ALBUTEROL 90 UG/1
2 AEROSOL, METERED ORAL
Qty: 1 | Refills: 0
Start: 2020-03-16

## 2020-03-16 RX ORDER — TIOTROPIUM BROMIDE 18 UG/1
1 CAPSULE ORAL; RESPIRATORY (INHALATION) DAILY
Refills: 0 | Status: DISCONTINUED | OUTPATIENT
Start: 2020-03-16 | End: 2020-03-17

## 2020-03-16 RX ORDER — ALPRAZOLAM 0.25 MG
0.25 TABLET ORAL
Refills: 0 | Status: DISCONTINUED | OUTPATIENT
Start: 2020-03-16 | End: 2020-03-17

## 2020-03-16 RX ORDER — ALBUTEROL 90 UG/1
1 AEROSOL, METERED ORAL EVERY 4 HOURS
Refills: 0 | Status: COMPLETED | OUTPATIENT
Start: 2020-03-16 | End: 2021-02-12

## 2020-03-16 RX ORDER — OXYCODONE HYDROCHLORIDE 5 MG/1
10 TABLET ORAL
Refills: 0 | Status: DISCONTINUED | OUTPATIENT
Start: 2020-03-16 | End: 2020-03-17

## 2020-03-16 RX ORDER — ACETAMINOPHEN 500 MG
650 TABLET ORAL EVERY 6 HOURS
Refills: 0 | Status: DISCONTINUED | OUTPATIENT
Start: 2020-03-16 | End: 2020-03-17

## 2020-03-16 RX ORDER — FUROSEMIDE 40 MG
1 TABLET ORAL
Qty: 0 | Refills: 0 | DISCHARGE

## 2020-03-16 RX ORDER — INFLUENZA VIRUS VACCINE 15; 15; 15; 15 UG/.5ML; UG/.5ML; UG/.5ML; UG/.5ML
0.5 SUSPENSION INTRAMUSCULAR ONCE
Refills: 0 | Status: DISCONTINUED | OUTPATIENT
Start: 2020-03-16 | End: 2020-03-17

## 2020-03-16 RX ORDER — ENOXAPARIN SODIUM 100 MG/ML
40 INJECTION SUBCUTANEOUS DAILY
Refills: 0 | Status: DISCONTINUED | OUTPATIENT
Start: 2020-03-16 | End: 2020-03-17

## 2020-03-16 RX ORDER — ZOLPIDEM TARTRATE 10 MG/1
5 TABLET ORAL AT BEDTIME
Refills: 0 | Status: DISCONTINUED | OUTPATIENT
Start: 2020-03-16 | End: 2020-03-17

## 2020-03-16 RX ORDER — AZITHROMYCIN 500 MG/1
1 TABLET, FILM COATED ORAL
Qty: 3 | Refills: 0
Start: 2020-03-16 | End: 2020-03-18

## 2020-03-16 RX ADMIN — Medication 1 MILLIGRAM(S): at 09:41

## 2020-03-16 RX ADMIN — AZITHROMYCIN 255 MILLIGRAM(S): 500 TABLET, FILM COATED ORAL at 21:47

## 2020-03-16 RX ADMIN — Medication 200 MILLIGRAM(S): at 12:17

## 2020-03-16 RX ADMIN — Medication 2 TABLET(S): at 06:54

## 2020-03-16 RX ADMIN — SODIUM CHLORIDE 1000 MILLILITER(S): 9 INJECTION, SOLUTION INTRAVENOUS at 06:34

## 2020-03-16 RX ADMIN — Medication 650 MILLIGRAM(S): at 21:00

## 2020-03-16 RX ADMIN — OXYCODONE HYDROCHLORIDE 10 MILLIGRAM(S): 5 TABLET ORAL at 17:01

## 2020-03-16 RX ADMIN — Medication 1 SPRAY(S): at 06:01

## 2020-03-16 RX ADMIN — Medication 1 SPRAY(S): at 00:18

## 2020-03-16 RX ADMIN — OXYCODONE AND ACETAMINOPHEN 1 TABLET(S): 5; 325 TABLET ORAL at 07:55

## 2020-03-16 RX ADMIN — Medication 650 MILLIGRAM(S): at 20:23

## 2020-03-16 RX ADMIN — Medication 3 MILLILITER(S): at 14:56

## 2020-03-16 RX ADMIN — OXYCODONE AND ACETAMINOPHEN 1 TABLET(S): 5; 325 TABLET ORAL at 05:02

## 2020-03-16 RX ADMIN — Medication 40 MILLIGRAM(S): at 12:16

## 2020-03-16 RX ADMIN — Medication 10 MILLIGRAM(S): at 02:54

## 2020-03-16 RX ADMIN — ATORVASTATIN CALCIUM 20 MILLIGRAM(S): 80 TABLET, FILM COATED ORAL at 21:48

## 2020-03-16 RX ADMIN — Medication 60 MILLIGRAM(S): at 06:02

## 2020-03-16 RX ADMIN — Medication 50 MILLIGRAM(S): at 06:03

## 2020-03-16 RX ADMIN — Medication 60 MILLIGRAM(S): at 00:40

## 2020-03-16 RX ADMIN — Medication 3 MILLILITER(S): at 20:27

## 2020-03-16 RX ADMIN — Medication 2 TABLET(S): at 07:55

## 2020-03-16 RX ADMIN — Medication 40 MILLIGRAM(S): at 21:48

## 2020-03-16 RX ADMIN — MONTELUKAST 10 MILLIGRAM(S): 4 TABLET, CHEWABLE ORAL at 09:42

## 2020-03-16 RX ADMIN — Medication 3 MILLILITER(S): at 08:54

## 2020-03-16 RX ADMIN — SENNA PLUS 2 TABLET(S): 8.6 TABLET ORAL at 21:56

## 2020-03-16 RX ADMIN — Medication 650 MILLIGRAM(S): at 09:40

## 2020-03-16 RX ADMIN — OXYCODONE HYDROCHLORIDE 10 MILLIGRAM(S): 5 TABLET ORAL at 09:40

## 2020-03-16 RX ADMIN — Medication 0.25 MILLIGRAM(S): at 09:39

## 2020-03-16 RX ADMIN — Medication 1 SPRAY(S): at 17:02

## 2020-03-16 RX ADMIN — ATORVASTATIN CALCIUM 20 MILLIGRAM(S): 80 TABLET, FILM COATED ORAL at 00:18

## 2020-03-16 RX ADMIN — SODIUM CHLORIDE 1000 MILLILITER(S): 9 INJECTION, SOLUTION INTRAVENOUS at 07:55

## 2020-03-16 RX ADMIN — AZITHROMYCIN 500 MILLIGRAM(S): 500 TABLET, FILM COATED ORAL at 07:56

## 2020-03-16 NOTE — H&P ADULT - NSICDXPASTMEDICALHX_GEN_ALL_CORE_FT
PAST MEDICAL HISTORY:  Asthma     Autoimmune disease     Breast lump Benign    Hashimoto's disease     High cholesterol     Lupus     Myocardial infarct     Osteoporosis

## 2020-03-16 NOTE — ED CDU PROVIDER SUBSEQUENT DAY NOTE - HISTORY
c/o I was supposed to get my extra dialysis today and I didn't, so I feel like my potassium is high
Patient reporting improvement. Occasional wheezes osculated, otherwise breath sounds vesicular. Vital signs remained stable. Received no calls by RN overnight. Patient stable for morning discharge.

## 2020-03-16 NOTE — ED CDU PROVIDER DISPOSITION NOTE - CLINICAL COURSE
This is a 68 y/o F pt with significant PMHx of Lupus, Stenosis, MI, asthma, and autoimmune disease presents to the ED c/o SOB, cough and congestion that began 1 week ago.  She states she has wheezing throughout that began over a week ago. Productive cough with yellowish sputum. Pt was admitted to the hospital for 12 days in April 2019, Seen and evaluated by Pulmonology (has not followed up since) and diagnosed with asthma. Chest tightness radiates to the left side of the chest only when coughing. Pt notes she had similar episodes in the past similar this current episode where she received duonebs and steroids and improved. Pt states that she used 2 breathing treatments today. Denies any recent travel or sick contacts. Denies fever, chills, abdominal pain, chest pain, recent travels, recent surgeries, hx of blood clots, calf pain, hemoptysis, contact with known + coronavirus.  Patient placed in observation for serial duonebs and steroids, re-assessed in morning patient saturating to low 90s.  Will benefit from additional steroids and neb treatments.  Case d/w with hospitalist.

## 2020-03-16 NOTE — ED CDU PROVIDER SUBSEQUENT DAY NOTE - BREATH SOUNDS
No chest wall retraction or nasal flaring. Occasional wheezing appreciated, otherwise breath sounds vesicular.

## 2020-03-16 NOTE — ED CDU PROVIDER DISPOSITION NOTE - ATTENDING CONTRIBUTION TO CARE
Khoi: I performed a face to face bedside interview with patient regarding history of present illness, review of symptoms and past medical history. I completed an independent physical exam.  I have discussed patient's plan of care with advanced care provider.   I agree with note as stated above including HISTORY OF PRESENT ILLNESS, HIV, PAST MEDICAL/SURGICAL/FAMILY/SOCIAL HISTORY, ALLERGIES AND HOME MEDICATIONS, REVIEW OF SYSTEMS, PHYSICAL EXAM, MEDICAL DECISION MAKING and any PROGRESS NOTES during the time I functioned as the attending physician for this patient  unless otherwise noted. My brief assessment is as follows: 69F h/o SLE, MI, asthma p/w SOB. Found to be wheezing. Placed in CDU for serial nebs and steroids. Reassessed in the morning, remained SOB, desatting to low 90s on ambulation. Pt admitted to the hospital.

## 2020-03-16 NOTE — ED CDU PROVIDER SUBSEQUENT DAY NOTE - MEDICAL DECISION MAKING DETAILS
68 yo female PMHx asthma HTN, CAD, lupus placed in CDU for asthma exacerbation. Patient with marked clinical improvement. Patient resting comfortably. Morning ambulation trial prior to discharge.

## 2020-03-16 NOTE — H&P ADULT - HISTORY OF PRESENT ILLNESS
70 y/o F pt with significant PMHx of Lupus, Lumbar Stenosis, asthma, prior smoker presents to ER for progressive shortness of breath, cough and congestion x1 week. States progressive shortness of breath for weeks, as she is poorly compliant with inhaler therapy. no pulmonary follow up since last 2 admits.   denies fevers/chills. + dry cough. no chest pain. no travel or sick contacts but works in restaurant business. No improvement with nebs at home.  denies current smoking therapy.    in ER, found to have entero/rhino virus treated as observation patient with improvement after nebs, steroids, azithromycin but not yet at baseline.

## 2020-03-16 NOTE — H&P ADULT - ASSESSMENT
70 y/o F pt with significant PMHx of Lupus, Lumbar Stenosis, asthma, prior smoker presents to ER for progressive shortness of breath, cough and congestion x1 week. States progressive shortness of breath for weeks, as she is poorly compliant with inhaler therapy. no pulmonary follow up since last 2 admits.  in ER, found to have entero/rhino virus treated as observation patient with improvement after nebs, steroids, azithromycin but not yet at baseline.         entero/rhino virus URI with acute exacerbation of COPD (suspected given smoking hx)    steroids, nebs    azithromycin x 5 days    hold home symbicort while on nebs    outpatient pulm follow up for PFT's    chronic pain:   ISTOP reviewed on oxycodone 10mg TID PRN, ambien 10mg qhs, xanax 0.25 BID prn    SLE: stable, c/w hydroxychloroquine, off methotrexate    HTN: toprol 50mg  HLD: lipitor    ppx: lovenox, ppi     patient educated extensively on medication compliance and pulm follow up.

## 2020-03-17 ENCOUNTER — TRANSCRIPTION ENCOUNTER (OUTPATIENT)
Age: 70
End: 2020-03-17

## 2020-03-17 VITALS
DIASTOLIC BLOOD PRESSURE: 76 MMHG | SYSTOLIC BLOOD PRESSURE: 128 MMHG | OXYGEN SATURATION: 95 % | RESPIRATION RATE: 18 BRPM | HEART RATE: 82 BPM | TEMPERATURE: 98 F

## 2020-03-17 LAB
ANION GAP SERPL CALC-SCNC: 14 MMOL/L — SIGNIFICANT CHANGE UP (ref 5–17)
BASOPHILS # BLD AUTO: 0.01 K/UL — SIGNIFICANT CHANGE UP (ref 0–0.2)
BASOPHILS NFR BLD AUTO: 0.1 % — SIGNIFICANT CHANGE UP (ref 0–2)
BUN SERPL-MCNC: 25 MG/DL — HIGH (ref 8–20)
CALCIUM SERPL-MCNC: 9.1 MG/DL — SIGNIFICANT CHANGE UP (ref 8.6–10.2)
CHLORIDE SERPL-SCNC: 100 MMOL/L — SIGNIFICANT CHANGE UP (ref 98–107)
CO2 SERPL-SCNC: 26 MMOL/L — SIGNIFICANT CHANGE UP (ref 22–29)
CREAT SERPL-MCNC: 0.58 MG/DL — SIGNIFICANT CHANGE UP (ref 0.5–1.3)
EOSINOPHIL # BLD AUTO: 0 K/UL — SIGNIFICANT CHANGE UP (ref 0–0.5)
EOSINOPHIL NFR BLD AUTO: 0 % — SIGNIFICANT CHANGE UP (ref 0–6)
GLUCOSE SERPL-MCNC: 105 MG/DL — HIGH (ref 70–99)
HCT VFR BLD CALC: 43.1 % — SIGNIFICANT CHANGE UP (ref 34.5–45)
HGB BLD-MCNC: 14.1 G/DL — SIGNIFICANT CHANGE UP (ref 11.5–15.5)
IMM GRANULOCYTES NFR BLD AUTO: 0.8 % — SIGNIFICANT CHANGE UP (ref 0–1.5)
LYMPHOCYTES # BLD AUTO: 1.5 K/UL — SIGNIFICANT CHANGE UP (ref 1–3.3)
LYMPHOCYTES # BLD AUTO: 11.9 % — LOW (ref 13–44)
MCHC RBC-ENTMCNC: 28.4 PG — SIGNIFICANT CHANGE UP (ref 27–34)
MCHC RBC-ENTMCNC: 32.7 GM/DL — SIGNIFICANT CHANGE UP (ref 32–36)
MCV RBC AUTO: 86.9 FL — SIGNIFICANT CHANGE UP (ref 80–100)
MONOCYTES # BLD AUTO: 0.53 K/UL — SIGNIFICANT CHANGE UP (ref 0–0.9)
MONOCYTES NFR BLD AUTO: 4.2 % — SIGNIFICANT CHANGE UP (ref 2–14)
NEUTROPHILS # BLD AUTO: 10.44 K/UL — HIGH (ref 1.8–7.4)
NEUTROPHILS NFR BLD AUTO: 83 % — HIGH (ref 43–77)
PLATELET # BLD AUTO: 287 K/UL — SIGNIFICANT CHANGE UP (ref 150–400)
POTASSIUM SERPL-MCNC: 4.1 MMOL/L — SIGNIFICANT CHANGE UP (ref 3.5–5.3)
POTASSIUM SERPL-SCNC: 4.1 MMOL/L — SIGNIFICANT CHANGE UP (ref 3.5–5.3)
RBC # BLD: 4.96 M/UL — SIGNIFICANT CHANGE UP (ref 3.8–5.2)
RBC # FLD: 13.2 % — SIGNIFICANT CHANGE UP (ref 10.3–14.5)
SODIUM SERPL-SCNC: 140 MMOL/L — SIGNIFICANT CHANGE UP (ref 135–145)
WBC # BLD: 12.58 K/UL — HIGH (ref 3.8–10.5)
WBC # FLD AUTO: 12.58 K/UL — HIGH (ref 3.8–10.5)

## 2020-03-17 PROCEDURE — 87798 DETECT AGENT NOS DNA AMP: CPT

## 2020-03-17 PROCEDURE — 96365 THER/PROPH/DIAG IV INF INIT: CPT

## 2020-03-17 PROCEDURE — 84484 ASSAY OF TROPONIN QUANT: CPT

## 2020-03-17 PROCEDURE — 94760 N-INVAS EAR/PLS OXIMETRY 1: CPT

## 2020-03-17 PROCEDURE — 83880 ASSAY OF NATRIURETIC PEPTIDE: CPT

## 2020-03-17 PROCEDURE — 84295 ASSAY OF SERUM SODIUM: CPT

## 2020-03-17 PROCEDURE — 87581 M.PNEUMON DNA AMP PROBE: CPT

## 2020-03-17 PROCEDURE — 96367 TX/PROPH/DG ADDL SEQ IV INF: CPT

## 2020-03-17 PROCEDURE — 82435 ASSAY OF BLOOD CHLORIDE: CPT

## 2020-03-17 PROCEDURE — 83735 ASSAY OF MAGNESIUM: CPT

## 2020-03-17 PROCEDURE — 82330 ASSAY OF CALCIUM: CPT

## 2020-03-17 PROCEDURE — 71045 X-RAY EXAM CHEST 1 VIEW: CPT

## 2020-03-17 PROCEDURE — 99285 EMERGENCY DEPT VISIT HI MDM: CPT | Mod: 25

## 2020-03-17 PROCEDURE — 82803 BLOOD GASES ANY COMBINATION: CPT

## 2020-03-17 PROCEDURE — 80053 COMPREHEN METABOLIC PANEL: CPT

## 2020-03-17 PROCEDURE — 80048 BASIC METABOLIC PNL TOTAL CA: CPT

## 2020-03-17 PROCEDURE — 96376 TX/PRO/DX INJ SAME DRUG ADON: CPT

## 2020-03-17 PROCEDURE — 85027 COMPLETE CBC AUTOMATED: CPT

## 2020-03-17 PROCEDURE — 96375 TX/PRO/DX INJ NEW DRUG ADDON: CPT

## 2020-03-17 PROCEDURE — 87486 CHLMYD PNEUM DNA AMP PROBE: CPT

## 2020-03-17 PROCEDURE — 99239 HOSP IP/OBS DSCHRG MGMT >30: CPT

## 2020-03-17 PROCEDURE — G0378: CPT

## 2020-03-17 PROCEDURE — 94640 AIRWAY INHALATION TREATMENT: CPT

## 2020-03-17 PROCEDURE — 93005 ELECTROCARDIOGRAM TRACING: CPT

## 2020-03-17 PROCEDURE — 82947 ASSAY GLUCOSE BLOOD QUANT: CPT

## 2020-03-17 PROCEDURE — 36415 COLL VENOUS BLD VENIPUNCTURE: CPT

## 2020-03-17 PROCEDURE — 84132 ASSAY OF SERUM POTASSIUM: CPT

## 2020-03-17 PROCEDURE — 85014 HEMATOCRIT: CPT

## 2020-03-17 PROCEDURE — 83605 ASSAY OF LACTIC ACID: CPT

## 2020-03-17 PROCEDURE — 87633 RESP VIRUS 12-25 TARGETS: CPT

## 2020-03-17 PROCEDURE — 96366 THER/PROPH/DIAG IV INF ADDON: CPT

## 2020-03-17 RX ORDER — AZITHROMYCIN 500 MG/1
1 TABLET, FILM COATED ORAL
Qty: 5 | Refills: 0
Start: 2020-03-17 | End: 2020-03-21

## 2020-03-17 RX ORDER — BUDESONIDE AND FORMOTEROL FUMARATE DIHYDRATE 160; 4.5 UG/1; UG/1
2 AEROSOL RESPIRATORY (INHALATION)
Qty: 0 | Refills: 0 | DISCHARGE

## 2020-03-17 RX ORDER — OXYCODONE HYDROCHLORIDE 5 MG/1
1 TABLET ORAL
Qty: 0 | Refills: 0 | DISCHARGE

## 2020-03-17 RX ORDER — MONTELUKAST 4 MG/1
1 TABLET, CHEWABLE ORAL
Qty: 0 | Refills: 0 | DISCHARGE

## 2020-03-17 RX ORDER — MONTELUKAST 4 MG/1
1 TABLET, CHEWABLE ORAL
Qty: 15 | Refills: 0
Start: 2020-03-17 | End: 2020-03-31

## 2020-03-17 RX ORDER — OXYCODONE HYDROCHLORIDE 5 MG/1
10 TABLET ORAL EVERY 6 HOURS
Refills: 0 | Status: DISCONTINUED | OUTPATIENT
Start: 2020-03-17 | End: 2020-03-17

## 2020-03-17 RX ORDER — BACLOFEN 100 %
1 POWDER (GRAM) MISCELLANEOUS
Qty: 0 | Refills: 0 | DISCHARGE

## 2020-03-17 RX ORDER — BUDESONIDE AND FORMOTEROL FUMARATE DIHYDRATE 160; 4.5 UG/1; UG/1
2 AEROSOL RESPIRATORY (INHALATION)
Qty: 1 | Refills: 0
Start: 2020-03-17 | End: 2020-03-31

## 2020-03-17 RX ORDER — HYDROXYCHLOROQUINE SULFATE 200 MG
1 TABLET ORAL
Qty: 0 | Refills: 0 | DISCHARGE
Start: 2020-03-17

## 2020-03-17 RX ORDER — PANTOPRAZOLE SODIUM 20 MG/1
1 TABLET, DELAYED RELEASE ORAL
Qty: 15 | Refills: 0
Start: 2020-03-17 | End: 2020-03-31

## 2020-03-17 RX ORDER — ALBUTEROL 90 UG/1
1 AEROSOL, METERED ORAL EVERY 4 HOURS
Refills: 0 | Status: DISCONTINUED | OUTPATIENT
Start: 2020-03-17 | End: 2020-03-17

## 2020-03-17 RX ORDER — HYDROXYCHLOROQUINE SULFATE 200 MG
1 TABLET ORAL
Qty: 0 | Refills: 0 | DISCHARGE

## 2020-03-17 RX ORDER — ALPRAZOLAM 0.25 MG
1 TABLET ORAL
Qty: 0 | Refills: 0 | DISCHARGE
Start: 2020-03-17

## 2020-03-17 RX ORDER — ALBUTEROL 90 UG/1
2 AEROSOL, METERED ORAL
Qty: 1 | Refills: 0
Start: 2020-03-17 | End: 2020-03-31

## 2020-03-17 RX ADMIN — Medication 40 MILLIGRAM(S): at 09:09

## 2020-03-17 RX ADMIN — ALBUTEROL 1 PUFF(S): 90 AEROSOL, METERED ORAL at 15:18

## 2020-03-17 RX ADMIN — OXYCODONE HYDROCHLORIDE 10 MILLIGRAM(S): 5 TABLET ORAL at 14:15

## 2020-03-17 RX ADMIN — Medication 200 MILLIGRAM(S): at 11:35

## 2020-03-17 RX ADMIN — Medication 3 MILLILITER(S): at 08:21

## 2020-03-17 RX ADMIN — OXYCODONE HYDROCHLORIDE 10 MILLIGRAM(S): 5 TABLET ORAL at 08:01

## 2020-03-17 RX ADMIN — Medication 40 MILLIGRAM(S): at 05:21

## 2020-03-17 RX ADMIN — OXYCODONE HYDROCHLORIDE 10 MILLIGRAM(S): 5 TABLET ORAL at 13:45

## 2020-03-17 RX ADMIN — OXYCODONE HYDROCHLORIDE 10 MILLIGRAM(S): 5 TABLET ORAL at 08:45

## 2020-03-17 RX ADMIN — OXYCODONE HYDROCHLORIDE 10 MILLIGRAM(S): 5 TABLET ORAL at 01:15

## 2020-03-17 RX ADMIN — MONTELUKAST 10 MILLIGRAM(S): 4 TABLET, CHEWABLE ORAL at 11:35

## 2020-03-17 RX ADMIN — Medication 1 SPRAY(S): at 05:40

## 2020-03-17 RX ADMIN — ALBUTEROL 1 PUFF(S): 90 AEROSOL, METERED ORAL at 12:09

## 2020-03-17 RX ADMIN — Medication 50 MILLIGRAM(S): at 05:21

## 2020-03-17 RX ADMIN — PANTOPRAZOLE SODIUM 40 MILLIGRAM(S): 20 TABLET, DELAYED RELEASE ORAL at 05:21

## 2020-03-17 RX ADMIN — OXYCODONE HYDROCHLORIDE 10 MILLIGRAM(S): 5 TABLET ORAL at 02:41

## 2020-03-17 RX ADMIN — Medication 1 MILLIGRAM(S): at 08:02

## 2020-03-17 NOTE — DISCHARGE NOTE PROVIDER - NSDCCPCAREPLAN_GEN_ALL_CORE_FT
PRINCIPAL DISCHARGE DIAGNOSIS  Diagnosis: Wheezing  Assessment and Plan of Treatment: - take medications as rx- this is very important   - follow up with pulm and pmd   - continue smoking cessation   - will need outpaitent pulmonary function testing - to classify copd

## 2020-03-17 NOTE — DISCHARGE NOTE PROVIDER - CARE PROVIDER_API CALL
Gordo Quintero)  Internal Medicine; Pulmonary Disease  Pulmonary Medicine at [place], [address]  [place] [address]  Phone: [phone]  Fax: [phone]  Follow Up Time:     Haydee Novak)  Internal Medicine  [address] [place] [address]  Phone: [phone]  Fax: [phone]  Follow Up Time:

## 2020-03-17 NOTE — DISCHARGE NOTE NURSING/CASE MANAGEMENT/SOCIAL WORK - PATIENT PORTAL LINK FT
You can access the FollowMyHealth Patient Portal offered by [facility] by registering at the following website: [email or web address]. By joining [facility]’s FollowMyHealth portal, you will also be able to view your health information using other applications (apps) compatible with our system.

## 2020-03-17 NOTE — DISCHARGE NOTE PROVIDER - HOSPITAL COURSE
70 y/o F pt with significant PMHx of Lupus, Lumbar Stenosis, asthma, prior smoker presents to ER for progressive shortness of breath, cough and congestion x1 week. States progressive shortness of breath for weeks, as she is poorly compliant with inhaler therapy. no pulmonary follow up since last 2 admits. in ER, found to have entero/rhino virus treated as observation patient with improvement after nebs, steroids, azithromycin. Patient currently at baseline and will be d/c home today. Patient extensively counselled on smoking cessation continuance, medication importance and follow up wt pulm and primary care. patient also advised to follow up with pulm for pft. patient verbalizing understanding of this.         Time spent on patients discharge 32 minutes

## 2020-03-17 NOTE — PROGRESS NOTE ADULT - ASSESSMENT
68 y/o F pt with significant PMHx of Lupus, Lumbar Stenosis, asthma, prior smoker presents to ER for progressive shortness of breath, cough and congestion x1 week. States progressive shortness of breath for weeks, as she is poorly compliant with inhaler therapy. no pulmonary follow up since last 2 admits. in ER, found to have entero/rhino virus treated as observation patient with improvement after nebs, steroids, azithromycin. Patient currently at baseline and will be d/c home today. Patient extensively counselled on smoking cessation continuance, medication importance and follow up wtih pulm and primary care. patient also advised to follow up with pulm for pft. patient verbalizing understanding of this.     #Acute COPD Exacerbation 2/2 viral URI  - +entero and rhino virus on RVP  - azithromyucin   - symbicord  - follow up wiht pulm and pmd   - iv steroid switch to po  steroid taper on d/c  - albuterol inhaler     #chronic pain   - istop reviewed by admitting doctor  - continue home regimen    #SLE  - stable  - hydroxychloroquine    #HTN   - monitor blood pressure   - toprol    #HLD   - statin     #GERD  - protonix     #DVT prophylaxis  - lovenox SC

## 2020-03-17 NOTE — DISCHARGE NOTE PROVIDER - NSDCMRMEDTOKEN_GEN_ALL_CORE_FT
albuterol 90 mcg/inh inhalation aerosol: 2 puff(s) inhaled every 4-6 hours as needed.  ALPRAZolam 0.25 mg oral tablet: 1 tab(s) orally 2 times a day, As needed, anxiety  Ambien 10 mg oral tablet: 1 tab(s) orally once a day (at bedtime)  azithromycin 500 mg oral tablet: 1 tab(s) orally every 24 hours   folic acid 1 mg oral tablet: 1 tab(s) orally once a day  guaiFENesin 100 mg/5 mL oral liquid: 10 milliliter(s) orally every 6 hours, As needed, Cough  hydroxychloroquine 200 mg oral tablet: 1 tab(s) orally once a day  Lipitor 20 mg oral tablet: 1 tab(s) orally once a day (at bedtime)  montelukast 10 mg oral tablet: 1 tab(s) orally once a day  oxyCODONE 10 mg oral tablet: 1 tab(s) orally every 8 hours, As Needed  pantoprazole 40 mg oral delayed release tablet: 1 tab(s) orally once a day (before a meal)  predniSONE 10 mg oral tablet: 5 tab(s) oral - orally once a day x 3 days  4 tab(s) oral - orally once a day x 3 days  3 tab(s) oral - orally once a day x 3 days  2 tab(s) oral - orally once a day x 3 days  1 tab(s) oral - orally once a day x 3 days  Symbicort 160 mcg-4.5 mcg/inh inhalation aerosol: 2 puff(s) inhaled 2 times a day  Toprol-XL 50 mg oral tablet, extended release: 1 tab(s) orally once a day

## 2020-03-17 NOTE — DISCHARGE NOTE NURSING/CASE MANAGEMENT/SOCIAL WORK - NSDCPEWEB_GEN_ALL_CORE
[facility] for Tobacco Control website --- [email or web address] website --- [email or web address]

## 2020-03-17 NOTE — DISCHARGE NOTE NURSING/CASE MANAGEMENT/SOCIAL WORK - NSDCPEEMAIL_GEN_ALL_CORE
[facility] for Tobacco Control email [email or web address]

## 2020-03-17 NOTE — PROGRESS NOTE ADULT - SUBJECTIVE AND OBJECTIVE BOX
Patient is a 69y old  Female who presents with a chief complaint of shortness of breath (16 Mar 2020 09:23)    Patient seen and examined at bedside. Patient currently at baseline and will be d/c home today. Patient extensively counselled on smoking cessation continuance, medication importance and follow up wt pulm and primary care. patient also advised to follow up with pulm for pft. patient verbalizing understanding of this.       ALLERGIES:  aspirin (Other)  Naprosyn (Other (Severe))  Sulfur (Rash)    MEDICATIONS  (STANDING):  ALBUTerol    90 MICROgram(s) HFA Inhaler 1 Puff(s) Inhalation every 4 hours  atorvastatin 20 milliGRAM(s) Oral at bedtime  azithromycin  IVPB 500 milliGRAM(s) IV Intermittent every 24 hours  enoxaparin Injectable 40 milliGRAM(s) SubCutaneous daily  fluticasone propionate 50 MICROgram(s)/spray Nasal Spray 1 Spray(s) Both Nostrils two times a day  folic acid 1 milliGRAM(s) Oral daily  hydroxychloroquine 200 milliGRAM(s) Oral daily  influenza   Vaccine 0.5 milliLiter(s) IntraMuscular once  methylPREDNISolone sodium succinate Injectable 40 milliGRAM(s) IV Push every 12 hours  metoprolol succinate ER 50 milliGRAM(s) Oral daily  montelukast 10 milliGRAM(s) Oral daily  pantoprazole    Tablet 40 milliGRAM(s) Oral before breakfast  senna 2 Tablet(s) Oral at bedtime  tiotropium 18 MICROgram(s) Capsule 1 Capsule(s) Inhalation daily    MEDICATIONS  (PRN):  acetaminophen   Tablet .. 650 milliGRAM(s) Oral every 6 hours PRN Temp greater or equal to 38C (100.4F), Mild Pain (1 - 3), Moderate Pain (4 - 6)  ALPRAZolam 0.25 milliGRAM(s) Oral two times a day PRN anxiety  guaiFENesin   Syrup  (Sugar-Free) 200 milliGRAM(s) Oral every 6 hours PRN Cough  oxyCODONE    IR 10 milliGRAM(s) Oral every 6 hours PRN Severe Pain (7 - 10)  zolpidem 5 milliGRAM(s) Oral at bedtime PRN Insomnia  zolpidem 5 milliGRAM(s) Oral at bedtime PRN Insomnia    Vital Signs Last 24 Hrs  T(F): 98.1 (17 Mar 2020 07:37), Max: 98.1 (17 Mar 2020 07:37)  HR: 82 (17 Mar 2020 08:23) (73 - 86)  BP: 148/78 (17 Mar 2020 07:37) (123/70 - 157/70)  RR: 18 (17 Mar 2020 07:37) (18 - 20)  SpO2: 96% (17 Mar 2020 08:23) (90% - 96%)  I&O's Summary    PHYSICAL EXAM:  General: NAD, A/O x 3  ENT: MMM, no thrush  Neck: Supple, No JVD  Lungs: Clear to auscultation bilaterally, good air entry, non-labored breathing  Cardio: +s1/s2, No pitting edema  Abdomen: Soft, Nontender, Nondistended; Bowel sounds present  Extremities: No calf tenderness    LABS:                        14.1   12.58 )-----------( 287      ( 17 Mar 2020 07:29 )             43.1     03-17    140  |  100  |  25.0  ----------------------------<  105  4.1   |  26.0  |  0.58    Ca    9.1      17 Mar 2020 07:29  Mg     1.8     03-15    TPro  7.7  /  Alb  4.3  /  TBili  0.4  /  DBili  x   /  AST  23  /  ALT  22  /  AlkPhos  108  03-15    eGFR if Non : 93 mL/min/1.73M2 (03-17-20 @ 07:29)  eGFR if : 107 mL/min/1.73M2 (03-17-20 @ 07:29)    CARDIAC MARKERS ( 15 Mar 2020 12:00 )  x     / <0.01 ng/mL / x     / x     / x      CARDIAC MARKERS ( 15 Mar 2020 01:25 )  x     / <0.01 ng/mL / x     / x     / x        01:24 - VBG - pH: 7.39  | pCO2: 52    | pO2: 39    | Lactate: 0.8      RADIOLOGY & ADDITIONAL TESTS:  < from: Xray Chest 1 View AP/PA. (03.15.20 @ 01:09) >  Impression:  No evidence of acute cardiopulmonary disease. No change since 11/30/2019..  < end of copied text >

## 2020-10-16 NOTE — ED ADULT NURSE REASSESSMENT NOTE - BREATH SOUNDS RML
Anesthesia Evaluation     Patient summary reviewed and Nursing notes reviewed   NPO Solid Status: > 8 hours             Airway   Mallampati: III  TM distance: >3 FB  Neck ROM: full  No difficulty expected  Dental      Pulmonary    (+) sleep apnea on CPAP,   Cardiovascular     ECG reviewed  Rhythm: regular  Rate: normal    (+) hypertension,       Neuro/Psych  (+) psychiatric history Anxiety and Depression,     GI/Hepatic/Renal/Endo    (+) morbid obesity, GERD,      Musculoskeletal     Abdominal    Substance History - negative use     OB/GYN negative ob/gyn ROS         Other   arthritis,                      Anesthesia Plan    ASA 3     general   (Pop/add popc)  intravenous induction     Anesthetic plan, all risks, benefits, and alternatives have been provided, discussed and informed consent has been obtained with: patient.    Plan discussed with CRNA.      
diminished/expiratory wheezes
diminished/expiratory wheezes

## 2020-10-24 NOTE — ED ADULT TRIAGE NOTE - ESI TRIAGE ACUITY LEVEL, MLM
2 VITALS:  I have reviewed the initial vital signs.  GENERAL: Well-developed, well-nourished, in no acute distress. Nontoxic.  HEENT: Sclera clear. No conjunctival pallor. EOMI, PERRLA. MMM.   NECK: supple w FROM.   CARDIO: RRR, nl S1 and S2. No murmurs, rubs, or gallops. 2+ radial pulses bilaterally.  PULM: Normal effort. CTA b/l without wheezes, rales, or rhonchi.  MSK: FROM to extremities x4. No joint swelling, erythema, deformity, or ttp.  GI: Abdomen soft and non-distended. Nontender.  SKIN: Warm, dry. Blanchable erythema to volar right forearm, mild overlying warmth. No ttp/crepitus/swelling.  NEURO: A&Ox3. Speech clear. 5/5 strength to upper extremities b/l. Sensation intact and equal throughout.   PSYCH: Calm and cooperative.

## 2021-02-08 NOTE — ED ADULT NURSE NOTE - AGGRAVATING FACTORS
no lesions,  no deformities,  no traumatic injuries,  no significant scars are present,  chest wall non-tender,  no masses present, breathing is unlabored without accessory muscle use,normal breath sounds
none

## 2021-03-19 NOTE — ED ADULT NURSE NOTE - RECENT EXPOSURE TO
"                                   Neurology Progress Note      Subjective:  No clinical change    Objective:    Last Recorded Vitals:  Blood pressure 128/67, pulse 99, temperature (!) 100.8 Â°F (38.2 Â°C), temperature source Oral, resp. rate 18, height 6' 0.01"" (1.829 m), weight 90.1 kg (198 lb 10.2 oz), SpO2 100 %. Physical Exam:  Vital Signs:    Visit Vitals  /67   Pulse 99   Temp (!) 100.8 Â°F (38.2 Â°C) (Oral)   Resp 18   Ht 6' 0.01\"" (1.829 m)   Wt 90.1 kg (198 lb 10.2 oz)   SpO2 100%   BMI 26.93 kg/mÂ²     General: NAD  HEENT: Moist oral mucosa, dentition intact, NGT intact  CV: S1/S2, RRR, no carotid bruits  Lung: CTA B, no wheeze or crackles   Abd: obese, Soft/NT/ND, + bowel sounds  Ext: 0 pedal edema   Psychiatric: Cooperative. Appropriate mood and affect. Normal judgment. Neurological Exam:  Mental Status: alert & oriented x1. Not following simple commands. Not tracking. Language: Mumbles. Moderate dysarthria. Cranial Nerves:  II, III: PERRL 3-->2mm, VFF to FC bilaterally. III, IV, VI: EOMI, no nystagmus. V: Sensation intact to LT V1-V3. VII: Face symmetric grossly   VIII: Hearing intact bilaterally. IX, X: Palate/uvula elevate symmetrically. XI: 5/5 Trapezius & SCM bilaterally. XII: Tongue protrudes midline. Motor: Normal bulk and tone. No pronator drift. Normal fine finger movements. Fine tremor bilateral throughout body. 5/5 motor throughout grossly thought. Sensation: Sensation to LT in all four extremities. No extinction to double simultaneous touch. Coordination: Finger-to-nose and heel-to-shin not tested for ataxia bilaterally. Reflexes:  +2 reflexes with down toes. Gait: Not tested. Frontal: No glabellar, snout, rooting, palmomental, paratonia, or grasp reflexes. Labs:  No results displayed because visit has over 200 results. Imaging:  LAST CT:  03/03/21   CT ABDOMEN PELVIS WO CONTRAST  Narrative  EXAM: CT ABDOMEN PELVIS WO CONTRAST. CLINICAL " INDICATION:  Vomiting, fall, generalized abdominal pain. COMPARISON:  None. TECHNIQUE: Axial acquisitions were obtained through the abdomen and pelvis. Multiplanar reformatted imaging was performed. Automatic exposure controlutilized for radiation dose reduction. CONTRAST: No contrast was administered. FINDINGS:No evidence of acute traumatic injury in the abdomen or pelvis. Solidorgans are intact. Low-density lesion in the left liver measuring 1.2 cm statistically a cystin the absence of a malignancy history. No other potential liver lesions are appreciated with limitations due tolack of contrast and beam hardening artifact. There is a dilatedgallbladder with pericholecystic stranding and possible mild wallthickening correlate for possibility of cholecystitis. No definiteintrahepatic biliary ductal dilatation is appreciated. The spleen, pancreas, and adrenal glands are normal.  No dilatation of thepancreatic duct is seen. The kidneys appear normal bilaterally. Simple appearing cyst seen lowerpole of the right kidney measures 4.4 cm. Probable prominent left renalpelvis or parapelvic cyst is seen midportion left kidney 2 cm. There is atherosclerosis of the aorta and its branches. There is noaneurysm. There are no enlarged lymph nodes identified in the abdomen or pelvis. Small and large bowel are normal in caliber. Fluid and gas filled loopsof small bowel are seen with small bowel measuring up to 2.8 cm, a coupleof air-fluid levels are seen, correlate for possibility of mild ileus. There is under distention seen of the descending and sigmoid colon, nomass or zone of transition is identified. Appendix is visualized and appears normal.  No free air or free fluid. No sizeable hernia. Musculature appears symmetric. There are degenerative changes in the spine with possible concentric andneural foraminal stenosis in the lower lumbar spine, correlate forradiculopathy. There are degenerative changes seen in the hips.    The lung bases are clear. Impression  1. Dilated gallbladder with pericholecystic stranding and probable wallthickening, correlate for cholecystitis. Ultrasound follow-up issuggested. 2. Atherosclerosis of the aorta, no aneurysm. 3. Gas and fluid-filled loops of small bowel not frankly dilated(measuring less than 3 cm), correlate for possibility of ileus. Noobstruction, free air, or free fluid. FOR PHYSICIAN USE ONLY - Please note that this report was generated usingvoice recognition software. If you require clarification or feel thatthere has been an error in this report please contact me through [employer]. Thank you very much for allowing me to participate in the care ofyour patient. Electronically Signed by: Joseluis Chappell M.D. Signed on: 3/3/2021 3:17 PM      CT CERVICAL SPINE WO CONTRAST  Narrative  EXAM: CT CERVICAL SPINE WITHOUT CONTRAST, . CLINICAL INFORMATION:  Unwitnessed fall with head trauma. PROCEDURE:  Axial tomographic images were obtained from the cervical spinewithout intravenous contrast material.  Images were obtained using bonewindows soft tissue windows. Sagittal and coronal reformatted images wereobtained. COMPARISONS:  None. FINDINGS:There is straightening of the normal cervical lordosis which is probablypositional. Approximately 1 to 2 mm retrolisthesis C4 on C5. Prominence of the retrodental region with the linear calcific effusionswhich can be seen in CPPD. Probable minimal to mild narrowing of the uppercervical canal..Disc osteophyte complex and bilateral uncovertebral spurring at C3-C4,C4-C5, C5-C6, and C6-C7. Facet joint hypertrophy is seen at C2-C3, C3-C4,C4-C5, C5-C6, and C6-C7. Findings are resulting in moderate to severespinal canal stenosis at C4-C5. There is no acute fracture or traumatic malalignment. There is noprevertebral soft tissue swelling. No evidence of pneumothorax in the visualized lung apices. Probable nodule in the inferior left thyroid lobe. Atherosclerosis of the left carotid vasculature. Air-fluid level or secretions in the upper or proximal esophagus. This canbe seen in gastroesophageal reflux. Impression  1. No acute fracture or traumatic malalignment of the cervical spine. Please note MRI cervical spine is more sensitive for evaluation of spinalcord or ligamentous injury. 2. Degenerative changes in the cervical spine. 3. Air-fluid level or secretions in the upper or proximal esophagus. This can be seen in gastroesophageal reflux. 4. Prominence of the retrodental region with the linear calcificeffusions which can be seen in CPPD. Probable minimal to mild narrowing ofthe upper cervical canal.5. Probable hypodense nodule in the inferior left thyroid lobe. Recommend nonemergent thyroid ultrasound if not already performed. FOR PHYSICIAN USE ONLY - Please note that this report was generated usingvoice recognition software. If you require clarification or feel thatthere has been an error in this report please contact me through [employer]. Thank you very much for allowing me to participate in the care ofyour patient. Electronically Signed by: Bob Faith Signed on: 3/3/2021 11:14 AM      CT HEAD WO CONTRAST  Narrative  EXAM: CT HEAD WITHOUT CONTRAST CLINICAL INDICATION: Unwitnessed fall. Headache. COMPARISON: None. TECHNIQUE:Contiguous axial sections were performed from the skull base to the top ofthe calvarium. Reformatted coronal and sagittal images were obtained fromthe axial data. Bone and soft tissue images were reviewed. FINDINGS:No evidence of acute intracranial hemorrhage, mass effect or shift of themidline structures. No large territorial infarcts. There is diffuse volume loss with concordant prominence of ventricles. There are mild burden of scattered areas of low attenuation in thesupratentorial white matter. These are nonspecific but likely a sequela ofchronic small vessel ischemic change. Atherosclerosis at the carotid siphons. Mild mucosal thickening in the right maxillary sinus. Probable retentioncyst in the right maxillary sinus. . No depressed skull fractures are seen. Impression  1. No evidence of acute intracranial hemorrhage, shift of the midlinestructure, or large territorial infarct. If there is clinical concern foracute or subacute ischemia/infarct, recommend MRI brain examination orrepeat CT head examination (i.E. if there is a contraindication to MRI). 2. Senescent changes as detailed in the findings section. FOR PHYSICIAN USE ONLY - Please note that this report was generated usingvoice recognition software. If you require clarification or feel thatthere has been an error in this report please contact me through [employer]. Thank you very much for allowing me to participate in the care ofyour patient. Electronically Signed by: Apple Grande Signed on: 3/3/2021 11:08 AM     LAST MRI:  No results found for this or any previous visit. Summary:  Mr. Andre Guillermo is an 79 y/o assisted living facility resident with h/o HTN, HL, DM2, CKD and dementia who presents from nursing home after an unwitnessed fall found to have sepsis due to acute cholecystitis now s/p cholecystectomy with adhesion lysis with noted necrosis of gallbladder and bile peritonitis. He has been persistently intermittently febrile with UTI on admission and acute on CKD with course also complicated by septic shock and thrombocytopenia. At the time of the interview, patient was found to be a poor historian secondary to clinical presentation and baseline dementia. Much of the history was obtained through chart review and patient's daughter present at bedside. Patient was seen in the ED tachypneic, otherwise afebrile and hemodynamically stable. Initial work-up was significant for BUN 30 with creatinine 1.88, blood glucose 181, WBC 14.9, lactic acid 6.0.  UA positive for UTI.   CT abdomen was significant for dilated gallbladder with pericholecystic stranding and wall thickening suggestive of cholecystitis. CT head with no evidence of acute intracranial hemorrhage, shift of the midline structure or large territorial infarct. CT cervical with no evidence of acute fracture. Chest x-ray with no evidence of acute cardiopulmonary findings. On exam he is nonverbal, not following commands but moving all extremities equally with no clear lateralizing deficits. CTH from 3/3 with no acute pathology. Overall clinical picture consistent with toxic-metabolic encephalopathy from multiple etiologies. Given his history of dementia he may have a prolonged recovery from his multiple and ongoing medical issues. Â   Assessment:  1. Acute metabolic encephalopathy superimposed on chronic dementia. Low suspicion for CVA or sz but cannot fully rule out. 2. Severe sepsis POA secondary to acute cholecystitis. Patient's altered mental status noted on admission is likely metabolic/infectious/toxic encephalopathy secondary to severe sepsis. Patient was seen on admission confused, tachypneic with leukocytosis and lactic acid of 6.0.  CT abdomen with findings suggestive of cholecystitis. Â   3. Unwitnessed fall. Patient's reported unwitnessed fall is possibly conical secondary to generalized weakness from severe sepsis. Cannot rule out syncope. Â   4. Acute UTI. UA positive for UTI on admission. Â   5. Acute kidney injury superimposed on CKD stage IIIA. Creatinine elevated on admission 1.88 from a remote baseline creatinine of 1.51. Concern is for RUPERT on CKD secondary to severe sepsis. 6. Type 2 diabetes mellitus with hyperglycemia  7. Diabetic nephropathy with CKD stage IIIA. Blood glucose elevated on admission. Â   8. Chronic hypertension. BP elevated on admission. 9.  Acute cholecystitis  Â   Plan:  1. Work-up including orthostatic vital signs pending. Continue to monitor clinically. PT/OT consulted.   2. I would observe for improvement over time which may be prolonged, can consider MRI and/or EEG if he is not improving. Â   DVT Prophylaxis  SCD. No chemoprophylaxis at this time pending evaluation by surgery. Code Status  Full Code by default  Â   Disposition: Pending clinical improvement. I personally reviewed patient's labs, EKG and imagings. Plan of care discussed extensively with patient's daughter present at bedside who verbalized understanding and agrees with plan of care. Primary Care Physician  Nabeel Bobo DO  Â    Patient/family has been provided with our contact information and encouraged to have RN staff help him page me with any questions or concerns that may arise in the interim. Thank you for the opportunity to participate in the care of this patient. Twyla Oneil MD none known

## 2021-05-25 NOTE — ED PROVIDER NOTE - FAMILY HISTORY
Call made to pt.  She would like to schedule her right total shoulder for a Tuesday only.  It looks like a reverse total shoulder was discussed.  Pt would like to schedule in June if there is anything available.  Otherwise, her 2nd choice would be July 20th, 3rd choice August 3rd.  Please advise on orders.   Father  Still living? Unknown  Family history of cancer, Age at diagnosis: Age Unknown     Mother  Still living? Unknown  Family history of cancer, Age at diagnosis: Age Unknown     Aunt  Still living? Unknown  Family history of cancer, Age at diagnosis: Age Unknown

## 2021-07-16 NOTE — ED ADULT NURSE NOTE - CAS TRG GENERAL AIRWAY, MLM
Pap hx is as follows:   6/2017: normal cyto, pos HR HPV (neg 16/18)  3/2020: normal cyto, pos "low risk HPV" (neg 16/18)   4/2020: colpo benign     Pap with cotesting was collected today Patent

## 2021-11-18 NOTE — PATIENT PROFILE ADULT - FALL HARM RISK CONCLUSION
Pt was brought in by ems for fall. Pt tripped and fell landing on right side on carpeted floor. Pt c/o right back and hip pain. Unsure if hit head. No thinners noted    Pt wearing mask on arrival. Staff wearing mask and goggles at time of triage.      Fall Risk

## 2022-02-02 ENCOUNTER — EMERGENCY (EMERGENCY)
Facility: HOSPITAL | Age: 72
LOS: 1 days | End: 2022-02-02
Attending: EMERGENCY MEDICINE
Payer: COMMERCIAL

## 2022-02-02 VITALS
DIASTOLIC BLOOD PRESSURE: 79 MMHG | TEMPERATURE: 99 F | OXYGEN SATURATION: 93 % | RESPIRATION RATE: 19 BRPM | WEIGHT: 145.95 LBS | SYSTOLIC BLOOD PRESSURE: 136 MMHG | HEIGHT: 63 IN | HEART RATE: 75 BPM

## 2022-02-02 DIAGNOSIS — Z98.89 OTHER SPECIFIED POSTPROCEDURAL STATES: Chronic | ICD-10-CM

## 2022-02-02 LAB
ALBUMIN SERPL ELPH-MCNC: 4.1 G/DL — SIGNIFICANT CHANGE UP (ref 3.3–5.2)
ALP SERPL-CCNC: 112 U/L — SIGNIFICANT CHANGE UP (ref 40–120)
ALT FLD-CCNC: 17 U/L — SIGNIFICANT CHANGE UP
ANION GAP SERPL CALC-SCNC: 15 MMOL/L — SIGNIFICANT CHANGE UP (ref 5–17)
APPEARANCE UR: CLEAR — SIGNIFICANT CHANGE UP
AST SERPL-CCNC: 21 U/L — SIGNIFICANT CHANGE UP
BASOPHILS # BLD AUTO: 0.05 K/UL — SIGNIFICANT CHANGE UP (ref 0–0.2)
BASOPHILS NFR BLD AUTO: 0.8 % — SIGNIFICANT CHANGE UP (ref 0–2)
BILIRUB SERPL-MCNC: 0.7 MG/DL — SIGNIFICANT CHANGE UP (ref 0.4–2)
BILIRUB UR-MCNC: NEGATIVE — SIGNIFICANT CHANGE UP
BUN SERPL-MCNC: 23.4 MG/DL — HIGH (ref 8–20)
CALCIUM SERPL-MCNC: 9.1 MG/DL — SIGNIFICANT CHANGE UP (ref 8.6–10.2)
CHLORIDE SERPL-SCNC: 97 MMOL/L — LOW (ref 98–107)
CO2 SERPL-SCNC: 25 MMOL/L — SIGNIFICANT CHANGE UP (ref 22–29)
COLOR SPEC: YELLOW — SIGNIFICANT CHANGE UP
CREAT SERPL-MCNC: 0.79 MG/DL — SIGNIFICANT CHANGE UP (ref 0.5–1.3)
DIFF PNL FLD: NEGATIVE — SIGNIFICANT CHANGE UP
EOSINOPHIL # BLD AUTO: 0.18 K/UL — SIGNIFICANT CHANGE UP (ref 0–0.5)
EOSINOPHIL NFR BLD AUTO: 2.9 % — SIGNIFICANT CHANGE UP (ref 0–6)
GLUCOSE SERPL-MCNC: 81 MG/DL — SIGNIFICANT CHANGE UP (ref 70–99)
GLUCOSE UR QL: NEGATIVE MG/DL — SIGNIFICANT CHANGE UP
HCT VFR BLD CALC: 42 % — SIGNIFICANT CHANGE UP (ref 34.5–45)
HGB BLD-MCNC: 14.2 G/DL — SIGNIFICANT CHANGE UP (ref 11.5–15.5)
IMM GRANULOCYTES NFR BLD AUTO: 0.2 % — SIGNIFICANT CHANGE UP (ref 0–1.5)
KETONES UR-MCNC: ABNORMAL
LEUKOCYTE ESTERASE UR-ACNC: NEGATIVE — SIGNIFICANT CHANGE UP
LIDOCAIN IGE QN: 9 U/L — LOW (ref 22–51)
LYMPHOCYTES # BLD AUTO: 2.57 K/UL — SIGNIFICANT CHANGE UP (ref 1–3.3)
LYMPHOCYTES # BLD AUTO: 41.5 % — SIGNIFICANT CHANGE UP (ref 13–44)
MCHC RBC-ENTMCNC: 28.8 PG — SIGNIFICANT CHANGE UP (ref 27–34)
MCHC RBC-ENTMCNC: 33.8 GM/DL — SIGNIFICANT CHANGE UP (ref 32–36)
MCV RBC AUTO: 85.2 FL — SIGNIFICANT CHANGE UP (ref 80–100)
MONOCYTES # BLD AUTO: 0.56 K/UL — SIGNIFICANT CHANGE UP (ref 0–0.9)
MONOCYTES NFR BLD AUTO: 9 % — SIGNIFICANT CHANGE UP (ref 2–14)
NEUTROPHILS # BLD AUTO: 2.82 K/UL — SIGNIFICANT CHANGE UP (ref 1.8–7.4)
NEUTROPHILS NFR BLD AUTO: 45.6 % — SIGNIFICANT CHANGE UP (ref 43–77)
NITRITE UR-MCNC: NEGATIVE — SIGNIFICANT CHANGE UP
PH UR: 6 — SIGNIFICANT CHANGE UP (ref 5–8)
PLATELET # BLD AUTO: 258 K/UL — SIGNIFICANT CHANGE UP (ref 150–400)
POTASSIUM SERPL-MCNC: 4 MMOL/L — SIGNIFICANT CHANGE UP (ref 3.5–5.3)
POTASSIUM SERPL-SCNC: 4 MMOL/L — SIGNIFICANT CHANGE UP (ref 3.5–5.3)
PROT SERPL-MCNC: 7.3 G/DL — SIGNIFICANT CHANGE UP (ref 6.6–8.7)
PROT UR-MCNC: NEGATIVE — SIGNIFICANT CHANGE UP
RBC # BLD: 4.93 M/UL — SIGNIFICANT CHANGE UP (ref 3.8–5.2)
RBC # FLD: 12.9 % — SIGNIFICANT CHANGE UP (ref 10.3–14.5)
SODIUM SERPL-SCNC: 137 MMOL/L — SIGNIFICANT CHANGE UP (ref 135–145)
SP GR SPEC: 1.02 — SIGNIFICANT CHANGE UP (ref 1.01–1.02)
TROPONIN T SERPL-MCNC: <0.01 NG/ML — SIGNIFICANT CHANGE UP (ref 0–0.06)
UROBILINOGEN FLD QL: NEGATIVE MG/DL — SIGNIFICANT CHANGE UP
WBC # BLD: 6.19 K/UL — SIGNIFICANT CHANGE UP (ref 3.8–10.5)
WBC # FLD AUTO: 6.19 K/UL — SIGNIFICANT CHANGE UP (ref 3.8–10.5)

## 2022-02-02 PROCEDURE — 93005 ELECTROCARDIOGRAM TRACING: CPT

## 2022-02-02 PROCEDURE — 96374 THER/PROPH/DIAG INJ IV PUSH: CPT

## 2022-02-02 PROCEDURE — 84484 ASSAY OF TROPONIN QUANT: CPT

## 2022-02-02 PROCEDURE — 87086 URINE CULTURE/COLONY COUNT: CPT

## 2022-02-02 PROCEDURE — 93010 ELECTROCARDIOGRAM REPORT: CPT

## 2022-02-02 PROCEDURE — 96375 TX/PRO/DX INJ NEW DRUG ADDON: CPT

## 2022-02-02 PROCEDURE — 36415 COLL VENOUS BLD VENIPUNCTURE: CPT

## 2022-02-02 PROCEDURE — 80053 COMPREHEN METABOLIC PANEL: CPT

## 2022-02-02 PROCEDURE — 83690 ASSAY OF LIPASE: CPT

## 2022-02-02 PROCEDURE — 99285 EMERGENCY DEPT VISIT HI MDM: CPT

## 2022-02-02 PROCEDURE — 81003 URINALYSIS AUTO W/O SCOPE: CPT

## 2022-02-02 PROCEDURE — 99284 EMERGENCY DEPT VISIT MOD MDM: CPT | Mod: 25

## 2022-02-02 PROCEDURE — 85025 COMPLETE CBC W/AUTO DIFF WBC: CPT

## 2022-02-02 RX ORDER — ONDANSETRON 8 MG/1
4 TABLET, FILM COATED ORAL ONCE
Refills: 0 | Status: COMPLETED | OUTPATIENT
Start: 2022-02-02 | End: 2022-02-02

## 2022-02-02 RX ORDER — ONDANSETRON 8 MG/1
1 TABLET, FILM COATED ORAL
Qty: 9 | Refills: 0
Start: 2022-02-02 | End: 2022-02-04

## 2022-02-02 RX ORDER — FAMOTIDINE 10 MG/ML
20 INJECTION INTRAVENOUS ONCE
Refills: 0 | Status: COMPLETED | OUTPATIENT
Start: 2022-02-02 | End: 2022-02-02

## 2022-02-02 RX ORDER — SODIUM CHLORIDE 9 MG/ML
1000 INJECTION INTRAMUSCULAR; INTRAVENOUS; SUBCUTANEOUS ONCE
Refills: 0 | Status: COMPLETED | OUTPATIENT
Start: 2022-02-02 | End: 2022-02-02

## 2022-02-02 RX ADMIN — Medication 30 MILLILITER(S): at 19:04

## 2022-02-02 RX ADMIN — FAMOTIDINE 20 MILLIGRAM(S): 10 INJECTION INTRAVENOUS at 19:00

## 2022-02-02 RX ADMIN — ONDANSETRON 4 MILLIGRAM(S): 8 TABLET, FILM COATED ORAL at 19:00

## 2022-02-02 RX ADMIN — SODIUM CHLORIDE 1000 MILLILITER(S): 9 INJECTION INTRAMUSCULAR; INTRAVENOUS; SUBCUTANEOUS at 19:00

## 2022-02-02 NOTE — ED STATDOCS - NS ED ROS FT
Review of Systems  •	CONSTITUTIONAL - no  fever, no diaphoresis, no weight change, + lightheadedness   •	SKIN - no rash  •	HEMATOLOGIC - no bleeding, no bruising  •	EYES - no eye pain, no blurred vision  •	ENT - no change in hearing, no pain  •	RESPIRATORY - no shortness of breath, no cough  •	CARDIAC - no chest pain, no palpitations  •	GI - no abd pain, + nausea, + vomiting, no diarrhea, no constipation, no bleeding  •	GENITO-URINARY - no discharge, no dysuria; no hematuria,   •	ENDO - no polydipsia, no polyuria, no heat/no cold intolerance  •	MUSCULOSKELETAL - no joint pain, no swelling, no redness  •	NEUROLOGIC - no weakness, no headache, no anesthesia, no paresthesias  •	PSYCH - no anxiety, non suicidal, non homicidal, no hallucination, no depression

## 2022-02-02 NOTE — ED STATDOCS - CLINICAL SUMMARY MEDICAL DECISION MAKING FREE TEXT BOX
Pt p/w nausea and lightheadedness, not tolerating PO. Will get blood work, urine EKG, Maalox, Pepcid, Zofran, IV fluid for hydration, reassess.

## 2022-02-02 NOTE — ED STATDOCS - EKG #1 DATE/TIME
Marlen Walton [address] [facility] 
[phone] Patient: Satnam Perrin 
MRN: [id number] GR Visit Information Date & Time Provider Department Dept. Phone Encounter #  
 3/15/2018  2:30 PM Jefe Kang MD [facility] at [address] [id number] Follow-up Instructions Return if symptoms worsen or fail to improve, for routine follow up. Upcoming Health Maintenance Date Due DTaP/Tdap/Td series (1 - Tdap) 3/15/1956 ZOSTER VACCINE AGE 60> 1/15/1993 GLAUCOMA SCREENING Q2Y 3/15/1998 MEDICARE YEARLY EXAM 3/15/1998 Allergies as of 3/15/2018  Review Complete On: 3/15/2018 By: Jefe Kang MD  
 No Known Allergies Current Immunizations  Reviewed on 2018 Name Date Influenza Vaccine (Quad) PF 2017  9:56 AM  
 Pneumococcal Conjugate (PCV-13) 3/15/2018  3:09 PM  
 Pneumococcal Polysaccharide (PPSV-23) 2014 12:07 PM  
  
 Not reviewed this visit You Were Diagnosed With   
  
 Codes Comments Encounter for Medicare annual wellness exam    -  Primary ICD-10-CM: Z00.00 ICD-9-CM: V70.0 Healthcare maintenance     ICD-10-CM: Z00.00 ICD-9-CM: V70.0 Encounter for immunization     ICD-10-CM: Y99 ICD-9-CM: V03.89 CKD (chronic kidney disease) stage 3, GFR 30-59 ml/min     ICD-10-CM: N18.3 ICD-9-CM: 585.3 Gouty arthritis     ICD-10-CM: M10.9 ICD-9-CM: 274.00 Vascular dementia without behavioral disturbance     ICD-10-CM: F01.50 ICD-9-CM: 290.40 BPH without urinary obstruction     ICD-10-CM: N40.0 ICD-9-CM: 600.00 Anxiety and depression     ICD-10-CM: F41.8 ICD-9-CM: 300.00, 311 Hypertension goal BP (blood pressure) < 130/80     ICD-10-CM: I10 
ICD-9-CM: 401.9 Osteoarthritis deformans     ICD-10-CM: M19.90 ICD-9-CM: 715.90 Screening for thyroid disorder     ICD-10-CM: Z13.29 ICD-9-CM: V77.0 Screening cholesterol level     ICD-10-CM: Z34.083 ICD-9-CM: V77.91 Hypovitaminosis D     ICD-10-CM: E55.9 ICD-9-CM: 268.9 Frequency of urination     ICD-10-CM: R35.0 ICD-9-CM: 788.41 Vitals BP Pulse Temp Resp Height(growth percentile) Weight(growth percentile) 142/74 (!) 50 96.6 °F (35.9 °C) (Oral) 20 6' (1.829 m) 187 lb (84.8 kg) SpO2 BMI Smoking Status 95% 25.36 kg/m2 Former Smoker Vitals History BMI and BSA Data Body Mass Index Body Surface Area  
 25.36 kg/m 2 2.08 m 2 Preferred Pharmacy Pharmacy Name Phone [facility] [address] [place] [facility] - [address] [facility], [place] [address] [phone] Your Updated Medication List  
  
   
This list is accurate as of 3/15/18  3:56 PM.  Always use your most recent med list.  
  
  
  
  
 allopurinol 100 mg tablet Commonly known as:  Anjel Alberto Take 1 Tab by mouth daily. amLODIPine 5 mg tablet Commonly known as:  Camden Springff Take 1 Tab by mouth daily. LASIX 20 mg tablet Generic drug:  furosemide Take 20 mg by mouth every other day. Indications: EDEMA  
  
 memantine 10 mg tablet Commonly known as:  Norva Koyanagi Take 1 Tab by mouth daily. 5 mg daily for 1 week, 5 mg p.o. twice daily for 1 week, 10 mg in a.m. and 5 mg at night for 1 week and then 10 mg p.o. twice daily  
  
 metoprolol succinate 50 mg XL tablet Commonly known as:  TOPROL-XL Take 1 Tab by mouth daily. QUEtiapine 25 mg tablet Commonly known as:  SEROquel Take 0.5 Tabs by mouth nightly. tamsulosin 0.4 mg capsule Commonly known as:  FLOMAX Take 1 Cap by mouth daily. varicella zoster vaccine live 19,400 unit/0.65 mL Susr injection Commonly known as:  varicella-zoster vacine live 1 Vial by SubCUTAneous route once for 1 dose. Prescriptions Printed  Refills  
 varicella zoster vaccine live (VARICELLA-ZOSTER VACINE LIVE) 19,400 unit/0.65 mL susr injection 0  
 Si Vial by SubCUTAneous route once for 1 dose. Class: Print Route: SubCUTAneous  
 memantine (NAMENDA) 10 mg tablet 0 Sig: Take 1 Tab by mouth daily. 5 mg daily for 1 week, 5 mg p.o. twice daily for 1 week, 10 mg in a.m. and 5 mg at night for 1 week and then 10 mg p.o. twice daily Class: Print Route: Oral  
  
Prescriptions Sent to Pharmacy Refills  
 amLODIPine (NORVASC) 5 mg tablet 3 Sig: Take 1 Tab by mouth daily. Class: Normal  
 Pharmacy: [facility] [id number] [place] [address] Ph #: [phone] Route: Oral  
 allopurinol (ZYLOPRIM) 100 mg tablet 4 Sig: Take 1 Tab by mouth daily. Class: Normal  
 Pharmacy: [facility] [address] [place] [address] Ph #: [phone] Route: Oral  
 tamsulosin (FLOMAX) 0.4 mg capsule 5 Sig: Take 1 Cap by mouth daily. Class: Normal  
 Pharmacy: [facility] [address] [place] [address] Ph #: [phone] Route: Oral  
  
We Performed the Following CBC WITH AUTOMATED DIFF [99361 CPT(R)] LIPID PANEL [69686 CPT(R)] METABOLIC PANEL, COMPREHENSIVE [42842 CPT(R)] PNEUMOCOCCAL CONJ VACCINE 13 VALENT IM [id number] CPT(R)] SD IMMUNIZ ADMIN,1 SINGLE/COMB VAC/TOXOID [id number] CPT(R)] REFERRAL TO CARDIOLOGY [GHY68 Custom] REFERRAL TO RHEUMATOLOGY [PUN91 Custom] TSH 3RD GENERATION [90499 CPT(R)] URINALYSIS W/ RFLX MICROSCOPIC [16477 CPT(R)] VITAMIN D, 25 HYDROXY [id number] CPT(R)] Follow-up Instructions Return if symptoms worsen or fail to improve, for routine follow up. Referral Information Referral ID Referred By Referred To  
  
 [id number] Daniel Fitzpatrick [address], [address] Phone: [phone] Fax: [phone] Visits Status Start Date End Date 1 New Request 3/15/18 3/15/19  If your referral has a status of pending review or denied, additional information will be sent to support the outcome of this decision. Referral ID Referred By Referred To  
 [id number] [facility]  
   [address], [address] Phone: [phone] Fax: [phone] Visits Status Start Date End Date 1 New Request 3/15/18 3/15/19 If your referral has a status of pending review or denied, additional information will be sent to support the outcome of this decision. Patient Instructions Well Visit, Over 72: Care Instructions Your Care Instructions Physical exams can help you stay healthy. Your doctor has checked your overall health and may have suggested ways to take good care of yourself. He or she also may have recommended tests. At home, you can help prevent illness with healthy eating, regular exercise, and other steps. Follow-up care is a key part of your treatment and safety. Be sure to make and go to all appointments, and call your doctor if you are having problems. It's also a good idea to know your test results and keep a list of the medicines you take. How can you care for yourself at home? · Reach and stay at a healthy weight. This will lower your risk for many problems, such as obesity, diabetes, heart disease, and high blood pressure. · Get at least 30 minutes of exercise on most days of the week. Walking is a good choice. You also may want to do other activities, such as running, swimming, cycling, or playing tennis or team sports. · Do not smoke. Smoking can make health problems worse. If you need help quitting, talk to your doctor about stop-smoking programs and medicines. These can increase your chances of quitting for good. · Protect your skin from too much sun. When you're outdoors from 10 a.m. to 4 p.m., stay in the shade or cover up with clothing and a hat with a wide brim. Wear sunglasses that block UV rays.  Even when it's cloudy, put broad-spectrum sunscreen (SPF 30 or higher) on any exposed skin. · See a dentist one or two times a year for checkups and to have your teeth cleaned. · Wear a seat belt in the car. · Limit alcohol to 2 drinks a day for men and 1 drink a day for women. Too much alcohol can cause health problems. Follow your doctor's advice about when to have certain tests. These tests can spot problems early. For men and women · Cholesterol. Your doctor will tell you how often to have this done based on your overall health and other things that can increase your risk for heart attack and stroke. · Blood pressure. Have your blood pressure checked during a routine doctor visit. Your doctor will tell you how often to check your blood pressure based on your age, your blood pressure results, and other factors. · Diabetes. Ask your doctor whether you should have tests for diabetes. · Vision. Experts recommend that you have yearly exams for glaucoma and other age-related eye problems. · Hearing. Tell your doctor if you notice any change in your hearing. You can have tests to find out how well you hear. · Colon cancer tests. Keep having colon cancer tests as your doctor recommends. You can have one of several types of tests. · Heart attack and stroke risk. At least every 4 to 6 years, you should have your risk for heart attack and stroke assessed. Your doctor uses factors such as your age, blood pressure, cholesterol, and whether you smoke or have diabetes to show what your risk for a heart attack or stroke is over the next 10 years. · Osteoporosis. Talk to your doctor about whether you should have a bone density test to find out whether you have thinning bones. Also ask your doctor about whether you should take calcium and vitamin D supplements. For women · Pap test and pelvic exam. You may no longer need a Pap test. Talk with your doctor about whether to stop or continue to have Pap tests. · Breast exam and mammogram. Ask how often you should have a mammogram, which is an X-ray of your breasts. A mammogram can spot breast cancer before it can be felt and when it is easiest to treat. · Thyroid disease. Talk to your doctor about whether to have your thyroid checked as part of a regular physical exam. Women have an increased chance of a thyroid problem. For men · Prostate exam. Talk to your doctor about whether you should have a blood test (called a PSA test) for prostate cancer. Experts disagree on whether men should have this test. Some experts recommend that you discuss the benefits and risks of the test with your doctor. · Abdominal aortic aneurysm. Ask your doctor whether you should have a test to check for an aneurysm. You may need a test if you ever smoked or if your parent, brother, sister, or child has had an aneurysm. When should you call for help? Watch closely for changes in your health, and be sure to contact your doctor if you have any problems or symptoms that concern you. Where can you learn more? Go to http://yann-ravinder.info/. Enter W616 in the search box to learn more about \"Well Visit, Over 65: Care Instructions. \" Current as of: May 12, 2017 Content Version: 11.4 © 9278-1568 Healthwise, Incorporated. Care instructions adapted under license by [employer] (which disclaims liability or warranty for this information). If you have questions about a medical condition or this instruction, always ask your healthcare professional. [place] [address] any warranty or liability for your use of this information. Introducing [facility]! [facility] introduces [employer] patient portal. Now you can access parts of your medical record, email your doctor's office, and request medication refills online. 1. In your internet browser, go to [email or web address]. [employer] 2. Click on the First Time User? Click Here link in the Sign In box. You will see the New Member Sign Up page. 3. Enter your [employer] Access Code exactly as it appears below. You will not need to use this code after youve completed the sign-up process. If you do not sign up before the expiration date, you must request a new code. · [employer] Access Code: [email or web address] Expires: 6/13/2018  2:20 PM 
 
4. Enter the last four digits of your Social Security Number (xxxx) and Date of Birth (mm/dd/yyyy) as indicated and click Submit. You will be taken to the next sign-up page. 5. Create a Ideedock ID. This will be your I[employer] login ID and cannot be changed, so think of one that is secure and easy to remember. 6. Create a [employer] password. You can change your password at any time. 7. Enter your Password Reset Question and Answer. This can be used at a later time if you forget your password. 8. Enter your e-mail address. You will receive e-mail notification when new information is available in [address] 9. Click Sign Up. You can now view and download portions of your medical record. 10. Click the Download Summary menu link to download a portable copy of your medical information. If you have questions, please visit the Frequently Asked Questions section of the [employer] website. Remember, [employer] is NOT to be used for urgent needs. For medical emergencies, dial 911. Now available from your iPhone and Android! Please provide this summary of care documentation to your next provider. Your primary care clinician is listed as Myrna Whittaker. If you have any questions after today's visit, please call [phone]. 02-Feb-2022 07:58

## 2022-02-02 NOTE — ED STATDOCS - PHYSICAL EXAMINATION
VITAL SIGNS: I have reviewed nursing notes and confirm.  CONSTITUTIONAL:  in no acute distress.  SKIN: Skin exam is warm and dry, no acute rash.  HEAD: Normocephalic; atraumatic.  EYES: PERRL, EOM intact; conjunctiva and sclera clear.  ENT: No nasal discharge; airway clear. Throat clear.  NECK: Supple; non tender.    CARD: Regular rate and rhythm.  RESP:   no rales or rhonchi. + mild diffuse wheeze, speaking in full sentences, no respiratory distress  ABD:  soft; non-distended; + epigastric tenderness  EXT: Normal ROM. No clubbing, cyanosis or edema.  NEURO: Alert, oriented. Grossly unremarkable. No focal deficits.  moves all extremities,  normal gait   PSYCH: Cooperative, appropriate.

## 2022-02-02 NOTE — ED STATDOCS - PROGRESS NOTE DETAILS
blood work and UA unremarkable. patient tolerated PO. patient has omeprazole at home. comfortable going home with Zofran for nausea. Outpatient follow up recommended.

## 2022-02-02 NOTE — ED ADULT NURSE NOTE - OBJECTIVE STATEMENT
Pt is A&O x4, pt is calm and cooperative, pt complains of a headache with a pain of 9/10,N,V, and body aches that started a couple of days ago, pt has a history of lupus and asthma, pt shows no signs of respiratory distress, pt resting in  TN B, will continue to monitor

## 2022-02-02 NOTE — ED STATDOCS - PATIENT PORTAL LINK FT
You can access the FollowMyHealth Patient Portal offered by [facility] by registering at the following website: [email or web address]. By joining [employer]’s FollowMyHealth portal, you will also be able to view your health information using other applications (apps) compatible with our system.

## 2022-02-02 NOTE — ED STATDOCS - OBJECTIVE STATEMENT
72 y/o female with PMHx of SLE, RA, asthma 3  c/o head congestion, N/V for the past few days, pt denies abdominal pain. Pt states she also feels lightheaded. Pt states "nothing feels right", states it has it been on and off for the past few weeks. Pt denies diarrhea, chest pain, difficulty breathing. Pt states wheeze she has is chronic and is not bad today.

## 2022-02-02 NOTE — ED STATDOCS - CARE PROVIDER_API CALL
Elizabeth Schilling (DO)  Gastroenterology  [address] [facility], [address]  [place] [address]  Phone: [phone]  Fax: [phone]  Follow Up Time: 7-10 Days

## 2022-02-04 LAB
CULTURE RESULTS: SIGNIFICANT CHANGE UP
SPECIMEN SOURCE: SIGNIFICANT CHANGE UP

## 2022-04-20 NOTE — ED PROVIDER NOTE - NS_EDPROVIDERDISPOUSERTYPE_ED_A_ED
OK thanks   Scribe Attestation (For Scribes USE Only)... Attending Attestation (For Attendings USE Only).../Scribe Attestation (For Scribes USE Only)...

## 2022-05-16 NOTE — PATIENT PROFILE ADULT - ARE SIGNIFICANT INDICATORS COMPLETE.
FOCUSED CARE/SKILL NEED: SWELLING ABOVE UNNA BOOTS, COMPRESSION STOCKINGS PLACED. TC TO MD ABOUT ORDER FOR OT TO ASSIST WITH DON/DOFF TECHNIQUE OF STOCKINGS; AWAITING CALL BACK. PT AND CG DEMONSTRATE DAILY WEIGHTS, ELEVATION OF BLE, MED ADMINISTRATION OF DIURETICS.      PLAN FOR NEXT VISIT: ASSESS BLE, USE OF COMPRESSION STOCKINGS Yes

## 2023-04-13 ENCOUNTER — INPATIENT (INPATIENT)
Facility: HOSPITAL | Age: 73
LOS: 4 days | Discharge: HOME | DRG: 871 | End: 2023-04-18
Attending: HOSPITALIST | Admitting: FAMILY MEDICINE
Payer: MEDICARE

## 2023-04-13 VITALS
DIASTOLIC BLOOD PRESSURE: 75 MMHG | RESPIRATION RATE: 22 BRPM | OXYGEN SATURATION: 91 % | HEART RATE: 90 BPM | WEIGHT: 138.01 LBS | SYSTOLIC BLOOD PRESSURE: 126 MMHG | TEMPERATURE: 98 F

## 2023-04-13 DIAGNOSIS — I48.91 UNSPECIFIED ATRIAL FIBRILLATION: ICD-10-CM

## 2023-04-13 DIAGNOSIS — Z98.89 OTHER SPECIFIED POSTPROCEDURAL STATES: Chronic | ICD-10-CM

## 2023-04-13 DIAGNOSIS — J18.9 PNEUMONIA, UNSPECIFIED ORGANISM: ICD-10-CM

## 2023-04-13 LAB
A1C WITH ESTIMATED AVERAGE GLUCOSE RESULT: 5.9 % — HIGH (ref 4–5.6)
ALBUMIN SERPL ELPH-MCNC: 3.1 G/DL — LOW (ref 3.3–5.2)
ALBUMIN SERPL ELPH-MCNC: 3.4 G/DL — SIGNIFICANT CHANGE UP (ref 3.3–5.2)
ALBUMIN SERPL ELPH-MCNC: 3.5 G/DL — SIGNIFICANT CHANGE UP (ref 3.3–5.2)
ALP SERPL-CCNC: 194 U/L — HIGH (ref 40–120)
ALP SERPL-CCNC: 207 U/L — HIGH (ref 40–120)
ALT FLD-CCNC: 53 U/L — HIGH
ALT FLD-CCNC: 67 U/L — HIGH
ANION GAP SERPL CALC-SCNC: 14 MMOL/L — SIGNIFICANT CHANGE UP (ref 5–17)
ANION GAP SERPL CALC-SCNC: 14 MMOL/L — SIGNIFICANT CHANGE UP (ref 5–17)
ANION GAP SERPL CALC-SCNC: 15 MMOL/L — SIGNIFICANT CHANGE UP (ref 5–17)
ANION GAP SERPL CALC-SCNC: 19 MMOL/L — HIGH (ref 5–17)
APTT BLD: >200 SEC — CRITICAL HIGH (ref 27.5–35.5)
AST SERPL-CCNC: 34 U/L — HIGH
AST SERPL-CCNC: 64 U/L — HIGH
BASE EXCESS BLDA CALC-SCNC: 3.3 MMOL/L — HIGH (ref -2–3)
BASE EXCESS BLDV CALC-SCNC: -2.8 MMOL/L — LOW (ref -2–3)
BASOPHILS # BLD AUTO: 0 K/UL — SIGNIFICANT CHANGE UP (ref 0–0.2)
BASOPHILS # BLD AUTO: 0.06 K/UL — SIGNIFICANT CHANGE UP (ref 0–0.2)
BASOPHILS NFR BLD AUTO: 0 % — SIGNIFICANT CHANGE UP (ref 0–2)
BASOPHILS NFR BLD AUTO: 0.3 % — SIGNIFICANT CHANGE UP (ref 0–2)
BILIRUB SERPL-MCNC: 0.4 MG/DL — SIGNIFICANT CHANGE UP (ref 0.4–2)
BILIRUB SERPL-MCNC: 1 MG/DL — SIGNIFICANT CHANGE UP (ref 0.4–2)
BUN SERPL-MCNC: 20.3 MG/DL — HIGH (ref 8–20)
BUN SERPL-MCNC: 27 MG/DL — HIGH (ref 8–20)
BUN SERPL-MCNC: 29.4 MG/DL — HIGH (ref 8–20)
BUN SERPL-MCNC: 29.8 MG/DL — HIGH (ref 8–20)
CA-I SERPL-SCNC: 0.87 MMOL/L — LOW (ref 1.15–1.33)
CALCIUM SERPL-MCNC: 8.6 MG/DL — SIGNIFICANT CHANGE UP (ref 8.4–10.5)
CALCIUM SERPL-MCNC: 8.6 MG/DL — SIGNIFICANT CHANGE UP (ref 8.4–10.5)
CALCIUM SERPL-MCNC: 8.9 MG/DL — SIGNIFICANT CHANGE UP (ref 8.4–10.5)
CALCIUM SERPL-MCNC: 9 MG/DL — SIGNIFICANT CHANGE UP (ref 8.4–10.5)
CHLORIDE BLDV-SCNC: 105 MMOL/L — SIGNIFICANT CHANGE UP (ref 96–108)
CHLORIDE SERPL-SCNC: 92 MMOL/L — LOW (ref 96–108)
CHLORIDE SERPL-SCNC: 96 MMOL/L — SIGNIFICANT CHANGE UP (ref 96–108)
CHLORIDE SERPL-SCNC: 98 MMOL/L — SIGNIFICANT CHANGE UP (ref 96–108)
CHLORIDE SERPL-SCNC: 99 MMOL/L — SIGNIFICANT CHANGE UP (ref 96–108)
CO2 SERPL-SCNC: 20 MMOL/L — LOW (ref 22–29)
CO2 SERPL-SCNC: 21 MMOL/L — LOW (ref 22–29)
CO2 SERPL-SCNC: 22 MMOL/L — SIGNIFICANT CHANGE UP (ref 22–29)
CO2 SERPL-SCNC: 25 MMOL/L — SIGNIFICANT CHANGE UP (ref 22–29)
CREAT SERPL-MCNC: 0.76 MG/DL — SIGNIFICANT CHANGE UP (ref 0.5–1.3)
CREAT SERPL-MCNC: 1.04 MG/DL — SIGNIFICANT CHANGE UP (ref 0.5–1.3)
CREAT SERPL-MCNC: 1.08 MG/DL — SIGNIFICANT CHANGE UP (ref 0.5–1.3)
CREAT SERPL-MCNC: 1.14 MG/DL — SIGNIFICANT CHANGE UP (ref 0.5–1.3)
EGFR: 51 ML/MIN/1.73M2 — LOW
EGFR: 54 ML/MIN/1.73M2 — LOW
EGFR: 56 ML/MIN/1.73M2 — LOW
EGFR: 82 ML/MIN/1.73M2 — SIGNIFICANT CHANGE UP
EOSINOPHIL # BLD AUTO: 0 K/UL — SIGNIFICANT CHANGE UP (ref 0–0.5)
EOSINOPHIL # BLD AUTO: 0.02 K/UL — SIGNIFICANT CHANGE UP (ref 0–0.5)
EOSINOPHIL NFR BLD AUTO: 0 % — SIGNIFICANT CHANGE UP (ref 0–6)
EOSINOPHIL NFR BLD AUTO: 0.1 % — SIGNIFICANT CHANGE UP (ref 0–6)
ESTIMATED AVERAGE GLUCOSE: 123 MG/DL — HIGH (ref 68–114)
GAS PNL BLDA: SIGNIFICANT CHANGE UP
GAS PNL BLDV: 137 MMOL/L — SIGNIFICANT CHANGE UP (ref 136–145)
GAS PNL BLDV: SIGNIFICANT CHANGE UP
GLUCOSE BLDC GLUCOMTR-MCNC: 307 MG/DL — HIGH (ref 70–99)
GLUCOSE BLDC GLUCOMTR-MCNC: 364 MG/DL — HIGH (ref 70–99)
GLUCOSE BLDC GLUCOMTR-MCNC: 371 MG/DL — HIGH (ref 70–99)
GLUCOSE BLDC GLUCOMTR-MCNC: 376 MG/DL — HIGH (ref 70–99)
GLUCOSE BLDC GLUCOMTR-MCNC: 403 MG/DL — HIGH (ref 70–99)
GLUCOSE BLDV-MCNC: 89 MG/DL — SIGNIFICANT CHANGE UP (ref 70–99)
GLUCOSE SERPL-MCNC: 372 MG/DL — HIGH (ref 70–99)
GLUCOSE SERPL-MCNC: 377 MG/DL — HIGH (ref 70–99)
GLUCOSE SERPL-MCNC: 451 MG/DL — CRITICAL HIGH (ref 70–99)
GLUCOSE SERPL-MCNC: 98 MG/DL — SIGNIFICANT CHANGE UP (ref 70–99)
HCO3 BLDA-SCNC: 27 MMOL/L — SIGNIFICANT CHANGE UP (ref 21–28)
HCO3 BLDV-SCNC: 22 MMOL/L — SIGNIFICANT CHANGE UP (ref 22–29)
HCT VFR BLD CALC: 37.6 % — SIGNIFICANT CHANGE UP (ref 34.5–45)
HCT VFR BLD CALC: 40.3 % — SIGNIFICANT CHANGE UP (ref 34.5–45)
HCT VFR BLD CALC: 40.7 % — SIGNIFICANT CHANGE UP (ref 34.5–45)
HCT VFR BLDA CALC: 38 % — SIGNIFICANT CHANGE UP
HGB BLD CALC-MCNC: 12.7 G/DL — SIGNIFICANT CHANGE UP (ref 11.7–16.1)
HGB BLD-MCNC: 12.7 G/DL — SIGNIFICANT CHANGE UP (ref 11.5–15.5)
HGB BLD-MCNC: 13.4 G/DL — SIGNIFICANT CHANGE UP (ref 11.5–15.5)
HGB BLD-MCNC: 13.9 G/DL — SIGNIFICANT CHANGE UP (ref 11.5–15.5)
HOROWITZ INDEX BLDA+IHG-RTO: SIGNIFICANT CHANGE UP
IMM GRANULOCYTES NFR BLD AUTO: 0.7 % — SIGNIFICANT CHANGE UP (ref 0–0.9)
INR BLD: 1.3 RATIO — HIGH (ref 0.88–1.16)
LACTATE BLDV-MCNC: 1.8 MMOL/L — SIGNIFICANT CHANGE UP (ref 0.5–2)
LACTATE SERPL-SCNC: 2.6 MMOL/L — HIGH (ref 0.5–2)
LACTATE SERPL-SCNC: 3.2 MMOL/L — HIGH (ref 0.5–2)
LYMPHOCYTES # BLD AUTO: 0.54 K/UL — LOW (ref 1–3.3)
LYMPHOCYTES # BLD AUTO: 1.5 K/UL — SIGNIFICANT CHANGE UP (ref 1–3.3)
LYMPHOCYTES # BLD AUTO: 3.5 % — LOW (ref 13–44)
LYMPHOCYTES # BLD AUTO: 7.2 % — LOW (ref 13–44)
MAGNESIUM SERPL-MCNC: 1.6 MG/DL — LOW (ref 1.8–2.6)
MAGNESIUM SERPL-MCNC: 2.4 MG/DL — SIGNIFICANT CHANGE UP (ref 1.6–2.6)
MAGNESIUM SERPL-MCNC: 2.4 MG/DL — SIGNIFICANT CHANGE UP (ref 1.6–2.6)
MANUAL SMEAR VERIFICATION: SIGNIFICANT CHANGE UP
MCHC RBC-ENTMCNC: 28.4 PG — SIGNIFICANT CHANGE UP (ref 27–34)
MCHC RBC-ENTMCNC: 28.7 PG — SIGNIFICANT CHANGE UP (ref 27–34)
MCHC RBC-ENTMCNC: 28.7 PG — SIGNIFICANT CHANGE UP (ref 27–34)
MCHC RBC-ENTMCNC: 33.3 GM/DL — SIGNIFICANT CHANGE UP (ref 32–36)
MCHC RBC-ENTMCNC: 33.8 GM/DL — SIGNIFICANT CHANGE UP (ref 32–36)
MCHC RBC-ENTMCNC: 34.2 GM/DL — SIGNIFICANT CHANGE UP (ref 32–36)
MCV RBC AUTO: 84.1 FL — SIGNIFICANT CHANGE UP (ref 80–100)
MCV RBC AUTO: 84.9 FL — SIGNIFICANT CHANGE UP (ref 80–100)
MCV RBC AUTO: 85.4 FL — SIGNIFICANT CHANGE UP (ref 80–100)
MONOCYTES # BLD AUTO: 0.4 K/UL — SIGNIFICANT CHANGE UP (ref 0–0.9)
MONOCYTES # BLD AUTO: 0.77 K/UL — SIGNIFICANT CHANGE UP (ref 0–0.9)
MONOCYTES NFR BLD AUTO: 2.6 % — SIGNIFICANT CHANGE UP (ref 2–14)
MONOCYTES NFR BLD AUTO: 3.7 % — SIGNIFICANT CHANGE UP (ref 2–14)
MRSA PCR RESULT.: SIGNIFICANT CHANGE UP
NEUTROPHILS # BLD AUTO: 14.59 K/UL — HIGH (ref 1.8–7.4)
NEUTROPHILS # BLD AUTO: 18.38 K/UL — HIGH (ref 1.8–7.4)
NEUTROPHILS NFR BLD AUTO: 88 % — HIGH (ref 43–77)
NEUTROPHILS NFR BLD AUTO: 93.9 % — HIGH (ref 43–77)
NT-PROBNP SERPL-SCNC: 1142 PG/ML — HIGH (ref 0–300)
NT-PROBNP SERPL-SCNC: 1271 PG/ML — HIGH (ref 0–300)
PCO2 BLDA: 36 MMHG — SIGNIFICANT CHANGE UP (ref 32–45)
PCO2 BLDV: 39 MMHG — SIGNIFICANT CHANGE UP (ref 39–42)
PH BLDA: 7.48 — HIGH (ref 7.35–7.45)
PH BLDV: 7.37 — SIGNIFICANT CHANGE UP (ref 7.32–7.43)
PHOSPHATE SERPL-MCNC: 1.7 MG/DL — LOW (ref 2.4–4.7)
PHOSPHATE SERPL-MCNC: 2.7 MG/DL — SIGNIFICANT CHANGE UP (ref 2.4–4.7)
PLAT MORPH BLD: NORMAL — SIGNIFICANT CHANGE UP
PLATELET # BLD AUTO: 275 K/UL — SIGNIFICANT CHANGE UP (ref 150–400)
PLATELET # BLD AUTO: 288 K/UL — SIGNIFICANT CHANGE UP (ref 150–400)
PLATELET # BLD AUTO: 290 K/UL — SIGNIFICANT CHANGE UP (ref 150–400)
PO2 BLDA: 98 MMHG — SIGNIFICANT CHANGE UP (ref 83–108)
PO2 BLDV: 57 MMHG — HIGH (ref 25–45)
POTASSIUM BLDV-SCNC: 2.9 MMOL/L — CRITICAL LOW (ref 3.5–5.1)
POTASSIUM SERPL-MCNC: 3.7 MMOL/L — SIGNIFICANT CHANGE UP (ref 3.5–5.3)
POTASSIUM SERPL-MCNC: 3.7 MMOL/L — SIGNIFICANT CHANGE UP (ref 3.5–5.3)
POTASSIUM SERPL-MCNC: 3.8 MMOL/L — SIGNIFICANT CHANGE UP (ref 3.5–5.3)
POTASSIUM SERPL-MCNC: 4.1 MMOL/L — SIGNIFICANT CHANGE UP (ref 3.5–5.3)
POTASSIUM SERPL-SCNC: 3.7 MMOL/L — SIGNIFICANT CHANGE UP (ref 3.5–5.3)
POTASSIUM SERPL-SCNC: 3.7 MMOL/L — SIGNIFICANT CHANGE UP (ref 3.5–5.3)
POTASSIUM SERPL-SCNC: 3.8 MMOL/L — SIGNIFICANT CHANGE UP (ref 3.5–5.3)
POTASSIUM SERPL-SCNC: 4.1 MMOL/L — SIGNIFICANT CHANGE UP (ref 3.5–5.3)
PROCALCITONIN SERPL-MCNC: 0.34 NG/ML — HIGH (ref 0.02–0.1)
PROT SERPL-MCNC: 7 G/DL — SIGNIFICANT CHANGE UP (ref 6.6–8.7)
PROT SERPL-MCNC: 7.4 G/DL — SIGNIFICANT CHANGE UP (ref 6.6–8.7)
PROTHROM AB SERPL-ACNC: 15.1 SEC — HIGH (ref 10.5–13.4)
RAPID RVP RESULT: SIGNIFICANT CHANGE UP
RBC # BLD: 4.43 M/UL — SIGNIFICANT CHANGE UP (ref 3.8–5.2)
RBC # BLD: 4.72 M/UL — SIGNIFICANT CHANGE UP (ref 3.8–5.2)
RBC # BLD: 4.84 M/UL — SIGNIFICANT CHANGE UP (ref 3.8–5.2)
RBC # FLD: 13 % — SIGNIFICANT CHANGE UP (ref 10.3–14.5)
RBC # FLD: 13 % — SIGNIFICANT CHANGE UP (ref 10.3–14.5)
RBC # FLD: 13.1 % — SIGNIFICANT CHANGE UP (ref 10.3–14.5)
RBC BLD AUTO: NORMAL — SIGNIFICANT CHANGE UP
S AUREUS DNA NOSE QL NAA+PROBE: SIGNIFICANT CHANGE UP
SAO2 % BLDA: 100 % — HIGH (ref 94–98)
SAO2 % BLDV: 88.2 % — SIGNIFICANT CHANGE UP
SARS-COV-2 RNA SPEC QL NAA+PROBE: SIGNIFICANT CHANGE UP
SODIUM SERPL-SCNC: 131 MMOL/L — LOW (ref 135–145)
SODIUM SERPL-SCNC: 132 MMOL/L — LOW (ref 135–145)
SODIUM SERPL-SCNC: 133 MMOL/L — LOW (ref 135–145)
SODIUM SERPL-SCNC: 138 MMOL/L — SIGNIFICANT CHANGE UP (ref 135–145)
T3 SERPL-MCNC: 70 NG/DL — LOW (ref 80–200)
T4 AB SER-ACNC: 9.7 UG/DL — SIGNIFICANT CHANGE UP (ref 4.5–12)
TROPONIN T SERPL-MCNC: <0.01 NG/ML — SIGNIFICANT CHANGE UP (ref 0–0.06)
TROPONIN T SERPL-MCNC: <0.01 NG/ML — SIGNIFICANT CHANGE UP (ref 0–0.06)
TSH SERPL-MCNC: 0.56 UIU/ML — SIGNIFICANT CHANGE UP (ref 0.27–4.2)
WBC # BLD: 15.54 K/UL — HIGH (ref 3.8–10.5)
WBC # BLD: 18.97 K/UL — HIGH (ref 3.8–10.5)
WBC # BLD: 20.87 K/UL — HIGH (ref 3.8–10.5)
WBC # FLD AUTO: 15.54 K/UL — HIGH (ref 3.8–10.5)
WBC # FLD AUTO: 18.97 K/UL — HIGH (ref 3.8–10.5)
WBC # FLD AUTO: 20.87 K/UL — HIGH (ref 3.8–10.5)

## 2023-04-13 PROCEDURE — 93010 ELECTROCARDIOGRAM REPORT: CPT | Mod: 76

## 2023-04-13 PROCEDURE — 99223 1ST HOSP IP/OBS HIGH 75: CPT

## 2023-04-13 PROCEDURE — 99285 EMERGENCY DEPT VISIT HI MDM: CPT

## 2023-04-13 PROCEDURE — 71275 CT ANGIOGRAPHY CHEST: CPT | Mod: 26,MA

## 2023-04-13 PROCEDURE — 71045 X-RAY EXAM CHEST 1 VIEW: CPT | Mod: 26

## 2023-04-13 PROCEDURE — 99222 1ST HOSP IP/OBS MODERATE 55: CPT

## 2023-04-13 RX ORDER — INSULIN GLARGINE 100 [IU]/ML
5 INJECTION, SOLUTION SUBCUTANEOUS ONCE
Refills: 0 | Status: COMPLETED | OUTPATIENT
Start: 2023-04-13 | End: 2023-04-13

## 2023-04-13 RX ORDER — ALPRAZOLAM 0.25 MG
0.25 TABLET ORAL EVERY 8 HOURS
Refills: 0 | Status: DISCONTINUED | OUTPATIENT
Start: 2023-04-13 | End: 2023-04-18

## 2023-04-13 RX ORDER — ATORVASTATIN CALCIUM 80 MG/1
1 TABLET, FILM COATED ORAL
Qty: 0 | Refills: 0 | DISCHARGE

## 2023-04-13 RX ORDER — SODIUM CHLORIDE 9 MG/ML
1000 INJECTION, SOLUTION INTRAVENOUS
Refills: 0 | Status: DISCONTINUED | OUTPATIENT
Start: 2023-04-13 | End: 2023-04-18

## 2023-04-13 RX ORDER — METOPROLOL TARTRATE 50 MG
5 TABLET ORAL ONCE
Refills: 0 | Status: COMPLETED | OUTPATIENT
Start: 2023-04-13 | End: 2023-04-13

## 2023-04-13 RX ORDER — AZITHROMYCIN 500 MG/1
TABLET, FILM COATED ORAL
Refills: 0 | Status: DISCONTINUED | OUTPATIENT
Start: 2023-04-13 | End: 2023-04-13

## 2023-04-13 RX ORDER — IPRATROPIUM/ALBUTEROL SULFATE 18-103MCG
3 AEROSOL WITH ADAPTER (GRAM) INHALATION ONCE
Refills: 0 | Status: COMPLETED | OUTPATIENT
Start: 2023-04-13 | End: 2023-04-13

## 2023-04-13 RX ORDER — DEXTROSE 50 % IN WATER 50 %
15 SYRINGE (ML) INTRAVENOUS ONCE
Refills: 0 | Status: DISCONTINUED | OUTPATIENT
Start: 2023-04-13 | End: 2023-04-18

## 2023-04-13 RX ORDER — CEFTRIAXONE 500 MG/1
1000 INJECTION, POWDER, FOR SOLUTION INTRAMUSCULAR; INTRAVENOUS ONCE
Refills: 0 | Status: DISCONTINUED | OUTPATIENT
Start: 2023-04-13 | End: 2023-04-13

## 2023-04-13 RX ORDER — INSULIN LISPRO 100/ML
VIAL (ML) SUBCUTANEOUS
Refills: 0 | Status: DISCONTINUED | OUTPATIENT
Start: 2023-04-13 | End: 2023-04-13

## 2023-04-13 RX ORDER — ACETAMINOPHEN 500 MG
650 TABLET ORAL EVERY 6 HOURS
Refills: 0 | Status: DISCONTINUED | OUTPATIENT
Start: 2023-04-13 | End: 2023-04-18

## 2023-04-13 RX ORDER — AMIODARONE HYDROCHLORIDE 400 MG/1
150 TABLET ORAL ONCE
Refills: 0 | Status: COMPLETED | OUTPATIENT
Start: 2023-04-13 | End: 2023-04-13

## 2023-04-13 RX ORDER — DILTIAZEM HCL 120 MG
10 CAPSULE, EXT RELEASE 24 HR ORAL ONCE
Refills: 0 | Status: DISCONTINUED | OUTPATIENT
Start: 2023-04-13 | End: 2023-04-13

## 2023-04-13 RX ORDER — SODIUM CHLORIDE 9 MG/ML
250 INJECTION, SOLUTION INTRAVENOUS ONCE
Refills: 0 | Status: COMPLETED | OUTPATIENT
Start: 2023-04-13 | End: 2023-04-13

## 2023-04-13 RX ORDER — POTASSIUM CHLORIDE 20 MEQ
40 PACKET (EA) ORAL ONCE
Refills: 0 | Status: COMPLETED | OUTPATIENT
Start: 2023-04-13 | End: 2023-04-13

## 2023-04-13 RX ORDER — DEXTROSE 50 % IN WATER 50 %
25 SYRINGE (ML) INTRAVENOUS ONCE
Refills: 0 | Status: DISCONTINUED | OUTPATIENT
Start: 2023-04-13 | End: 2023-04-18

## 2023-04-13 RX ORDER — PIPERACILLIN AND TAZOBACTAM 4; .5 G/20ML; G/20ML
3.38 INJECTION, POWDER, LYOPHILIZED, FOR SOLUTION INTRAVENOUS EVERY 8 HOURS
Refills: 0 | Status: DISCONTINUED | OUTPATIENT
Start: 2023-04-13 | End: 2023-04-18

## 2023-04-13 RX ORDER — BUDESONIDE AND FORMOTEROL FUMARATE DIHYDRATE 160; 4.5 UG/1; UG/1
2 AEROSOL RESPIRATORY (INHALATION)
Refills: 0 | Status: DISCONTINUED | OUTPATIENT
Start: 2023-04-13 | End: 2023-04-18

## 2023-04-13 RX ORDER — HYDROXYCHLOROQUINE SULFATE 200 MG
200 TABLET ORAL DAILY
Refills: 0 | Status: DISCONTINUED | OUTPATIENT
Start: 2023-04-13 | End: 2023-04-13

## 2023-04-13 RX ORDER — DILTIAZEM HCL 120 MG
5 CAPSULE, EXT RELEASE 24 HR ORAL EVERY 8 HOURS
Refills: 0 | Status: DISCONTINUED | OUTPATIENT
Start: 2023-04-13 | End: 2023-04-18

## 2023-04-13 RX ORDER — HEPARIN SODIUM 5000 [USP'U]/ML
5000 INJECTION INTRAVENOUS; SUBCUTANEOUS ONCE
Refills: 0 | Status: COMPLETED | OUTPATIENT
Start: 2023-04-13 | End: 2023-04-13

## 2023-04-13 RX ORDER — DILTIAZEM HCL 120 MG
30 CAPSULE, EXT RELEASE 24 HR ORAL EVERY 6 HOURS
Refills: 0 | Status: DISCONTINUED | OUTPATIENT
Start: 2023-04-13 | End: 2023-04-18

## 2023-04-13 RX ORDER — SODIUM CHLORIDE 9 MG/ML
1000 INJECTION, SOLUTION INTRAVENOUS ONCE
Refills: 0 | Status: COMPLETED | OUTPATIENT
Start: 2023-04-13 | End: 2023-04-13

## 2023-04-13 RX ORDER — DEXTROSE 50 % IN WATER 50 %
12.5 SYRINGE (ML) INTRAVENOUS ONCE
Refills: 0 | Status: DISCONTINUED | OUTPATIENT
Start: 2023-04-13 | End: 2023-04-18

## 2023-04-13 RX ORDER — GLUCAGON INJECTION, SOLUTION 0.5 MG/.1ML
1 INJECTION, SOLUTION SUBCUTANEOUS ONCE
Refills: 0 | Status: DISCONTINUED | OUTPATIENT
Start: 2023-04-13 | End: 2023-04-18

## 2023-04-13 RX ORDER — HEPARIN SODIUM 5000 [USP'U]/ML
5000 INJECTION INTRAVENOUS; SUBCUTANEOUS EVERY 12 HOURS
Refills: 0 | Status: DISCONTINUED | OUTPATIENT
Start: 2023-04-13 | End: 2023-04-13

## 2023-04-13 RX ORDER — FOLIC ACID 0.8 MG
1 TABLET ORAL DAILY
Refills: 0 | Status: DISCONTINUED | OUTPATIENT
Start: 2023-04-13 | End: 2023-04-18

## 2023-04-13 RX ORDER — ZOLPIDEM TARTRATE 10 MG/1
1 TABLET ORAL
Qty: 0 | Refills: 0 | DISCHARGE

## 2023-04-13 RX ORDER — HEPARIN SODIUM 5000 [USP'U]/ML
5000 INJECTION INTRAVENOUS; SUBCUTANEOUS EVERY 6 HOURS
Refills: 0 | Status: DISCONTINUED | OUTPATIENT
Start: 2023-04-13 | End: 2023-04-15

## 2023-04-13 RX ORDER — HEPARIN SODIUM 5000 [USP'U]/ML
900 INJECTION INTRAVENOUS; SUBCUTANEOUS
Qty: 25000 | Refills: 0 | Status: DISCONTINUED | OUTPATIENT
Start: 2023-04-13 | End: 2023-04-15

## 2023-04-13 RX ORDER — HEPARIN SODIUM 5000 [USP'U]/ML
2500 INJECTION INTRAVENOUS; SUBCUTANEOUS EVERY 6 HOURS
Refills: 0 | Status: DISCONTINUED | OUTPATIENT
Start: 2023-04-13 | End: 2023-04-15

## 2023-04-13 RX ORDER — ADENOSINE 3 MG/ML
6 INJECTION INTRAVENOUS ONCE
Refills: 0 | Status: DISCONTINUED | OUTPATIENT
Start: 2023-04-13 | End: 2023-04-13

## 2023-04-13 RX ORDER — AZITHROMYCIN 500 MG/1
500 TABLET, FILM COATED ORAL ONCE
Refills: 0 | Status: COMPLETED | OUTPATIENT
Start: 2023-04-13 | End: 2023-04-13

## 2023-04-13 RX ORDER — ONDANSETRON 8 MG/1
4 TABLET, FILM COATED ORAL EVERY 8 HOURS
Refills: 0 | Status: DISCONTINUED | OUTPATIENT
Start: 2023-04-13 | End: 2023-04-18

## 2023-04-13 RX ORDER — CEFTRIAXONE 500 MG/1
1000 INJECTION, POWDER, FOR SOLUTION INTRAMUSCULAR; INTRAVENOUS ONCE
Refills: 0 | Status: COMPLETED | OUTPATIENT
Start: 2023-04-13 | End: 2023-04-13

## 2023-04-13 RX ORDER — OXYCODONE HYDROCHLORIDE 5 MG/1
10 TABLET ORAL EVERY 8 HOURS
Refills: 0 | Status: DISCONTINUED | OUTPATIENT
Start: 2023-04-13 | End: 2023-04-15

## 2023-04-13 RX ORDER — PANTOPRAZOLE SODIUM 20 MG/1
40 TABLET, DELAYED RELEASE ORAL
Refills: 0 | Status: DISCONTINUED | OUTPATIENT
Start: 2023-04-13 | End: 2023-04-18

## 2023-04-13 RX ORDER — METOPROLOL TARTRATE 50 MG
1 TABLET ORAL
Qty: 0 | Refills: 0 | DISCHARGE

## 2023-04-13 RX ORDER — LEVALBUTEROL 1.25 MG/.5ML
0.63 SOLUTION, CONCENTRATE RESPIRATORY (INHALATION) EVERY 6 HOURS
Refills: 0 | Status: COMPLETED | OUTPATIENT
Start: 2023-04-13 | End: 2023-04-16

## 2023-04-13 RX ORDER — MAGNESIUM SULFATE 500 MG/ML
1 VIAL (ML) INJECTION ONCE
Refills: 0 | Status: COMPLETED | OUTPATIENT
Start: 2023-04-13 | End: 2023-04-13

## 2023-04-13 RX ORDER — MAGNESIUM SULFATE 500 MG/ML
2 VIAL (ML) INJECTION ONCE
Refills: 0 | Status: COMPLETED | OUTPATIENT
Start: 2023-04-13 | End: 2023-04-13

## 2023-04-13 RX ORDER — HYDRALAZINE HCL 50 MG
10 TABLET ORAL EVERY 8 HOURS
Refills: 0 | Status: DISCONTINUED | OUTPATIENT
Start: 2023-04-13 | End: 2023-04-18

## 2023-04-13 RX ORDER — AZITHROMYCIN 500 MG/1
500 TABLET, FILM COATED ORAL ONCE
Refills: 0 | Status: DISCONTINUED | OUTPATIENT
Start: 2023-04-13 | End: 2023-04-13

## 2023-04-13 RX ORDER — MONTELUKAST 4 MG/1
10 TABLET, CHEWABLE ORAL DAILY
Refills: 0 | Status: DISCONTINUED | OUTPATIENT
Start: 2023-04-13 | End: 2023-04-18

## 2023-04-13 RX ORDER — METOPROLOL TARTRATE 50 MG
5 TABLET ORAL ONCE
Refills: 0 | Status: DISCONTINUED | OUTPATIENT
Start: 2023-04-13 | End: 2023-04-13

## 2023-04-13 RX ORDER — HYDROXYCHLOROQUINE SULFATE 200 MG
200 TABLET ORAL
Refills: 0 | Status: DISCONTINUED | OUTPATIENT
Start: 2023-04-13 | End: 2023-04-18

## 2023-04-13 RX ORDER — IPRATROPIUM/ALBUTEROL SULFATE 18-103MCG
3 AEROSOL WITH ADAPTER (GRAM) INHALATION EVERY 6 HOURS
Refills: 0 | Status: DISCONTINUED | OUTPATIENT
Start: 2023-04-13 | End: 2023-04-13

## 2023-04-13 RX ORDER — AMIODARONE HYDROCHLORIDE 400 MG/1
1 TABLET ORAL
Qty: 450 | Refills: 0 | Status: DISCONTINUED | OUTPATIENT
Start: 2023-04-13 | End: 2023-04-13

## 2023-04-13 RX ORDER — LANOLIN ALCOHOL/MO/W.PET/CERES
3 CREAM (GRAM) TOPICAL AT BEDTIME
Refills: 0 | Status: DISCONTINUED | OUTPATIENT
Start: 2023-04-13 | End: 2023-04-18

## 2023-04-13 RX ORDER — SODIUM CHLORIDE 9 MG/ML
1000 INJECTION, SOLUTION INTRAVENOUS
Refills: 0 | Status: DISCONTINUED | OUTPATIENT
Start: 2023-04-13 | End: 2023-04-14

## 2023-04-13 RX ORDER — AMIODARONE HYDROCHLORIDE 400 MG/1
0.5 TABLET ORAL
Qty: 450 | Refills: 0 | Status: DISCONTINUED | OUTPATIENT
Start: 2023-04-14 | End: 2023-04-18

## 2023-04-13 RX ORDER — INSULIN LISPRO 100/ML
2 VIAL (ML) SUBCUTANEOUS
Refills: 0 | Status: DISCONTINUED | OUTPATIENT
Start: 2023-04-13 | End: 2023-04-16

## 2023-04-13 RX ORDER — HEPARIN SODIUM 5000 [USP'U]/ML
INJECTION INTRAVENOUS; SUBCUTANEOUS
Qty: 25000 | Refills: 0 | Status: DISCONTINUED | OUTPATIENT
Start: 2023-04-13 | End: 2023-04-13

## 2023-04-13 RX ORDER — INSULIN LISPRO 100/ML
VIAL (ML) SUBCUTANEOUS EVERY 4 HOURS
Refills: 0 | Status: DISCONTINUED | OUTPATIENT
Start: 2023-04-13 | End: 2023-04-16

## 2023-04-13 RX ORDER — AMIODARONE HYDROCHLORIDE 400 MG/1
1 TABLET ORAL
Qty: 450 | Refills: 0 | Status: DISCONTINUED | OUTPATIENT
Start: 2023-04-13 | End: 2023-04-18

## 2023-04-13 RX ADMIN — Medication 5 MILLIGRAM(S): at 16:58

## 2023-04-13 RX ADMIN — Medication 200 MILLIGRAM(S): at 17:50

## 2023-04-13 RX ADMIN — Medication 3 MILLILITER(S): at 07:52

## 2023-04-13 RX ADMIN — PIPERACILLIN AND TAZOBACTAM 25 GRAM(S): 4; .5 INJECTION, POWDER, LYOPHILIZED, FOR SOLUTION INTRAVENOUS at 21:35

## 2023-04-13 RX ADMIN — Medication 3 MILLILITER(S): at 15:09

## 2023-04-13 RX ADMIN — Medication 125 MILLIGRAM(S): at 07:52

## 2023-04-13 RX ADMIN — SODIUM CHLORIDE 125 MILLILITER(S): 9 INJECTION, SOLUTION INTRAVENOUS at 21:33

## 2023-04-13 RX ADMIN — Medication 1 MILLIGRAM(S): at 17:54

## 2023-04-13 RX ADMIN — Medication 40 MILLIGRAM(S): at 15:09

## 2023-04-13 RX ADMIN — Medication 30 MILLIGRAM(S): at 23:02

## 2023-04-13 RX ADMIN — HEPARIN SODIUM 1100 UNIT(S)/HR: 5000 INJECTION INTRAVENOUS; SUBCUTANEOUS at 19:53

## 2023-04-13 RX ADMIN — MONTELUKAST 10 MILLIGRAM(S): 4 TABLET, CHEWABLE ORAL at 17:50

## 2023-04-13 RX ADMIN — Medication 6: at 18:29

## 2023-04-13 RX ADMIN — AMIODARONE HYDROCHLORIDE 33.3 MG/MIN: 400 TABLET ORAL at 19:50

## 2023-04-13 RX ADMIN — INSULIN GLARGINE 5 UNIT(S): 100 INJECTION, SOLUTION SUBCUTANEOUS at 20:18

## 2023-04-13 RX ADMIN — Medication 40 MILLIEQUIVALENT(S): at 18:03

## 2023-04-13 RX ADMIN — HEPARIN SODIUM 5000 UNIT(S): 5000 INJECTION INTRAVENOUS; SUBCUTANEOUS at 18:30

## 2023-04-13 RX ADMIN — SODIUM CHLORIDE 1000 MILLILITER(S): 9 INJECTION, SOLUTION INTRAVENOUS at 21:33

## 2023-04-13 RX ADMIN — Medication 25 GRAM(S): at 18:04

## 2023-04-13 RX ADMIN — AMIODARONE HYDROCHLORIDE 618 MILLIGRAM(S): 400 TABLET ORAL at 16:58

## 2023-04-13 RX ADMIN — AZITHROMYCIN 500 MILLIGRAM(S): 500 TABLET, FILM COATED ORAL at 13:20

## 2023-04-13 RX ADMIN — Medication 3 MILLILITER(S): at 07:50

## 2023-04-13 RX ADMIN — Medication 40 MILLIGRAM(S): at 21:34

## 2023-04-13 RX ADMIN — SODIUM CHLORIDE 1000 MILLILITER(S): 9 INJECTION, SOLUTION INTRAVENOUS at 19:50

## 2023-04-13 RX ADMIN — OXYCODONE HYDROCHLORIDE 10 MILLIGRAM(S): 5 TABLET ORAL at 18:03

## 2023-04-13 RX ADMIN — PIPERACILLIN AND TAZOBACTAM 25 GRAM(S): 4; .5 INJECTION, POWDER, LYOPHILIZED, FOR SOLUTION INTRAVENOUS at 15:08

## 2023-04-13 RX ADMIN — HEPARIN SODIUM 5000 UNIT(S): 5000 INJECTION INTRAVENOUS; SUBCUTANEOUS at 17:50

## 2023-04-13 RX ADMIN — CEFTRIAXONE 1000 MILLIGRAM(S): 500 INJECTION, POWDER, FOR SOLUTION INTRAMUSCULAR; INTRAVENOUS at 13:20

## 2023-04-13 RX ADMIN — Medication 5: at 21:40

## 2023-04-13 RX ADMIN — Medication 30 MILLIGRAM(S): at 18:29

## 2023-04-13 RX ADMIN — HEPARIN SODIUM 900 UNIT(S)/HR: 5000 INJECTION INTRAVENOUS; SUBCUTANEOUS at 22:53

## 2023-04-13 RX ADMIN — HEPARIN SODIUM 1100 UNIT(S)/HR: 5000 INJECTION INTRAVENOUS; SUBCUTANEOUS at 18:31

## 2023-04-13 NOTE — CONSULT NOTE ADULT - ASSESSMENT
72y old  Female PMHx of Lupus, Lumbar Stenosis, asthma, active smoker presents with SOB, cough, chest tightness similar to her prior COPD flairs. Sent up to the floor and was an RRT for new afib RVR s/p amio 150 IVP, IV lopressor and IV cardizem.  Still persistent tachy. Labile SBP. No prior cardiac hx.

## 2023-04-13 NOTE — ED PROVIDER NOTE - OBJECTIVE STATEMENT
73 y/o female with PMHx of lupus, RA asthma p/w runny nose, worse over past few days, chest tightness, pt notes feels similar to asthma attacks in past. Denies f/c/n/v/cp/palpitations/rash/headache/dizziness/abd.pain/d/c/dysuria/hematuria

## 2023-04-13 NOTE — ED ADULT TRIAGE NOTE - CHIEF COMPLAINT QUOTE
patient a&Ox4 BIBA from home c/o difficulty breathing and headache since monday. hx of emphysema and asthma. has albuterol rescue inhaler but did not take this morning. patient arrives on 4lpm of o2 via nc by ems.

## 2023-04-13 NOTE — H&P ADULT - ASSESSMENT
69 y/o F pt with significant PMHx of Lupus, Lumbar Stenosis, asthma, prior smoker presents to ER for progressive shortness of breath, cough and congestion x1 week. States progressive shortness of breath for weeks,  Found to hypoxic 87%RA. Pt states she was having runny nose,post nasal drip. seen by PCP given nasal saline, Progressive sob last 3 days,using nebs/inh more w/o much improvement. Ed found to so2 87% RA,CTA chest multifocal PNA. Negative viral panel.Pt denies chest pain,fever,chill,n/v/d/dysuria,headache,joints pain.    Acute Hypoxic respiratory failure sec Asthma/  COPD Exacerbation 2/2 Pna likley Gram negative  -Oxygen to keep so2>90%  - azithromyucin ,zosyn  - symbicord  - iv steroid  - Neb  -Sputum/legionella/strep ag  -Bl c/s  -Monitor wbc/procal  -C/S ID      chronic pain   -oxycodone 10 mg q4hr prn  - istop reviewed  - continue home regimen    SLE  - stable  - hydroxychloroquine    HTN   - monitor blood pressure   - Pt is not taking meds    HLD   - pt is not taking meds  -lipid panel    GERD  - protonix     Nicotine abuse  -refused patch  -counselled to quit smoking  Pt was counselled to compliant with meds. Plan of care dw pt. DW risk of respiratory failure.     #DVT prophylaxis  - SC heparin 69 y/o F pt with significant PMHx of Lupus, Lumbar Stenosis, asthma, prior smoker presents to ER for progressive shortness of breath, cough and congestion x1 week. States progressive shortness of breath for weeks,  Found to hypoxic 87%RA. Pt states she was having runny nose,post nasal drip. seen by PCP given nasal saline, Progressive sob last 3 days,using nebs/inh more w/o much improvement. Ed found to so2 87% RA,CTA chest multifocal PNA. Negative viral panel.Pt denies chest pain,fever,chill,n/v/d/dysuria,headache,joints pain.    Acute Hypoxic respiratory failure sec Asthma/  COPD Exacerbation 2/2 Pna likley Gram negative  -Oxygen to keep so2>90%  - azithromyucin ,zosyn  - symbicord  - iv steroid  - Neb  -Sputum/legionella/strep ag  -Bl c/s  -Monitor wbc/procal  -C/S ID      chronic pain   - pt states she is on oxycodone 10 mg q6hr prn/ prn xanax 0.25 mg tid prn   - istop reviewed Reference #:  Reference #: [id number] oxy/xanax last refill 03/31/23 30days sypply      SLE  - stable  - hydroxychloroquine    HTN   - monitor blood pressure   - Pt is not taking meds    HLD   - pt is not taking meds  -lipid panel    GERD  - protonix     Nicotine abuse  -refused patch  -counselled to quit smoking    #DVT prophylaxis  - SC heparin  Pt was counselled to compliant with meds. Plan of care floresita pt. FLORESITA risk of respiratory failure.   called daughter Chelo [phone])pt called from her phone---unable to reach  floresita rn     71 y/o F pt with significant PMHx of Lupus, Lumbar Stenosis, asthma, prior smoker presents to ER for progressive shortness of breath, cough and congestion x1 week. States progressive shortness of breath for weeks,  Found to hypoxic 87%RA. Pt states she was having runny nose,post nasal drip. seen by PCP given nasal saline, Progressive sob last 3 days,using nebs/inh more w/o much improvement. Ed found to so2 87% RA,CTA chest multifocal PNA. Negative viral panel.Pt denies chest pain,fever,chill,n/v/d/dysuria,headache,joints pain.    Acute Hypoxic respiratory failure sec Asthma/  COPD Exacerbation 2/2 Pna likley Gram negative  -Oxygen to keep so2>90%  - azithromyucin ,zosyn  - symbicord  - iv steroid  - Neb  -Sputum/legionella/strep ag  -Bl c/s  -Monitor wbc/procal  -C/S ID      chronic pain   - pt states she is on oxycodone 10 mg q6hr prn/ prn xanax 0.25 mg tid prn   - istop reviewed Reference #:  Reference #: [id number] oxy/xanax last refill 03/31/23 30days sypply      SLE  - stable  - hydroxychloroquine    HTN   - monitor blood pressure   - Pt is not taking meds    HLD   - pt is not taking meds  -lipid panel    GERD  - protonix     Nicotine abuse  -refused patch  -counselled to quit smoking    #DVT prophylaxis  - SC heparin  Pt was counselled to compliant with meds. Plan of care nikki pt. DW risk of respiratory failure.   daughter Chelo [phone])  Pt does not want us to call family    RRT: pt is afib rvr 200's. c/o palpitation  afib rvr  Hypotension  -stat ekg,s/p iv 5 mg metoprolol. bp dropped to sbp 90s. hr still high 170's s/p bolus ns 500cc. bolus amiodern gtt --hr 110--135. continue amiod. spoke with cardiac PA.   CMP,MG/PHOS,CE  TTE  increase oxy 5l so2 93%  pt is sitting on chair. aaox3. denies cp,sob,dizziness. does not want to notify family-understood risk of high mortality. non complaints with meds.  dw rn  Pt understood risk of respiratory failure, high risk of intubation /resusciation.wants every thing to be done---full code.  nikki rn     69 y/o F pt with significant PMHx of Lupus, Lumbar Stenosis, asthma, prior smoker presents to ER for progressive shortness of breath, cough and congestion x1 week. States progressive shortness of breath for weeks,  Found to hypoxic 87%RA. Pt states she was having runny nose,post nasal drip. seen by PCP given nasal saline, Progressive sob last 3 days,using nebs/inh more w/o much improvement. Ed found to so2 87% RA,CTA chest multifocal PNA. Negative viral panel.Pt denies chest pain,fever,chill,n/v/d/dysuria,headache,joints pain.    Acute Hypoxic respiratory failure sec Asthma/  COPD Exacerbation 2/2 Pna likley Gram negative  -Oxygen to keep so2>90%  - azithromyucin ,zosyn  - symbicord  - iv steroid  - Neb  -Sputum/legionella/strep ag  -Bl c/s  -Monitor wbc/procal  -C/S ID      chronic pain   - pt states she is on oxycodone 10 mg q6hr prn/ prn xanax 0.25 mg tid prn   - istop reviewed Reference #:  Reference #: [id number] oxy/xanax last refill 03/31/23 30days sypply      SLE  - stable  - hydroxychloroquine    HTN   - monitor blood pressure   - Pt is not taking meds    HLD   - pt is not taking meds  -lipid panel    GERD  - protonix     Nicotine abuse  -refused patch  -counselled to quit smoking    #DVT prophylaxis  - SC heparin  Pt was counselled to compliant with meds. Plan of care dw pt. DW risk of respiratory failure.   daughter Chelo [phone])  Pt does not want us to call family    RRT: pt is afib rvr 200's. c/o palpitation  afib rvr  Hypotension  -stat ekg,s/p iv 5 mg metoprolol. bp dropped to sbp 90s. hr still high 170's s/p bolus ns 500cc. bolus amiodern gtt --hr 110--135. continue amiod. spoke with cardiac PA.   CMP,MG/PHOS,CE  TTE  increase oxy 5l so2 93%  LAST ECHO 2019 STABLE EF  pt is sitting on chair. aaox3. denies cp,sob,dizziness. does not want to notify family-understood risk of high mortality. non complaints with meds.  dw rn  Pt understood risk of respiratory failure, high risk of intubation /resusciation.wants every thing to be done---full code.  dw rn

## 2023-04-13 NOTE — CONSULT NOTE ADULT - NS ATTEND AMEND GEN_ALL_CORE FT
AF with RVR in the setting of pneumonia. BP low normal. Will start on IV amiodarone to attempt cardioversion and additional diltiazem for rate control.
Atrial fibrillation with RVR: being treated for PNA / COPD exacerbation. Active smoker. New onset afib RVR without successful rate control despite amio 150, metoprolol IV, cardizem IV  Start Cardizem 30mg q6, would avoid BB 2/2 bronchospasm  Seral CE, EKG, TTE   NPO midnight FR POSSIBLE nova/cv. EP Consulted.   F/u EKG in AM and serial EKGs for  QTC  monitoring.

## 2023-04-13 NOTE — H&P ADULT - NSHPPHYSICALEXAM_GEN_ALL_CORE
T(C): 36.8 (04-13-23 @ 11:03), Max: 36.8 (04-13-23 @ 06:20)  HR: 87 (04-13-23 @ 11:03) (87 - 90)  BP: 121/64 (04-13-23 @ 11:03) (121/64 - 126/75)  RR: 20 (04-13-23 @ 11:03) (20 - 22)  SpO2: 96% (04-13-23 @ 06:35) (91% - 96%)    GEN - NAD  HEENT - NCAT, EOMI, JESSE,   RESP - positive air entry, mild  wheeze. b/l crackles. on supplemental O2.  CARDIO - NS1S2, RRR. No murmurs  ABD - Soft/Non tender/Non distended. Normal BS x4 quadrants.   Ext - No RAFFI. no signs of venous/arterial stasis ulcers  MSK - full ROM of BL upper and lower extremities without pain or restriction. BL 5/5 strength on upper and lower extremities.   Neuro - cn 2-12 grossly intact.. no tremor. gait not observed.   Psych- AAOx3. . attentive. normal affect.

## 2023-04-13 NOTE — ED PROVIDER NOTE - CLINICAL SUMMARY MEDICAL DECISION MAKING FREE TEXT BOX
pt with most like asthma exacerbation/ emphysema given URI /allergy season, pt states she usually feels similar around this time of year, does not follow with pulmonology, no hx of intubation, no recent steroids, labs CXR, reassess, no home O2 use pt with most like asthma exacerbation/ emphysema given URI /allergy season, pt states she usually feels similar around this time of year, does not follow with pulmonology, no hx of intubation, no recent steroids, labs CXR, reassess, no home O2 use    Multifocal pna on ct no pe abx sating 89% without o2 will admit  copd exacerbation nebs steroids

## 2023-04-13 NOTE — ED ADULT NURSE NOTE - OBJECTIVE STATEMENT
Pt AAOX4 states that she came to the ED for vomiting x 2 days. last time vomited 2 days ago. Pt states she have been  diagnose with emphysema but no Oxygen at home. ambulate without difficulty.

## 2023-04-13 NOTE — H&P ADULT - HISTORY OF PRESENT ILLNESS
71 y/o F pt with significant PMHx of Lupus, Lumbar Stenosis, asthma, prior smoker presents to ER for progressive shortness of breath, cough and congestion x1 week. States progressive shortness of breath for weeks,  Found to hypoxic 87%RA. Pt states she was having runny nose,post nasal drip. seen by PCP given nasal saline, Progressive sob last 3 days,using nebs/inh more w/o much improvement. Ed found to so2 87% RA,CTA chest multifocal PNA. Negative viral panel.Pt denies chest pain,fever,chill,n/v/d/dysuria,headache,joints pain.          PAST MEDICAL HISTORY:  Asthma /copd    Autoimmune disease     Breast lump Benign  HTN  Hashimoto's disease     High cholesterol     Lupus     Myocardial infarct     Osteoporosis.     PAST SURGICAL HISTORY:   delivery delivered x3    H/O lumpectomy.     FAMILY HISTORY:  Family history of cancer    Social History:    smoker -4-5/day ,pt states she smokes depend on her stress level,some days more. Denies any alcohol/illicit drug uses.     69 y/o F pt with significant PMHx of Lupus, Lumbar Stenosis, asthma, prior smoker presents to ER for progressive shortness of breath, cough and congestion x1 week. States progressive shortness of breath for weeks,  Found to hypoxic 87%RA. Pt states she was having runny nose,post nasal drip. seen by PCP given nasal saline, Progressive sob last 3 days,using nebs/inh more w/o much improvement. Ed found to so2 87% RA,CTA chest multifocal PNA. Negative viral panel.Pt denies chest pain,fever,chill,n/v/d/dysuria,headache,joints pain.          PAST MEDICAL HISTORY:  Asthma /copd    Autoimmune disease     Breast lump Benign  HTN  Hashimoto's disease     High cholesterol     Lupus     Myocardial infarct     Osteoporosis.     PAST SURGICAL HISTORY:   delivery delivered x3    H/O lumpectomy.     FAMILY HISTORY:  Family history of cancer    Social History:    smoker -4-5/day ,pt states she smokes depend on her stress level,some days more. Denies any alcohol/illicit drug uses.    < from: CT Angio Chest PE Protocol w/ IV Cont (23 @ 09:50) >  IMPRESSION:    1.  No pulmonary thromboembolism    2.  Findings suggestive of multifocal infectious process in both lungs    < end of copied text >  < from: TTE Echo Complete w/Doppler (19 @ 14:06) >  Summary:   1. Left ventricular ejection fraction, by visual estimation, is 55 to   60%.   2. Normal global left ventricular systolic function.   3. Normal left ventricular internal cavity size.   4. Normal right ventricular size and function.   5. The right atrium is normal in size.   6. Mild mitral annular calcification.   7. Mild thickening and calcification of the anterior and posterior   mitral valve leaflets.   8. Borderlineprolapse of the anterior and posterior mitral valve   leaflets.   9. Mild to moderate mitral valve regurgitation.  10. Aortic valve is functionally bicuspid. Raphe noted between left and   non-coronary cusps.  11. Peak transaortic gradient equals 17.0 mmHg, mean transaortic gradient     < end of copied text >

## 2023-04-13 NOTE — RAPID RESPONSE TEAM SUMMARY - NSOTHERINTERVENTIONSRRT_GEN_ALL_CORE
- ECG with A-Fib RVR  - STAT labs  - TTE  - Urgent cardiology consult  - Patient requesting that her family not be updated at this time.

## 2023-04-13 NOTE — CONSULT NOTE ADULT - PROBLEM SELECTOR RECOMMENDATION 9
-Being treated for PNA / COPD exacerbation. Active smoker  -New onset afib RVR without successful rate control despite amio 150, metoprolol IV, cardizem IV  -EKG with no ischemia  -Trop neg x2  -Echo from 2019 preserved EF  -No familial history  -TFT normal  -Start hep gtt. CHADVASC 2  -Start Cardizem 30mg q6, would avoid BB 2/2 bronchospasm  -NPO midnight   -EP consulted  -Echo ordered -Being treated for PNA / COPD exacerbation. Active smoker  -New onset afib RVR without successful rate control despite amio 150, metoprolol IV, cardizem IV  -EKG with no ischemia  -Trop neg x2  -Echo from 2019 preserved EF  -No familial history  -TFT normal  -Start hep gtt. CHADVASC 2  -Start Cardizem 30mg q6, would avoid BB 2/2 bronchospasm  -NPO midnight   -F/u EKG in AM for QTC. Latest 501. On azithro, Plaquenil, PRN zofran  -EP consulted  -Echo ordered

## 2023-04-13 NOTE — ED PROVIDER NOTE - PHYSICAL EXAMINATION
Head: atraumatic, normocephalic  Face: atraumatic, no crepitus no orbital/maxillary/mandibular ttp  throat: uvula midline no exudates  eyes: perrla eomi  heart: rrr s1s2  lungs: diffuse wheeze  abd: soft, nt nd +bs no rebound/guarding no cva ttp  skin: warm  LE: no swelling, no calf ttp  back: no midline cervical/thoracic/lumbar ttp

## 2023-04-13 NOTE — RAPID RESPONSE TEAM SUMMARY - NSSITUATIONBACKGROUNDRRT_GEN_ALL_CORE
69 y/o F pt with significant PMHx of Lupus, Lumbar Stenosis, asthma, prior smoker presents to ER for progressive shortness of breath, cough and congestion x1 week. Admitted with COPD exacerbation.     RRT called for new onset A-Fib RVR with rates sustained > 180. Seen and assessed at bedside, patient with c/o burning throat pain, w/o any other acute complaints. Denies chest pain, dyspnea. VS: , /70, RR 22, SpO2 90% on 3 L, with dips into the high 80s. Placed on 6 L nc with improvement to high 90s.  69 y/o F pt with significant PMHx of Lupus, Lumbar Stenosis, asthma, prior smoker presents to ER for progressive shortness of breath, cough and congestion x1 week. Admitted with COPD exacerbation.     RRT called for new onset A-Fib RVR with rates sustained > 180. Seen and assessed at bedside, patient with c/o burning throat pain, w/o any other acute complaints. Denies chest pain, dyspnea. VS: , /70, RR 22, SpO2 90% on 3 L, with dips into the high 80s. Placed on 6 L nc with improvement to high 90s. Dr. Castro at bedside running rapid. On exam, pt resting in arm chair in NAD. A&Ox3, nonlabored respirations. Irregular pulse, rapid rate. Abd soft nontender. Nonfocal, follows commands.     5 mg IV lopressor pushed with HR improvement to 170s. BP 90/45. Patient received 500 cc IV bolus and given 150 mg amio with improvement in HR to 110s. Patient clinically in no acute distress. Cardio urgently consulted and to see patient. Pt to be started on amio gtt pending formal recommendations.  69 y/o F pt with significant PMHx of Lupus, Lumbar Stenosis, asthma, prior smoker presents to ER for progressive shortness of breath, cough and congestion x1 week. Admitted with COPD exacerbation.     RRT called for new onset A-Fib RVR with rates sustained > 180. Seen and assessed at bedside, patient with c/o burning throat pain, w/o any other acute complaints. Denies chest pain, dyspnea. VS: , /70, RR 22, SpO2 90% on 3 L, with dips into the high 80s. Placed on 6 L nc with improvement to high 90s. Dr. Castro at bedside running rapid. On exam, pt resting in arm chair in NAD. A&Ox3, nonlabored respirations. Irregular pulse, rapid rate. Abd soft nontender. Nonfocal, follows commands.     5 mg IV lopressor pushed with HR improvement to 170s. BP 90/45. Patient received 500 cc IV bolus and given 150 mg amio with improvement in HR to 110s. Patient clinically in no acute distress. Cardio urgently consulted and to see patient. D/w cardio, will hold off on amio gtt pending formal recommendations as rate now better controlled.

## 2023-04-14 LAB
ANION GAP SERPL CALC-SCNC: 11 MMOL/L — SIGNIFICANT CHANGE UP (ref 5–17)
APPEARANCE UR: CLEAR — SIGNIFICANT CHANGE UP
APTT BLD: 41.3 SEC — HIGH (ref 27.5–35.5)
APTT BLD: 55.9 SEC — HIGH (ref 27.5–35.5)
APTT BLD: 59.4 SEC — HIGH (ref 27.5–35.5)
BACTERIA # UR AUTO: ABNORMAL
BASOPHILS # BLD AUTO: 0.02 K/UL — SIGNIFICANT CHANGE UP (ref 0–0.2)
BASOPHILS NFR BLD AUTO: 0.1 % — SIGNIFICANT CHANGE UP (ref 0–2)
BILIRUB UR-MCNC: NEGATIVE — SIGNIFICANT CHANGE UP
BUN SERPL-MCNC: 28.7 MG/DL — HIGH (ref 8–20)
CALCIUM SERPL-MCNC: 8.6 MG/DL — SIGNIFICANT CHANGE UP (ref 8.4–10.5)
CHLORIDE SERPL-SCNC: 100 MMOL/L — SIGNIFICANT CHANGE UP (ref 96–108)
CHOLEST SERPL-MCNC: 125 MG/DL — SIGNIFICANT CHANGE UP
CO2 SERPL-SCNC: 22 MMOL/L — SIGNIFICANT CHANGE UP (ref 22–29)
COLOR SPEC: YELLOW — SIGNIFICANT CHANGE UP
CREAT SERPL-MCNC: 0.85 MG/DL — SIGNIFICANT CHANGE UP (ref 0.5–1.3)
DIFF PNL FLD: ABNORMAL
EGFR: 72 ML/MIN/1.73M2 — SIGNIFICANT CHANGE UP
EOSINOPHIL # BLD AUTO: 0 K/UL — SIGNIFICANT CHANGE UP (ref 0–0.5)
EOSINOPHIL NFR BLD AUTO: 0 % — SIGNIFICANT CHANGE UP (ref 0–6)
EPI CELLS # UR: ABNORMAL
GLUCOSE BLDC GLUCOMTR-MCNC: 128 MG/DL — HIGH (ref 70–99)
GLUCOSE BLDC GLUCOMTR-MCNC: 128 MG/DL — HIGH (ref 70–99)
GLUCOSE BLDC GLUCOMTR-MCNC: 165 MG/DL — HIGH (ref 70–99)
GLUCOSE BLDC GLUCOMTR-MCNC: 173 MG/DL — HIGH (ref 70–99)
GLUCOSE BLDC GLUCOMTR-MCNC: 179 MG/DL — HIGH (ref 70–99)
GLUCOSE BLDC GLUCOMTR-MCNC: 249 MG/DL — HIGH (ref 70–99)
GLUCOSE SERPL-MCNC: 206 MG/DL — HIGH (ref 70–99)
GLUCOSE UR QL: 250 MG/DL
HCT VFR BLD CALC: 36.8 % — SIGNIFICANT CHANGE UP (ref 34.5–45)
HDLC SERPL-MCNC: 50 MG/DL — LOW
HGB BLD-MCNC: 12 G/DL — SIGNIFICANT CHANGE UP (ref 11.5–15.5)
IMM GRANULOCYTES NFR BLD AUTO: 1 % — HIGH (ref 0–0.9)
KETONES UR-MCNC: NEGATIVE — SIGNIFICANT CHANGE UP
LACTATE BLDV-MCNC: 1.7 MMOL/L — SIGNIFICANT CHANGE UP (ref 0.5–2)
LEGIONELLA AG UR QL: NEGATIVE — SIGNIFICANT CHANGE UP
LEUKOCYTE ESTERASE UR-ACNC: NEGATIVE — SIGNIFICANT CHANGE UP
LIPID PNL WITH DIRECT LDL SERPL: 65 MG/DL — SIGNIFICANT CHANGE UP
LYMPHOCYTES # BLD AUTO: 0.75 K/UL — LOW (ref 1–3.3)
LYMPHOCYTES # BLD AUTO: 4.7 % — LOW (ref 13–44)
MAGNESIUM SERPL-MCNC: 2 MG/DL — SIGNIFICANT CHANGE UP (ref 1.6–2.6)
MCHC RBC-ENTMCNC: 27.9 PG — SIGNIFICANT CHANGE UP (ref 27–34)
MCHC RBC-ENTMCNC: 32.6 GM/DL — SIGNIFICANT CHANGE UP (ref 32–36)
MCV RBC AUTO: 85.6 FL — SIGNIFICANT CHANGE UP (ref 80–100)
MONOCYTES # BLD AUTO: 0.38 K/UL — SIGNIFICANT CHANGE UP (ref 0–0.9)
MONOCYTES NFR BLD AUTO: 2.4 % — SIGNIFICANT CHANGE UP (ref 2–14)
NEUTROPHILS # BLD AUTO: 14.52 K/UL — HIGH (ref 1.8–7.4)
NEUTROPHILS NFR BLD AUTO: 91.8 % — HIGH (ref 43–77)
NITRITE UR-MCNC: NEGATIVE — SIGNIFICANT CHANGE UP
NON HDL CHOLESTEROL: 75 MG/DL — SIGNIFICANT CHANGE UP
PH UR: 6 — SIGNIFICANT CHANGE UP (ref 5–8)
PHOSPHATE SERPL-MCNC: 2.2 MG/DL — LOW (ref 2.4–4.7)
PLATELET # BLD AUTO: 272 K/UL — SIGNIFICANT CHANGE UP (ref 150–400)
POTASSIUM SERPL-MCNC: 3.7 MMOL/L — SIGNIFICANT CHANGE UP (ref 3.5–5.3)
POTASSIUM SERPL-SCNC: 3.7 MMOL/L — SIGNIFICANT CHANGE UP (ref 3.5–5.3)
PROCALCITONIN SERPL-MCNC: 0.29 NG/ML — HIGH (ref 0.02–0.1)
PROT UR-MCNC: 30 MG/DL
RBC # BLD: 4.3 M/UL — SIGNIFICANT CHANGE UP (ref 3.8–5.2)
RBC # FLD: 13.2 % — SIGNIFICANT CHANGE UP (ref 10.3–14.5)
RBC CASTS # UR COMP ASSIST: SIGNIFICANT CHANGE UP /HPF (ref 0–4)
S PNEUM AG UR QL: NEGATIVE — SIGNIFICANT CHANGE UP
SODIUM SERPL-SCNC: 133 MMOL/L — LOW (ref 135–145)
SP GR SPEC: 1.01 — SIGNIFICANT CHANGE UP (ref 1.01–1.02)
TRIGL SERPL-MCNC: 50 MG/DL — SIGNIFICANT CHANGE UP
UROBILINOGEN FLD QL: NEGATIVE MG/DL — SIGNIFICANT CHANGE UP
WBC # BLD: 15.83 K/UL — HIGH (ref 3.8–10.5)
WBC # FLD AUTO: 15.83 K/UL — HIGH (ref 3.8–10.5)
WBC UR QL: SIGNIFICANT CHANGE UP /HPF (ref 0–5)

## 2023-04-14 PROCEDURE — 99233 SBSQ HOSP IP/OBS HIGH 50: CPT

## 2023-04-14 PROCEDURE — 99232 SBSQ HOSP IP/OBS MODERATE 35: CPT

## 2023-04-14 RX ORDER — SODIUM CHLORIDE 9 MG/ML
500 INJECTION, SOLUTION INTRAVENOUS ONCE
Refills: 0 | Status: COMPLETED | OUTPATIENT
Start: 2023-04-14 | End: 2023-04-14

## 2023-04-14 RX ORDER — AMIODARONE HYDROCHLORIDE 400 MG/1
400 TABLET ORAL
Refills: 0 | Status: DISCONTINUED | OUTPATIENT
Start: 2023-04-14 | End: 2023-04-17

## 2023-04-14 RX ORDER — POTASSIUM CHLORIDE 20 MEQ
40 PACKET (EA) ORAL ONCE
Refills: 0 | Status: COMPLETED | OUTPATIENT
Start: 2023-04-14 | End: 2023-04-14

## 2023-04-14 RX ORDER — BENZOCAINE AND MENTHOL 5; 1 G/100ML; G/100ML
1 LIQUID ORAL
Refills: 0 | Status: DISCONTINUED | OUTPATIENT
Start: 2023-04-14 | End: 2023-04-18

## 2023-04-14 RX ORDER — SODIUM,POTASSIUM PHOSPHATES 278-250MG
1 POWDER IN PACKET (EA) ORAL
Refills: 0 | Status: DISCONTINUED | OUTPATIENT
Start: 2023-04-14 | End: 2023-04-18

## 2023-04-14 RX ADMIN — Medication 30 MILLIGRAM(S): at 05:33

## 2023-04-14 RX ADMIN — OXYCODONE HYDROCHLORIDE 10 MILLIGRAM(S): 5 TABLET ORAL at 20:22

## 2023-04-14 RX ADMIN — LEVALBUTEROL 0.63 MILLIGRAM(S): 1.25 SOLUTION, CONCENTRATE RESPIRATORY (INHALATION) at 09:06

## 2023-04-14 RX ADMIN — Medication 1: at 13:05

## 2023-04-14 RX ADMIN — Medication 100 MILLIGRAM(S): at 17:44

## 2023-04-14 RX ADMIN — SODIUM CHLORIDE 666.67 MILLILITER(S): 9 INJECTION, SOLUTION INTRAVENOUS at 00:23

## 2023-04-14 RX ADMIN — PIPERACILLIN AND TAZOBACTAM 25 GRAM(S): 4; .5 INJECTION, POWDER, LYOPHILIZED, FOR SOLUTION INTRAVENOUS at 13:04

## 2023-04-14 RX ADMIN — Medication 200 MILLIGRAM(S): at 17:44

## 2023-04-14 RX ADMIN — Medication 1 TABLET(S): at 17:44

## 2023-04-14 RX ADMIN — Medication 1 TABLET(S): at 21:13

## 2023-04-14 RX ADMIN — Medication 2 UNIT(S): at 13:05

## 2023-04-14 RX ADMIN — AMIODARONE HYDROCHLORIDE 16.7 MG/MIN: 400 TABLET ORAL at 03:04

## 2023-04-14 RX ADMIN — HEPARIN SODIUM 1100 UNIT(S)/HR: 5000 INJECTION INTRAVENOUS; SUBCUTANEOUS at 17:55

## 2023-04-14 RX ADMIN — Medication 30 MILLIGRAM(S): at 11:18

## 2023-04-14 RX ADMIN — AMIODARONE HYDROCHLORIDE 400 MILLIGRAM(S): 400 TABLET ORAL at 21:09

## 2023-04-14 RX ADMIN — MONTELUKAST 10 MILLIGRAM(S): 4 TABLET, CHEWABLE ORAL at 11:19

## 2023-04-14 RX ADMIN — BENZOCAINE AND MENTHOL 1 LOZENGE: 5; 1 LIQUID ORAL at 22:15

## 2023-04-14 RX ADMIN — OXYCODONE HYDROCHLORIDE 10 MILLIGRAM(S): 5 TABLET ORAL at 03:54

## 2023-04-14 RX ADMIN — Medication 40 MILLIGRAM(S): at 05:33

## 2023-04-14 RX ADMIN — Medication 1 TABLET(S): at 08:41

## 2023-04-14 RX ADMIN — Medication 40 MILLIEQUIVALENT(S): at 00:23

## 2023-04-14 RX ADMIN — Medication 1 TABLET(S): at 11:19

## 2023-04-14 RX ADMIN — PIPERACILLIN AND TAZOBACTAM 25 GRAM(S): 4; .5 INJECTION, POWDER, LYOPHILIZED, FOR SOLUTION INTRAVENOUS at 21:09

## 2023-04-14 RX ADMIN — OXYCODONE HYDROCHLORIDE 10 MILLIGRAM(S): 5 TABLET ORAL at 19:22

## 2023-04-14 RX ADMIN — Medication 1: at 21:20

## 2023-04-14 RX ADMIN — LEVALBUTEROL 0.63 MILLIGRAM(S): 1.25 SOLUTION, CONCENTRATE RESPIRATORY (INHALATION) at 21:32

## 2023-04-14 RX ADMIN — PIPERACILLIN AND TAZOBACTAM 25 GRAM(S): 4; .5 INJECTION, POWDER, LYOPHILIZED, FOR SOLUTION INTRAVENOUS at 05:31

## 2023-04-14 RX ADMIN — Medication 40 MILLIGRAM(S): at 21:10

## 2023-04-14 RX ADMIN — BUDESONIDE AND FORMOTEROL FUMARATE DIHYDRATE 2 PUFF(S): 160; 4.5 AEROSOL RESPIRATORY (INHALATION) at 21:33

## 2023-04-14 RX ADMIN — AMIODARONE HYDROCHLORIDE 16.7 MG/MIN: 400 TABLET ORAL at 13:07

## 2023-04-14 RX ADMIN — Medication 40 MILLIGRAM(S): at 13:05

## 2023-04-14 RX ADMIN — SODIUM CHLORIDE 125 MILLILITER(S): 9 INJECTION, SOLUTION INTRAVENOUS at 13:06

## 2023-04-14 RX ADMIN — BUDESONIDE AND FORMOTEROL FUMARATE DIHYDRATE 2 PUFF(S): 160; 4.5 AEROSOL RESPIRATORY (INHALATION) at 09:06

## 2023-04-14 RX ADMIN — OXYCODONE HYDROCHLORIDE 10 MILLIGRAM(S): 5 TABLET ORAL at 11:22

## 2023-04-14 RX ADMIN — HEPARIN SODIUM 1100 UNIT(S)/HR: 5000 INJECTION INTRAVENOUS; SUBCUTANEOUS at 19:15

## 2023-04-14 RX ADMIN — Medication 30 MILLIGRAM(S): at 17:44

## 2023-04-14 RX ADMIN — HEPARIN SODIUM 1100 UNIT(S)/HR: 5000 INJECTION INTRAVENOUS; SUBCUTANEOUS at 19:23

## 2023-04-14 RX ADMIN — LEVALBUTEROL 0.63 MILLIGRAM(S): 1.25 SOLUTION, CONCENTRATE RESPIRATORY (INHALATION) at 13:59

## 2023-04-14 RX ADMIN — OXYCODONE HYDROCHLORIDE 10 MILLIGRAM(S): 5 TABLET ORAL at 12:00

## 2023-04-14 RX ADMIN — HEPARIN SODIUM 1100 UNIT(S)/HR: 5000 INJECTION INTRAVENOUS; SUBCUTANEOUS at 10:36

## 2023-04-14 RX ADMIN — Medication 1: at 05:32

## 2023-04-14 RX ADMIN — Medication 100 MILLIGRAM(S): at 05:34

## 2023-04-14 RX ADMIN — Medication 2 UNIT(S): at 17:45

## 2023-04-14 RX ADMIN — HEPARIN SODIUM 2500 UNIT(S): 5000 INJECTION INTRAVENOUS; SUBCUTANEOUS at 10:38

## 2023-04-14 RX ADMIN — Medication 2: at 01:47

## 2023-04-14 RX ADMIN — Medication 1 MILLIGRAM(S): at 11:19

## 2023-04-14 RX ADMIN — Medication 200 MILLIGRAM(S): at 05:33

## 2023-04-14 RX ADMIN — OXYCODONE HYDROCHLORIDE 10 MILLIGRAM(S): 5 TABLET ORAL at 03:03

## 2023-04-14 RX ADMIN — HEPARIN SODIUM 1000 UNIT(S)/HR: 5000 INJECTION INTRAVENOUS; SUBCUTANEOUS at 05:34

## 2023-04-14 NOTE — PROGRESS NOTE ADULT - PROBLEM SELECTOR PLAN 1
.  - with continued episodes of RVR  - TTE with preserved EF and mod-severe AS  - will work up AS as OP after resolution of infectious process   - Afib likely driven by multifocal pneumonia and metabolic derangement  - EP following, will continue amiodarone and diltiazem through weekend and plan for DCCV Monday if patient does not convert on her own  - continue heparin  - appreciate EP reccs    No further inpatient cardiac work up at this time.  Will sign off.  Please reconsult if needed.

## 2023-04-14 NOTE — CONSULT NOTE ADULT - SUBJECTIVE AND OBJECTIVE BOX
[facility]                                                INFECTIOUS DISEASES  =======================================================                               Bertrand Leyva MD#    Cristino Higgins MD*                           Kenia Avila MD*   Alisha Rodrigez MD*            Diplomates American Board of Internal Medicine & Infectious Diseases                  # [place] Office - Appt - Tel  [phone] Fax [phone]                * [place] Office - Appt - Tel [phone] Fax [phone]                                  Hospital Consult line:  [phone]  =======================================================      MRN-[id number]  [facility]   HPI:    69 y/o F pt with significant PMHx of Lupus, Lumbar Stenosis, asthma, prior smoker presents to ER for progressive shortness of breath, cough and congestion x1 week. States progressive shortness of breath for weeks,  Found to hypoxic 87%RA. Pt states she was having runny nose,post nasal drip. seen by PCP given nasal saline, Progressive sob last 3 days,using nebs/inh more w/o much improvement. Ed found to so2 87% RA,CTA chest multifocal PNA. Negative viral panel.Pt denies chest pain,fever,chill,n/v/d/dysuria,headache,joints pain.          PAST MEDICAL HISTORY:  Asthma /copd    Autoimmune disease     Breast lump Benign  HTN  Hashimoto's disease     High cholesterol     Lupus     Myocardial infarct     Osteoporosis.     PAST SURGICAL HISTORY:   delivery delivered x3    H/O lumpectomy.     FAMILY HISTORY:  Family history of cancer    Social History:    smoker -4-5/day ,pt states she smokes depend on her stress level,some days more. Denies any alcohol/illicit drug uses.    < from: CT Angio Chest PE Protocol w/ IV Cont (23 @ 09:50) >  IMPRESSION:    1.  No pulmonary thromboembolism    2.  Findings suggestive of multifocal infectious process in both lungs    < end of copied text >  < from: TTE Echo Complete w/Doppler (19 @ 14:06) >  Summary:   1. Left ventricular ejection fraction, by visual estimation, is 55 to   60%.   2. Normal global left ventricular systolic function.   3. Normal left ventricular internal cavity size.   4. Normal right ventricular size and function.   5. The right atrium is normal in size.   6. Mild mitral annular calcification.   7. Mild thickening and calcification of the anterior and posterior   mitral valve leaflets.   8. Borderlineprolapse of the anterior and posterior mitral valve   leaflets.   9. Mild to moderate mitral valve regurgitation.  10. Aortic valve is functionally bicuspid. Raphe noted between left and   non-coronary cusps.  11. Peak transaortic gradient equals 17.0 mmHg, mean transaortic gradient     < end of copied text >     (2023 13:25)          I have personally reviewed the labs and data; pertinent labs and data are listed in this note; please see below.   =======================================================  Past Medical & Surgical Hx:  =====================  PAST MEDICAL & SURGICAL HISTORY:  Myocardial infarct      Asthma      High cholesterol      Osteoporosis      Autoimmune disease      Lupus      Breast lump  Benign      Hashimoto's disease       delivery delivered  x3      H/O lumpectomy        Problem List:  ==========  HEALTH ISSUES - PROBLEM Dx:  Atrial fibrillation with RVR          Social Hx:  =======  no toxic habits currently    FAMILY HISTORY:  Family history of cancer (Aunt)  Father - Lung Cancer  Mother - Leukemia  Aunts - Breast Cancer    no significant family history of immunosuppressive disorders in mother or father   =======================================================    REVIEW OF SYSTEMS:  CONSTITUTIONAL:  No Fever or chills  HEENT:  No diplopia or blurred vision.  No earache, sore throat or runny nose.  CARDIOVASCULAR:  No pressure, squeezing, strangling, tightness, heaviness or aching about the chest, neck, axilla or epigastrium.  RESPIRATORY:  + cough, shortness of breath  GASTROINTESTINAL:  No nausea, vomiting or diarrhea.  GENITOURINARY:  No dysuria, frequency or urgency. No Blood in urine  MUSCULOSKELETAL:  no joint aches, no muscle pain  SKIN:  No change in skin, hair or nails.  NEUROLOGIC:  No Headaches, seizures or weakness.  PSYCHIATRIC:  No disorder of thought or mood.  ENDOCRINE:  No heat or cold intolerance  HEMATOLOGICAL:  No easy bruising or bleeding.    =======================================================  Allergies    aspirin (Other)  Naprosyn (Other (Severe))  Sulfur (Rash)    Intolerances    Antibiotics:  doxycycline IVPB 100 milliGRAM(s) IV Intermittent every 12 hours  hydroxychloroquine 200 milliGRAM(s) Oral two times a day  piperacillin/tazobactam IVPB.. 3.375 Gram(s) IV Intermittent every 8 hours    Other medications:  aMIOdarone Infusion 0.999 mG/Min IV Continuous <Continuous>  aMIOdarone Infusion 0.5 mG/Min IV Continuous <Continuous>  budesonide 160 MICROgram(s)/formoterol 4.5 MICROgram(s) Inhaler 2 Puff(s) Inhalation two times a day  dextrose 5%. 1000 milliLiter(s) IV Continuous <Continuous>  dextrose 5%. 1000 milliLiter(s) IV Continuous <Continuous>  dextrose 50% Injectable 25 Gram(s) IV Push once  dextrose 50% Injectable 12.5 Gram(s) IV Push once  dextrose 50% Injectable 25 Gram(s) IV Push once  diltiazem    Tablet 30 milliGRAM(s) Oral every 6 hours  folic acid 1 milliGRAM(s) Oral daily  glucagon  Injectable 1 milliGRAM(s) IntraMuscular once  heparin  Infusion. 900 Unit(s)/Hr IV Continuous <Continuous>  insulin lispro (ADMELOG) corrective regimen sliding scale   SubCutaneous every 4 hours  insulin lispro Injectable (ADMELOG) 2 Unit(s) SubCutaneous three times a day before meals  lactated ringers. 1000 milliLiter(s) IV Continuous <Continuous>  levalbuterol Inhalation 0.63 milliGRAM(s) Inhalation every 6 hours  methylPREDNISolone sodium succinate Injectable 40 milliGRAM(s) IV Push every 8 hours  montelukast 10 milliGRAM(s) Oral daily  pantoprazole    Tablet 40 milliGRAM(s) Oral before breakfast  potassium phosphate / sodium phosphate Tablet (K-PHOS No. 2) 1 Tablet(s) Oral four times a day with meals     azithromycin   Tablet   500 milliGRAM(s) Oral (23 @ 13:20)    cefTRIAXone Injectable.   1000 milliGRAM(s) IV Push (23 @ 13:20)    doxycycline IVPB   100 mL/Hr IV Intermittent (23 @ 05:34)    hydroxychloroquine   200 milliGRAM(s) Oral (23 @ 17:50)   200 milliGRAM(s) Oral (23 @ 05:33)    piperacillin/tazobactam IVPB..   25 mL/Hr IV Intermittent (23 @ 15:08)   25 mL/Hr IV Intermittent (23 @ 21:35)   25 mL/Hr IV Intermittent (23 @ 05:31)      ======================================================  Physical Exam:  ============  T(F): 98.1 (2023 08:07), Max: 98.2 (2023 21:00)  HR: 110 (2023 12:00)  BP: 119/95 (2023 12:00)  RR: 20 (2023 12:00)  SpO2: 92% (2023 12:00) (89% - 97%)  temp max in last 48H T(F): , Max: 98.3 (23 @ 06:20)    General:  No acute distress.  Eye:  Normal conjunctiva.  Neck: Supple, No lymphadenopathy.  Respiratory: rhonchi to auscultation, Respirations are non-labored.  Cardiovascular: tachycardic, irregular rhythm, s1 + s2  Gastrointestinal: Soft, Non-tender, Non-distended, Normal bowel sounds.  Genitourinary: No costovertebral angle tenderness.  Lymphatics: No lymphadenopathy neck,   Musculoskeletal: Normal range of motion, Normal strength.  Integumentary: No rash.  Neurologic: Alert, Oriented, No focal deficits  Psychiatric: Appropriate mood & affect.    =======================================================  Labs:                        12.0   15.83 )-----------( 272      ( 2023 03:48 )             36.8     04-14    133<L>  |  100  |  28.7<H>  ----------------------------<  206<H>  3.7   |  22.0  |  0.85    Ca    8.6      2023 03:48  Phos  2.2     04-14  Mg     2.0     04-14    TPro  7.0  /  Alb  3.1<L>  /  TBili  0.4  /  DBili  x   /  AST  34<H>  /  ALT  53<H>  /  AlkPhos  194<H>         SARS-CoV-2: NotDetec (23 @ 07:31)     < from: CT Angio Chest PE Protocol w/ IV Cont (23 @ 09:50) >  FINDINGS:    CTA: The contrast bolus is satisfactory. The study is degraded by   mild-moderate respiratory motion. There are no filling defects in the   pulmonary artery or its branches. The heart is normal in size. No   pericardial effusion. There are coronary artery calcifications. There is   mitral annular calcification. There is mild aortic valve calcification.   The aorta is normal in caliber    Lungs/Airways/Pleura: There are scattered centrilobular and branching   linear densities and ill-defined groundglass densities in both lungs.   There is branching tubular mucoid impaction in the posterior right lower   lobe. There is multifocal bronchial wall thickening and areas of mucoid   impaction. No pleural effusion.    Mediastinum/Lymph nodes: There are a few stable borderline enlarged   mediastinal and hilar lymph nodes.    Upper Abdomen: Coarse calcification in the liver.    Bones and Soft Tissues: There is diffuse qualitative osteopenia and mild   multilevel discogenic disease in the spine.    IMPRESSION:    1.  No pulmonary thromboembolism    2.  Findings suggestive of multifocal infectious process in both lungs    --- End of Report ---    < end of copied text >    
Community Cardiologist: Chitra Dixon MD  Primary: Alexia Novak    Pleasant but anxious 72 year old female patient with a known history of Lupus, Lumbar Stenosis, asthma, current smoker, and emphysema who presented to [facility] with progressive shortness of breath, cough and congestion x1 week. States progressive shortness of breath for weeks. Found to hypoxic 87%RA. Had CT scan which was consistent with PNA. EP was consulted for rapid atrial fibrillation which started suddenly while the patient was on 4 TWR. She reports she has never had any these symptoms before. To her knowledge she was never diagnosed with AFib prior to today. On telemetry she presented with sinus tachycardia which degenerated to rapid atrial fibrillation with rates in the 160s at 16:07. She reports that at time of the AFib she was experiencing burning in her arm at the IV site. She otherwise denies any associated chest pain, syncope or presyncope.     PAST MEDICAL & SURGICAL HISTORY:  Myocardial infarct  Asthma  High cholesterol  Osteoporosis  Autoimmune disease  Lupus  Breast lump  Benign  Hashimoto's disease   delivery delivered  x3  H/O lumpectomy    REVIEW OF SYSTEMS  General: fever or chills,   Skin/Breast: - rashes  Ophthalmologic: - blurred vision  ENMT: - sore throat  Respiratory and Thorax: + cough, + dyspnea	  Cardiovascular: - chest pain + palpitations  Gastrointestinal: - N/V/D/C  Genitourinary: - dysuria  Musculoskeletal:	 - arthritis  Neurological: - weaknesses  Psychiatric: + anxiety  Hematology/Lymphatics: - bleeding disorders	  Endocrine: - heat or cold intolerance    MEDICATIONS  (STANDING):  aMIOdarone Infusion 1 mG/Min (33.3 mL/Hr) IV Continuous <Continuous>  azithromycin  IVPB 500 milliGRAM(s) IV Intermittent once  azithromycin  IVPB      budesonide 160 MICROgram(s)/formoterol 4.5 MICROgram(s) Inhaler 2 Puff(s) Inhalation two times a day  dextrose 5%. 1000 milliLiter(s) (50 mL/Hr) IV Continuous <Continuous>  dextrose 5%. 1000 milliLiter(s) (100 mL/Hr) IV Continuous <Continuous>  dextrose 50% Injectable 25 Gram(s) IV Push once  dextrose 50% Injectable 12.5 Gram(s) IV Push once  dextrose 50% Injectable 25 Gram(s) IV Push once  diltiazem    Tablet 30 milliGRAM(s) Oral every 6 hours  folic acid 1 milliGRAM(s) Oral daily  glucagon  Injectable 1 milliGRAM(s) IntraMuscular once  heparin  Infusion.  Unit(s)/Hr (11 mL/Hr) IV Continuous <Continuous>  hydroxychloroquine 200 milliGRAM(s) Oral two times a day  insulin lispro (ADMELOG) corrective regimen sliding scale   SubCutaneous three times a day before meals  levalbuterol Inhalation 0.63 milliGRAM(s) Inhalation every 6 hours  methylPREDNISolone sodium succinate Injectable 40 milliGRAM(s) IV Push every 8 hours  montelukast 10 milliGRAM(s) Oral daily  pantoprazole    Tablet 40 milliGRAM(s) Oral before breakfast  piperacillin/tazobactam IVPB.. 3.375 Gram(s) IV Intermittent every 8 hours    MEDICATIONS  (PRN):  acetaminophen     Tablet .. 650 milliGRAM(s) Oral every 6 hours PRN Temp greater or equal to 38C (100.4F), Mild Pain (1 - 3)  ALPRAZolam 0.25 milliGRAM(s) Oral every 8 hours PRN anxiety  aluminum hydroxide/magnesium hydroxide/simethicone Suspension 30 milliLiter(s) Oral every 4 hours PRN Dyspepsia  dextrose Oral Gel 15 Gram(s) Oral once PRN Blood Glucose LESS THAN 70 milliGRAM(s)/deciliter  diltiazem Injectable 5 milliGRAM(s) IV Push every 8 hours PRN TACHY > 170  heparin   Injectable 5000 Unit(s) IV Push every 6 hours PRN For aPTT less than 40  heparin   Injectable 2500 Unit(s) IV Push every 6 hours PRN For aPTT between 40 - 57  hydrALAZINE 10 milliGRAM(s) Oral every 8 hours PRN Systolic blood pressure >135  melatonin 3 milliGRAM(s) Oral at bedtime PRN Insomnia  ondansetron Injectable 4 milliGRAM(s) IV Push every 8 hours PRN Nausea and/or Vomiting  oxyCODONE    IR 10 milliGRAM(s) Oral every 8 hours PRN Severe Pain (7 - 10)    Allergies  aspirin (Other)  Naprosyn (Other (Severe))  Sulfur (Rash)    SOCIAL HISTORY: current occasional cigarette smoker > 30 yeas. 1 pack last 3 days. Non drinker.     FAMILY HISTORY:  Family history of cancer (Aunt)  Father - Lung Cancer  Mother - Leukemia  Aunts - Breast Cancer    Vital Signs Last 24 Hrs  T(C): 36.6 (2023 16:28), Max: 36.8 (2023 06:20)  T(F): 97.9 (2023 16:28), Max: 98.3 (2023 06:20)  HR: 146 (2023 18:36) (87 - 186)  BP: 101/70 (2023 18:36) (101/70 - 126/75)  RR: 18 (2023 16:28) (18 - 22)  SpO2: 95% (2023 16:28) (91% - 96%)    Physical Exam:  Constitutional: AAOx3, NAD, disheveled   Neck: supple, No JVD  Cardiovascular: +S1S2 IRIR; rapid aFib at time of exam  Pulmonary: Coarse breath sounds with global rhonchi  Abdomen: +BS, soft NTND  Extremities: no edema b/l,   Neuro: non focal, speech clear, WHITE x 4  Psych: very anxious     LABS:                        13.4   18.97 )-----------( 290      ( 2023 14:45 )             40.3     132<L>  |  92<L>  |  27.0<H>  ----------------------------<  451<HH>  3.7   |  21.0<L>  |  1.14  Ca    9.0      2023 16:57  Phos  2.7     04-13  Mg     1.6     04-13  TPro  x   /  Alb  3.4  /  TBili  x   /  DBili  x   /  AST  x   /  ALT  x   /  AlkPhos  x   04-13  LIVER FUNCTIONS - ( 2023 14:45 )  Alb: 3.4 g/dL / Pro: x     / ALK PHOS: x     / ALT: x     / AST: x     / GGT: x         CARDIAC MARKERS ( 2023 16:57 )  x     / <0.01 ng/mL / x     / x     / x      CARDIAC MARKERS ( 2023 07:31 )  x     / <0.01 ng/mL / x     / x     / x        RADIOLOGY & ADDITIONAL STUDIES:  CT Angio 23:   Lungs/Airways/Pleura: There are scattered centrilobular and branching   linear densities and ill-defined ground glass densities in both lungs.   There is branching tubular mucoid impaction in the posterior right lower   lobe. There is multifocal bronchial wall thickening and areas of mucoid   impaction. No pleural effusion.  Mediastinum/Lymph nodes: There are a few stable borderline enlarged   mediastinal and hilar lymph nodes.  Upper Abdomen: Coarse calcification in the liver.  Bones and Soft Tissues: There is diffuse qualitative osteopenia and mild   multilevel discogenic disease in the spine.  IMPRESSION:  1.  No pulmonary thromboembolism  2.  Findings suggestive of multifocal infectious process in both lungs    CXR 23  IMPRESSION: No acute cardiopulmonary disease process.    EK22  SR at 59bpm; qRSD 82ms; IA 136ms    Telemetry ST with onset of rapid AFib at 16:07    A/P  72 year old female patient with a known history of Lupus, Lumbar Stenosis, asthma, current smoker, and emphysema who is admitted with multifocal PNA, hyperglycemia with anion gap, lactic acidosis, metabolic derangement and new onset atrial fibrillation with rapid rates. EP consult for new AFib    Currently rates of AFib not well controlled - in the 150s   CHADSVASC: 2 (age, gender)  Rates of AFib driven by metabolic derangement and sepsis due to PNA.     - Start Amiodarone gtt at 1 mg/min x 8 hours then 0.5mg/hr for 16 hours  - Start anticoagulation with IV heparin and follow therapeutic PTTs  - Consider ICU consult - requirement for insulin gtt and Amiodarone gtt. May need central access if long term amiodarone gtt will be needed.   - Obtain TTE  - Correct electrolytes - may further decrease with increasing insulin requirement.  - Serial EKGs for QT monitoring  - If does not convert to SR with Amiodarone may require DCCV.   - Use beta blockers for rate control till TTE is resulted.   - Full recommendations below.   
                                             [facility]                                              CARDIOLOGY AT [address], [place] [address]                                             Telephone: [phone]. Fax:[phone]                                                       CARDIOLOGY CONSULTATION NOTE                                                                                             History obtained by: Patient and medical record  Community Cardiologist: NONE   obtained: Yes [  ] No [ x ]  Reason for Consultation: afib RVR  Available out pt records reviewed: Yes [x  ] No [  ]    HPI:  Patient is a 72y old  Female PMHx of Lupus, Lumbar Stenosis, asthma, active smoker presents with SOB, cough, chest tightness similar to her prior COPD flairs. Sent up to the floor and was an RRT for new afib RVR s/p amio 150 IVP, IV lopressor and IV cardizem.  Still persistent tachy. Labile SBP. No prior cardiac hx. Denies ACS, palpitations, dizziness.        CARDIAC TESTING   ECHO:  < from: TTE Echo Complete w/Doppler (19 @ 14:06) >  PHYSICIAN INTERPRETATION:  Left Ventricle: The left ventricular internal cavity size is normal. Left   ventricular wall thickness is normal.  Global LV systolic function was normal. Left ventricular ejection   fraction, by visual estimation, is 55 to 60%. Spectral Doppler shows   normal pattern of LV diastolic filling.  Right Ventricle: Normal right ventricular size and function.  Left Atrium: Mildly enlarged left atrium.  Right Atrium: The right atrium is normal in size.  Pericardium: There is no evidence of pericardial effusion.  Mitral Valve: Structurally normal mitral valve, with normal leaflet   excursion. Mild thickening and calcification of the anterior and   posterior mitral valve leaflets. Mitral leaflet mobility is normal. There   is mild mitral annular calcification. Mild to moderate mitral valve   regurgitation is seen. The MR jet is centrally-directed. Borderline   prolapse of the anterior and posterior mitral valve leaflets.  Tricuspid Valve: Thetricuspid valve is normal in structure. Mild   tricuspid regurgitation is visualized. Estimated pulmonary artery   systolic pressure is 35.7 mmHg assuming a right atrial pressure of 8   mmHg, which is consistent with borderline pulmonary hypertension.  Aortic Valve: Peak transaortic gradient equals 17.0 mmHg, mean   transaortic gradient equals 10.0 mmHg, the calculated aortic valve area   equals 1.64 cm² by the continuity equation consistent with mild aortic   stenosis. Mild aortic valve regurgitation is seen. Aortic valve is   functionally bicuspid. Raphe noted between left and non-coronary cusps.  Pulmonic Valve: Trace pulmonic valve regurgitation.  Aorta: The aortic root is normal in size and structure.  Venous: The inferior vena cava was normal sized, with respiratory size   variation less than 50%.    < end of copied text >    STRESS:    CATH:     ELECTROPHYSIOLOGY:     PAST MEDICAL HISTORY  Myocardial infarct    Asthma    High cholesterol    Osteoporosis    Autoimmune disease    Lupus    Breast lump    Hashimoto's disease        PAST SURGICAL HISTORY   delivery delivered    H/O lumpectomy        SOCIAL HISTORY:  Denies smoking/alcohol/drugs  CIGARETTES:   ACTIVE smoker  ALCOHOL:  DRUGS:    FAMILY HISTORY:  Family history of cancer (Aunt)  Father - Lung Cancer  Mother - Leukemia  Aunts - Breast Cancer      Family History of Cardiovascular Disease:  Yes [  ] No [x  ]  Coronary Artery Disease in first degree relative: Yes [  ] No [ x ]  Sudden Cardiac Death in First degree relative: Yes [  ] No [x  ]    HOME MEDICATIONS:  albuterol 2.5 mg/3 mL (0.083%) inhalation solution: 3 by nebulizer 4 times a day as needed for  shortness of breath and/or wheezing (2023 13:59)  folic acid 1 mg oral tablet: 1 tab(s) orally once a day (16 Mar 2020 09:19)  hydroxychloroquine 200 mg oral tablet: 1 tab(s) orally once a day (17 Mar 2020 08:19)  oxyCODONE 10 mg oral tablet: 1 tab(s) orally every 8 hours, As Needed (17 Mar 2020 08:19)  Xanax 0.25 mg oral tablet: 1 orally 3 times a day as needed for  anxiety (2023 13:58)      CURRENT CARDIAC MEDICATIONS:  diltiazem    Tablet 30 milliGRAM(s) Oral every 6 hours  diltiazem Injectable 5 milliGRAM(s) IV Push every 8 hours PRN TACHY > 170  hydrALAZINE 10 milliGRAM(s) Oral every 8 hours PRN Systolic blood pressure >135      CURRENT OTHER MEDICATIONS:  budesonide 160 MICROgram(s)/formoterol 4.5 MICROgram(s) Inhaler 2 Puff(s) Inhalation two times a day  levalbuterol Inhalation 0.63 milliGRAM(s) Inhalation every 6 hours, Stop order after: 3 Days  montelukast 10 milliGRAM(s) Oral daily  acetaminophen     Tablet .. 650 milliGRAM(s) Oral every 6 hours PRN Temp greater or equal to 38C (100.4F), Mild Pain (1 - 3)  ALPRAZolam 0.25 milliGRAM(s) Oral every 8 hours PRN anxiety  melatonin 3 milliGRAM(s) Oral at bedtime PRN Insomnia  ondansetron Injectable 4 milliGRAM(s) IV Push every 8 hours PRN Nausea and/or Vomiting  oxyCODONE    IR 10 milliGRAM(s) Oral every 8 hours PRN Severe Pain (7 - 10)  aluminum hydroxide/magnesium hydroxide/simethicone Suspension 30 milliLiter(s) Oral every 4 hours PRN Dyspepsia  pantoprazole    Tablet 40 milliGRAM(s) Oral before breakfast  azithromycin  IVPB 500 milliGRAM(s) IV Intermittent once  azithromycin  IVPB      dextrose 5%. 1000 milliLiter(s) (50 mL/Hr) IV Continuous <Continuous>  dextrose 5%. 1000 milliLiter(s) (100 mL/Hr) IV Continuous <Continuous>  dextrose 50% Injectable 25 Gram(s) IV Push once, Stop order after: 1 Doses  dextrose 50% Injectable 12.5 Gram(s) IV Push once, Stop order after: 1 Doses  dextrose 50% Injectable 25 Gram(s) IV Push once, Stop order after: 1 Doses  dextrose Oral Gel 15 Gram(s) Oral once, Stop order after: 1 Doses PRN Blood Glucose LESS THAN 70 milliGRAM(s)/deciliter  folic acid 1 milliGRAM(s) Oral daily  glucagon  Injectable 1 milliGRAM(s) IntraMuscular once, Stop order after: 1 Doses  heparin   Injectable 5000 Unit(s) IV Push once, Stop order after: 1 Doses  heparin   Injectable 5000 Unit(s) IV Push every 6 hours PRN For aPTT less than 40  heparin   Injectable 2500 Unit(s) IV Push every 6 hours PRN For aPTT between 40 - 57  heparin  Infusion.  Unit(s)/Hr (11 mL/Hr) IV Continuous <Continuous>  hydroxychloroquine 200 milliGRAM(s) Oral two times a day  insulin lispro (ADMELOG) corrective regimen sliding scale   SubCutaneous three times a day before meals  methylPREDNISolone sodium succinate Injectable 40 milliGRAM(s) IV Push every 8 hours  piperacillin/tazobactam IVPB.. 3.375 Gram(s) IV Intermittent every 8 hours, Stop order after: 7 Days      ALLERGIES:   aspirin (Other)  Naprosyn (Other (Severe))  Sulfur (Rash)      REVIEW OF SYMPTOMS:   CONSTITUTIONAL: No fever, no chills, no weight loss, no weight gain, no fatigue   ENMT:  No vertigo; No sinus or throat pain  NECK: No pain or stiffness  CARDIOVASCULAR: No chest pain, no dyspnea, no syncope/presyncope, no palpitations, no dizziness, no Orthopnea, no Paroxsymal nocturnal dyspnea  RESPIRATORY: no Shortness of breath, no cough, no wheezing  : No dysuria, no hematuria   GI: No dark color stool, no nausea, no diarrhea, no constipation, no abdominal pain   NEURO: No headache, no slurred speech   MUSCULOSKELETAL: No joint pain or swelling; No muscle, back, or extremity pain  PSYCH: No agitation, no anxiety.    ALL OTHER REVIEW OF SYSTEMS ARE NEGATIVE.    VITAL SIGNS:  T(C): 36.6 (23 @ 16:28), Max: 36.8 (23 @ 06:20)  T(F): 97.9 (23 @ 16:28), Max: 98.3 (23 @ 06:20)  HR: 136 (23 @ 17:58) (87 - 186)  BP: 116/62 (23 @ 17:58) (109/78 - 126/75)  RR: 18 (23 @ 16:28) (18 - 22)  SpO2: 95% (23 @ 16:28) (91% - 96%)    INTAKE AND OUTPUT:       PHYSICAL EXAM:  Constitutional: Comfortable . No acute distress.   HEENT: Atraumatic and normocephalic , neck is supple . no JVD. No carotid bruit.  CNS: A&Ox3. No focal deficits.   Respiratory: CTAB, unlabored   Cardiovascular: irregular normal s1 s2. No murmur. No rubs or gallop.  Gastrointestinal: Soft, non-tender. +Bowel sounds.   Extremities: 2+ Peripheral Pulses, No clubbing, cyanosis, or edema  Psychiatric: Calm . no agitation.   Skin: Warm and dry, no ulcers on extremities     LABS:  ( 2023 16:57 )  Troponin T  <0.01,  CPK  X    , CKMB  X    , BNP X        , ( 2023 07:31 )  Troponin T  <0.01,  CPK  X    , CKMB  X    , BNP X                                  13.4   18.97 )-----------( 290      ( 2023 14:45 )             40.3     -    132<L>  |  92<L>  |  27.0<H>  ----------------------------<  451<HH>  3.7   |  21.0<L>  |  1.14    Ca    9.0      2023 16:57  Phos  2.7       Mg     1.6         TPro  x   /  Alb  3.4  /  TBili  x   /  DBili  x   /  AST  x   /  ALT  x   /  AlkPhos  x               Thyroid Stimulating Hormone, Serum: 0.56 uIU/mL (23 @ 14:45)      INTERPRETATION OF TELEMETRY: Afib RVR    ECG: Afib RVR  Prior ECG: Yes [ x ] No [  ]    RADIOLOGY & ADDITIONAL STUDIES:      CT scan:   < from: CT Angio Chest PE Protocol w/ IV Cont (23 @ 09:50) >  FINDINGS:    CTA: The contrast bolus is satisfactory. The study is degraded by   mild-moderate respiratory motion. There are no filling defects in the   pulmonary artery or its branches. The heart is normal in size. No   pericardial effusion. There are coronary artery calcifications. There is   mitral annular calcification. There is mild aortic valve calcification.   The aorta is normal in caliber    Lungs/Airways/Pleura: There are scattered centrilobular and branching   linear densities and ill-defined groundglass densities in both lungs.   There is branching tubular mucoid impaction in the posterior right lower   lobe. There is multifocal bronchial wall thickening and areas of mucoid   impaction. No pleural effusion.    Mediastinum/Lymph nodes: There are a few stable borderline enlarged   mediastinal and hilar lymph nodes.    Upper Abdomen: Coarse calcification in the liver.    Bones and Soft Tissues: There is diffuse qualitative osteopenia and mild   multilevel discogenic disease in the spine.    < end of copied text >

## 2023-04-14 NOTE — PROGRESS NOTE ADULT - SUBJECTIVE AND OBJECTIVE BOX
[facility]                                                         CARDIOLOGY AT [facility]                                                                  [address], [place] [address]                                                         Telephone: [phone]. Fax:[phone]                                                                             PROGRESS NOTE    Reason for follow up: Afib with RVR  Update: still with Afib with occasional RVR       Review of symptoms:   Cardiac:  No chest pain. No dyspnea. No palpitations.  Respiratory: + cough. + No dyspnea  Gastrointestinal: No diarrhea. No abdominal pain. No bleeding.   Neuro: No focal neuro complaints.    Vitals:  T(C): 36.7 (04-14-23 @ 08:07), Max: 36.8 (04-13-23 @ 21:00)  HR: 95 (04-14-23 @ 09:12) (95 - 186)  BP: 100/73 (04-14-23 @ 06:00) (93/55 - 116/62)  RR: 22 (04-14-23 @ 06:00) (18 - 22)  SpO2: 95% (04-14-23 @ 09:12) (89% - 97%)  Wt(kg): --  I&O's Summary    13 Apr 2023 07:01  -  14 Apr 2023 07:00  --------------------------------------------------------  IN: 3817.6 mL / OUT: 750 mL / NET: 3067.6 mL      Weight (kg): 62.6 (04-13 @ 06:20)    PHYSICAL EXAM:  Appearance: Comfortable. No acute distress  HEENT:  Atraumatic. Normocephalic.  Normal oral mucosa  Neurologic: A & O x 3, no gross focal deficits.  Cardiovascular: irreg irregular S1 S2, No murmur, no rubs/gallops. No JVD  Respiratory: rhonchi  Gastrointestinal:  Soft, Non-tender, + BS  Lower Extremities: 2+ Peripheral Pulses, No clubbing, cyanosis, or edema  Psychiatry: Patient is calm. No agitation.   Skin: warm and dry.    CURRENT CARDIAC MEDICATIONS:  aMIOdarone Infusion 0.999 mG/Min IV Continuous <Continuous>  aMIOdarone Infusion 0.5 mG/Min IV Continuous <Continuous>  diltiazem    Tablet 30 milliGRAM(s) Oral every 6 hours  diltiazem Injectable 5 milliGRAM(s) IV Push every 8 hours PRN  hydrALAZINE 10 milliGRAM(s) Oral every 8 hours PRN      CURRENT OTHER MEDICATIONS:  budesonide 160 MICROgram(s)/formoterol 4.5 MICROgram(s) Inhaler 2 Puff(s) Inhalation two times a day  levalbuterol Inhalation 0.63 milliGRAM(s) Inhalation every 6 hours  montelukast 10 milliGRAM(s) Oral daily  doxycycline IVPB 100 milliGRAM(s) IV Intermittent every 12 hours  hydroxychloroquine 200 milliGRAM(s) Oral two times a day  piperacillin/tazobactam IVPB.. 3.375 Gram(s) IV Intermittent every 8 hours  acetaminophen     Tablet .. 650 milliGRAM(s) Oral every 6 hours PRN Temp greater or equal to 38C (100.4F), Mild Pain (1 - 3)  ALPRAZolam 0.25 milliGRAM(s) Oral every 8 hours PRN anxiety  melatonin 3 milliGRAM(s) Oral at bedtime PRN Insomnia  ondansetron Injectable 4 milliGRAM(s) IV Push every 8 hours PRN Nausea and/or Vomiting  oxyCODONE    IR 10 milliGRAM(s) Oral every 8 hours PRN Severe Pain (7 - 10)  aluminum hydroxide/magnesium hydroxide/simethicone Suspension 30 milliLiter(s) Oral every 4 hours PRN Dyspepsia  pantoprazole    Tablet 40 milliGRAM(s) Oral before breakfast  dextrose 50% Injectable 25 Gram(s) IV Push once, Stop order after: 1 Doses  dextrose 50% Injectable 12.5 Gram(s) IV Push once, Stop order after: 1 Doses  dextrose 50% Injectable 25 Gram(s) IV Push once, Stop order after: 1 Doses  dextrose Oral Gel 15 Gram(s) Oral once, Stop order after: 1 Doses PRN Blood Glucose LESS THAN 70 milliGRAM(s)/deciliter  glucagon  Injectable 1 milliGRAM(s) IntraMuscular once, Stop order after: 1 Doses  insulin lispro (ADMELOG) corrective regimen sliding scale   SubCutaneous every 4 hours  insulin lispro Injectable (ADMELOG) 2 Unit(s) SubCutaneous three times a day before meals  methylPREDNISolone sodium succinate Injectable 40 milliGRAM(s) IV Push every 8 hours  benzocaine/menthol Lozenge 1 Lozenge Oral every 2 hours PRN Sore Throat  dextrose 5%. 1000 milliLiter(s) (50 mL/Hr) IV Continuous <Continuous>  dextrose 5%. 1000 milliLiter(s) (100 mL/Hr) IV Continuous <Continuous>  folic acid 1 milliGRAM(s) Oral daily  heparin   Injectable 5000 Unit(s) IV Push every 6 hours PRN For aPTT less than 40  heparin   Injectable 2500 Unit(s) IV Push every 6 hours PRN For aPTT between 40 - 57  heparin  Infusion. 900 Unit(s)/Hr (9 mL/Hr) IV Continuous <Continuous>  lactated ringers. 1000 milliLiter(s) (125 mL/Hr) IV Continuous <Continuous>  potassium phosphate / sodium phosphate Tablet (K-PHOS No. 2) 1 Tablet(s) Oral four times a day with meals      LABS:	 	  ( 13 Apr 2023 16:57 )  Troponin T  <0.01,  CPK  X    , CKMB  X    , BNP X        , ( 13 Apr 2023 07:31 )  Troponin T  <0.01,  CPK  X    , CKMB  X    , BNP X                                  12.0   15.83 )-----------( 272      ( 14 Apr 2023 03:48 )             36.8     04-14    133<L>  |  100  |  28.7<H>  ----------------------------<  206<H>  3.7   |  22.0  |  0.85    Ca    8.6      14 Apr 2023 03:48  Phos  2.2     04-14  Mg     2.0     04-14    TPro  7.0  /  Alb  3.1<L>  /  TBili  0.4  /  DBili  x   /  AST  34<H>  /  ALT  53<H>  /  AlkPhos  194<H>  04-13    PT/INR/PTT ( 14 Apr 2023 10:08 )                       :                       :      X            :       55.9                  .        .                   .              .           .       X           .                                       Lipid Profile: Date: 04-14 @ 03:48  Total cholesterol 125; Direct LDL: --; HDL: 50; Triglycerides:50    HgA1c:   TSH: Thyroid Stimulating Hormone, Serum: 0.56 uIU/mL      TELEMETRY: Afib with occasional RVR  ECG:    DIAGNOSTIC TESTING:  [ ] Echocardiogram: < from: TTE Echo Complete w/ Contrast w/ Doppler (04.13.23 @ 19:16) >    PHYSICIAN INTERPRETATION:  Left Ventricle: The left ventricular internal cavity size is normal. Left   ventricularwall thickness is normal.  Global LV systolic function was normal. Left ventricular ejection   fraction, by visual estimation, is 55 to 60%. The mitral in-flow pattern   reveals no discernable A-wave, therefore no comment on diastolic function   can be made.  Right Ventricle: Normal right ventricular size and function.  Left Atrium: Moderately enlarged left atrium.  Right Atrium: Mildly enlarged right atrium.  Pericardium: There is no evidence of pericardial effusion.  Mitral Valve: The mitral valve is degenerative in appearance. Moderate   thickening of the anterior and posterior mitral valve leaflets. There is   moderate mitral annular calcification. Mild mitral valve regurgitation is   seen.  Tricuspid Valve: The tricuspid valve is normal in structure. Mild   tricuspid regurgitation is visualized.  Aortic Valve: The aortic valve was not well visualized. Moderate to   severe aortic stenosis is present. Mild aortic valve regurgitation is   seen. The Dimesionless Index value is 0.27.  Pulmonic Valve: The pulmonic valve was not well visualized. Trace   pulmonic valve regurgitation.  Aorta: The aortic root is normal in size and structure.  Pulmonary Artery: The pulmonary artery is not well seen.  Venous: The inferior vena cava was normal sized, with respiratory size   variation greater than 50%.  In comparison to the previous echocardiogram(s): Prior examinations are   available and were reviewed for comparison purposes. Compared with TTE   dated 4/16/2019 the AS has worsened.      Summary:   1. Left ventricular ejection fraction, by visual estimation, is 55 to   60%.   2. Technically limited study.   3. Normal global left ventricular systolic function.   4. The mitral in-flow pattern reveals no discernable A-wave, therefore   no comment on diastolic function can be made.   5. There is mild concentric left ventricular hypertrophy.   6. Moderately enlarged left atrium.   7. Mildly enlarged right atrium.   8. Degenerative mitral valve.   9. Mild mitral valve regurgitation.  10. Moderate thickening of the anterior and posterior mitral valve   leaflets.  11. Mild tricuspid regurgitation.  12. Mild aortic regurgitation.  13. Moderate to severe aortic valve stenosis.  14. The Dimesionless Index value is 0.27.    Tye Zuleta MD Electronically signed on 4/14/2023 at 6:44:11 AM        < end of copied text >    [ ]  Catheterization:  [ ] Stress Test:    OTHER:

## 2023-04-14 NOTE — CONSULT NOTE ADULT - ASSESSMENT
69 y/o F pt with significant PMHx of Lupus, Lumbar Stenosis, asthma, prior smoker presents to ER for progressive shortness of breath, cough and congestion x1 week. States progressive shortness of breath for weeks,  Found to hypoxic 87%RA. Pt states she was having runny nose,post nasal drip. seen by PCP given nasal saline, Progressive sob last 3 days,using nebs/inh more w/o much improvement. Ed found to so2 87% RA,CTA chest multifocal PNA. Negative viral panel.    CAP  Leukocytosis  Rapid A fib  Lupus    - RVP (-)  - MRSA (-)  - f/u BCX   - f/u sputum cx  - f/u legionella  - f/u strep pnemo ag  - Procalcitonin level  - Continue zosyn and doxycycline  - Trend Fever  - Trend Leukocytosis      Will Follow     71 y/o F pt with significant PMHx of Lupus, Lumbar Stenosis, asthma, prior smoker presents to ER for progressive shortness of breath, cough and congestion x1 week. States progressive shortness of breath for weeks,  Found to hypoxic 87%RA. Pt states she was having runny nose,post nasal drip. seen by PCP given nasal saline, Progressive sob last 3 days,using nebs/inh more w/o much improvement. Ed found to so2 87% RA,CTA chest multifocal PNA. Negative viral panel. Had RRT on 4/13 for rapid afib    CAP  Leukocytosis  Rapid A fib  Lupus    - RVP (-)  - MRSA (-)  - f/u BCX   - f/u sputum cx  - f/u legionella  - f/u strep pnemo ag  - Procalcitonin level  - Continue zosyn and doxycycline  - Trend Fever  - Trend Leukocytosis      Will Follow
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
## 2023-04-14 NOTE — CHART NOTE - NSCHARTNOTEFT_GEN_A_CORE
RAPID RESPONSE FOLLOW UP NOTE:    Patient seen and examined at bedside. RR called @ 1629 for new onset rapid A-Fib. Pt reassessed around 29550. Patient states she feels better, is requesting dinner. Denies headaches, dizziness, chest pain, palpitations, abdominal pain, n/v/d or any other complaints. Throat pain has resolved. Denies any history of diabetes or elevated blood sugars.    Vital Signs Last 24 Hrs  T(C): 36.6 (13 Apr 2023 16:28), Max: 36.8 (13 Apr 2023 06:20)  T(F): 97.9 (13 Apr 2023 16:28), Max: 98.3 (13 Apr 2023 06:20)  HR: 146 (13 Apr 2023 18:36) (87 - 186)  BP: 101/70 (13 Apr 2023 18:36) (101/70 - 126/75)  RR: 18 (13 Apr 2023 16:28) (18 - 22)  SpO2: 95% on room air    PHYSICAL EXAM:  GENERAL: Pt sitting in armchair, in NAD  CHEST/LUNG: Nonlabored  HEART: S1, S2, irregular rhythm   ABDOMEN: Bowel sounds present; Soft, Nontender, Nondistended. No guarding or rigidity    NERVOUS SYSTEM:  Alert & Oriented X3, speech clear. Nonfocal, follows commands.     ASSESSMENT/ PLAN: Pt is a 72y old Female s/p Rapid Response for new onset rapid afib.   - Cardio following adjusting meds and work up ongoing. EP c/s placed.   - Labs reviewed, electrolytes repleted.   - A1c ordered, elevated glucose possibly 2/2 IV steroids. Trend sugars and correct w/ ISS.   - Lactate elevated, s/p IVF. Repeat ordered for 2200.   - RN to continue to monitor, will alert provider w/ any change in patient status.     DISPOSITION:  - Maintain current level of care
spoke with covering physical to eval pt-f/u lab. eval for ICU Needs/icu c/s  Bolus LR ,REPEAT Lactic acid,change abx to doxyclycline spoke with ,  spoke with PA/Covering physical to intiate sepsis protocol.start ABG order.stepdown monitor. eval for ICU REQUIRMENT.Repeat LA,Mg,BMP.VITAL Q2 HR
Called by RN for patient with sore throat/ dry cough, requesting cough drops  Chart reviewed, VSS and patient otherwise asymptomatic   Ordered cepacol 1 drop [id number] PRN sore throat  Will continue to monitor   RN to notify provider of any changes in patient status

## 2023-04-14 NOTE — PROGRESS NOTE ADULT - SUBJECTIVE AND OBJECTIVE BOX
Subjective: Reports improvement in breathing. Denies any palpitations.     TELE: atrial fibrillation with ventricular rates now in 100s-120s    MEDICATIONS  (STANDING):  aMIOdarone Infusion 0.999 mG/Min (33.3 mL/Hr) IV Continuous <Continuous>  aMIOdarone Infusion 0.5 mG/Min (16.7 mL/Hr) IV Continuous <Continuous>  budesonide 160 MICROgram(s)/formoterol 4.5 MICROgram(s) Inhaler 2 Puff(s) Inhalation two times a day  dextrose 5%. 1000 milliLiter(s) (50 mL/Hr) IV Continuous <Continuous>  dextrose 5%. 1000 milliLiter(s) (100 mL/Hr) IV Continuous <Continuous>  dextrose 50% Injectable 25 Gram(s) IV Push once  dextrose 50% Injectable 12.5 Gram(s) IV Push once  dextrose 50% Injectable 25 Gram(s) IV Push once  diltiazem    Tablet 30 milliGRAM(s) Oral every 6 hours  doxycycline IVPB 100 milliGRAM(s) IV Intermittent every 12 hours  folic acid 1 milliGRAM(s) Oral daily  glucagon  Injectable 1 milliGRAM(s) IntraMuscular once  heparin  Infusion. 900 Unit(s)/Hr (9 mL/Hr) IV Continuous <Continuous>  hydroxychloroquine 200 milliGRAM(s) Oral two times a day  insulin lispro (ADMELOG) corrective regimen sliding scale   SubCutaneous every 4 hours  insulin lispro Injectable (ADMELOG) 2 Unit(s) SubCutaneous three times a day before meals  lactated ringers. 1000 milliLiter(s) (125 mL/Hr) IV Continuous <Continuous>  levalbuterol Inhalation 0.63 milliGRAM(s) Inhalation every 6 hours  methylPREDNISolone sodium succinate Injectable 40 milliGRAM(s) IV Push every 8 hours  montelukast 10 milliGRAM(s) Oral daily  pantoprazole    Tablet 40 milliGRAM(s) Oral before breakfast  piperacillin/tazobactam IVPB.. 3.375 Gram(s) IV Intermittent every 8 hours  potassium phosphate / sodium phosphate Tablet (K-PHOS No. 2) 1 Tablet(s) Oral four times a day with meals    MEDICATIONS  (PRN):  acetaminophen     Tablet .. 650 milliGRAM(s) Oral every 6 hours PRN Temp greater or equal to 38C (100.4F), Mild Pain (1 - 3)  ALPRAZolam 0.25 milliGRAM(s) Oral every 8 hours PRN anxiety  aluminum hydroxide/magnesium hydroxide/simethicone Suspension 30 milliLiter(s) Oral every 4 hours PRN Dyspepsia  benzocaine/menthol Lozenge 1 Lozenge Oral every 2 hours PRN Sore Throat  dextrose Oral Gel 15 Gram(s) Oral once PRN Blood Glucose LESS THAN 70 milliGRAM(s)/deciliter  diltiazem Injectable 5 milliGRAM(s) IV Push every 8 hours PRN TACHY > 170  heparin   Injectable 5000 Unit(s) IV Push every 6 hours PRN For aPTT less than 40  heparin   Injectable 2500 Unit(s) IV Push every 6 hours PRN For aPTT between 40 - 57  hydrALAZINE 10 milliGRAM(s) Oral every 8 hours PRN Systolic blood pressure >135  melatonin 3 milliGRAM(s) Oral at bedtime PRN Insomnia  ondansetron Injectable 4 milliGRAM(s) IV Push every 8 hours PRN Nausea and/or Vomiting  oxyCODONE    IR 10 milliGRAM(s) Oral every 8 hours PRN Severe Pain (7 - 10)      Allergies    aspirin (Other)  Naprosyn (Other (Severe))  Sulfur (Rash)    Intolerances        Vital Signs Last 24 Hrs  T(C): 36.7 (2023 08:07), Max: 36.8 (2023 11:03)  T(F): 98.1 (2023 08:07), Max: 98.2 (2023 11:03)  HR: 95 (2023 09:12) (87 - 186)  BP: 100/73 (2023 06:00) (93/55 - 121/64)  BP(mean): 80 (2023 06:00) (63 - 81)  RR: 22 (2023 06:00) (18 - 22)  SpO2: 95% (2023 09:12) (89% - 97%)    Parameters below as of 2023 09:12  Patient On (Oxygen Delivery Method): room air        Physical Exam:  Constitutional: NAD, AAOx3  Cardiovascular: +S1S2, irregular rhythm  Pulmonary: CTA b/l, unlabored  GI: soft NTND +BS  Extremities: no pedal edema, +distal pulses b/l  Neuro: non focal, WHITE x4    LABS:                        12.0   15.83 )-----------( 272      ( 2023 03:48 )             36.8     04-14    133<L>  |  100  |  28.7<H>  ----------------------------<  206<H>  3.7   |  22.0  |  0.85    Ca    8.6      2023 03:48  Phos  2.2     04-14  Mg     2.0     -14    TPro  7.0  /  Alb  3.1<L>  /  TBili  0.4  /  DBili  x   /  AST  34<H>  /  ALT  53<H>  /  AlkPhos  194<H>  04-13    PT/INR - ( 2023 21:40 )   PT: 15.1 sec;   INR: 1.30 ratio         PTT - ( 2023 10:08 )  PTT:55.9 sec  Urinalysis Basic - ( 2023 03:00 )    Color: Yellow / Appearance: Clear / S.015 / pH: x  Gluc: x / Ketone: Negative  / Bili: Negative / Urobili: Negative mg/dL   Blood: x / Protein: 30 mg/dL / Nitrite: Negative   Leuk Esterase: Negative / RBC: 0-2 /HPF / WBC 0-2 /HPF   Sq Epi: x / Non Sq Epi: x / Bacteria: Occasional        RADIOLOGY & ADDITIONAL TESTS:

## 2023-04-14 NOTE — PROGRESS NOTE ADULT - SUBJECTIVE AND OBJECTIVE BOX
DUKE HOUSER    [id number]    72y      Female    CC: Cough sob admitted with multifocal pna    INTERVAL HPI/OVERNIGHT EVENTS: no acute events     REVIEW OF SYSTEMS:    CONSTITUTIONAL: No  fatigue  CARDIOVASCULAR: No chest pain, palpitations  GASTROINTESTINAL: No abdominal or epigastric pain. No nausea, vomiting        Vital Signs Last 24 Hrs  T(C): 36.7 (2023 08:07), Max: 36.8 (2023 21:00)  T(F): 98.1 (2023 08:07), Max: 98.2 (2023 21:00)  HR: 125 (2023 14:38) (95 - 186)  BP: 106/65 (2023 14:00) (93/55 - 119/95)  BP(mean): 74 (2023 14:00) (63 - 101)  RR: 20 (2023 14:00) (18 - 22)  SpO2: 94% (2023 14:38) (89% - 97%)    Parameters below as of 2023 14:38  Patient On (Oxygen Delivery Method): room air        PHYSICAL EXAM:    GENERAL: NAD, well-groomed  HEENT: PERRL, +EOMI  NECK: soft, Supple   CHEST/LUNG: Clear to percussion bilaterally; b/l rhonchi+  HEART: S1S2+, Regular rate and rhythm; No murmurs  ABDOMEN: Soft, Nontender, Nondistended; Bowel sounds present  EXTREMITIES:  No clubbing, cyanosis, or edema  SKIN: No rashes or lesions  NEURO: AAOX3, no focal deficits       @ : @ 07:00  --------------------------------------------------------  IN: 3817.6 mL / OUT: 750 mL / NET: 3067.6 mL     @ 07: @ 15:44  --------------------------------------------------------  IN: 1061.5 mL / OUT: 350 mL / NET: 711.5 mL        LABS:                        12.0   15.83 )-----------( 272      ( 2023 03:48 )             36.8     04-14    133<L>  |  100  |  28.7<H>  ----------------------------<  206<H>  3.7   |  22.0  |  0.85    Ca    8.6      2023 03:48  Phos  2.2     04-14  Mg     2.0     -14    TPro  7.0  /  Alb  3.1<L>  /  TBili  0.4  /  DBili  x   /  AST  34<H>  /  ALT  53<H>  /  AlkPhos  194<H>  04-13    PT/INR - ( 2023 21:40 )   PT: 15.1 sec;   INR: 1.30 ratio         PTT - ( 2023 10:08 )  PTT:55.9 sec  Urinalysis Basic - ( 2023 03:00 )    Color: Yellow / Appearance: Clear / S.015 / pH: x  Gluc: x / Ketone: Negative  / Bili: Negative / Urobili: Negative mg/dL   Blood: x / Protein: 30 mg/dL / Nitrite: Negative   Leuk Esterase: Negative / RBC: 0-2 /HPF / WBC 0-2 /HPF   Sq Epi: x / Non Sq Epi: x / Bacteria: Occasional          MEDICATIONS  (STANDING):  aMIOdarone Infusion 0.999 mG/Min (33.3 mL/Hr) IV Continuous <Continuous>  aMIOdarone Infusion 0.5 mG/Min (16.7 mL/Hr) IV Continuous <Continuous>  budesonide 160 MICROgram(s)/formoterol 4.5 MICROgram(s) Inhaler 2 Puff(s) Inhalation two times a day  dextrose 5%. 1000 milliLiter(s) (50 mL/Hr) IV Continuous <Continuous>  dextrose 5%. 1000 milliLiter(s) (100 mL/Hr) IV Continuous <Continuous>  dextrose 50% Injectable 25 Gram(s) IV Push once  dextrose 50% Injectable 12.5 Gram(s) IV Push once  dextrose 50% Injectable 25 Gram(s) IV Push once  diltiazem    Tablet 30 milliGRAM(s) Oral every 6 hours  doxycycline IVPB 100 milliGRAM(s) IV Intermittent every 12 hours  folic acid 1 milliGRAM(s) Oral daily  glucagon  Injectable 1 milliGRAM(s) IntraMuscular once  heparin  Infusion. 900 Unit(s)/Hr (9 mL/Hr) IV Continuous <Continuous>  hydroxychloroquine 200 milliGRAM(s) Oral two times a day  insulin lispro (ADMELOG) corrective regimen sliding scale   SubCutaneous every 4 hours  insulin lispro Injectable (ADMELOG) 2 Unit(s) SubCutaneous three times a day before meals  lactated ringers. 1000 milliLiter(s) (125 mL/Hr) IV Continuous <Continuous>  levalbuterol Inhalation 0.63 milliGRAM(s) Inhalation every 6 hours  methylPREDNISolone sodium succinate Injectable 40 milliGRAM(s) IV Push every 8 hours  montelukast 10 milliGRAM(s) Oral daily  pantoprazole    Tablet 40 milliGRAM(s) Oral before breakfast  piperacillin/tazobactam IVPB.. 3.375 Gram(s) IV Intermittent every 8 hours  potassium phosphate / sodium phosphate Tablet (K-PHOS No. 2) 1 Tablet(s) Oral four times a day with meals    MEDICATIONS  (PRN):  acetaminophen     Tablet .. 650 milliGRAM(s) Oral every 6 hours PRN Temp greater or equal to 38C (100.4F), Mild Pain (1 - 3)  ALPRAZolam 0.25 milliGRAM(s) Oral every 8 hours PRN anxiety  aluminum hydroxide/magnesium hydroxide/simethicone Suspension 30 milliLiter(s) Oral every 4 hours PRN Dyspepsia  benzocaine/menthol Lozenge 1 Lozenge Oral every 2 hours PRN Sore Throat  dextrose Oral Gel 15 Gram(s) Oral once PRN Blood Glucose LESS THAN 70 milliGRAM(s)/deciliter  diltiazem Injectable 5 milliGRAM(s) IV Push every 8 hours PRN TACHY > 170  heparin   Injectable 5000 Unit(s) IV Push every 6 hours PRN For aPTT less than 40  heparin   Injectable 2500 Unit(s) IV Push every 6 hours PRN For aPTT between 40 - 57  hydrALAZINE 10 milliGRAM(s) Oral every 8 hours PRN Systolic blood pressure >135  melatonin 3 milliGRAM(s) Oral at bedtime PRN Insomnia  ondansetron Injectable 4 milliGRAM(s) IV Push every 8 hours PRN Nausea and/or Vomiting  oxyCODONE    IR 10 milliGRAM(s) Oral every 8 hours PRN Severe Pain (7 - 10)      RADIOLOGY & ADDITIONAL TESTS:    CXR - reviewed

## 2023-04-14 NOTE — PATIENT PROFILE ADULT - FALL HARM RISK - RISK INTERVENTIONS

## 2023-04-15 LAB
ANION GAP SERPL CALC-SCNC: 10 MMOL/L — SIGNIFICANT CHANGE UP (ref 5–17)
APTT BLD: 44.7 SEC — HIGH (ref 27.5–35.5)
APTT BLD: 51.7 SEC — HIGH (ref 27.5–35.5)
APTT BLD: >200 SEC — CRITICAL HIGH (ref 27.5–35.5)
BUN SERPL-MCNC: 29.5 MG/DL — HIGH (ref 8–20)
CALCIUM SERPL-MCNC: 8.8 MG/DL — SIGNIFICANT CHANGE UP (ref 8.4–10.5)
CHLORIDE SERPL-SCNC: 104 MMOL/L — SIGNIFICANT CHANGE UP (ref 96–108)
CO2 SERPL-SCNC: 23 MMOL/L — SIGNIFICANT CHANGE UP (ref 22–29)
CREAT SERPL-MCNC: 0.79 MG/DL — SIGNIFICANT CHANGE UP (ref 0.5–1.3)
EGFR: 78 ML/MIN/1.73M2 — SIGNIFICANT CHANGE UP
GLUCOSE BLDC GLUCOMTR-MCNC: 121 MG/DL — HIGH (ref 70–99)
GLUCOSE BLDC GLUCOMTR-MCNC: 122 MG/DL — HIGH (ref 70–99)
GLUCOSE BLDC GLUCOMTR-MCNC: 145 MG/DL — HIGH (ref 70–99)
GLUCOSE BLDC GLUCOMTR-MCNC: 146 MG/DL — HIGH (ref 70–99)
GLUCOSE BLDC GLUCOMTR-MCNC: 146 MG/DL — HIGH (ref 70–99)
GLUCOSE BLDC GLUCOMTR-MCNC: 156 MG/DL — HIGH (ref 70–99)
GLUCOSE SERPL-MCNC: 153 MG/DL — HIGH (ref 70–99)
HCT VFR BLD CALC: 35.4 % — SIGNIFICANT CHANGE UP (ref 34.5–45)
HGB BLD-MCNC: 11.5 G/DL — SIGNIFICANT CHANGE UP (ref 11.5–15.5)
MAGNESIUM SERPL-MCNC: 1.9 MG/DL — SIGNIFICANT CHANGE UP (ref 1.6–2.6)
MCHC RBC-ENTMCNC: 28 PG — SIGNIFICANT CHANGE UP (ref 27–34)
MCHC RBC-ENTMCNC: 32.5 GM/DL — SIGNIFICANT CHANGE UP (ref 32–36)
MCV RBC AUTO: 86.3 FL — SIGNIFICANT CHANGE UP (ref 80–100)
PHOSPHATE SERPL-MCNC: 3.1 MG/DL — SIGNIFICANT CHANGE UP (ref 2.4–4.7)
PLATELET # BLD AUTO: 288 K/UL — SIGNIFICANT CHANGE UP (ref 150–400)
POTASSIUM SERPL-MCNC: 4.8 MMOL/L — SIGNIFICANT CHANGE UP (ref 3.5–5.3)
POTASSIUM SERPL-SCNC: 4.8 MMOL/L — SIGNIFICANT CHANGE UP (ref 3.5–5.3)
RBC # BLD: 4.1 M/UL — SIGNIFICANT CHANGE UP (ref 3.8–5.2)
RBC # FLD: 13.3 % — SIGNIFICANT CHANGE UP (ref 10.3–14.5)
SODIUM SERPL-SCNC: 137 MMOL/L — SIGNIFICANT CHANGE UP (ref 135–145)
WBC # BLD: 15.03 K/UL — HIGH (ref 3.8–10.5)
WBC # FLD AUTO: 15.03 K/UL — HIGH (ref 3.8–10.5)

## 2023-04-15 PROCEDURE — 99233 SBSQ HOSP IP/OBS HIGH 50: CPT

## 2023-04-15 RX ORDER — APIXABAN 2.5 MG/1
5 TABLET, FILM COATED ORAL
Refills: 0 | Status: DISCONTINUED | OUTPATIENT
Start: 2023-04-15 | End: 2023-04-18

## 2023-04-15 RX ORDER — OXYCODONE HYDROCHLORIDE 5 MG/1
10 TABLET ORAL EVERY 6 HOURS
Refills: 0 | Status: DISCONTINUED | OUTPATIENT
Start: 2023-04-15 | End: 2023-04-18

## 2023-04-15 RX ADMIN — Medication 200 MILLIGRAM(S): at 05:07

## 2023-04-15 RX ADMIN — OXYCODONE HYDROCHLORIDE 10 MILLIGRAM(S): 5 TABLET ORAL at 03:25

## 2023-04-15 RX ADMIN — Medication 1 TABLET(S): at 08:38

## 2023-04-15 RX ADMIN — Medication 100 MILLIGRAM(S): at 05:15

## 2023-04-15 RX ADMIN — Medication 2 UNIT(S): at 17:52

## 2023-04-15 RX ADMIN — Medication 1 MILLIGRAM(S): at 12:52

## 2023-04-15 RX ADMIN — Medication 30 MILLIGRAM(S): at 17:51

## 2023-04-15 RX ADMIN — Medication 2 UNIT(S): at 12:52

## 2023-04-15 RX ADMIN — OXYCODONE HYDROCHLORIDE 10 MILLIGRAM(S): 5 TABLET ORAL at 12:02

## 2023-04-15 RX ADMIN — PIPERACILLIN AND TAZOBACTAM 25 GRAM(S): 4; .5 INJECTION, POWDER, LYOPHILIZED, FOR SOLUTION INTRAVENOUS at 13:50

## 2023-04-15 RX ADMIN — LEVALBUTEROL 0.63 MILLIGRAM(S): 1.25 SOLUTION, CONCENTRATE RESPIRATORY (INHALATION) at 15:37

## 2023-04-15 RX ADMIN — Medication 40 MILLIGRAM(S): at 13:46

## 2023-04-15 RX ADMIN — OXYCODONE HYDROCHLORIDE 10 MILLIGRAM(S): 5 TABLET ORAL at 17:53

## 2023-04-15 RX ADMIN — Medication 1 TABLET(S): at 17:53

## 2023-04-15 RX ADMIN — Medication 30 MILLIGRAM(S): at 05:06

## 2023-04-15 RX ADMIN — Medication 650 MILLIGRAM(S): at 06:22

## 2023-04-15 RX ADMIN — APIXABAN 5 MILLIGRAM(S): 2.5 TABLET, FILM COATED ORAL at 17:51

## 2023-04-15 RX ADMIN — Medication 1 TABLET(S): at 21:19

## 2023-04-15 RX ADMIN — BUDESONIDE AND FORMOTEROL FUMARATE DIHYDRATE 2 PUFF(S): 160; 4.5 AEROSOL RESPIRATORY (INHALATION) at 08:33

## 2023-04-15 RX ADMIN — Medication 1 TABLET(S): at 12:52

## 2023-04-15 RX ADMIN — Medication 30 MILLIGRAM(S): at 00:00

## 2023-04-15 RX ADMIN — LEVALBUTEROL 0.63 MILLIGRAM(S): 1.25 SOLUTION, CONCENTRATE RESPIRATORY (INHALATION) at 21:26

## 2023-04-15 RX ADMIN — Medication 200 MILLIGRAM(S): at 18:55

## 2023-04-15 RX ADMIN — OXYCODONE HYDROCHLORIDE 10 MILLIGRAM(S): 5 TABLET ORAL at 04:25

## 2023-04-15 RX ADMIN — HEPARIN SODIUM 0 UNIT(S)/HR: 5000 INJECTION INTRAVENOUS; SUBCUTANEOUS at 02:25

## 2023-04-15 RX ADMIN — AMIODARONE HYDROCHLORIDE 400 MILLIGRAM(S): 400 TABLET ORAL at 17:51

## 2023-04-15 RX ADMIN — Medication 40 MILLIGRAM(S): at 05:05

## 2023-04-15 RX ADMIN — Medication 100 MILLIGRAM(S): at 17:54

## 2023-04-15 RX ADMIN — Medication 2 UNIT(S): at 08:37

## 2023-04-15 RX ADMIN — Medication 40 MILLIGRAM(S): at 17:53

## 2023-04-15 RX ADMIN — Medication 650 MILLIGRAM(S): at 05:22

## 2023-04-15 RX ADMIN — LEVALBUTEROL 0.63 MILLIGRAM(S): 1.25 SOLUTION, CONCENTRATE RESPIRATORY (INHALATION) at 08:32

## 2023-04-15 RX ADMIN — HEPARIN SODIUM 1000 UNIT(S)/HR: 5000 INJECTION INTRAVENOUS; SUBCUTANEOUS at 11:04

## 2023-04-15 RX ADMIN — PIPERACILLIN AND TAZOBACTAM 25 GRAM(S): 4; .5 INJECTION, POWDER, LYOPHILIZED, FOR SOLUTION INTRAVENOUS at 05:06

## 2023-04-15 RX ADMIN — PANTOPRAZOLE SODIUM 40 MILLIGRAM(S): 20 TABLET, DELAYED RELEASE ORAL at 05:06

## 2023-04-15 RX ADMIN — HEPARIN SODIUM 2500 UNIT(S): 5000 INJECTION INTRAVENOUS; SUBCUTANEOUS at 11:03

## 2023-04-15 RX ADMIN — PIPERACILLIN AND TAZOBACTAM 25 GRAM(S): 4; .5 INJECTION, POWDER, LYOPHILIZED, FOR SOLUTION INTRAVENOUS at 21:19

## 2023-04-15 RX ADMIN — Medication 30 MILLIGRAM(S): at 12:52

## 2023-04-15 RX ADMIN — HEPARIN SODIUM 900 UNIT(S)/HR: 5000 INJECTION INTRAVENOUS; SUBCUTANEOUS at 03:26

## 2023-04-15 RX ADMIN — MONTELUKAST 10 MILLIGRAM(S): 4 TABLET, CHEWABLE ORAL at 12:53

## 2023-04-15 RX ADMIN — HEPARIN SODIUM 900 UNIT(S)/HR: 5000 INJECTION INTRAVENOUS; SUBCUTANEOUS at 07:19

## 2023-04-15 RX ADMIN — OXYCODONE HYDROCHLORIDE 10 MILLIGRAM(S): 5 TABLET ORAL at 11:02

## 2023-04-15 RX ADMIN — Medication 1: at 08:38

## 2023-04-15 RX ADMIN — AMIODARONE HYDROCHLORIDE 400 MILLIGRAM(S): 400 TABLET ORAL at 05:06

## 2023-04-15 RX ADMIN — BUDESONIDE AND FORMOTEROL FUMARATE DIHYDRATE 2 PUFF(S): 160; 4.5 AEROSOL RESPIRATORY (INHALATION) at 21:26

## 2023-04-15 NOTE — PROGRESS NOTE ADULT - SUBJECTIVE AND OBJECTIVE BOX
Pt stable, remains in AF. HRs averaging 120s w/ salvos to 140s - 150s bpm just prior to evaluation - pt reports she was on the phone with her daughter and was aggravated by the conversation. HRs trending down now.     MEDICATIONS  (STANDING):  aMIOdarone    Tablet 400 milliGRAM(s) Oral two times a day  budesonide 160 MICROgram(s)/formoterol 4.5 MICROgram(s) Inhaler 2 Puff(s) Inhalation two times a day  dextrose 5%. 1000 milliLiter(s) (50 mL/Hr) IV Continuous <Continuous>  dextrose 5%. 1000 milliLiter(s) (100 mL/Hr) IV Continuous <Continuous>  dextrose 50% Injectable 25 Gram(s) IV Push once  dextrose 50% Injectable 12.5 Gram(s) IV Push once  dextrose 50% Injectable 25 Gram(s) IV Push once  diltiazem    Tablet 30 milliGRAM(s) Oral every 6 hours  doxycycline IVPB 100 milliGRAM(s) IV Intermittent every 12 hours  folic acid 1 milliGRAM(s) Oral daily  glucagon  Injectable 1 milliGRAM(s) IntraMuscular once  heparin  Infusion. 900 Unit(s)/Hr (9 mL/Hr) IV Continuous <Continuous>  hydroxychloroquine 200 milliGRAM(s) Oral two times a day  insulin lispro (ADMELOG) corrective regimen sliding scale   SubCutaneous every 4 hours  insulin lispro Injectable (ADMELOG) 2 Unit(s) SubCutaneous three times a day before meals  levalbuterol Inhalation 0.63 milliGRAM(s) Inhalation every 6 hours  methylPREDNISolone sodium succinate Injectable 40 milliGRAM(s) IV Push every 12 hours  montelukast 10 milliGRAM(s) Oral daily  pantoprazole    Tablet 40 milliGRAM(s) Oral before breakfast  piperacillin/tazobactam IVPB.. 3.375 Gram(s) IV Intermittent every 8 hours  potassium phosphate / sodium phosphate Tablet (K-PHOS No. 2) 1 Tablet(s) Oral four times a day with meals    MEDICATIONS  (PRN):  acetaminophen     Tablet .. 650 milliGRAM(s) Oral every 6 hours PRN Temp greater or equal to 38C (100.4F), Mild Pain (1 - 3)  ALPRAZolam 0.25 milliGRAM(s) Oral every 8 hours PRN anxiety  aluminum hydroxide/magnesium hydroxide/simethicone Suspension 30 milliLiter(s) Oral every 4 hours PRN Dyspepsia  benzocaine/menthol Lozenge 1 Lozenge Oral every 2 hours PRN Sore Throat  dextrose Oral Gel 15 Gram(s) Oral once PRN Blood Glucose LESS THAN 70 milliGRAM(s)/deciliter  diltiazem Injectable 5 milliGRAM(s) IV Push every 8 hours PRN TACHY > 170  heparin   Injectable 5000 Unit(s) IV Push every 6 hours PRN For aPTT less than 40  heparin   Injectable 2500 Unit(s) IV Push every 6 hours PRN For aPTT between 40 - 57  hydrALAZINE 10 milliGRAM(s) Oral every 8 hours PRN Systolic blood pressure >135  melatonin 3 milliGRAM(s) Oral at bedtime PRN Insomnia  ondansetron Injectable 4 milliGRAM(s) IV Push every 8 hours PRN Nausea and/or Vomiting  oxyCODONE    IR 10 milliGRAM(s) Oral every 6 hours PRN Severe Pain (7 - 10)      Allergies  aspirin (Other)  Naprosyn (Other (Severe))  Sulfur (Rash)    Vital Signs Last 24 Hrs  T(C): 36.5 (15 Apr 2023 13:03), Max: 36.8 (2023 20:00)  T(F): 97.7 (15 Apr 2023 13:03), Max: 98.3 (2023 20:00)  HR: 145 (15 Apr 2023 16:00) (75 - 145)  BP: 106/68 (15 Apr 2023 16:00) (99/59 - 136/81)  BP(mean): 78 (15 Apr 2023 16:00) (78 - 94)  RR: 24 (15 Apr 2023 16:00) (15 - 24)  SpO2: 92% (15 Apr 2023 16:00) (92% - 96%)    Parameters below as of 15 Apr 2023 16:00  Patient On (Oxygen Delivery Method): room air    Physical Exam:  Constitutional: NAD, AAOx3  Cardiovascular: +S1S2, tachycardic, irregular  Pulmonary: CTA b/l, unlabored  GI: soft NTND +BS  Extremities: no pedal edema, +distal pulses b/l  Neuro: non focal, WHITE x4    LABS:                      11.5   15.03 )-----------( 288      ( 15 Apr 2023 01:24 )             35.4     137  |  104  |  29.5<H>  ----------------------------<  153<H>  4.8   |  23.0  |  0.79    Ca    8.8      15 Apr 2023 01:24  Phos  3.1     04-15  Mg     1.9     04-15    TPro  7.0  /  Alb  3.1<L>  /  TBili  0.4  /  DBili  x   /  AST  34<H>  /  ALT  53<H>  /  AlkPhos  194<H>  04-13    PT/INR - ( 2023 21:40 )   PT: 15.1 sec;   INR: 1.30 ratio         PTT - ( 15 Apr 2023 09:48 )  PTT:44.7 sec  Urinalysis Basic - ( 2023 03:00 )    Color: Yellow / Appearance: Clear / S.015 / pH: x  Gluc: x / Ketone: Negative  / Bili: Negative / Urobili: Negative mg/dL   Blood: x / Protein: 30 mg/dL / Nitrite: Negative   Leuk Esterase: Negative / RBC: 0-2 /HPF / WBC 0-2 /HPF   Sq Epi: x / Non Sq Epi: x / Bacteria: Occasional        RADIOLOGY & ADDITIONAL TESTS:  < from: TTE Echo Complete w/ Contrast w/ Doppler (23 @ 19:16) >  PHYSICIAN INTERPRETATION:  Left Ventricle: The left ventricular internal cavity size is normal. Left   ventricular wall thickness is normal.  Global LV systolic function was normal. Left ventricular ejection   fraction, by visual estimation, is 55 to 60%. The mitral in-flow pattern   reveals no discernable A-wave, therefore no comment on diastolic function can be made.  Right Ventricle: Normal right ventricular size and function.  Left Atrium: Moderately enlarged left atrium.  Right Atrium: Mildly enlarged right atrium.  Pericardium: There is no evidence of pericardial effusion.  Mitral Valve: The mitral valve is degenerative in appearance. Moderate   thickening of the anterior and posterior mitral valve leaflets. There is   moderate mitral annular calcification. Mild mitral valve regurgitation is   seen.  Tricuspid Valve: The tricuspid valve is normal in structure. Mild   tricuspid regurgitation is visualized.  Aortic Valve: The aortic valve was not well visualized. Moderate to   severe aortic stenosis is present. Mild aortic valve regurgitation is   seen. The Dimesionless Index value is 0.27.  Pulmonic Valve: The pulmonic valve was not well visualized. Trace   pulmonic valve regurgitation.  Aorta: The aortic root is normal in size and structure.  Pulmonary Artery: The pulmonary artery is not well seen.  Venous: The inferior vena cava was normal sized, with respiratory size   variation greater than 50%.  In comparison to the previous echocardiogram(s): Prior examinations are   available and were reviewed for comparison purposes. Compared with TTE   dated 2019 the AS has worsened.      Summary:   1. Left ventricular ejection fraction, by visual estimation, is 55 to 60%.   2. Technically limited study.   3. Normal global left ventricular systolic function.   4. The mitral in-flow pattern reveals no discernable A-wave, therefore   no comment on diastolic function can be made.   5. There is mild concentric left ventricular hypertrophy.   6. Moderately enlarged left atrium.   7. Mildly enlarged right atrium.   8. Degenerative mitral valve.   9. Mild mitral valve regurgitation.  10. Moderate thickening of the anterior and posterior mitral valve   leaflets.  11. Mild tricuspid regurgitation.  12. Mild aortic regurgitation.  13. Moderate to severe aortic valve stenosis.  14. The Dimesionless Index value is 0.27.    Tye Zuleta MD Electronically signed on 2023 at 6:44:11 AM    < end of copied text >

## 2023-04-15 NOTE — PROGRESS NOTE ADULT - SUBJECTIVE AND OBJECTIVE BOX
DUKE HOUSER    [id number]    72y      Female    CC: sob    INTERVAL HPI/OVERNIGHT EVENTS: pt seen and examined. no acute events reported o/n     REVIEW OF SYSTEMS:    CONSTITUTIONAL: No fever, weight loss  RESPIRATORY: No wheezing, hemoptysis  CARDIOVASCULAR: No chest pain, palpitations  GASTROINTESTINAL: No abdominal or epigastric pain. No nausea, vomiting  NEUROLOGICAL: No headaches    Vital Signs Last 24 Hrs  T(C): 36.5 (15 Apr 2023 13:03), Max: 36.8 (2023 15:59)  T(F): 97.7 (15 Apr 2023 13:03), Max: 98.3 (2023 20:00)  HR: 130 (15 Apr 2023 15:47) (75 - 138)  BP: 115/74 (15 Apr 2023 14:00) (99/59 - 136/81)  BP(mean): 84 (15 Apr 2023 14:00) (73 - 94)  RR: 19 (15 Apr 2023 14:00) (15 - 23)  SpO2: 94% (15 Apr 2023 15:47) (90% - 96%)    Parameters below as of 15 Apr 2023 15:47  Patient On (Oxygen Delivery Method): room air        PHYSICAL EXAM:    GENERAL: NAD  HEENT: +EOMI  NECK: soft, supple  CHEST/LUNG: b/l rales; respirations unlabored on RA   HEART: S1S2+, irregularly irregular   ABDOMEN: Soft, Nontender, Nondistended; Bowel sounds present  SKIN: warm, dry  NEURO: Awake, alert; grossly non-focal   PSYCH: normal affect     LABS:                        11.5   15.03 )-----------( 288      ( 15 Apr 2023 01:24 )             35.4     04-15    137  |  104  |  29.5<H>  ----------------------------<  153<H>  4.8   |  23.0  |  0.79    Ca    8.8      15 Apr 2023 01:24  Phos  3.1     04-15  Mg     1.9     04-15    TPro  7.0  /  Alb  3.1<L>  /  TBili  0.4  /  DBili  x   /  AST  34<H>  /  ALT  53<H>  /  AlkPhos  194<H>  04-13    PT/INR - ( 2023 21:40 )   PT: 15.1 sec;   INR: 1.30 ratio         PTT - ( 15 Apr 2023 09:48 )  PTT:44.7 sec  Urinalysis Basic - ( 2023 03:00 )    Color: Yellow / Appearance: Clear / S.015 / pH: x  Gluc: x / Ketone: Negative  / Bili: Negative / Urobili: Negative mg/dL   Blood: x / Protein: 30 mg/dL / Nitrite: Negative   Leuk Esterase: Negative / RBC: 0-2 /HPF / WBC 0-2 /HPF   Sq Epi: x / Non Sq Epi: x / Bacteria: Occasional          MEDICATIONS  (STANDING):  aMIOdarone    Tablet 400 milliGRAM(s) Oral two times a day  aMIOdarone Infusion 0.999 mG/Min (33.3 mL/Hr) IV Continuous <Continuous>  aMIOdarone Infusion 0.5 mG/Min (16.7 mL/Hr) IV Continuous <Continuous>  budesonide 160 MICROgram(s)/formoterol 4.5 MICROgram(s) Inhaler 2 Puff(s) Inhalation two times a day  dextrose 5%. 1000 milliLiter(s) (50 mL/Hr) IV Continuous <Continuous>  dextrose 5%. 1000 milliLiter(s) (100 mL/Hr) IV Continuous <Continuous>  dextrose 50% Injectable 25 Gram(s) IV Push once  dextrose 50% Injectable 12.5 Gram(s) IV Push once  dextrose 50% Injectable 25 Gram(s) IV Push once  diltiazem    Tablet 30 milliGRAM(s) Oral every 6 hours  doxycycline IVPB 100 milliGRAM(s) IV Intermittent every 12 hours  folic acid 1 milliGRAM(s) Oral daily  glucagon  Injectable 1 milliGRAM(s) IntraMuscular once  heparin  Infusion. 900 Unit(s)/Hr (9 mL/Hr) IV Continuous <Continuous>  hydroxychloroquine 200 milliGRAM(s) Oral two times a day  insulin lispro (ADMELOG) corrective regimen sliding scale   SubCutaneous every 4 hours  insulin lispro Injectable (ADMELOG) 2 Unit(s) SubCutaneous three times a day before meals  levalbuterol Inhalation 0.63 milliGRAM(s) Inhalation every 6 hours  methylPREDNISolone sodium succinate Injectable 40 milliGRAM(s) IV Push every 8 hours  montelukast 10 milliGRAM(s) Oral daily  pantoprazole    Tablet 40 milliGRAM(s) Oral before breakfast  piperacillin/tazobactam IVPB.. 3.375 Gram(s) IV Intermittent every 8 hours  potassium phosphate / sodium phosphate Tablet (K-PHOS No. 2) 1 Tablet(s) Oral four times a day with meals    MEDICATIONS  (PRN):  acetaminophen     Tablet .. 650 milliGRAM(s) Oral every 6 hours PRN Temp greater or equal to 38C (100.4F), Mild Pain (1 - 3)  ALPRAZolam 0.25 milliGRAM(s) Oral every 8 hours PRN anxiety  aluminum hydroxide/magnesium hydroxide/simethicone Suspension 30 milliLiter(s) Oral every 4 hours PRN Dyspepsia  benzocaine/menthol Lozenge 1 Lozenge Oral every 2 hours PRN Sore Throat  dextrose Oral Gel 15 Gram(s) Oral once PRN Blood Glucose LESS THAN 70 milliGRAM(s)/deciliter  diltiazem Injectable 5 milliGRAM(s) IV Push every 8 hours PRN TACHY > 170  heparin   Injectable 5000 Unit(s) IV Push every 6 hours PRN For aPTT less than 40  heparin   Injectable 2500 Unit(s) IV Push every 6 hours PRN For aPTT between 40 - 57  hydrALAZINE 10 milliGRAM(s) Oral every 8 hours PRN Systolic blood pressure >135  melatonin 3 milliGRAM(s) Oral at bedtime PRN Insomnia  ondansetron Injectable 4 milliGRAM(s) IV Push every 8 hours PRN Nausea and/or Vomiting  oxyCODONE    IR 10 milliGRAM(s) Oral every 6 hours PRN Severe Pain (7 - 10)      RADIOLOGY & ADDITIONAL TESTS:

## 2023-04-15 NOTE — PROGRESS NOTE ADULT - SUBJECTIVE AND OBJECTIVE BOX
[facility]  INFECTIOUS DISEASES at [facility]  ===============================================================                               Bertrand Leyva MD*     Kenia Avila MD*                         Cristino Higgins MD*       Alisha Rodrigez MD*            Diplomates American Board of Internal Medicine & Infectious Diseases                * [place] Office - Appt - Tel  [phone] Fax [phone]                * [place] Office - Appt - Tel [phone] Fax [phone]                                  Hospital Consult line:  [phone]  ==============================================================    SAMANTHASAMIA HARDYDUKE [id number]    Follow up: PNA    POx 90-92 on RA  No acute events  hemodynamically stable       I have personally reviewed the labs and data; pertinent labs and data are listed in this note; please see below.     _______________________________________________________________  REVIEW OF SYSTEMS  Feeling slightly better but still unwell. Breathing better. Still with productive cough. No N, V.   ________________________________________________________________  Allergies:  aspirin (Other)  Naprosyn (Other (Severe))  Sulfur (Rash)        ________________________________________________________________  PHYSICAL EXAM  GEN: chronically ill appearing but in NAD, sitting in bed  HEENT: Anicteric sclerae. Moist mucous membranes. No mucosal lesions.   LUNGS: mild tachypnea, but no resp distress. Rales, rhonchi b/l   HEART: RRR, no m/r/g  ABDOMEN: Soft, NT, ND  +BS.    :  No Mustafa catheter  EXTREMITIES: well perfused, without  edema.  NEUROLOGIC: Grossly no focal deficits   PSYCHIATRIC: Appropriate affect and mood  SKIN: No rash, wounds or jaundice  LINES: PIV  ________________________________________________________________  Vitals:  T(F): 97.3 (15 Apr 2023 07:50), Max: 98.3 (14 Apr 2023 20:00)  HR: 131 (15 Apr 2023 10:00)  BP: 105/81 (15 Apr 2023 10:00)  RR: 23 (15 Apr 2023 10:00)  SpO2: 95% (15 Apr 2023 10:00) (90% - 96%)  temp max in last 48H T(F): , Max: 98.3 (04-14-23 @ 20:00)    Current Antibiotics:  doxycycline IVPB 100 milliGRAM(s) IV Intermittent every 12 hours  hydroxychloroquine 200 milliGRAM(s) Oral two times a day  piperacillin/tazobactam IVPB.. 3.375 Gram(s) IV Intermittent every 8 hours    Other medications:  aMIOdarone    Tablet 400 milliGRAM(s) Oral two times a day  aMIOdarone Infusion 0.999 mG/Min IV Continuous <Continuous>  aMIOdarone Infusion 0.5 mG/Min IV Continuous <Continuous>  budesonide 160 MICROgram(s)/formoterol 4.5 MICROgram(s) Inhaler 2 Puff(s) Inhalation two times a day  dextrose 5%. 1000 milliLiter(s) IV Continuous <Continuous>  dextrose 5%. 1000 milliLiter(s) IV Continuous <Continuous>  dextrose 50% Injectable 25 Gram(s) IV Push once  dextrose 50% Injectable 12.5 Gram(s) IV Push once  dextrose 50% Injectable 25 Gram(s) IV Push once  diltiazem    Tablet 30 milliGRAM(s) Oral every 6 hours  folic acid 1 milliGRAM(s) Oral daily  glucagon  Injectable 1 milliGRAM(s) IntraMuscular once  heparin  Infusion. 900 Unit(s)/Hr IV Continuous <Continuous>  insulin lispro (ADMELOG) corrective regimen sliding scale   SubCutaneous every 4 hours  insulin lispro Injectable (ADMELOG) 2 Unit(s) SubCutaneous three times a day before meals  levalbuterol Inhalation 0.63 milliGRAM(s) Inhalation every 6 hours  methylPREDNISolone sodium succinate Injectable 40 milliGRAM(s) IV Push every 8 hours  montelukast 10 milliGRAM(s) Oral daily  pantoprazole    Tablet 40 milliGRAM(s) Oral before breakfast  potassium phosphate / sodium phosphate Tablet (K-PHOS No. 2) 1 Tablet(s) Oral four times a day with meals                            11.5   15.03 )-----------( 288      ( 15 Apr 2023 01:24 )             35.4     04-15    137  |  104  |  29.5<H>  ----------------------------<  153<H>  4.8   |  23.0  |  0.79    Ca    8.8      15 Apr 2023 01:24  Phos  3.1     04-15  Mg     1.9     04-15    TPro  7.0  /  Alb  3.1<L>  /  TBili  0.4  /  DBili  x   /  AST  34<H>  /  ALT  53<H>  /  AlkPhos  194<H>  04-13    RECENT CULTURES:  04-13 @ 21:45 .Blood Blood-Peripheral     No growth to date.      04-13 @ 21:40 .Blood Blood-Peripheral     No growth to date.      04-13 @ 14:49 .Blood Blood     No growth to date.      04-13 @ 14:45 .Blood Blood     No growth to date.      04-13 @ 07:31    RVP with SARS-CoV-2   NotDetec      WBC Count: 15.03 K/uL (04-15-23 @ 01:24)  WBC Count: 15.83 K/uL (04-14-23 @ 03:48)  WBC Count: 15.54 K/uL (04-13-23 @ 21:40)  WBC Count: 18.97 K/uL (04-13-23 @ 14:45)  WBC Count: 20.87 K/uL (04-13-23 @ 07:31)    Creatinine, Serum: 0.79 mg/dL (04-15-23 @ 01:24)  Creatinine, Serum: 0.85 mg/dL (04-14-23 @ 03:48)  Creatinine, Serum: 1.08 mg/dL (04-13-23 @ 21:40)  Creatinine, Serum: 1.04 mg/dL (04-13-23 @ 21:40)  Creatinine, Serum: 1.14 mg/dL (04-13-23 @ 16:57)  Creatinine, Serum: 0.76 mg/dL (04-13-23 @ 07:31)      Procalcitonin, Serum: 0.29 ng/mL (04-14-23 @ 03:48)  Procalcitonin, Serum: 0.34 ng/mL (04-13-23 @ 21:40)     SARS-CoV-2: NotDetec (04-13-23 @ 07:31)    ________________________________________________________________  RADIOLOGY  < from: CT Angio Chest PE Protocol w/ IV Cont (04.13.23 @ 09:50) >    FINDINGS:    CTA: The contrast bolus is satisfactory. The study is degraded by   mild-moderate respiratory motion. There are no filling defects in the   pulmonary artery or its branches. The heart is normal in size. No   pericardial effusion. There are coronary artery calcifications. There is   mitral annular calcification. There is mild aortic valve calcification.   The aorta is normal in caliber    Lungs/Airways/Pleura: There are scattered centrilobular and branching   linear densities and ill-defined groundglass densities in both lungs.   There is branching tubular mucoid impaction in the posterior right lower   lobe. There is multifocal bronchial wall thickening and areas of mucoid   impaction. No pleural effusion.    Mediastinum/Lymph nodes: There are a few stable borderline enlarged   mediastinal and hilar lymph nodes.    Upper Abdomen: Coarse calcification in the liver.    Bones and Soft Tissues: There is diffuse qualitative osteopenia and mild   multilevel discogenic disease in the spine.    IMPRESSION:    1.  No pulmonary thromboembolism    2.  Findings suggestive of multifocal infectious process in both lungs    < end of copied text >

## 2023-04-16 LAB
ANION GAP SERPL CALC-SCNC: 13 MMOL/L — SIGNIFICANT CHANGE UP (ref 5–17)
BUN SERPL-MCNC: 27 MG/DL — HIGH (ref 8–20)
CALCIUM SERPL-MCNC: 8.7 MG/DL — SIGNIFICANT CHANGE UP (ref 8.4–10.5)
CHLORIDE SERPL-SCNC: 100 MMOL/L — SIGNIFICANT CHANGE UP (ref 96–108)
CO2 SERPL-SCNC: 24 MMOL/L — SIGNIFICANT CHANGE UP (ref 22–29)
CREAT SERPL-MCNC: 0.82 MG/DL — SIGNIFICANT CHANGE UP (ref 0.5–1.3)
EGFR: 75 ML/MIN/1.73M2 — SIGNIFICANT CHANGE UP
GLUCOSE BLDC GLUCOMTR-MCNC: 127 MG/DL — HIGH (ref 70–99)
GLUCOSE BLDC GLUCOMTR-MCNC: 151 MG/DL — HIGH (ref 70–99)
GLUCOSE BLDC GLUCOMTR-MCNC: 153 MG/DL — HIGH (ref 70–99)
GLUCOSE BLDC GLUCOMTR-MCNC: 159 MG/DL — HIGH (ref 70–99)
GLUCOSE BLDC GLUCOMTR-MCNC: 162 MG/DL — HIGH (ref 70–99)
GLUCOSE BLDC GLUCOMTR-MCNC: 186 MG/DL — HIGH (ref 70–99)
GLUCOSE SERPL-MCNC: 154 MG/DL — HIGH (ref 70–99)
HCT VFR BLD CALC: 36.2 % — SIGNIFICANT CHANGE UP (ref 34.5–45)
HGB BLD-MCNC: 11.8 G/DL — SIGNIFICANT CHANGE UP (ref 11.5–15.5)
MAGNESIUM SERPL-MCNC: 1.8 MG/DL — SIGNIFICANT CHANGE UP (ref 1.6–2.6)
MCHC RBC-ENTMCNC: 28.2 PG — SIGNIFICANT CHANGE UP (ref 27–34)
MCHC RBC-ENTMCNC: 32.6 GM/DL — SIGNIFICANT CHANGE UP (ref 32–36)
MCV RBC AUTO: 86.6 FL — SIGNIFICANT CHANGE UP (ref 80–100)
PLATELET # BLD AUTO: 319 K/UL — SIGNIFICANT CHANGE UP (ref 150–400)
POTASSIUM SERPL-MCNC: 4.2 MMOL/L — SIGNIFICANT CHANGE UP (ref 3.5–5.3)
POTASSIUM SERPL-SCNC: 4.2 MMOL/L — SIGNIFICANT CHANGE UP (ref 3.5–5.3)
RBC # BLD: 4.18 M/UL — SIGNIFICANT CHANGE UP (ref 3.8–5.2)
RBC # FLD: 13.5 % — SIGNIFICANT CHANGE UP (ref 10.3–14.5)
SODIUM SERPL-SCNC: 137 MMOL/L — SIGNIFICANT CHANGE UP (ref 135–145)
WBC # BLD: 10.55 K/UL — HIGH (ref 3.8–10.5)
WBC # FLD AUTO: 10.55 K/UL — HIGH (ref 3.8–10.5)

## 2023-04-16 PROCEDURE — 99231 SBSQ HOSP IP/OBS SF/LOW 25: CPT

## 2023-04-16 PROCEDURE — 99233 SBSQ HOSP IP/OBS HIGH 50: CPT

## 2023-04-16 RX ORDER — MAGNESIUM SULFATE 500 MG/ML
1 VIAL (ML) INJECTION ONCE
Refills: 0 | Status: COMPLETED | OUTPATIENT
Start: 2023-04-16 | End: 2023-04-16

## 2023-04-16 RX ORDER — INSULIN LISPRO 100/ML
VIAL (ML) SUBCUTANEOUS
Refills: 0 | Status: DISCONTINUED | OUTPATIENT
Start: 2023-04-16 | End: 2023-04-18

## 2023-04-16 RX ADMIN — OXYCODONE HYDROCHLORIDE 10 MILLIGRAM(S): 5 TABLET ORAL at 16:30

## 2023-04-16 RX ADMIN — AMIODARONE HYDROCHLORIDE 400 MILLIGRAM(S): 400 TABLET ORAL at 05:25

## 2023-04-16 RX ADMIN — PIPERACILLIN AND TAZOBACTAM 25 GRAM(S): 4; .5 INJECTION, POWDER, LYOPHILIZED, FOR SOLUTION INTRAVENOUS at 13:08

## 2023-04-16 RX ADMIN — Medication 40 MILLIGRAM(S): at 05:25

## 2023-04-16 RX ADMIN — BUDESONIDE AND FORMOTEROL FUMARATE DIHYDRATE 2 PUFF(S): 160; 4.5 AEROSOL RESPIRATORY (INHALATION) at 20:55

## 2023-04-16 RX ADMIN — Medication 1 TABLET(S): at 08:44

## 2023-04-16 RX ADMIN — Medication 30 MILLIGRAM(S): at 23:50

## 2023-04-16 RX ADMIN — Medication 1: at 00:05

## 2023-04-16 RX ADMIN — PIPERACILLIN AND TAZOBACTAM 25 GRAM(S): 4; .5 INJECTION, POWDER, LYOPHILIZED, FOR SOLUTION INTRAVENOUS at 21:23

## 2023-04-16 RX ADMIN — LEVALBUTEROL 0.63 MILLIGRAM(S): 1.25 SOLUTION, CONCENTRATE RESPIRATORY (INHALATION) at 09:43

## 2023-04-16 RX ADMIN — OXYCODONE HYDROCHLORIDE 10 MILLIGRAM(S): 5 TABLET ORAL at 08:15

## 2023-04-16 RX ADMIN — APIXABAN 5 MILLIGRAM(S): 2.5 TABLET, FILM COATED ORAL at 17:29

## 2023-04-16 RX ADMIN — Medication 1: at 05:33

## 2023-04-16 RX ADMIN — MONTELUKAST 10 MILLIGRAM(S): 4 TABLET, CHEWABLE ORAL at 11:23

## 2023-04-16 RX ADMIN — LEVALBUTEROL 0.63 MILLIGRAM(S): 1.25 SOLUTION, CONCENTRATE RESPIRATORY (INHALATION) at 14:48

## 2023-04-16 RX ADMIN — Medication 200 MILLIGRAM(S): at 17:29

## 2023-04-16 RX ADMIN — OXYCODONE HYDROCHLORIDE 10 MILLIGRAM(S): 5 TABLET ORAL at 00:01

## 2023-04-16 RX ADMIN — Medication 30 MILLIGRAM(S): at 00:03

## 2023-04-16 RX ADMIN — Medication 30 MILLIGRAM(S): at 05:25

## 2023-04-16 RX ADMIN — Medication 1 TABLET(S): at 21:22

## 2023-04-16 RX ADMIN — Medication 3 MILLIGRAM(S): at 00:01

## 2023-04-16 RX ADMIN — Medication 30 MILLIGRAM(S): at 11:23

## 2023-04-16 RX ADMIN — Medication 2 UNIT(S): at 11:22

## 2023-04-16 RX ADMIN — Medication 1: at 17:29

## 2023-04-16 RX ADMIN — Medication 100 MILLIGRAM(S): at 17:30

## 2023-04-16 RX ADMIN — Medication 1 MILLIGRAM(S): at 11:23

## 2023-04-16 RX ADMIN — Medication 1 TABLET(S): at 17:28

## 2023-04-16 RX ADMIN — Medication 1: at 21:23

## 2023-04-16 RX ADMIN — Medication 30 MILLIGRAM(S): at 17:28

## 2023-04-16 RX ADMIN — Medication 2 UNIT(S): at 08:42

## 2023-04-16 RX ADMIN — LEVALBUTEROL 0.63 MILLIGRAM(S): 1.25 SOLUTION, CONCENTRATE RESPIRATORY (INHALATION) at 05:13

## 2023-04-16 RX ADMIN — Medication 100 GRAM(S): at 08:43

## 2023-04-16 RX ADMIN — BENZOCAINE AND MENTHOL 1 LOZENGE: 5; 1 LIQUID ORAL at 04:29

## 2023-04-16 RX ADMIN — BUDESONIDE AND FORMOTEROL FUMARATE DIHYDRATE 2 PUFF(S): 160; 4.5 AEROSOL RESPIRATORY (INHALATION) at 10:13

## 2023-04-16 RX ADMIN — Medication 40 MILLIGRAM(S): at 17:29

## 2023-04-16 RX ADMIN — OXYCODONE HYDROCHLORIDE 10 MILLIGRAM(S): 5 TABLET ORAL at 23:49

## 2023-04-16 RX ADMIN — Medication 3 MILLIGRAM(S): at 23:49

## 2023-04-16 RX ADMIN — PANTOPRAZOLE SODIUM 40 MILLIGRAM(S): 20 TABLET, DELAYED RELEASE ORAL at 05:25

## 2023-04-16 RX ADMIN — Medication 1: at 08:43

## 2023-04-16 RX ADMIN — PIPERACILLIN AND TAZOBACTAM 25 GRAM(S): 4; .5 INJECTION, POWDER, LYOPHILIZED, FOR SOLUTION INTRAVENOUS at 05:26

## 2023-04-16 RX ADMIN — Medication 200 MILLIGRAM(S): at 05:25

## 2023-04-16 RX ADMIN — OXYCODONE HYDROCHLORIDE 10 MILLIGRAM(S): 5 TABLET ORAL at 07:26

## 2023-04-16 RX ADMIN — Medication 100 MILLIGRAM(S): at 05:26

## 2023-04-16 RX ADMIN — OXYCODONE HYDROCHLORIDE 10 MILLIGRAM(S): 5 TABLET ORAL at 15:38

## 2023-04-16 RX ADMIN — AMIODARONE HYDROCHLORIDE 400 MILLIGRAM(S): 400 TABLET ORAL at 17:29

## 2023-04-16 RX ADMIN — OXYCODONE HYDROCHLORIDE 10 MILLIGRAM(S): 5 TABLET ORAL at 01:01

## 2023-04-16 RX ADMIN — APIXABAN 5 MILLIGRAM(S): 2.5 TABLET, FILM COATED ORAL at 05:25

## 2023-04-16 RX ADMIN — Medication 1 TABLET(S): at 11:23

## 2023-04-16 NOTE — PROGRESS NOTE ADULT - SUBJECTIVE AND OBJECTIVE BOX
DUKE HOUSER    [id number]    72y      Female    CC: sob    INTERVAL HPI/OVERNIGHT EVENTS: pt seen and examined.     REVIEW OF SYSTEMS:    CONSTITUTIONAL: No fever, weight loss  RESPIRATORY: No cough, wheezing, hemoptysis; No shortness of breath  CARDIOVASCULAR: No chest pain  GASTROINTESTINAL: No abdominal or epigastric pain. No nausea, vomiting  NEUROLOGICAL: No headaches    Vital Signs Last 24 Hrs  T(C): 36.4 (16 Apr 2023 07:40), Max: 37.1 (15 Apr 2023 20:00)  T(F): 97.6 (16 Apr 2023 07:40), Max: 98.8 (15 Apr 2023 20:00)  HR: 125 (16 Apr 2023 12:00) (98 - 145)  BP: 112/65 (16 Apr 2023 12:00) (91/69 - 115/74)  BP(mean): 75 (16 Apr 2023 12:00) (68 - 87)  RR: 15 (16 Apr 2023 12:00) (12 - 24)  SpO2: 95% (16 Apr 2023 12:00) (91% - 99%)    Parameters below as of 16 Apr 2023 12:00  Patient On (Oxygen Delivery Method): room air        PHYSICAL EXAM:    GENERAL: NAD  HEENT: +EOMI  NECK: soft, supple  CHEST/LUNG: b/l rales; respirations unlabored on RA   HEART: S1S2+, irregularly irregular   ABDOMEN: Soft, Nontender, Nondistended; Bowel sounds present  SKIN: warm, dry  NEURO: Awake, alert; grossly non-focal   PSYCH: normal affect     LABS:                        11.8   10.55 )-----------( 319      ( 16 Apr 2023 07:04 )             36.2     04-16    137  |  100  |  27.0<H>  ----------------------------<  154<H>  4.2   |  24.0  |  0.82    Ca    8.7      16 Apr 2023 07:04  Phos  3.1     04-15  Mg     1.8     04-16      PTT - ( 15 Apr 2023 17:03 )  PTT:51.7 sec        MEDICATIONS  (STANDING):  aMIOdarone    Tablet 400 milliGRAM(s) Oral two times a day  aMIOdarone Infusion 0.999 mG/Min (33.3 mL/Hr) IV Continuous <Continuous>  aMIOdarone Infusion 0.5 mG/Min (16.7 mL/Hr) IV Continuous <Continuous>  apixaban 5 milliGRAM(s) Oral <User Schedule>  budesonide 160 MICROgram(s)/formoterol 4.5 MICROgram(s) Inhaler 2 Puff(s) Inhalation two times a day  dextrose 5%. 1000 milliLiter(s) (50 mL/Hr) IV Continuous <Continuous>  dextrose 5%. 1000 milliLiter(s) (100 mL/Hr) IV Continuous <Continuous>  dextrose 50% Injectable 25 Gram(s) IV Push once  dextrose 50% Injectable 12.5 Gram(s) IV Push once  dextrose 50% Injectable 25 Gram(s) IV Push once  diltiazem    Tablet 30 milliGRAM(s) Oral every 6 hours  doxycycline IVPB 100 milliGRAM(s) IV Intermittent every 12 hours  folic acid 1 milliGRAM(s) Oral daily  glucagon  Injectable 1 milliGRAM(s) IntraMuscular once  hydroxychloroquine 200 milliGRAM(s) Oral two times a day  insulin lispro (ADMELOG) corrective regimen sliding scale   SubCutaneous every 4 hours  insulin lispro Injectable (ADMELOG) 2 Unit(s) SubCutaneous three times a day before meals  levalbuterol Inhalation 0.63 milliGRAM(s) Inhalation every 6 hours  methylPREDNISolone sodium succinate Injectable 40 milliGRAM(s) IV Push every 12 hours  montelukast 10 milliGRAM(s) Oral daily  pantoprazole    Tablet 40 milliGRAM(s) Oral before breakfast  piperacillin/tazobactam IVPB.. 3.375 Gram(s) IV Intermittent every 8 hours  potassium phosphate / sodium phosphate Tablet (K-PHOS No. 2) 1 Tablet(s) Oral four times a day with meals    MEDICATIONS  (PRN):  acetaminophen     Tablet .. 650 milliGRAM(s) Oral every 6 hours PRN Temp greater or equal to 38C (100.4F), Mild Pain (1 - 3)  ALPRAZolam 0.25 milliGRAM(s) Oral every 8 hours PRN anxiety  aluminum hydroxide/magnesium hydroxide/simethicone Suspension 30 milliLiter(s) Oral every 4 hours PRN Dyspepsia  benzocaine/menthol Lozenge 1 Lozenge Oral every 2 hours PRN Sore Throat  dextrose Oral Gel 15 Gram(s) Oral once PRN Blood Glucose LESS THAN 70 milliGRAM(s)/deciliter  diltiazem Injectable 5 milliGRAM(s) IV Push every 8 hours PRN TACHY > 170  hydrALAZINE 10 milliGRAM(s) Oral every 8 hours PRN Systolic blood pressure >135  melatonin 3 milliGRAM(s) Oral at bedtime PRN Insomnia  ondansetron Injectable 4 milliGRAM(s) IV Push every 8 hours PRN Nausea and/or Vomiting  oxyCODONE    IR 10 milliGRAM(s) Oral every 6 hours PRN Severe Pain (7 - 10)      RADIOLOGY & ADDITIONAL TESTS:

## 2023-04-16 NOTE — DIETITIAN INITIAL EVALUATION ADULT - ETIOLOGY
Related to inadequate protein energy intake with persistent lack of appetite in setting of lupus now with respiratory failure 2/2 multifactorial Pna

## 2023-04-16 NOTE — DIETITIAN INITIAL EVALUATION ADULT - PERTINENT LABORATORY DATA
04-16    137  |  100  |  27.0<H>  ----------------------------<  154<H>  4.2   |  24.0  |  0.82    Ca    8.7      16 Apr 2023 07:04  Phos  3.1     04-15  Mg     1.8     04-16    POCT Blood Glucose.: 127 mg/dL (04-16-23 @ 11:21)  A1C with Estimated Average Glucose Result: 5.9 % (04-13-23 @ 14:45)

## 2023-04-16 NOTE — PROGRESS NOTE ADULT - SUBJECTIVE AND OBJECTIVE BOX
No complaints. Remains in AF.        MEDICATIONS  (STANDING):  aMIOdarone    Tablet 400 milliGRAM(s) Oral two times a day  aMIOdarone Infusion 0.5 mG/Min (16.7 mL/Hr) IV Continuous <Continuous>  aMIOdarone Infusion 0.999 mG/Min (33.3 mL/Hr) IV Continuous <Continuous>  apixaban 5 milliGRAM(s) Oral <User Schedule>  budesonide 160 MICROgram(s)/formoterol 4.5 MICROgram(s) Inhaler 2 Puff(s) Inhalation two times a day  dextrose 5%. 1000 milliLiter(s) (100 mL/Hr) IV Continuous <Continuous>  dextrose 5%. 1000 milliLiter(s) (50 mL/Hr) IV Continuous <Continuous>  dextrose 50% Injectable 25 Gram(s) IV Push once  dextrose 50% Injectable 12.5 Gram(s) IV Push once  dextrose 50% Injectable 25 Gram(s) IV Push once  diltiazem    Tablet 30 milliGRAM(s) Oral every 6 hours  doxycycline IVPB 100 milliGRAM(s) IV Intermittent every 12 hours  folic acid 1 milliGRAM(s) Oral daily  glucagon  Injectable 1 milliGRAM(s) IntraMuscular once  hydroxychloroquine 200 milliGRAM(s) Oral two times a day  insulin lispro (ADMELOG) corrective regimen sliding scale   SubCutaneous Before meals and at bedtime  methylPREDNISolone sodium succinate Injectable 40 milliGRAM(s) IV Push every 12 hours  montelukast 10 milliGRAM(s) Oral daily  pantoprazole    Tablet 40 milliGRAM(s) Oral before breakfast  piperacillin/tazobactam IVPB.. 3.375 Gram(s) IV Intermittent every 8 hours  potassium phosphate / sodium phosphate Tablet (K-PHOS No. 2) 1 Tablet(s) Oral four times a day with meals    MEDICATIONS  (PRN):  acetaminophen     Tablet .. 650 milliGRAM(s) Oral every 6 hours PRN Temp greater or equal to 38C (100.4F), Mild Pain (1 - 3)  ALPRAZolam 0.25 milliGRAM(s) Oral every 8 hours PRN anxiety  aluminum hydroxide/magnesium hydroxide/simethicone Suspension 30 milliLiter(s) Oral every 4 hours PRN Dyspepsia  benzocaine/menthol Lozenge 1 Lozenge Oral every 2 hours PRN Sore Throat  dextrose Oral Gel 15 Gram(s) Oral once PRN Blood Glucose LESS THAN 70 milliGRAM(s)/deciliter  diltiazem Injectable 5 milliGRAM(s) IV Push every 8 hours PRN TACHY > 170  hydrALAZINE 10 milliGRAM(s) Oral every 8 hours PRN Systolic blood pressure >135  melatonin 3 milliGRAM(s) Oral at bedtime PRN Insomnia  ondansetron Injectable 4 milliGRAM(s) IV Push every 8 hours PRN Nausea and/or Vomiting  oxyCODONE    IR 10 milliGRAM(s) Oral every 6 hours PRN Severe Pain (7 - 10)      Allergies    aspirin (Other)  Sulfur (Rash)  Naprosyn (Other (Severe))    Intolerances      PAST MEDICAL & SURGICAL HISTORY:  Myocardial infarct      Asthma      High cholesterol      Osteoporosis      Autoimmune disease      Lupus      Breast lump  Benign      Hashimoto's disease       delivery delivered  x3      H/O lumpectomy          Vital Signs Last 24 Hrs  T(C): 36.8 (2023 15:30), Max: 37.1 (15 Apr 2023 20:00)  T(F): 98.3 (2023 15:30), Max: 98.8 (15 Apr 2023 20:00)  HR: 124 (2023 17:22) (98 - 141)  BP: 108/73 (2023 17:22) (91/69 - 115/68)  BP(mean): 80 (2023 17:22) (68 - 87)  RR: 20 (2023 17:22) (12 - 20)  SpO2: 94% (2023 17:22) (91% - 99%)    Parameters below as of 2023 17:22  Patient On (Oxygen Delivery Method): room air            LABS:                        11.8   10.55 )-----------( 319      ( 2023 07:04 )             36.2     04-16    137  |  100  |  27.0<H>  ----------------------------<  154<H>  4.2   |  24.0  |  0.82    Ca    8.7      2023 07:04  Phos  3.1     04-15  Mg     1.8     04-16      PTT - ( 15 Apr 2023 17:03 )  PTT:51.7 sec

## 2023-04-16 NOTE — DIETITIAN INITIAL EVALUATION ADULT - OTHER INFO
Pt is a 70y/oF PMH lupus, lumbar stenosis, asthma, prior smoker presenting to ER with progressive SOB, cough, congestion x1 week. Found to be hypoxic 87% on RA, with multifocal PNA on CT chest and admitted. On 4/13, RRT called for rapid afib.   Acute hypoxic respiratory failure 2/2 multifactorial due to asthma exacerbation and multifactorial pna

## 2023-04-16 NOTE — DIETITIAN INITIAL EVALUATION ADULT - ORAL INTAKE PTA/DIET HISTORY
Pt reports having persistent lack of appetite over past few months, endorses losing over 60lbs unintentionally. Pt denies difficulty chewing or swallowing at this time. reports once a while dry food gets stuck in her throat, she drinks fluids between bites. Pt reports appetite fair since admission. Encouraged small bites and hBV protein foods. NFPE conducted.

## 2023-04-16 NOTE — DIETITIAN INITIAL EVALUATION ADULT - PERTINENT MEDS FT
MEDICATIONS  (STANDING):  aMIOdarone    Tablet 400 milliGRAM(s) Oral two times a day  aMIOdarone Infusion 0.999 mG/Min (33.3 mL/Hr) IV Continuous <Continuous>  aMIOdarone Infusion 0.5 mG/Min (16.7 mL/Hr) IV Continuous <Continuous>  apixaban 5 milliGRAM(s) Oral <User Schedule>  budesonide 160 MICROgram(s)/formoterol 4.5 MICROgram(s) Inhaler 2 Puff(s) Inhalation two times a day  dextrose 5%. 1000 milliLiter(s) (100 mL/Hr) IV Continuous <Continuous>  dextrose 5%. 1000 milliLiter(s) (50 mL/Hr) IV Continuous <Continuous>  dextrose 50% Injectable 25 Gram(s) IV Push once  dextrose 50% Injectable 12.5 Gram(s) IV Push once  dextrose 50% Injectable 25 Gram(s) IV Push once  diltiazem    Tablet 30 milliGRAM(s) Oral every 6 hours  doxycycline IVPB 100 milliGRAM(s) IV Intermittent every 12 hours  folic acid 1 milliGRAM(s) Oral daily  glucagon  Injectable 1 milliGRAM(s) IntraMuscular once  hydroxychloroquine 200 milliGRAM(s) Oral two times a day  insulin lispro (ADMELOG) corrective regimen sliding scale   SubCutaneous Before meals and at bedtime  levalbuterol Inhalation 0.63 milliGRAM(s) Inhalation every 6 hours  methylPREDNISolone sodium succinate Injectable 40 milliGRAM(s) IV Push every 12 hours  montelukast 10 milliGRAM(s) Oral daily  pantoprazole    Tablet 40 milliGRAM(s) Oral before breakfast  piperacillin/tazobactam IVPB.. 3.375 Gram(s) IV Intermittent every 8 hours  potassium phosphate / sodium phosphate Tablet (K-PHOS No. 2) 1 Tablet(s) Oral four times a day with meals    MEDICATIONS  (PRN):  acetaminophen     Tablet .. 650 milliGRAM(s) Oral every 6 hours PRN Temp greater or equal to 38C (100.4F), Mild Pain (1 - 3)  ALPRAZolam 0.25 milliGRAM(s) Oral every 8 hours PRN anxiety  aluminum hydroxide/magnesium hydroxide/simethicone Suspension 30 milliLiter(s) Oral every 4 hours PRN Dyspepsia  benzocaine/menthol Lozenge 1 Lozenge Oral every 2 hours PRN Sore Throat  dextrose Oral Gel 15 Gram(s) Oral once PRN Blood Glucose LESS THAN 70 milliGRAM(s)/deciliter  diltiazem Injectable 5 milliGRAM(s) IV Push every 8 hours PRN TACHY > 170  hydrALAZINE 10 milliGRAM(s) Oral every 8 hours PRN Systolic blood pressure >135  melatonin 3 milliGRAM(s) Oral at bedtime PRN Insomnia  ondansetron Injectable 4 milliGRAM(s) IV Push every 8 hours PRN Nausea and/or Vomiting  oxyCODONE    IR 10 milliGRAM(s) Oral every 6 hours PRN Severe Pain (7 - 10)

## 2023-04-16 NOTE — DIETITIAN INITIAL EVALUATION ADULT - NS FNS DIET ORDER
Diet, NPO after Midnight:      NPO Start Date: 16-Apr-2023,   NPO Start Time: 23:59 (04-16-23 @ 12:42)

## 2023-04-16 NOTE — DIETITIAN NUTRITION RISK NOTIFICATION - TREATMENT: THE FOLLOWING DIET HAS BEEN RECOMMENDED
Diet, NPO after Midnight:      NPO Start Date: 16-Apr-2023,   NPO Start Time: 23:59 (04-16-23 @ 12:42) [Active]  Diet, Regular:   DASH/TLC {Sodium & Cholesterol Restricted} (DASH) (04-14-23 @ 11:59) [Active]

## 2023-04-17 ENCOUNTER — TRANSCRIPTION ENCOUNTER (OUTPATIENT)
Age: 73
End: 2023-04-17

## 2023-04-17 LAB
ANION GAP SERPL CALC-SCNC: 11 MMOL/L — SIGNIFICANT CHANGE UP (ref 5–17)
BUN SERPL-MCNC: 28.1 MG/DL — HIGH (ref 8–20)
CALCIUM SERPL-MCNC: 8.6 MG/DL — SIGNIFICANT CHANGE UP (ref 8.4–10.5)
CHLORIDE SERPL-SCNC: 101 MMOL/L — SIGNIFICANT CHANGE UP (ref 96–108)
CO2 SERPL-SCNC: 26 MMOL/L — SIGNIFICANT CHANGE UP (ref 22–29)
CREAT SERPL-MCNC: 0.7 MG/DL — SIGNIFICANT CHANGE UP (ref 0.5–1.3)
EGFR: 91 ML/MIN/1.73M2 — SIGNIFICANT CHANGE UP
GLUCOSE BLDC GLUCOMTR-MCNC: 117 MG/DL — HIGH (ref 70–99)
GLUCOSE BLDC GLUCOMTR-MCNC: 122 MG/DL — HIGH (ref 70–99)
GLUCOSE BLDC GLUCOMTR-MCNC: 150 MG/DL — HIGH (ref 70–99)
GLUCOSE SERPL-MCNC: 146 MG/DL — HIGH (ref 70–99)
HCT VFR BLD CALC: 36.8 % — SIGNIFICANT CHANGE UP (ref 34.5–45)
HGB BLD-MCNC: 11.8 G/DL — SIGNIFICANT CHANGE UP (ref 11.5–15.5)
MAGNESIUM SERPL-MCNC: 1.9 MG/DL — SIGNIFICANT CHANGE UP (ref 1.6–2.6)
MCHC RBC-ENTMCNC: 27.9 PG — SIGNIFICANT CHANGE UP (ref 27–34)
MCHC RBC-ENTMCNC: 32.1 GM/DL — SIGNIFICANT CHANGE UP (ref 32–36)
MCV RBC AUTO: 87 FL — SIGNIFICANT CHANGE UP (ref 80–100)
PLATELET # BLD AUTO: 339 K/UL — SIGNIFICANT CHANGE UP (ref 150–400)
POTASSIUM SERPL-MCNC: 4.7 MMOL/L — SIGNIFICANT CHANGE UP (ref 3.5–5.3)
POTASSIUM SERPL-SCNC: 4.7 MMOL/L — SIGNIFICANT CHANGE UP (ref 3.5–5.3)
RBC # BLD: 4.23 M/UL — SIGNIFICANT CHANGE UP (ref 3.8–5.2)
RBC # FLD: 13.8 % — SIGNIFICANT CHANGE UP (ref 10.3–14.5)
SODIUM SERPL-SCNC: 138 MMOL/L — SIGNIFICANT CHANGE UP (ref 135–145)
WBC # BLD: 10.96 K/UL — HIGH (ref 3.8–10.5)
WBC # FLD AUTO: 10.96 K/UL — HIGH (ref 3.8–10.5)

## 2023-04-17 PROCEDURE — 93325 DOPPLER ECHO COLOR FLOW MAPG: CPT | Mod: 26

## 2023-04-17 PROCEDURE — 93010 ELECTROCARDIOGRAM REPORT: CPT

## 2023-04-17 PROCEDURE — 92960 CARDIOVERSION ELECTRIC EXT: CPT

## 2023-04-17 PROCEDURE — 99232 SBSQ HOSP IP/OBS MODERATE 35: CPT

## 2023-04-17 PROCEDURE — 93312 ECHO TRANSESOPHAGEAL: CPT | Mod: 26

## 2023-04-17 PROCEDURE — 93320 DOPPLER ECHO COMPLETE: CPT | Mod: 26

## 2023-04-17 RX ORDER — AMIODARONE HYDROCHLORIDE 400 MG/1
400 TABLET ORAL
Refills: 0 | Status: DISCONTINUED | OUTPATIENT
Start: 2023-04-17 | End: 2023-04-18

## 2023-04-17 RX ORDER — AMIODARONE HYDROCHLORIDE 400 MG/1
200 TABLET ORAL DAILY
Refills: 0 | Status: CANCELLED | OUTPATIENT
Start: 2023-04-21 | End: 2023-04-18

## 2023-04-17 RX ADMIN — OXYCODONE HYDROCHLORIDE 10 MILLIGRAM(S): 5 TABLET ORAL at 21:15

## 2023-04-17 RX ADMIN — OXYCODONE HYDROCHLORIDE 10 MILLIGRAM(S): 5 TABLET ORAL at 06:30

## 2023-04-17 RX ADMIN — Medication 200 MILLIGRAM(S): at 05:25

## 2023-04-17 RX ADMIN — Medication 200 MILLIGRAM(S): at 17:48

## 2023-04-17 RX ADMIN — Medication 1 MILLIGRAM(S): at 15:05

## 2023-04-17 RX ADMIN — OXYCODONE HYDROCHLORIDE 10 MILLIGRAM(S): 5 TABLET ORAL at 16:00

## 2023-04-17 RX ADMIN — OXYCODONE HYDROCHLORIDE 10 MILLIGRAM(S): 5 TABLET ORAL at 00:49

## 2023-04-17 RX ADMIN — AMIODARONE HYDROCHLORIDE 400 MILLIGRAM(S): 400 TABLET ORAL at 17:52

## 2023-04-17 RX ADMIN — PIPERACILLIN AND TAZOBACTAM 25 GRAM(S): 4; .5 INJECTION, POWDER, LYOPHILIZED, FOR SOLUTION INTRAVENOUS at 05:27

## 2023-04-17 RX ADMIN — Medication 100 MILLIGRAM(S): at 05:27

## 2023-04-17 RX ADMIN — OXYCODONE HYDROCHLORIDE 10 MILLIGRAM(S): 5 TABLET ORAL at 15:03

## 2023-04-17 RX ADMIN — Medication 30 MILLIGRAM(S): at 20:18

## 2023-04-17 RX ADMIN — APIXABAN 5 MILLIGRAM(S): 2.5 TABLET, FILM COATED ORAL at 05:26

## 2023-04-17 RX ADMIN — PIPERACILLIN AND TAZOBACTAM 25 GRAM(S): 4; .5 INJECTION, POWDER, LYOPHILIZED, FOR SOLUTION INTRAVENOUS at 21:15

## 2023-04-17 RX ADMIN — BUDESONIDE AND FORMOTEROL FUMARATE DIHYDRATE 2 PUFF(S): 160; 4.5 AEROSOL RESPIRATORY (INHALATION) at 21:20

## 2023-04-17 RX ADMIN — MONTELUKAST 10 MILLIGRAM(S): 4 TABLET, CHEWABLE ORAL at 15:05

## 2023-04-17 RX ADMIN — BUDESONIDE AND FORMOTEROL FUMARATE DIHYDRATE 2 PUFF(S): 160; 4.5 AEROSOL RESPIRATORY (INHALATION) at 10:01

## 2023-04-17 RX ADMIN — OXYCODONE HYDROCHLORIDE 10 MILLIGRAM(S): 5 TABLET ORAL at 05:49

## 2023-04-17 RX ADMIN — Medication 1 TABLET(S): at 21:15

## 2023-04-17 RX ADMIN — Medication 1 TABLET(S): at 17:52

## 2023-04-17 RX ADMIN — APIXABAN 5 MILLIGRAM(S): 2.5 TABLET, FILM COATED ORAL at 17:48

## 2023-04-17 RX ADMIN — OXYCODONE HYDROCHLORIDE 10 MILLIGRAM(S): 5 TABLET ORAL at 22:00

## 2023-04-17 RX ADMIN — Medication 1 TABLET(S): at 15:05

## 2023-04-17 RX ADMIN — BENZOCAINE AND MENTHOL 1 LOZENGE: 5; 1 LIQUID ORAL at 01:06

## 2023-04-17 RX ADMIN — Medication 100 MILLIGRAM(S): at 19:19

## 2023-04-17 RX ADMIN — AMIODARONE HYDROCHLORIDE 400 MILLIGRAM(S): 400 TABLET ORAL at 05:25

## 2023-04-17 RX ADMIN — Medication 30 MILLIGRAM(S): at 15:05

## 2023-04-17 RX ADMIN — Medication 30 MILLIGRAM(S): at 05:26

## 2023-04-17 RX ADMIN — PIPERACILLIN AND TAZOBACTAM 25 GRAM(S): 4; .5 INJECTION, POWDER, LYOPHILIZED, FOR SOLUTION INTRAVENOUS at 15:04

## 2023-04-17 RX ADMIN — Medication 40 MILLIGRAM(S): at 05:26

## 2023-04-17 NOTE — PROGRESS NOTE ADULT - SUBJECTIVE AND OBJECTIVE BOX
Pt doing well s/p uncomplicated NORTH & DCCV (200Jx1). Pt sleepy post anesthesia but reports no complaints.     Exam:   VSS, NAD, A&O x 3  Skin: no erythema/edema/blistering at defib pad sites.   Card: S1/S2, RRR, no m/g/r  Resp: lungs CTA b/l  Abd: S/NT/ND  Ext: no edema, distal pulses intact    EKG: Pending  NORTH: Moderate to severe AS. No HAMIDA thrombus. No PFO.       Assessment:   72 year old female patient with a history of Lupus, asthma/emphysema, who is admitted with multifocal PNA, found to have new AF with RVR. Pt is now status post NORTH negative for HAMIDA thrombus and uncomplicated DCCV (200J x1) with restoration of sinus rhythm. Pt sleepy post anesthesia but reports no complaints at time of assessment.     Plan:   Observation on telemetry per post op protocol.    Resume PO intake.   Ambulate w/ assist once fully awake & back to baseline mental status w/ VSS.  Continue Eliquis 5mg Q12HR  Importance of strict compliance with anticoagulation regimen reinforced with pt.   c/w Amiodarone 400mg BID for a total of 7 days (14doses). Currently recieved dose 6 of 14.   Start Amiodarone 200mg QD following completion of loading dose  TFTs & LFTs should be monitored q 3-6 months while on amiodarone therapy. Anticipate <1 year of amiodarone therapy for this patient; however if > 1 year, patient will need annual fundoscopic exam and PFTs. Patient is advised of associated risks and need for monitoring; all questions answered to pt's expressed understanding.   c/w Diltiazem 30mg Q6HR, plan to convert to 120mg extended release at time of discharge  Resume other home medications.   Outpt f/up with Dr. Caldera in 4 weeks.     Pt doing well s/p uncomplicated NORTH & DCCV (200Jx1). Pt sleepy post anesthesia but reports no complaints.     Exam:   VSS, NAD, A&O x 3  Skin: no erythema/edema/blistering at defib pad sites.   Card: S1/S2, RRR, no m/g/r  Resp: lungs CTA b/l  Abd: S/NT/ND  Ext: no edema, distal pulses intact    EKG: SR with intact conduction  NORTH: Moderate to severe AS. No HAMIDA thrombus. No PFO.       Assessment:   72 year old female patient with a history of Lupus, asthma/emphysema, who is admitted with multifocal PNA, found to have new AF with RVR. Pt is now status post NORTH negative for HAMDIA thrombus and uncomplicated DCCV (200J x1) with restoration of sinus rhythm. Pt sleepy post anesthesia but reports no complaints at time of assessment.     Plan:   Observation on telemetry per post op protocol.    Resume PO intake.   Ambulate w/ assist once fully awake & back to baseline mental status w/ VSS.  Continue Eliquis 5mg Q12HR  Importance of strict compliance with anticoagulation regimen reinforced with pt.   c/w Amiodarone 400mg BID for a total of 7 days (14doses). Currently recieved dose 6 of 14.   Start Amiodarone 200mg QD following completion of loading dose  TFTs & LFTs should be monitored q 3-6 months while on amiodarone therapy. Anticipate <1 year of amiodarone therapy for this patient; however if > 1 year, patient will need annual fundoscopic exam and PFTs. Patient is advised of associated risks and need for monitoring; all questions answered to pt's expressed understanding.   c/w Diltiazem 30mg Q6HR, plan to convert to 120mg extended release at time of discharge  Resume other home medications.   Outpt f/up with Dr. Caldera in 4 weeks.

## 2023-04-17 NOTE — DISCHARGE NOTE PROVIDER - HOSPITAL COURSE
72y/oF PMH lupus, lumbar stenosis, asthma, prior smoker presenting to ER with progressive SOB, cough, congestion x1 week. Found to be hypoxic 87% on RA, with multifocal PNA on CT chest and admitted. RVP negative.  Blood cultures negative.  Urine legionella AG negative.  Procalcitonin 0.34.  Patient treated with IV antibiotics and IV steroids.  Patient transitioned to PO antibiotics to complete course and steroid taper orally.  On 4/13, RRT called for rapid afib. Cardiology and EP following.  Patient treated with IV Amiodarone gtt and then transitioned to Amiodarone load.  Patient also received IV Heparin gtt and then transitioned to PO Eliquis.  Patient underwent DCCV 4/17/23 and currently in NSR.  Patient stable for discharge to home with outpatient follow up with primary doctor and cardiology.      Vital Signs Last 24 Hrs  T(C): 36.7 (18 Apr 2023 08:23), Max: 36.8 (17 Apr 2023 20:00)  T(F): 98.1 (18 Apr 2023 08:23), Max: 98.2 (17 Apr 2023 20:00)  HR: 69 (18 Apr 2023 10:00) (53 - 98)  BP: 111/50 (18 Apr 2023 10:00) (104/58 - 132/68)  BP(mean): 66 (18 Apr 2023 10:00) (66 - 91)  RR: 18 (18 Apr 2023 10:00) (12 - 21)  SpO2: 96% (18 Apr 2023 10:00) (93% - 98%)    Parameters below as of 18 Apr 2023 10:00  Patient On (Oxygen Delivery Method): room air    PHYSICAL EXAM:  GENERAL: NAD  HEAD:  Atraumatic, Normocephalic  NECK: Supple, No JVD, Normal thyroid  NERVOUS SYSTEM:  Alert & Oriented X3, Good concentration; Motor Strength 5/5 B/L upper and lower extremities  CHEST/LUNG: Diminished BS bilaterally  HEART: Regular rate and rhythm; No murmurs, rubs, or gallops  ABDOMEN: Soft, Nontender, Nondistended; Bowel sounds present  EXTREMITIES:  2+ Peripheral Pulses, No clubbing, cyanosis, or edema

## 2023-04-17 NOTE — DISCHARGE NOTE PROVIDER - CARE PROVIDER_API CALL
Jose Caldera)  Cardiac Electrophysiology; Cardiovascular Disease; Internal Medicine  [address] [place] [address]  Phone: [phone]  Fax: [phone]  Follow Up Time:    Jose Caldera)  Cardiac Electrophysiology; Cardiovascular Disease; Internal Medicine  [address] [place] [address]  Phone: [phone]  Fax: [phone]  Follow Up Time:     Primary doctor,   Phone: (   )    -  Fax: (   )    -  Follow Up Time:

## 2023-04-17 NOTE — PROGRESS NOTE ADULT - NUTRITIONAL ASSESSMENT
This patient has been assessed with a concern for Malnutrition and has been determined to have a diagnosis/diagnoses of Severe protein-calorie malnutrition.    This patient is being managed with:   Diet Regular-  DASH/TLC {Sodium & Cholesterol Restricted} (DASH)  Entered: Apr 14 2023 11:59AM

## 2023-04-17 NOTE — DISCHARGE NOTE PROVIDER - NSDCCPCAREPLAN_GEN_ALL_CORE_FT
PRINCIPAL DISCHARGE DIAGNOSIS  Diagnosis: Multifocal pneumonia  Assessment and Plan of Treatment: Complete antibiotic course.  Follow up with primary doctor.      SECONDARY DISCHARGE DIAGNOSES  Diagnosis: Atrial fibrillation with RVR  Assessment and Plan of Treatment: Continue current medications as prescribed.  Follow up with primary doctor and cardiology.    Diagnosis: COPD exacerbation  Assessment and Plan of Treatment: Complete antibiotic course.  Complete steroid taper.  Follow up with primary doctor.

## 2023-04-17 NOTE — DISCHARGE NOTE PROVIDER - NSDCCPTREATMENT_GEN_ALL_CORE_FT
PRINCIPAL PROCEDURE  Procedure: Transesophageal echocardiography (NORTH) with external cardioverison  Findings and Treatment: -Take each dose of your anticoagulation medication (blood thinner) exactly as prescribed.   -Resume your diet with soft foods first.  - Do not drive, operate heavy machinery or make important decisions for 24 hours following the procedure.  - You may resume all other activities the day after the procedure.  Call your doctor if:   -you develop a fever, cough up blood, have any chest pain, palpitations or difficulty breathing. You may take a throat lozenge if you develop a sore throat or try warm salt water gargle.   - your rapid heart rhythm returns.  - you have any questions or concerns regarding the procedure.  If you experience increased difficulty breathing or chest pain, or if you faint, have dizzy spells, or any other distressing symptom, please seek immediate medical attention.

## 2023-04-17 NOTE — DISCHARGE NOTE PROVIDER - NSDCFUADDINST_GEN_ALL_CORE_FT
Follow up with Dr. Caldera in 2 - 4 weeks. Our office will contact you in 3-5 days to schedule this appointment. Please call [phone] with questions or concerns.

## 2023-04-17 NOTE — DISCHARGE NOTE PROVIDER - ATTENDING DISCHARGE PHYSICAL EXAMINATION:
Vital Signs Last 24 Hrs  T(C): 36.7 (18 Apr 2023 08:23), Max: 36.8 (17 Apr 2023 20:00)  T(F): 98.1 (18 Apr 2023 08:23), Max: 98.2 (17 Apr 2023 20:00)  HR: 69 (18 Apr 2023 10:00) (53 - 98)  BP: 111/50 (18 Apr 2023 10:00) (104/58 - 132/68)  BP(mean): 66 (18 Apr 2023 10:00) (66 - 91)  RR: 18 (18 Apr 2023 10:00) (12 - 21)  SpO2: 96% (18 Apr 2023 10:00) (93% - 98%)    Parameters below as of 18 Apr 2023 10:00  Patient On (Oxygen Delivery Method): room air    GENERAL: NAD  HEENT: +EOMI  NECK: soft, supple  CHEST/LUNG: b/l rales; respirations unlabored on RA   HEART: S1S2+, RRR  ABDOMEN: Soft, Nontender, Nondistended; Bowel sounds present  SKIN: warm, dry  NEURO: Awake, alert; grossly non-focal   PSYCH: normal affect

## 2023-04-17 NOTE — DISCHARGE NOTE PROVIDER - NSDCMRMEDTOKEN_GEN_ALL_CORE_FT
albuterol 2.5 mg/3 mL (0.083%) inhalation solution: 3 by nebulizer 4 times a day as needed for  shortness of breath and/or wheezing  albuterol 90 mcg/inh inhalation aerosol: 2 puff(s) inhaled every 4-6 hours as needed.  folic acid 1 mg oral tablet: 1 tab(s) orally once a day  hydroxychloroquine 200 mg oral tablet: 1 tab(s) orally once a day  montelukast 10 mg oral tablet: 1 tab(s) orally once a day  oxyCODONE 10 mg oral tablet: 1 tab(s) orally every 8 hours, As Needed  pantoprazole 40 mg oral delayed release tablet: 1 tab(s) orally once a day (before a meal)  Symbicort 160 mcg-4.5 mcg/inh inhalation aerosol: 2 puff(s) inhaled 2 times a day  Xanax 0.25 mg oral tablet: 1 orally 3 times a day as needed for  anxiety   albuterol 2.5 mg/3 mL (0.083%) inhalation solution: 3 by nebulizer 4 times a day as needed for  shortness of breath and/or wheezing  albuterol 90 mcg/inh inhalation aerosol: 2 puff(s) inhaled every 4-6 hours as needed.  amiodarone 200 mg oral tablet: 2 tab(s) orally 2 times a day x6more doses and then 200mg PO daily  apixaban 5 mg oral tablet: 1 tab(s) orally 2 times a day  dilTIAZem 120 mg/12 hours oral capsule, extended release: 1 cap(s) orally once a day  doxycycline monohydrate 100 mg oral tablet: 1 tab(s) orally 2 times a day  folic acid 1 mg oral tablet: 1 tab(s) orally once a day  hydroxychloroquine 200 mg oral tablet: 1 tab(s) orally once a day  montelukast 10 mg oral tablet: 1 tab(s) orally once a day  oxyCODONE 10 mg oral tablet: 1 tab(s) orally every 8 hours, As Needed  pantoprazole 40 mg oral delayed release tablet: 1 tab(s) orally once a day (before a meal)  predniSONE 10 mg oral tablet: 4 tab(s) orally once a day 40mg PO daily x2days, then 30mg PO daily x2days, then 20mg PO daily x2days and then 10mg PO daily x2days  Symbicort 160 mcg-4.5 mcg/inh inhalation aerosol: 2 puff(s) inhaled 2 times a day  Xanax 0.25 mg oral tablet: 1 orally 3 times a day as needed for  anxiety

## 2023-04-17 NOTE — PROGRESS NOTE ADULT - SUBJECTIVE AND OBJECTIVE BOX
CC: Respiratory failure ,PNA (17 Apr 2023 12:25)    HPI:  71 y/o F pt with significant PMHx of Lupus, Lumbar Stenosis, asthma, prior smoker presents to ER for progressive shortness of breath, cough and congestion x1 week.     INTERVAL HPI/OVERNIGHT EVENTS: Patient seen and examined sitting up in bed.  Patient s/p DCCV.  SOB improved.  Patient denies any headache, dizziness, CP, abdominal pain, nausea, vomiting, dysuria.  Other ROS reviewed and are negative.    Vital Signs Last 24 Hrs  T(C): 36.7 (17 Apr 2023 10:24), Max: 36.8 (16 Apr 2023 15:30)  T(F): 98.1 (17 Apr 2023 10:24), Max: 98.3 (16 Apr 2023 15:30)  HR: 72 (17 Apr 2023 15:10) (62 - 143)  BP: 112/65 (17 Apr 2023 15:10) (101/75 - 128/59)  BP(mean): 72 (17 Apr 2023 15:10) (72 - 83)  RR: 21 (17 Apr 2023 15:10) (11 - 21)  SpO2: 98% (17 Apr 2023 15:10) (92% - 98%)    Parameters below as of 17 Apr 2023 15:10  Patient On (Oxygen Delivery Method): room air      I&O's Detail    16 Apr 2023 07:01  -  17 Apr 2023 07:00  --------------------------------------------------------  IN:    IV PiggyBack: 100 mL    IV PiggyBack: 100 mL    IV PiggyBack: 100 mL  Total IN: 300 mL    OUT:  Total OUT: 0 mL    Total NET: 300 mL    PHYSICAL EXAM:  GENERAL: NAD  HEAD:  Atraumatic, Normocephalic  NECK: Supple, No JVD, Normal thyroid  NERVOUS SYSTEM:  Alert & Oriented X3, Good concentration; Motor Strength 5/5 B/L upper and lower extremities  CHEST/LUNG: Bibasilar crackles  HEART: Regular rate and rhythm; No murmurs, rubs, or gallops  ABDOMEN: Soft, Nontender, Nondistended; Bowel sounds present  EXTREMITIES:  2+ Peripheral Pulses, No clubbing, cyanosis, or edema                              11.8   10.96 )-----------( 339      ( 17 Apr 2023 06:28 )             36.8     17 Apr 2023 06:28    138    |  101    |  28.1   ----------------------------<  146    4.7     |  26.0   |  0.70     Ca    8.6        17 Apr 2023 06:28  Mg     1.9       17 Apr 2023 06:28      PTT - ( 15 Apr 2023 17:03 )  PTT:51.7 sec    CAPILLARY BLOOD GLUCOSE  POCT Blood Glucose.: 150 mg/dL (17 Apr 2023 15:10)  POCT Blood Glucose.: 122 mg/dL (17 Apr 2023 08:12)  POCT Blood Glucose.: 162 mg/dL (16 Apr 2023 21:21)  POCT Blood Glucose.: 151 mg/dL (16 Apr 2023 17:27)          MEDICATIONS  (STANDING):  aMIOdarone    Tablet 400 milliGRAM(s) Oral two times a day  aMIOdarone Infusion 0.5 mG/Min (16.7 mL/Hr) IV Continuous <Continuous>  aMIOdarone Infusion 0.999 mG/Min (33.3 mL/Hr) IV Continuous <Continuous>  apixaban 5 milliGRAM(s) Oral <User Schedule>  budesonide 160 MICROgram(s)/formoterol 4.5 MICROgram(s) Inhaler 2 Puff(s) Inhalation two times a day  dextrose 5%. 1000 milliLiter(s) (50 mL/Hr) IV Continuous <Continuous>  dextrose 5%. 1000 milliLiter(s) (100 mL/Hr) IV Continuous <Continuous>  dextrose 50% Injectable 25 Gram(s) IV Push once  dextrose 50% Injectable 12.5 Gram(s) IV Push once  dextrose 50% Injectable 25 Gram(s) IV Push once  diltiazem    Tablet 30 milliGRAM(s) Oral every 6 hours  doxycycline IVPB 100 milliGRAM(s) IV Intermittent every 12 hours  folic acid 1 milliGRAM(s) Oral daily  glucagon  Injectable 1 milliGRAM(s) IntraMuscular once  hydroxychloroquine 200 milliGRAM(s) Oral two times a day  insulin lispro (ADMELOG) corrective regimen sliding scale   SubCutaneous Before meals and at bedtime  montelukast 10 milliGRAM(s) Oral daily  pantoprazole    Tablet 40 milliGRAM(s) Oral before breakfast  piperacillin/tazobactam IVPB.. 3.375 Gram(s) IV Intermittent every 8 hours  potassium phosphate / sodium phosphate Tablet (K-PHOS No. 2) 1 Tablet(s) Oral four times a day with meals  predniSONE   Tablet 40 milliGRAM(s) Oral daily    MEDICATIONS  (PRN):  acetaminophen     Tablet .. 650 milliGRAM(s) Oral every 6 hours PRN Temp greater or equal to 38C (100.4F), Mild Pain (1 - 3)  ALPRAZolam 0.25 milliGRAM(s) Oral every 8 hours PRN anxiety  aluminum hydroxide/magnesium hydroxide/simethicone Suspension 30 milliLiter(s) Oral every 4 hours PRN Dyspepsia  benzocaine/menthol Lozenge 1 Lozenge Oral every 2 hours PRN Sore Throat  dextrose Oral Gel 15 Gram(s) Oral once PRN Blood Glucose LESS THAN 70 milliGRAM(s)/deciliter  diltiazem Injectable 5 milliGRAM(s) IV Push every 8 hours PRN TACHY > 170  hydrALAZINE 10 milliGRAM(s) Oral every 8 hours PRN Systolic blood pressure >135  melatonin 3 milliGRAM(s) Oral at bedtime PRN Insomnia  ondansetron Injectable 4 milliGRAM(s) IV Push every 8 hours PRN Nausea and/or Vomiting  oxyCODONE    IR 10 milliGRAM(s) Oral every 6 hours PRN Severe Pain (7 - 10)

## 2023-04-18 ENCOUNTER — TRANSCRIPTION ENCOUNTER (OUTPATIENT)
Age: 73
End: 2023-04-18

## 2023-04-18 VITALS
DIASTOLIC BLOOD PRESSURE: 63 MMHG | HEART RATE: 72 BPM | TEMPERATURE: 98 F | SYSTOLIC BLOOD PRESSURE: 119 MMHG | OXYGEN SATURATION: 98 % | RESPIRATION RATE: 16 BRPM

## 2023-04-18 LAB
CULTURE RESULTS: SIGNIFICANT CHANGE UP
CULTURE RESULTS: SIGNIFICANT CHANGE UP
GLUCOSE BLDC GLUCOMTR-MCNC: 226 MG/DL — HIGH (ref 70–99)
GLUCOSE BLDC GLUCOMTR-MCNC: 98 MG/DL — SIGNIFICANT CHANGE UP (ref 70–99)
HCT VFR BLD CALC: 36.8 % — SIGNIFICANT CHANGE UP (ref 34.5–45)
HGB BLD-MCNC: 11.8 G/DL — SIGNIFICANT CHANGE UP (ref 11.5–15.5)
MCHC RBC-ENTMCNC: 28 PG — SIGNIFICANT CHANGE UP (ref 27–34)
MCHC RBC-ENTMCNC: 32.1 GM/DL — SIGNIFICANT CHANGE UP (ref 32–36)
MCV RBC AUTO: 87.4 FL — SIGNIFICANT CHANGE UP (ref 80–100)
PLATELET # BLD AUTO: 331 K/UL — SIGNIFICANT CHANGE UP (ref 150–400)
RBC # BLD: 4.21 M/UL — SIGNIFICANT CHANGE UP (ref 3.8–5.2)
RBC # FLD: 13.9 % — SIGNIFICANT CHANGE UP (ref 10.3–14.5)
SPECIMEN SOURCE: SIGNIFICANT CHANGE UP
SPECIMEN SOURCE: SIGNIFICANT CHANGE UP
WBC # BLD: 12.18 K/UL — HIGH (ref 3.8–10.5)
WBC # FLD AUTO: 12.18 K/UL — HIGH (ref 3.8–10.5)

## 2023-04-18 PROCEDURE — 93320 DOPPLER ECHO COMPLETE: CPT

## 2023-04-18 PROCEDURE — 87899 AGENT NOS ASSAY W/OPTIC: CPT

## 2023-04-18 PROCEDURE — 84443 ASSAY THYROID STIM HORMONE: CPT

## 2023-04-18 PROCEDURE — 84132 ASSAY OF SERUM POTASSIUM: CPT

## 2023-04-18 PROCEDURE — 93325 DOPPLER ECHO COLOR FLOW MAPG: CPT

## 2023-04-18 PROCEDURE — 83880 ASSAY OF NATRIURETIC PEPTIDE: CPT

## 2023-04-18 PROCEDURE — 82803 BLOOD GASES ANY COMBINATION: CPT

## 2023-04-18 PROCEDURE — 84436 ASSAY OF TOTAL THYROXINE: CPT

## 2023-04-18 PROCEDURE — 87641 MR-STAPH DNA AMP PROBE: CPT

## 2023-04-18 PROCEDURE — 82947 ASSAY GLUCOSE BLOOD QUANT: CPT

## 2023-04-18 PROCEDURE — 71045 X-RAY EXAM CHEST 1 VIEW: CPT

## 2023-04-18 PROCEDURE — 85025 COMPLETE CBC W/AUTO DIFF WBC: CPT

## 2023-04-18 PROCEDURE — 82435 ASSAY OF BLOOD CHLORIDE: CPT

## 2023-04-18 PROCEDURE — 84145 PROCALCITONIN (PCT): CPT

## 2023-04-18 PROCEDURE — 84100 ASSAY OF PHOSPHORUS: CPT

## 2023-04-18 PROCEDURE — 82330 ASSAY OF CALCIUM: CPT

## 2023-04-18 PROCEDURE — 83036 HEMOGLOBIN GLYCOSYLATED A1C: CPT

## 2023-04-18 PROCEDURE — 80061 LIPID PANEL: CPT

## 2023-04-18 PROCEDURE — 99239 HOSP IP/OBS DSCHRG MGMT >30: CPT

## 2023-04-18 PROCEDURE — 99285 EMERGENCY DEPT VISIT HI MDM: CPT

## 2023-04-18 PROCEDURE — 87449 NOS EACH ORGANISM AG IA: CPT

## 2023-04-18 PROCEDURE — 85014 HEMATOCRIT: CPT

## 2023-04-18 PROCEDURE — 85018 HEMOGLOBIN: CPT

## 2023-04-18 PROCEDURE — 85027 COMPLETE CBC AUTOMATED: CPT

## 2023-04-18 PROCEDURE — 93005 ELECTROCARDIOGRAM TRACING: CPT

## 2023-04-18 PROCEDURE — 85610 PROTHROMBIN TIME: CPT

## 2023-04-18 PROCEDURE — 84484 ASSAY OF TROPONIN QUANT: CPT

## 2023-04-18 PROCEDURE — 94760 N-INVAS EAR/PLS OXIMETRY 1: CPT

## 2023-04-18 PROCEDURE — 99232 SBSQ HOSP IP/OBS MODERATE 35: CPT

## 2023-04-18 PROCEDURE — 81001 URINALYSIS AUTO W/SCOPE: CPT

## 2023-04-18 PROCEDURE — 87040 BLOOD CULTURE FOR BACTERIA: CPT

## 2023-04-18 PROCEDURE — 83735 ASSAY OF MAGNESIUM: CPT

## 2023-04-18 PROCEDURE — 71275 CT ANGIOGRAPHY CHEST: CPT | Mod: MA

## 2023-04-18 PROCEDURE — 87640 STAPH A DNA AMP PROBE: CPT

## 2023-04-18 PROCEDURE — 93312 ECHO TRANSESOPHAGEAL: CPT

## 2023-04-18 PROCEDURE — 85730 THROMBOPLASTIN TIME PARTIAL: CPT

## 2023-04-18 PROCEDURE — 83605 ASSAY OF LACTIC ACID: CPT

## 2023-04-18 PROCEDURE — 94640 AIRWAY INHALATION TREATMENT: CPT

## 2023-04-18 PROCEDURE — 80048 BASIC METABOLIC PNL TOTAL CA: CPT

## 2023-04-18 PROCEDURE — 36415 COLL VENOUS BLD VENIPUNCTURE: CPT

## 2023-04-18 PROCEDURE — 92960 CARDIOVERSION ELECTRIC EXT: CPT

## 2023-04-18 PROCEDURE — 84480 ASSAY TRIIODOTHYRONINE (T3): CPT

## 2023-04-18 PROCEDURE — 80053 COMPREHEN METABOLIC PANEL: CPT

## 2023-04-18 PROCEDURE — C8929: CPT

## 2023-04-18 PROCEDURE — 84295 ASSAY OF SERUM SODIUM: CPT

## 2023-04-18 PROCEDURE — 82962 GLUCOSE BLOOD TEST: CPT

## 2023-04-18 PROCEDURE — 82040 ASSAY OF SERUM ALBUMIN: CPT

## 2023-04-18 PROCEDURE — 96374 THER/PROPH/DIAG INJ IV PUSH: CPT

## 2023-04-18 PROCEDURE — 0225U NFCT DS DNA&RNA 21 SARSCOV2: CPT

## 2023-04-18 RX ORDER — DILTIAZEM HCL 120 MG
1 CAPSULE, EXT RELEASE 24 HR ORAL
Qty: 30 | Refills: 0
Start: 2023-04-18

## 2023-04-18 RX ORDER — AMIODARONE HYDROCHLORIDE 400 MG/1
2 TABLET ORAL
Qty: 54 | Refills: 0
Start: 2023-04-18

## 2023-04-18 RX ORDER — APIXABAN 2.5 MG/1
1 TABLET, FILM COATED ORAL
Qty: 60 | Refills: 0
Start: 2023-04-18

## 2023-04-18 RX ADMIN — OXYCODONE HYDROCHLORIDE 10 MILLIGRAM(S): 5 TABLET ORAL at 05:56

## 2023-04-18 RX ADMIN — OXYCODONE HYDROCHLORIDE 10 MILLIGRAM(S): 5 TABLET ORAL at 06:45

## 2023-04-18 RX ADMIN — Medication 1 TABLET(S): at 12:35

## 2023-04-18 RX ADMIN — OXYCODONE HYDROCHLORIDE 10 MILLIGRAM(S): 5 TABLET ORAL at 12:37

## 2023-04-18 RX ADMIN — Medication 1 MILLIGRAM(S): at 12:35

## 2023-04-18 RX ADMIN — Medication 2: at 12:35

## 2023-04-18 RX ADMIN — PANTOPRAZOLE SODIUM 40 MILLIGRAM(S): 20 TABLET, DELAYED RELEASE ORAL at 05:57

## 2023-04-18 RX ADMIN — Medication 30 MILLIGRAM(S): at 12:35

## 2023-04-18 RX ADMIN — Medication 100 MILLIGRAM(S): at 10:41

## 2023-04-18 RX ADMIN — MONTELUKAST 10 MILLIGRAM(S): 4 TABLET, CHEWABLE ORAL at 12:41

## 2023-04-18 RX ADMIN — BUDESONIDE AND FORMOTEROL FUMARATE DIHYDRATE 2 PUFF(S): 160; 4.5 AEROSOL RESPIRATORY (INHALATION) at 09:14

## 2023-04-18 NOTE — DISCHARGE NOTE NURSING/CASE MANAGEMENT/SOCIAL WORK - NSDCFUADDAPPT_GEN_ALL_CORE_FT
Patient is on AI List.  You have a prescheduled appointment with your PCP, Dr. Novak, on 4/25/23 at 11:30am. Please call [phone] if you need to reschedule.    Follow up with cardiology.

## 2023-04-18 NOTE — PROGRESS NOTE ADULT - SUBJECTIVE AND OBJECTIVE BOX
[facility]  INFECTIOUS DISEASES at [place] and [place]  ===============================================================                               Bertrand Leyva MD*     Kenia Avila MD*                         Cristino Higgins MD*       Alisha Rodrigez MD*            Diplomates American Board of Internal Medicine & Infectious Diseases                * [place] Office - Appt - Tel  [phone] Fax [phone]                * [place] Office - Appt - Tel [phone] Fax [phone]                                  Hospital Consult line:  [phone]  ==============================================================    SAMIA HOUSERLANDA [id number]    Follow up: PNA     s/p cardioversion  on RA   afebrile    I have personally reviewed the labs and data; pertinent labs and data are listed in this note; please see below.     _______________________________________________________________  REVIEW OF SYSTEMS  vague complaints - feeling weak, shaky, anxious. Does not feel ready to go home yet. Loose stools but no bharti diarrhea.   ________________________________________________________________  Allergies:  aspirin (Other)  Sulfur (Rash)  Naprosyn (Other (Severe))        ________________________________________________________________  PHYSICAL EXAM  GEN: in NAD, lying in bed.   HEENT: Anicteric sclerae. Moist mucous membranes. No mucosal lesions.   LUNGS: eupneic. CTA B/L.  HEART: RRR, no m/r/g  ABDOMEN: Soft, NT, ND +BS.    :  No Mustafa catheter  EXTREMITIES: well perfused, without  edema.  NEUROLOGIC: Grossly no focal deficits   PSYCHIATRIC: Appropriate affect and mood  SKIN: No rash, wounds or jaundice  LINES: no central lines, only peripheral access c/d/i  ________________________________________________________________  Vitals:  T(F): 98.1 (18 Apr 2023 08:23), Max: 98.2 (17 Apr 2023 20:00)  HR: 72 (18 Apr 2023 12:00)  BP: 119/63 (18 Apr 2023 12:00)  RR: 16 (18 Apr 2023 12:00)  SpO2: 98% (18 Apr 2023 12:00) (93% - 98%)  temp max in last 48H T(F): , Max: 98.3 (04-16-23 @ 15:30)    Current Antibiotics:  hydroxychloroquine 200 milliGRAM(s) Oral two times a day  piperacillin/tazobactam IVPB.. 3.375 Gram(s) IV Intermittent every 8 hours    Other medications:  aMIOdarone    Tablet 400 milliGRAM(s) Oral two times a day  aMIOdarone Infusion 0.999 mG/Min IV Continuous <Continuous>  aMIOdarone Infusion 0.5 mG/Min IV Continuous <Continuous>  apixaban 5 milliGRAM(s) Oral <User Schedule>  budesonide 160 MICROgram(s)/formoterol 4.5 MICROgram(s) Inhaler 2 Puff(s) Inhalation two times a day  dextrose 5%. 1000 milliLiter(s) IV Continuous <Continuous>  dextrose 5%. 1000 milliLiter(s) IV Continuous <Continuous>  dextrose 50% Injectable 25 Gram(s) IV Push once  dextrose 50% Injectable 12.5 Gram(s) IV Push once  dextrose 50% Injectable 25 Gram(s) IV Push once  diltiazem    Tablet 30 milliGRAM(s) Oral every 6 hours  folic acid 1 milliGRAM(s) Oral daily  glucagon  Injectable 1 milliGRAM(s) IntraMuscular once  insulin lispro (ADMELOG) corrective regimen sliding scale   SubCutaneous Before meals and at bedtime  montelukast 10 milliGRAM(s) Oral daily  pantoprazole    Tablet 40 milliGRAM(s) Oral before breakfast  potassium phosphate / sodium phosphate Tablet (K-PHOS No. 2) 1 Tablet(s) Oral four times a day with meals  predniSONE   Tablet 40 milliGRAM(s) Oral daily                            11.8   12.18 )-----------( 331      ( 18 Apr 2023 05:38 )             36.8     04-17    138  |  101  |  28.1<H>  ----------------------------<  146<H>  4.7   |  26.0  |  0.70    Ca    8.6      17 Apr 2023 06:28  Mg     1.9     04-17      RECENT CULTURES:  04-13 @ 21:45 .Blood Blood-Peripheral     No growth to date.        04-13 @ 21:40 .Blood Blood-Peripheral     No growth to date.        04-13 @ 14:49 .Blood Blood     No growth to date.        04-13 @ 14:45 .Blood Blood     No growth to date.        04-13 @ 07:31    RVP with SARS-CoV-2   [facility]      WBC Count: 12.18 K/uL (04-18-23 @ 05:38)  WBC Count: 10.96 K/uL (04-17-23 @ 06:28)  WBC Count: 10.55 K/uL (04-16-23 @ 07:04)  WBC Count: 15.03 K/uL (04-15-23 @ 01:24)  WBC Count: 15.83 K/uL (04-14-23 @ 03:48)  WBC Count: 15.54 K/uL (04-13-23 @ 21:40)  WBC Count: 18.97 K/uL (04-13-23 @ 14:45)    Creatinine, Serum: 0.70 mg/dL (04-17-23 @ 06:28)  Creatinine, Serum: 0.82 mg/dL (04-16-23 @ 07:04)  Creatinine, Serum: 0.79 mg/dL (04-15-23 @ 01:24)  Creatinine, Serum: 0.85 mg/dL (04-14-23 @ 03:48)  Creatinine, Serum: 1.08 mg/dL (04-13-23 @ 21:40)  Creatinine, Serum: 1.04 mg/dL (04-13-23 @ 21:40)  Creatinine, Serum: 1.14 mg/dL (04-13-23 @ 16:57)      Procalcitonin, Serum: 0.29 ng/mL (04-14-23 @ 03:48)  Procalcitonin, Serum: 0.34 ng/mL (04-13-23 @ 21:40)     SARS-CoV-2: NotDetec (04-13-23 @ 07:31)    ________________________________________________________________  RADIOLOGY  < from: CT Angio Chest PE Protocol w/ IV Cont (04.13.23 @ 09:50) >  FINDINGS:    CTA: The contrast bolus is satisfactory. The study is degraded by   mild-moderate respiratory motion. There are no filling defects in the   pulmonary artery or its branches. The heart is normal in size. No   pericardial effusion. There are coronary artery calcifications. There is   mitral annular calcification. There is mild aortic valve calcification.   The aorta is normal in caliber    Lungs/Airways/Pleura: There are scattered centrilobular and branching   linear densities and ill-defined groundglass densities in both lungs.   There is branching tubular mucoid impaction in the posterior right lower   lobe. There is multifocal bronchial wall thickening and areas of mucoid   impaction. No pleural effusion.    Mediastinum/Lymph nodes: There are a few stable borderline enlarged   mediastinal and hilar lymph nodes.    Upper Abdomen: Coarse calcification in the liver.    Bones and Soft Tissues: There is diffuse qualitative osteopenia and mild   multilevel discogenic disease in the spine.    IMPRESSION:    1.  No pulmonary thromboembolism    2.  Findings suggestive of multifocal infectious process in both lungs    < end of copied text >

## 2023-04-18 NOTE — PROGRESS NOTE ADULT - PROVIDER SPECIALTY LIST ADULT
Hospitalist
Infectious Disease
Electrophysiology
Hospitalist
Cardiology
Electrophysiology
Electrophysiology
Hospitalist
Infectious Disease
Electrophysiology
Electrophysiology
Hospitalist

## 2023-04-18 NOTE — PROGRESS NOTE ADULT - NS ATTEND AMEND GEN_ALL_CORE FT
Successful cardioversion yesterday. Will continue with amiodarone, diltiazem and AC as above.
pt seen and examined. s/p dccv today. pt to dc home, likely next 24hrs
Follow up for Afib with RVR and now moderate to severe AS (with dimensionless index of 0.27). agree with above plan   with continued episodes of RVR  - TTE with preserved EF and mod-severe AS  - will work up AS as OP after resolution of infectious process   - Afib likely driven by multifocal pneumonia and metabolic derangement  - EP following, will continue amiodarone and diltiazem through weekend and plan for DCCV Monday if patient does not convert on her own  - continue heparin  - appreciate EP reccs    Abi Avina D.O. FAC  Cardiology/Vascular Cardiology -[facility]   Telephone # [phone]

## 2023-04-18 NOTE — PROGRESS NOTE ADULT - ASSESSMENT
70y/oF PMH lupus, lumbar stenosis, asthma, prior smoker presenting to ER with progressive SOB, cough, congestion x1 week. Found to be hypoxic 87% on RA, with multifocal PNA on CT chest and admitted. On 4/13, RRT called for rapid afib.     Acute hypoxic respiratory failure 2/2 multifactorial due to asthma exacerbation and multifactorial pna   -RVP neg   -blood cx ngtd   -legionella urine AG neg   -procalcitonin 0.34  -ID recs appreciated   -cont abx   -taper steroids   -nebs     afib rvr, new onset   -amio gtt  -cont po amio  -EP following   -cardizem   -s/p heparin gtt --> now on eliquis  -plan for dccv tomorrow 4/17 if pt does not convert with meds     SLE   chronic pain   -cont hydroxychloroquine   -cont home meds     GERD   -ppi    Nicotine dependence   -refusing patch   -cessation advised     vte ppx: eliquis  
72 year old female patient with a history of Lupus, asthma/emphysema/admitted with multifocal PNA.  Remains in AF  .   On amiodarone 400 mg bid loading. Amiodarone usage would be short term in her  Diltiazem 30 mg q6h  Eliquis 5 mg bid  Plan for NORTH/CV 
69 y/o F pt with significant PMHx of Lupus, Lumbar Stenosis, asthma, prior smoker presents to ER for progressive shortness of breath, cough and congestion x1 week. States progressive shortness of breath for weeks,  Found to hypoxic 87%RA. Pt states she was having runny nose,post nasal drip. seen by PCP given nasal saline, Progressive sob last 3 days,using nebs/inh more w/o much improvement. Ed found to so2 87% RA,CTA chest multifocal PNA. Negative viral panel. Had RRT on 4/13 for rapid afib    CAP  Leukocytosis  Rapid A fib  Lupus    - RVP (-)  - MRSA (-)  - BCX ngtd  - if feasible check sputum cx  - Legionella urine AG neg   - trep pnemo ag neg  - Procalcitonin 0.34  - Continue zosyn and doxycycline  - Afebrile  - Leukocytosis persists - on IV steroids        
69 y/o F pt with significant PMHx of Lupus, Lumbar Stenosis, asthma, prior smoker presents to ER for progressive shortness of breath, cough and congestion x1 week. States progressive shortness of breath for weeks,  Found to hypoxic 87%RA.. Ed found to so2 87% RA,CTA chest multifocal PNA. Negative viral panel.    Acute Hypoxic respiratory failure sec Asthma/  COPD Exacerbation 2/2 Pna likley Gram negative  - doxycyclie+ zosyn  - symbicort  - iv steroid  - Neb  -f/u Sputum/legionella/strep ag  -Bl c/s  -Monitor wbc/procal  - ID recs       chronic pain   - pt states she is on oxycodone 10 mg q6hr prn/ prn xanax 0.25 mg tid prn         SLE  - stable  - hydroxychloroquine    HTN   - monitor blood pressure   - Pt is not taking meds    HLD   - pt is not taking meds  -lipid panel    GERD  - protonix     Nicotine abuse  -refused patch  -counselled to quit smoking        afib rvr  started on amiodarone, dilatiazem, EP following   TTE  heparin gtt   -full code.  dw rn    daughter Chelo [phone])  Pt does not want us to call family
71 y/o F pt with significant PMHx of Lupus, Lumbar Stenosis, asthma, prior smoker presents to ER for progressive shortness of breath, cough and congestion x1 week. States progressive shortness of breath for weeks,  Found to hypoxic 87%RA. Pt states she was having runny nose,post nasal drip. seen by PCP given nasal saline, Progressive sob last 3 days,using nebs/inh more w/o much improvement. Ed found to so2 87% RA,CTA chest multifocal PNA. Negative viral panel. Had RRT on 4/13 for rapid afib    CAP  Leukocytosis  Rapid A fib  Lupus    - RVP (-)  - MRSA (-)  - BCX ngtd  - Unable to obtain sputum cx  - Legionella urine AG neg   - Strep pnemo ag neg  - Procalcitonin 0.34  - Afebrile  - Mild leukocytosis persists - on steroids  - s/p cardioversion for Afib with RV on 4/17 - now on NSR   - OK to discharge on doxycycline to complete 7 days of treatment     D/w team     Will call with questions 
72y/oF PMH lupus, lumbar stenosis, asthma, prior smoker presenting to ER with progressive SOB, cough, congestion x1 week. Found to be hypoxic 87% on RA, with multifocal PNA on CT chest and admitted. On 4/13, RRT called for rapid afib.     Acute hypoxic respiratory failure 2/2 multifactorial due to asthma exacerbation and multifactorial pna   -RVP neg   -blood cx ngtd   -legionella urine AG neg   -procalcitonin 0.34  -ID recs appreciated   -cont abx, will continue Doxycycline to complete 7day course   -taper steroids to Prednisone 40mg PO daily  -nebs     afib rvr, new onset   -s/p amio gtt  -cont po amio load and then transition to 200mg PO daily  -EP following   -cardizem   -s/p heparin gtt --> now on eliquis  -s/p dccv 4/17     SLE   chronic pain   -cont hydroxychloroquine   -cont home meds     GERD   -ppi    Nicotine dependence   -refusing patch   -cessation advised     vte ppx: eliquis  
The patient is a 72 year old female patient with a history of Lupus, asthma/emphysema, who is admitted with multifocal PNA. Yesterday noted to be in AF with RVR. BP low normal. Was started on IV amiodarone and diltiazem. Rate control appears better now with HRs in the 100s -120s. AF RVR likely driven by the pneumonia.     Recommendations:  - Continue IV amiodarone for a total of 24 hours and then change to oral amiodarone 400 mg bid  - Continue diltiazem 30 mg q6h, titrate up if HR persistently > 130 bpm  - Switch from IV heparin to eliquis 5 mg bid  - If patient does not convert to sinus rhythm over the weekend will plan for NORTH/DCCV on Monday if respiratory status is stable and pneumonia is resolving.     Will follow. Please call with questions.   
70y/oF PMH lupus, lumbar stenosis, asthma, prior smoker presenting to ER with progressive SOB, cough, congestion x1 week. Found to be hypoxic 87% on RA, with multifocal PNA on CT chest and admitted. On 4/13, RRT called for rapid afib.     Acute hypoxic respiratory failure 2/2 multifactorial due to asthma exacerbation and multifactorial pna   -RVP neg   -blood cx ngtd   -legionella urine AG neg   -procalcitonin 0.34  -ID recs appreciated   -taper steroids   -nebs     afib rvr, new onset   -amio gtt  -cont po amio  -EP following   -cardizem   -heparin gtt   -plan for dccv monday 4/17 if pt does not convert with meds     SLE   chronic pain   -cont hydroxychloroquine   -cont home meds     GERD   -ppi    Nicotine dependence   -refusing patch   -cessation advised     vte ppx: heparin gtt
72 year old female patient with a history of Lupus, asthma/emphysema, who is admitted with multifocal PNA. Yesterday noted to be in AF with RVR. BP low normal. Was started on IV amiodarone and diltiazem. Rate control appears better now with HRs in the 100s -120s, with salvos to 130s - 140s with activity/agitation. AF RVR likely driven by the pneumonia.     Recommendations:  - IV amiodarone load completed. Continue PO load amiodarone 400 mg bid   - Continue diltiazem 30 mg q6h, titrate up if HR persistently > 130 bpm  - Switch from IV heparin to eliquis 5 mg bid  - If patient does not convert to sinus rhythm over the weekend will plan for NORTH/DCCV on Monday if respiratory status is stable and pneumonia is resolving.   EP will follow.   
72y old  Female PMHx of Lupus, Lumbar Stenosis, asthma, active smoker presents with SOB, cough, chest tightness similar to her prior COPD flairs. Sent up to the floor and was an RRT for new afib RVR s/p amio 150 IVP, IV lopressor and IV cardizem.  Still persistent tachy. Labile SBP. No prior cardiac hx.

## 2023-04-18 NOTE — PROGRESS NOTE ADULT - SUBJECTIVE AND OBJECTIVE BOX
Pt doing well s/p NORTH guided cardioversion yesterday. Pt remains in sinus rhythm on amiodarone and cardizem. Pt c/o diarrhea x 3 days with fatigue today, and not sleeping well. Denies any fevers, chills, abdominal pain, N/V, CP, palpitations or dizziness.     TELE: SR, SB overnight ~50bpm     NORTH: 2023  Summary:   2. Left ventricular ejection fraction, by visual estimation, is 50 to   55%.   3. Left atrial enlargement.   4. Right atrial enlargement.   5. There is no evidence of pericardial effusion.   6. Mild to moderate mitral valve regurgitation.   7. Thickening of the anterior and posterior mitral valve leaflets.   8. Mild-moderate tricuspid regurgitation.   9. Mild pulmonic valve regurgitation.  10. Color flow doppler and intravenous injection of agitated saline   demonstrates the presence of an intact intra atrial septum.  11. No left atrial appendage thrombus and decreased left atrial appendage   velocities.  12. DAHLIA by planimetry is measured twice, at 1.04 cm2 and 1.08 cm2. Aortic   valve calcified with restricted motion; functional bicuspid with Raphe   between Left and Right Coronary Cusps. Moderate to severe aortic   stenosis. Mild aortic regurgitation.      MEDICATIONS  (STANDING):  aMIOdarone    Tablet 400 milliGRAM(s) Oral two times a day  aMIOdarone Infusion 0.999 mG/Min (33.3 mL/Hr) IV Continuous <Continuous>  aMIOdarone Infusion 0.5 mG/Min (16.7 mL/Hr) IV Continuous <Continuous>  apixaban 5 milliGRAM(s) Oral <User Schedule>  budesonide 160 MICROgram(s)/formoterol 4.5 MICROgram(s) Inhaler 2 Puff(s) Inhalation two times a day  dextrose 5%. 1000 milliLiter(s) (100 mL/Hr) IV Continuous <Continuous>  dextrose 5%. 1000 milliLiter(s) (50 mL/Hr) IV Continuous <Continuous>  dextrose 50% Injectable 25 Gram(s) IV Push once  dextrose 50% Injectable 12.5 Gram(s) IV Push once  dextrose 50% Injectable 25 Gram(s) IV Push once  diltiazem    Tablet 30 milliGRAM(s) Oral every 6 hours  doxycycline IVPB 100 milliGRAM(s) IV Intermittent every 12 hours  folic acid 1 milliGRAM(s) Oral daily  glucagon  Injectable 1 milliGRAM(s) IntraMuscular once  hydroxychloroquine 200 milliGRAM(s) Oral two times a day  insulin lispro (ADMELOG) corrective regimen sliding scale   SubCutaneous Before meals and at bedtime  montelukast 10 milliGRAM(s) Oral daily  pantoprazole    Tablet 40 milliGRAM(s) Oral before breakfast  piperacillin/tazobactam IVPB.. 3.375 Gram(s) IV Intermittent every 8 hours  potassium phosphate / sodium phosphate Tablet (K-PHOS No. 2) 1 Tablet(s) Oral four times a day with meals  predniSONE   Tablet 40 milliGRAM(s) Oral daily    MEDICATIONS  (PRN):  acetaminophen     Tablet .. 650 milliGRAM(s) Oral every 6 hours PRN Temp greater or equal to 38C (100.4F), Mild Pain (1 - 3)  ALPRAZolam 0.25 milliGRAM(s) Oral every 8 hours PRN anxiety  aluminum hydroxide/magnesium hydroxide/simethicone Suspension 30 milliLiter(s) Oral every 4 hours PRN Dyspepsia  benzocaine/menthol Lozenge 1 Lozenge Oral every 2 hours PRN Sore Throat  dextrose Oral Gel 15 Gram(s) Oral once PRN Blood Glucose LESS THAN 70 milliGRAM(s)/deciliter  diltiazem Injectable 5 milliGRAM(s) IV Push every 8 hours PRN TACHY > 170  hydrALAZINE 10 milliGRAM(s) Oral every 8 hours PRN Systolic blood pressure >135  melatonin 3 milliGRAM(s) Oral at bedtime PRN Insomnia  ondansetron Injectable 4 milliGRAM(s) IV Push every 8 hours PRN Nausea and/or Vomiting  oxyCODONE    IR 10 milliGRAM(s) Oral every 6 hours PRN Severe Pain (7 - 10)      Allergies  aspirin (Other)  Sulfur (Rash)  Naprosyn (Other (Severe))      PAST MEDICAL & SURGICAL HISTORY:  Myocardial infarct  Asthma  High cholesterol  Osteoporosis  Autoimmune disease  Lupus  Breast lump  Benign  Hashimoto's disease   delivery delivered  x3  H/O lumpectomy      Vital Signs Last 24 Hrs  T(C): 36.7 (2023 08:23), Max: 36.8 (2023 20:00)  T(F): 98.1 (2023 08:23), Max: 98.2 (2023 20:00)  HR: 67 (2023 09:15) (53 - 129)  BP: 110/80 (2023 08:00) (104/58 - 132/68)  BP(mean): 87 (2023 08:00) (67 - 91)  RR: 16 (2023 08:00) (12 - 21)  SpO2: 96% (2023 09:15) (93% - 98%)    Parameters below as of 2023 09:15  Patient On (Oxygen Delivery Method): room air        Physical Exam:  Constitutional: NAD, Awake and alert  Cardiovascular: +S1S2 RRR  Pulmonary: CTA b/l, unlabored  Extremities: no pedal edema  Neuro: non focal, WHITE x4    LABS:                        11.8   12.18 )-----------( 331      ( 2023 05:38 )             36.8         138  |  101  |  28.1<H>  ----------------------------<  146<H>  4.7   |  26.0  |  0.70    Ca    8.6      2023 06:28  Mg     1.9                 Assessment:   72 year old female patient with a history of Lupus, asthma/emphysema, who is admitted with multifocal PNA, found to have new AF with RVR. Echo notable for moderate to severe AS; general cardiology team following and will  work up in the outpatient setting. She underwent NORTH guided cardioversion yesterday and has sustained sinus rhythm, currently on Amiodarone and Cardizem.     Plan:   Continue Eliquis twice daily without ANY interruptions.   Continue with Amiodarone 400mg BID for a total of 7 days, then decrease to 200mg daily. TFTs & LFTs should be monitored q 3-6 months while on amiodarone therapy. Anticipate <1 year of amiodarone therapy for this patient; however if > 1 year, patient will need annual fundoscopic exam and PFTs. Patient is advised of associated risks and need for monitoring; all questions answered to pt's expressed understanding.   Continue with Diltiazem 30mg Q6HR, plan to convert to 120mg extended release at time of discharge  Resume other home medications.   Maintain Mg >2 and K >4  Outpt f/up with Dr. Caldera in 4 weeks.       Pt doing well s/p NORTH guided cardioversion yesterday. Pt remains in sinus rhythm on amiodarone and cardizem. Pt c/o diarrhea x 3 days with fatigue today, and not sleeping well. Denies any fevers, chills, abdominal pain, N/V, CP, palpitations or dizziness.     TELE: SR, SB overnight ~50bpm, APCs    NORTH: 2023  Summary:   2. Left ventricular ejection fraction, by visual estimation, is 50 to   55%.   3. Left atrial enlargement.   4. Right atrial enlargement.   5. There is no evidence of pericardial effusion.   6. Mild to moderate mitral valve regurgitation.   7. Thickening of the anterior and posterior mitral valve leaflets.   8. Mild-moderate tricuspid regurgitation.   9. Mild pulmonic valve regurgitation.  10. Color flow doppler and intravenous injection of agitated saline   demonstrates the presence of an intact intra atrial septum.  11. No left atrial appendage thrombus and decreased left atrial appendage   velocities.  12. DAHLIA by planimetry is measured twice, at 1.04 cm2 and 1.08 cm2. Aortic   valve calcified with restricted motion; functional bicuspid with Raphe   between Left and Right Coronary Cusps. Moderate to severe aortic   stenosis. Mild aortic regurgitation.      MEDICATIONS  (STANDING):  aMIOdarone    Tablet 400 milliGRAM(s) Oral two times a day  aMIOdarone Infusion 0.999 mG/Min (33.3 mL/Hr) IV Continuous <Continuous>  aMIOdarone Infusion 0.5 mG/Min (16.7 mL/Hr) IV Continuous <Continuous>  apixaban 5 milliGRAM(s) Oral <User Schedule>  budesonide 160 MICROgram(s)/formoterol 4.5 MICROgram(s) Inhaler 2 Puff(s) Inhalation two times a day  dextrose 5%. 1000 milliLiter(s) (100 mL/Hr) IV Continuous <Continuous>  dextrose 5%. 1000 milliLiter(s) (50 mL/Hr) IV Continuous <Continuous>  dextrose 50% Injectable 25 Gram(s) IV Push once  dextrose 50% Injectable 12.5 Gram(s) IV Push once  dextrose 50% Injectable 25 Gram(s) IV Push once  diltiazem    Tablet 30 milliGRAM(s) Oral every 6 hours  doxycycline IVPB 100 milliGRAM(s) IV Intermittent every 12 hours  folic acid 1 milliGRAM(s) Oral daily  glucagon  Injectable 1 milliGRAM(s) IntraMuscular once  hydroxychloroquine 200 milliGRAM(s) Oral two times a day  insulin lispro (ADMELOG) corrective regimen sliding scale   SubCutaneous Before meals and at bedtime  montelukast 10 milliGRAM(s) Oral daily  pantoprazole    Tablet 40 milliGRAM(s) Oral before breakfast  piperacillin/tazobactam IVPB.. 3.375 Gram(s) IV Intermittent every 8 hours  potassium phosphate / sodium phosphate Tablet (K-PHOS No. 2) 1 Tablet(s) Oral four times a day with meals  predniSONE   Tablet 40 milliGRAM(s) Oral daily    MEDICATIONS  (PRN):  acetaminophen     Tablet .. 650 milliGRAM(s) Oral every 6 hours PRN Temp greater or equal to 38C (100.4F), Mild Pain (1 - 3)  ALPRAZolam 0.25 milliGRAM(s) Oral every 8 hours PRN anxiety  aluminum hydroxide/magnesium hydroxide/simethicone Suspension 30 milliLiter(s) Oral every 4 hours PRN Dyspepsia  benzocaine/menthol Lozenge 1 Lozenge Oral every 2 hours PRN Sore Throat  dextrose Oral Gel 15 Gram(s) Oral once PRN Blood Glucose LESS THAN 70 milliGRAM(s)/deciliter  diltiazem Injectable 5 milliGRAM(s) IV Push every 8 hours PRN TACHY > 170  hydrALAZINE 10 milliGRAM(s) Oral every 8 hours PRN Systolic blood pressure >135  melatonin 3 milliGRAM(s) Oral at bedtime PRN Insomnia  ondansetron Injectable 4 milliGRAM(s) IV Push every 8 hours PRN Nausea and/or Vomiting  oxyCODONE    IR 10 milliGRAM(s) Oral every 6 hours PRN Severe Pain (7 - 10)      Allergies  aspirin (Other)  Sulfur (Rash)  Naprosyn (Other (Severe))      PAST MEDICAL & SURGICAL HISTORY:  Myocardial infarct  Asthma  High cholesterol  Osteoporosis  Autoimmune disease  Lupus  Breast lump  Benign  Hashimoto's disease   delivery delivered  x3  H/O lumpectomy      Vital Signs Last 24 Hrs  T(C): 36.7 (2023 08:23), Max: 36.8 (2023 20:00)  T(F): 98.1 (2023 08:23), Max: 98.2 (2023 20:00)  HR: 67 (2023 09:15) (53 - 129)  BP: 110/80 (2023 08:00) (104/58 - 132/68)  BP(mean): 87 (2023 08:00) (67 - 91)  RR: 16 (2023 08:00) (12 - 21)  SpO2: 96% (2023 09:15) (93% - 98%)    Parameters below as of 2023 09:15  Patient On (Oxygen Delivery Method): room air        Physical Exam:  Constitutional: NAD, Awake and alert  Cardiovascular: +S1S2 RRR  Pulmonary: CTA b/l, unlabored  Extremities: no pedal edema  Neuro: non focal, WHITE x4    LABS:                        11.8   12.18 )-----------( 331      ( 2023 05:38 )             36.8         138  |  101  |  28.1<H>  ----------------------------<  146<H>  4.7   |  26.0  |  0.70    Ca    8.6      2023 06:28  Mg     1.9                 Assessment:   72 year old female patient with a history of Lupus, asthma/emphysema, who is admitted with multifocal PNA, found to have new AF with RVR. Echo notable for moderate to severe AS; general cardiology team following and will  work up in the outpatient setting. She underwent NORTH guided cardioversion yesterday and has sustained sinus rhythm, currently on Amiodarone and Cardizem.     Plan:   Continue Eliquis twice daily without ANY interruptions.   Continue with Amiodarone 400mg BID for a total of 7 days, then decrease to 200mg daily. TFTs & LFTs should be monitored q 3-6 months while on amiodarone therapy. Anticipate <1 year of amiodarone therapy for this patient; however if > 1 year, patient will need annual fundoscopic exam and PFTs. Patient is advised of associated risks and need for monitoring; all questions answered to pt's expressed understanding.   Continue with Diltiazem 30mg Q6HR, plan to convert to 120mg extended release at time of discharge  Resume other home medications.   Maintain Mg >2 and K >4  Outpt f/up with Dr. Caldera in 4 weeks.

## 2023-04-18 NOTE — DISCHARGE NOTE NURSING/CASE MANAGEMENT/SOCIAL WORK - NSDCPEFALRISK_GEN_ALL_CORE
For information on Fall & Injury Prevention, visit: [email or web address] OR  [email or web address] OR  https://www.cdc.gov/steadi/patient.html [Follow-Up - Clinic] : a clinic follow-up of [Atrial Fibrillation] : atrial fibrillation [FreeTextEntry1] : Mr. Priest is a 70 y/o male with history of paroxysmal atrial fibrillation presents to [facility] care.  He has had pAF for approximately 10 years with an occasional feeling of irregular heart rate 3 times in per year.  Episodes usually last 30-60 minutes.  However, for the past 2 months has had much more frequent episodes of palpitations, lasting for much longer periods of time.  Associated with lightheadness at times, and dyspnea if exercises (heart rate increases more with exercise).  He has been compliant with his medication regimen.    \par \par

## 2023-04-18 NOTE — PROGRESS NOTE ADULT - REASON FOR ADMISSION
Respiratory failure ,PNA

## 2023-04-18 NOTE — DISCHARGE NOTE NURSING/CASE MANAGEMENT/SOCIAL WORK - PATIENT PORTAL LINK FT
You can access the FollowMyHealth Patient Portal offered by [facility] by registering at the following website: [email or web address]. By joining [employer]’s FollowMyHealth portal, you will also be able to view your health information using other applications (apps) compatible with our system.

## 2023-05-05 ENCOUNTER — APPOINTMENT (OUTPATIENT)
Dept: CARDIOTHORACIC SURGERY | Facility: CLINIC | Age: 73
End: 2023-05-05
Payer: MEDICARE

## 2023-05-05 VITALS
HEART RATE: 82 BPM | SYSTOLIC BLOOD PRESSURE: 147 MMHG | DIASTOLIC BLOOD PRESSURE: 75 MMHG | WEIGHT: 145 LBS | OXYGEN SATURATION: 93 % | TEMPERATURE: 98.2 F | HEIGHT: 62 IN | RESPIRATION RATE: 14 BRPM | BODY MASS INDEX: 26.68 KG/M2

## 2023-05-05 DIAGNOSIS — M32.9 SYSTEMIC LUPUS ERYTHEMATOSUS, UNSPECIFIED: ICD-10-CM

## 2023-05-05 DIAGNOSIS — Z80.1 FAMILY HISTORY OF MALIGNANT NEOPLASM OF TRACHEA, BRONCHUS AND LUNG: ICD-10-CM

## 2023-05-05 DIAGNOSIS — Z86.2 PERSONAL HISTORY OF DISEASES OF THE BLOOD AND BLOOD-FORMING ORGANS AND CERTAIN DISORDERS INVOLVING THE IMMUNE MECHANISM: ICD-10-CM

## 2023-05-05 DIAGNOSIS — Z78.9 OTHER SPECIFIED HEALTH STATUS: ICD-10-CM

## 2023-05-05 DIAGNOSIS — Z86.39 PERSONAL HISTORY OF OTHER ENDOCRINE, NUTRITIONAL AND METABOLIC DISEASE: ICD-10-CM

## 2023-05-05 DIAGNOSIS — J45.909 UNSPECIFIED ASTHMA, UNCOMPLICATED: ICD-10-CM

## 2023-05-05 DIAGNOSIS — F41.9 ANXIETY DISORDER, UNSPECIFIED: ICD-10-CM

## 2023-05-05 DIAGNOSIS — Z80.6 FAMILY HISTORY OF LEUKEMIA: ICD-10-CM

## 2023-05-05 DIAGNOSIS — Z87.39 PERSONAL HISTORY OF OTHER DISEASES OF THE MUSCULOSKELETAL SYSTEM AND CONNECTIVE TISSUE: ICD-10-CM

## 2023-05-05 DIAGNOSIS — K21.9 GASTRO-ESOPHAGEAL REFLUX DISEASE W/OUT ESOPHAGITIS: ICD-10-CM

## 2023-05-05 DIAGNOSIS — N63.0 UNSPECIFIED LUMP IN UNSPECIFIED BREAST: ICD-10-CM

## 2023-05-05 DIAGNOSIS — Z80.9 FAMILY HISTORY OF MALIGNANT NEOPLASM, UNSPECIFIED: ICD-10-CM

## 2023-05-05 DIAGNOSIS — Z87.09 PERSONAL HISTORY OF OTHER DISEASES OF THE RESPIRATORY SYSTEM: ICD-10-CM

## 2023-05-05 DIAGNOSIS — I25.2 OLD MYOCARDIAL INFARCTION: ICD-10-CM

## 2023-05-05 DIAGNOSIS — Z00.00 ENCOUNTER FOR GENERAL ADULT MEDICAL EXAMINATION W/OUT ABNORMAL FINDINGS: ICD-10-CM

## 2023-05-05 DIAGNOSIS — Z80.3 FAMILY HISTORY OF MALIGNANT NEOPLASM OF BREAST: ICD-10-CM

## 2023-05-05 PROCEDURE — 99204 OFFICE O/P NEW MOD 45 MIN: CPT

## 2023-05-05 RX ORDER — PREDNISONE 10 MG/1
10 TABLET ORAL
Refills: 0 | Status: COMPLETED | COMMUNITY
End: 2023-05-05

## 2023-05-05 RX ORDER — DOXYCYCLINE HYCLATE 100 MG/1
100 TABLET ORAL
Refills: 0 | Status: COMPLETED | COMMUNITY
End: 2023-05-05

## 2023-05-08 NOTE — ASSESSMENT
[FreeTextEntry1] : The patient presents with worsening exertional dyspnea.  She does have underlying lung disease, but her COPD is reportedly mild.  Unfortunately she continues to smoke.  The patient reports progressively worsening exertional dyspnea.  Echocardiograms confirm moderate aortic valve stenosis.  We therefore had a lengthy conversation of the current indication for aortic valve replacement which is limited to symptomatic, severe stenosis.  However, with moderate symptomatic stenosis she can potentially be enrolled in the Expand II clinical trial versus monitoring her aortic valve stenosis until it becomes severe.  Enrollment in the clinical trial, including medical optimization with a heart failure cardiologist, repeat echocardiogram, followed by randomization were discussed in detail.  The patient chooses to enroll.\par \par \par PLAN:\par -Enter Expand Trial 2\par -Follow up  HF for optimization\par -F/u TTE echo after optimized by HF \par \par I would like to thank you for referring this patient to my attention and for allowing me to participate in her care.  If there are any further questions, or I can be of any further assistance, please do not hesitate contacting me at any time.\par \par "I, Lionel Griffin, am scribing for and the presence of Dr. Person the following sections HISTORY OF PRESENT ILLNESS, PAST MEDICAL/FAMILY/SOCIAL HISTORY; REVIEW OF SYSTEMS; VITAL SIGNS; PHYSICAL EXAM; DISPOSITION."\par \par "I personally performed the services described in the documentation, review the documentation recorded by the scribe in my presence and it accurately and completely records my words and actions."\par

## 2023-05-08 NOTE — DATA REVIEWED
[FreeTextEntry1] : NORTH COMP W SPECT AND COLOR#                      \par \par PROCEDURE DATE:  2023  \par \par \par \par INTERPRETATION:  TRANSESOPHAGEAL ECHOCARDIOGRAM REPORT\par \par \par \par Patient Name:   DUKE HOUSER Patient Location: Inpatient\par Medical Rec #:  [id number]            Accession #:      [id number]\par Account #:                           Height:           62.0 in 157.5 cm\par YOB: 1950            Weight:           138.0 lb 62.60 kg\par Patient Age:    72 years             BSA:              1.63 m²\par Patient Gender: F                    BP:               118/75 mmHg\par \par \par Date of Exam:        2023 11:34:31 AM\par Sonographer:         Edwar Escobedo\par Referring Physician: Jose Caldera MD\par \par Procedure:   Transesophageal Echocardiogram.\par Indications: I48.91 - Unspecified atrial fibrillation\par Diagnosis:   I48.91 - Unspecified atrial fibrillation; I48.92 - \par Unspecified\par              atrial flutter\par \par SPECTRAL DOPPLER ANALYSIS (where applicable):\par Aortic Valve: AoV Max Daquan: 2.15 m/s AoV Peak P.5 mmHg AoV Mean PG: \par 9.3 mmHg\par \par LVOT Vmax: 0.64 m/s LVOT VTI: 0.116 m LVOT Diameter: 2.09 cm\par \par AoV Area, Vmax: 1.03 cm² AoV Area, VTI: 0.96 cm² AoV Area, Vmn:\par Ao VTI: 0.414\par Aortic Insufficiency:\par AI Half-time:  443 msec\par AI Decel Rate: 3.10 m/s²\par \par Tricuspid Valve and PA/RV Systolic Pressure: TR Max Velocity: 2.00 m/s RA \par Pressure: 10 mmHg RVSP/PASP: 26.0 mmHg\par \par \par \par PROCEDURE: After discussion of the risks and benefits of the NORTH, an \par informed consent was obtained by the cardiologist. Intravenous sedation \par was performed by anesthesia. Images were obtained with the patient in a \par left lateral decubitus position. Image quality was adequate. The \par patient's vital signs; including heart rate, blood pressure, and oxygen \par saturation; remained stable throughout the procedure. The patient \par tolerated the procedure well and without complications.\par \par PHYSICIAN INTERPRETATION:\par Left Ventricle: The left ventricular internal cavity size is normal.\par Left ventricular ejection fraction, by visual estimation, is 50 to 55%.\par Right Ventricle: Normal right ventricular size and function.\par Left Atrium: Left atrial enlargement. No left atrial appendage thrombus \par is seen and decreased left atrial appendage velocities. Color flow \par doppler and intravenous injection of agitated saline demonstrates the \par presence of an intact intra atrial septum.\par Right Atrium: Right atrial enlargement.\par Pericardium: There is no evidence of pericardial effusion.\par Mitral Valve: Thickening of the anterior and posterior mitral valve \par leaflets. Mild to moderate mitral valve regurgitation is seen.\par Tricuspid Valve: Structurally normal tricuspid valve, with normal leaflet \par excursion. Mild-moderate tricuspid regurgitation is visualized.\par Aortic Valve: Mild aortic valve regurgitation is seen. DAHLIA by planimetry \par is measured twice, at 1.04 cm2 and 1.08 cm2. Aortic valve calficied with \par restricted motion; functional bicuspid with Raphe between Left and Right \par Coronary Cusps. Moderate to severe aortic stenosis. Mild aortic \par regurgitation.\par Pulmonic Valve: Structurally normal pulmonic valve, with normal leaflet \par excursion. Mild pulmonic valve regurgitation.\par Aorta: Mild athersosclerotic plaquing in the observed descending aorta.\par Venous: The left upper pulmonary vein is normal.\par Shunts: Agitated saline contrast was given intravenously to evaluate for \par intracardiac shunting.\par \par \par Summary:\par  1. This is a pre proecdure NORTH for NORTH/CV for Atrial FLutter/Atrial \par Fibrillation.\par  2. Left ventricular ejection fraction, by visual estimation, is 50 to \par 55%.\par  3. Left atrial enlargement.\par  4. Right atrial enlargement.\par  5. There is no evidence of pericardial effusion.\par  6. Mild to moderate mitral valve regurgitation.\par  7. Thickening of the anterior and posterior mitral valve leaflets.\par  8. Mild-moderate tricuspid regurgitation.\par  9. Mild pulmonic valve regurgitation.\par 10. Color flow doppler and intravenous injection of agitated saline \par demonstrates the presence of an intact intra atrial septum.\par 11. No left atrial appendage thrombus and decreased left atrial appendage \par velocities.\par 12. DAHLIA by planimetry is measured twice, at 1.04 cm2 and 1.08 cm2. Aortic \par valve calficied with restricted motion; functional bicuspid with Raphe \par between Left and Right Coronary Cusps. Moderate to severe aortic \par stenosis. Mild aortic regurgitation.\par \par MD Nasim Electronically signed on 2023 at 12:26:42 PM\par \par  CT ANGIO CHEST PULM [facility]   ORDERED BY: BRO HERNANDEZ \par [place] \par \par PROCEDURE DATE:  2023  \par \par \par \par INTERPRETATION:  Clinical information: Shortness of breath, assess for PE\par \par TECHNIQUE: A volumetric acquisition of the chest was obtained from the \par thoracic inlet to the upper abdomen during dynamic administration of \par intravenous contrast. 3D MIP images and sagittal and coronal multiplanar \par reformations were provided.\par \par CONTRAST/COMPLICATIONS:\par IV Contrast: Omnipaque 350  54 cc administered   46 cc discarded\par Oral Contrast: NONE\par Complications: None reported at time of study completion\par \par COMPARISON: 2019\par \par FINDINGS:\par \par CTA: The contrast bolus is satisfactory. The study is degraded by \par mild-moderate respiratory motion. There are no filling defects in the \par pulmonary artery or its branches. The heart is normal in size. No \par pericardial effusion. There are coronary artery calcifications. There is \par mitral annular calcification. There is mild aortic valve calcification. \par The aorta is normal in caliber\par \par Lungs/Airways/Pleura: There are scattered centrilobular and branching \par linear densities and ill-defined groundglass densities in both lungs. \par There is branching tubular mucoid impaction in the posterior right lower \par lobe. There is multifocal bronchial wall thickening and areas of mucoid \par impaction. No pleural effusion.\par \par Mediastinum/Lymph nodes: There are a few stable borderline enlarged \par mediastinal and hilar lymph nodes.\par \par Upper Abdomen: Coarse calcification in the liver.\par \par Bones and Soft Tissues: There is diffuse qualitative osteopenia and mild \par multilevel discogenic disease in the spine.\par \par IMPRESSION:\par \par 1.  No pulmonary thromboembolism\par \par 2.  Findings suggestive of multifocal infectious process in both lungs\par \par --- End of Report ---\par \par \par \par \par \par GERI BARRY M.D., Attending Radiologist\par \par

## 2023-05-08 NOTE — REVIEW OF SYSTEMS
[Feeling Tired] : feeling tired [Feeling Poorly] : not feeling poorly [Eyesight Problems] : no eyesight problems [Discharge From Eyes] : no purulent discharge from the eyes [Nosebleeds] : no nosebleeds [Nasal Discharge] : no nasal discharge [Chest Pain] : no chest pain [Palpitations] : no palpitations [Shortness Of Breath] : shortness of breath [Wheezing] : wheezing [SOB on Exertion] : shortness of breath during exertion [Orthopnea] : no orthopnea [PND] : no PND [Constipation] : no constipation [Diarrhea] : no diarrhea [Pelvic Pain] : no pelvic pain [Dysmenorrhea] : no dysmenorrhea [Limb Pain] : no limb pain [Limb Swelling] : no limb swelling [Itching] : no itching [Change In A Mole] : no change in a mole [Dizziness] : no dizziness [Fainting] : no fainting [Anxiety] : no anxiety [Depression] : no depression [Muscle Weakness] : no muscle weakness [Deepening Of The Voice] : no deepening of the voice [Swollen Glands] : no swollen glands [Swollen Glands In The Neck] : no swollen glands in the neck [Negative] : Heme/Lymph

## 2023-05-08 NOTE — HISTORY OF PRESENT ILLNESS
[FreeTextEntry1] : Mrs. Awad is a 72-year-old female, former smoker, referred by Dr. Grant for consultation due to moderate aortic stenosis. She is being considered as a candidate for transcatheter aortic valve replacement and expand trial candidacy. Her medical history includes lupus, lumbar stenosis, asthma, prior smoking, pneumonia, and atrial fibrillation, for which she is taking eliquis and had undergone cardioversion. \par \par On 04/17/2023, she was admitted to [facility] due to shortness of breath, cough, and congestion for a week. A CT chest revealed multifocal pneumonia, and she was found to be hypoxic at 87% on RA. She was treated with steroids and IV antibiotics and later transitioned to PO on discharge. She also experienced rapid atrial fibrillation, which was successfully treated with cardioversion, amiodarone, and eliquis. The patient has been experiencing fatigue and shortness of breath upon exertion for the past few months but is able to ambulate independently without any assistance devices. She is independent with her daily activities and lives alone, with a [place]HP class 2.\par \par Both transthoracic echocardiogram and NORTH both show moderate aortic valve stenosis.  The studies were personally reviewed.  Aortic valve area is reported at 0.85 cm².  However, peak velocity is only 2.58 m/s, and peak and mean gradients are 26.6 and 17.6 mmHg respectively.  This is more consistent with moderate aortic valve stenosis.  NORTH confirms the same.\par \par The patient is now referred for consideration for enrollment in the [employer] moderate aortic valve stenosis clinical trial.\par \par The patient's past medical history, past surgical history, family history, social history, allergies, medications, and multisystem review of systems were individually reviewed with the patient.  There are no pertinent additions or subtractions.  The patient was personally seen and examined.

## 2023-05-08 NOTE — PHYSICAL EXAM
[General Appearance - Alert] : alert [General Appearance - Well Nourished] : well nourished [Sclera] : the sclera and conjunctiva were normal [Extraocular Movements] : extraocular movements were intact [Outer Ear] : the ears and nose were normal in appearance [Hearing Threshold Finger Rub Not Giles] : hearing was normal [Neck Appearance] : the appearance of the neck was normal [Neck Cervical Mass (___cm)] : no neck mass was observed [Exaggerated Use Of Accessory Muscles For Inspiration] : no accessory muscle use [Apical Impulse] : the apical impulse was normal [Examination Of The Chest] : the chest was normal in appearance [2+] : left 2+ [Breast Appearance] : normal in appearance [Breast Palpation Mass] : no palpable masses [Bowel Sounds] : normal bowel sounds [Abdomen Tenderness] : non-tender [Urethral Meatus] : normal urethra [External Female Genitalia] : normal external genitalia [Cervical Lymph Nodes Enlarged Posterior Bilaterally] : posterior cervical [Supraclavicular Lymph Nodes Enlarged Bilaterally] : supraclavicular [No CVA Tenderness] : no ~M costovertebral angle tenderness [No Spinal Tenderness] : no spinal tenderness [Abnormal Walk] : normal gait [Nail Clubbing] : no clubbing  or cyanosis of the fingernails [Skin Color & Pigmentation] : normal skin color and pigmentation [] : no rash [Cranial Nerves] : cranial nerves 2-12 were intact [Sensation] : the sensory exam was normal to light touch and pinprick [Oriented To Time, Place, And Person] : oriented to person, place, and time [Affect] : the affect was normal [Heart Rate And Rhythm] : heart rate was normal and rhythm regular [Heart Sounds Gallop] : no gallops [Heart Sounds Pericardial Friction Rub] : no pericardial rub [FreeTextEntry1] : Normal S1, mildly diminished S2, 2/6 systolic murmur cardiac base

## 2023-05-08 NOTE — CONSULT LETTER
[Dear  ___] : Dear  [unfilled], [Courtesy Letter:] : I had the pleasure of seeing your patient, [unfilled], in my office today. [Please see my note below.] : Please see my note below. [Referral Closing:] : Thank you very much for seeing this patient.  If you have any questions, please do not hesitate to contact me. [Sincerely,] : Sincerely, [FreeTextEntry2] : Dr. Grant  [FreeTextEntry3] : Micheal Lee MD\par  of Cardiovascular & Thoracic Surgery\par Associated Professor \par Cardiovascular & Thoracic Surgery\par [employer]\par \par [facility] \par [address] \[place] [address]\par Tel   [phone]\par Fax  [phone]\par

## 2023-05-11 DIAGNOSIS — J44.1 CHRONIC OBSTRUCTIVE PULMONARY DISEASE WITH (ACUTE) EXACERBATION: ICD-10-CM

## 2023-05-11 DIAGNOSIS — J18.9 PNEUMONIA, UNSPECIFIED ORGANISM: ICD-10-CM

## 2023-05-11 RX ORDER — OXYCODONE 10 MG/1
10 TABLET ORAL EVERY 8 HOURS
Refills: 0 | Status: ACTIVE | COMMUNITY
Start: 2023-05-11

## 2023-05-16 ENCOUNTER — EMERGENCY (EMERGENCY)
Facility: HOSPITAL | Age: 73
LOS: 1 days | Discharge: HOME | End: 2023-05-16
Attending: EMERGENCY MEDICINE
Payer: MEDICARE

## 2023-05-16 ENCOUNTER — NON-APPOINTMENT (OUTPATIENT)
Age: 73
End: 2023-05-16

## 2023-05-16 ENCOUNTER — APPOINTMENT (OUTPATIENT)
Dept: HEART FAILURE | Facility: CLINIC | Age: 73
End: 2023-05-16
Payer: MEDICARE

## 2023-05-16 VITALS
HEIGHT: 62 IN | OXYGEN SATURATION: 92 % | SYSTOLIC BLOOD PRESSURE: 128 MMHG | BODY MASS INDEX: 26.87 KG/M2 | DIASTOLIC BLOOD PRESSURE: 54 MMHG | HEART RATE: 68 BPM | WEIGHT: 146 LBS

## 2023-05-16 VITALS
TEMPERATURE: 99 F | WEIGHT: 138.01 LBS | SYSTOLIC BLOOD PRESSURE: 129 MMHG | OXYGEN SATURATION: 96 % | DIASTOLIC BLOOD PRESSURE: 69 MMHG | RESPIRATION RATE: 20 BRPM | HEART RATE: 80 BPM

## 2023-05-16 VITALS
HEART RATE: 61 BPM | OXYGEN SATURATION: 93 % | SYSTOLIC BLOOD PRESSURE: 122 MMHG | TEMPERATURE: 98 F | RESPIRATION RATE: 18 BRPM | DIASTOLIC BLOOD PRESSURE: 65 MMHG

## 2023-05-16 DIAGNOSIS — Z98.89 OTHER SPECIFIED POSTPROCEDURAL STATES: Chronic | ICD-10-CM

## 2023-05-16 LAB
ALBUMIN SERPL ELPH-MCNC: 3.6 G/DL — SIGNIFICANT CHANGE UP (ref 3.3–5.2)
ALP SERPL-CCNC: 96 U/L — SIGNIFICANT CHANGE UP (ref 40–120)
ALT FLD-CCNC: 11 U/L — SIGNIFICANT CHANGE UP
ANION GAP SERPL CALC-SCNC: 10 MMOL/L — SIGNIFICANT CHANGE UP (ref 5–17)
APTT BLD: 45.7 SEC — HIGH (ref 27.5–35.5)
AST SERPL-CCNC: 12 U/L — SIGNIFICANT CHANGE UP
BASOPHILS # BLD AUTO: 0.04 K/UL — SIGNIFICANT CHANGE UP (ref 0–0.2)
BASOPHILS NFR BLD AUTO: 0.7 % — SIGNIFICANT CHANGE UP (ref 0–2)
BILIRUB SERPL-MCNC: 0.3 MG/DL — LOW (ref 0.4–2)
BUN SERPL-MCNC: 15.2 MG/DL — SIGNIFICANT CHANGE UP (ref 8–20)
CALCIUM SERPL-MCNC: 8.6 MG/DL — SIGNIFICANT CHANGE UP (ref 8.4–10.5)
CHLORIDE SERPL-SCNC: 100 MMOL/L — SIGNIFICANT CHANGE UP (ref 96–108)
CO2 SERPL-SCNC: 28 MMOL/L — SIGNIFICANT CHANGE UP (ref 22–29)
CREAT SERPL-MCNC: 0.7 MG/DL — SIGNIFICANT CHANGE UP (ref 0.5–1.3)
EGFR: 91 ML/MIN/1.73M2 — SIGNIFICANT CHANGE UP
EOSINOPHIL # BLD AUTO: 0.14 K/UL — SIGNIFICANT CHANGE UP (ref 0–0.5)
EOSINOPHIL NFR BLD AUTO: 2.6 % — SIGNIFICANT CHANGE UP (ref 0–6)
GLUCOSE SERPL-MCNC: 97 MG/DL — SIGNIFICANT CHANGE UP (ref 70–99)
HCT VFR BLD CALC: 36.9 % — SIGNIFICANT CHANGE UP (ref 34.5–45)
HGB BLD-MCNC: 11.9 G/DL — SIGNIFICANT CHANGE UP (ref 11.5–15.5)
IMM GRANULOCYTES NFR BLD AUTO: 0.2 % — SIGNIFICANT CHANGE UP (ref 0–0.9)
INR BLD: 1.56 RATIO — HIGH (ref 0.88–1.16)
LYMPHOCYTES # BLD AUTO: 2.23 K/UL — SIGNIFICANT CHANGE UP (ref 1–3.3)
LYMPHOCYTES # BLD AUTO: 41.5 % — SIGNIFICANT CHANGE UP (ref 13–44)
MCHC RBC-ENTMCNC: 27.9 PG — SIGNIFICANT CHANGE UP (ref 27–34)
MCHC RBC-ENTMCNC: 32.2 GM/DL — SIGNIFICANT CHANGE UP (ref 32–36)
MCV RBC AUTO: 86.4 FL — SIGNIFICANT CHANGE UP (ref 80–100)
MONOCYTES # BLD AUTO: 0.62 K/UL — SIGNIFICANT CHANGE UP (ref 0–0.9)
MONOCYTES NFR BLD AUTO: 11.5 % — SIGNIFICANT CHANGE UP (ref 2–14)
NEUTROPHILS # BLD AUTO: 2.33 K/UL — SIGNIFICANT CHANGE UP (ref 1.8–7.4)
NEUTROPHILS NFR BLD AUTO: 43.5 % — SIGNIFICANT CHANGE UP (ref 43–77)
PLATELET # BLD AUTO: 361 K/UL — SIGNIFICANT CHANGE UP (ref 150–400)
POTASSIUM SERPL-MCNC: 3.7 MMOL/L — SIGNIFICANT CHANGE UP (ref 3.5–5.3)
POTASSIUM SERPL-SCNC: 3.7 MMOL/L — SIGNIFICANT CHANGE UP (ref 3.5–5.3)
PROT SERPL-MCNC: 6.9 G/DL — SIGNIFICANT CHANGE UP (ref 6.6–8.7)
PROTHROM AB SERPL-ACNC: 18.2 SEC — HIGH (ref 10.5–13.4)
RBC # BLD: 4.27 M/UL — SIGNIFICANT CHANGE UP (ref 3.8–5.2)
RBC # FLD: 14.1 % — SIGNIFICANT CHANGE UP (ref 10.3–14.5)
SODIUM SERPL-SCNC: 138 MMOL/L — SIGNIFICANT CHANGE UP (ref 135–145)
WBC # BLD: 5.37 K/UL — SIGNIFICANT CHANGE UP (ref 3.8–10.5)
WBC # FLD AUTO: 5.37 K/UL — SIGNIFICANT CHANGE UP (ref 3.8–10.5)

## 2023-05-16 PROCEDURE — 93971 EXTREMITY STUDY: CPT | Mod: 26,RT

## 2023-05-16 PROCEDURE — 99204 OFFICE O/P NEW MOD 45 MIN: CPT

## 2023-05-16 PROCEDURE — 93971 EXTREMITY STUDY: CPT

## 2023-05-16 PROCEDURE — 85610 PROTHROMBIN TIME: CPT

## 2023-05-16 PROCEDURE — 99284 EMERGENCY DEPT VISIT MOD MDM: CPT | Mod: 25

## 2023-05-16 PROCEDURE — 36415 COLL VENOUS BLD VENIPUNCTURE: CPT

## 2023-05-16 PROCEDURE — 80053 COMPREHEN METABOLIC PANEL: CPT

## 2023-05-16 PROCEDURE — 85025 COMPLETE CBC W/AUTO DIFF WBC: CPT

## 2023-05-16 PROCEDURE — 99284 EMERGENCY DEPT VISIT MOD MDM: CPT

## 2023-05-16 PROCEDURE — 85730 THROMBOPLASTIN TIME PARTIAL: CPT

## 2023-05-16 RX ORDER — OXYCODONE HYDROCHLORIDE 5 MG/1
10 TABLET ORAL ONCE
Refills: 0 | Status: DISCONTINUED | OUTPATIENT
Start: 2023-05-16 | End: 2023-05-16

## 2023-05-16 RX ORDER — DILTIAZEM HYDROCHLORIDE 120 MG/1
120 TABLET ORAL DAILY
Qty: 30 | Refills: 3 | Status: ACTIVE | COMMUNITY

## 2023-05-16 RX ORDER — PREDNISONE 10 MG/1
10 TABLET ORAL
Refills: 0 | Status: DISCONTINUED | COMMUNITY
Start: 2023-05-11 | End: 2023-05-16

## 2023-05-16 RX ORDER — AMIODARONE HYDROCHLORIDE 200 MG/1
200 TABLET ORAL TWICE DAILY
Refills: 0 | Status: COMPLETED | COMMUNITY
End: 2023-05-16

## 2023-05-16 RX ADMIN — OXYCODONE HYDROCHLORIDE 10 MILLIGRAM(S): 5 TABLET ORAL at 21:05

## 2023-05-16 NOTE — ED STATDOCS - OBJECTIVE STATEMENT
71 y/o female with PMHx of Lupus, RA, HLD, MI, COPD presents to the ED c/o swelling to right lower extremity for the past few weeks after being discharged from hospital, has been on Eliquis 5mg but inadvertently has only been taking in once a day but is supposed to be on it BID. Pt takes oxy 10 for lupus and RA which she has not taken c/o generalized pain. Pt denies fevers, numbness, tingling, weakness to right lower extremity.

## 2023-05-16 NOTE — ED STATDOCS - CLINICAL SUMMARY MEDICAL DECISION MAKING FREE TEXT BOX
73 y/o female with PMHx of Lupus, RA, HLD, MI, COPD presents to the ED c/o swelling to right lower extremity for the past few weeks after being discharged from hospital, has been on Eliquis 5mg but inadvertently has only been taking in once a day but is supposed to be on it BID. NO chest pain or SOB, swelling is more distally near distal calf/ankle/foot, will check basic labs, doppler, reassess 73 y/o female with PMHx of Lupus, RA, HLD, MI, COPD presents to the ED c/o swelling to right lower extremity for the past few weeks after being discharged from hospital, has been on Eliquis 5mg but inadvertently has only been taking in once a day but is supposed to be on it BID. NO chest pain or SOB, swelling is more distally near distal calf/ankle/foot, will check basic labs, doppler, reassess    US negative, Labs WNL    Pt reassessed, pt feeling better at this time, vss, pt able to walk, talk and vocalized plan of action. Discussed in depth and explained to pt in depth the next steps that need to be taken including proper follow up with PCP or specialists. All incidental findings were discussed with pt as well. Pt verbalized their concerns and all questions were answered. Pt understands dispo and wants discharge. Given good instructions when to return to ED, strict return precautions and importance of f/u.

## 2023-05-16 NOTE — ED STATDOCS - PATIENT PORTAL LINK FT
You can access the FollowMyHealth Patient Portal offered by [facility] by registering at the following website: [email or web address]. By joining [employer]’s FollowMyHealth portal, you will also be able to view your health information using other applications (apps) compatible with our system.

## 2023-05-16 NOTE — ED STATDOCS - PHYSICAL EXAMINATION
Gen: No acute distress, non toxic  HEENT: Mucous membranes moist, pink conjunctivae, EOMI  CV: RRR, nl s1/s2.  Resp: CTAB, normal rate and effort  GI: Abdomen soft, NT, ND. No rebound, no guarding  : No CVAT  Neuro: A&O x 3, moving all 4 extremities  MSK: No spine or joint tenderness to palpation, right lower extremity with swelling more around foot ankle and distal calf without erythema, does not extend to knee, full ROM, neurovascularly intact   Skin: No rashes. intact and perfused.

## 2023-05-16 NOTE — ED ADULT TRIAGE NOTE - CHIEF COMPLAINT QUOTE
Sent by cardiologist for R/O DVT of RLE. Swelling and pain for the past week and half since being hospitalized. Denies chest pain or SOB. On Eliquis.

## 2023-05-16 NOTE — ED ADULT NURSE NOTE - OBJECTIVE STATEMENT
Patient received in supertrack c/o swelling to her right foot.  Upon palpation of right foot patient apparently in severe pain.

## 2023-05-16 NOTE — ED STATDOCS - ATTENDING APP SHARED VISIT CONTRIBUTION OF CARE
Eliseo: I performed a face to face bedside interview with patient regarding history of present illness, review of symptoms and past medical history. I completed an independent physical exam and ordered tests/medications as needed.  I have discussed patient's plan of care with advanced care provider. The advanced care provider assisted in  executing the discussed plan. I was available for any questions or issues that may have arose during the execution of the plan of care.

## 2023-05-16 NOTE — ED STATDOCS - NSFOLLOWUPINSTRUCTIONS_ED_ALL_ED_FT
- take medication as directed  - follow up with primary care    -Return to ED for new or worsening symptoms.

## 2023-05-17 NOTE — ED ADULT NURSE REASSESSMENT NOTE - NS ED NURSE REASSESS COMMENT FT1
pt dc by ELLIOT Cam
Assumed care of pt at 2200 as stated in report from LISSY Wilson. Charting as noted. Patient A&O x4, denies pain/discomfort, denies CP/SOB. Updated on the plan of care. Call bell within reach, bed locked in lowest position. No signs of acute distress noted, safety maintained.

## 2023-05-18 NOTE — PHYSICAL EXAM
[No Acute Distress] : no acute distress [Normal Conjunctiva] : normal conjunctiva [Normal Venous Pressure] : normal venous pressure [No Carotid Bruit] : no carotid bruit [Normal S1, S2] : normal S1, S2 [No Murmur] : no murmur [Clear Lung Fields] : clear lung fields [Good Air Entry] : good air entry [Soft] : abdomen soft [Non Tender] : non-tender [No Masses/organomegaly] : no masses/organomegaly [Normal Gait] : normal gait [No Rash] : no rash [de-identified] : right leg 2+ pitting edema, left leg no edema, warm bilaterally

## 2023-05-18 NOTE — ASSESSMENT
[FreeTextEntry1] :  72 year old female with chronic diastolic heart failure, aortic stenosis,  lupus, Lumbar Stenosis, asthma, active smoker who comes in to establish care with heart failure.\par \par # Chronic systolic heart failure: Patient is warm and well perfused. She does have asymmetric swelling of her legs, right greater than left\par - will start jardiance 10mg daily and then start spironolactone at the next visit\par - Will rule out amyloid via PYP scan and bloodwork\par -HF lifestyle modifications including daily weights, BP log, and low sodium diet reiterated. Pt given daily weight and BP log and verbalized understanding to notify office if >3lb/1 day or >5 lb/1 week weight gain\par \par #Leg swelling: Patient has asymmetric leg swelling and she recently was hospitalized. She also has only been taking her eliquis 5mg once a day\par Will send her to the hospital to rule out DVT\par \par #Afib\par - c/w amiodarone and diltiazem\par - instructed her to take her eliquis as a BID dosing\par - will get her an EP appointment\par \par #AS\par - patient is seeing Dr. Ibarra for EXAPNDII trial (TAVR for moderate AS)\par - will continue to optimize medications as anove

## 2023-05-18 NOTE — CARDIOLOGY SUMMARY
[de-identified] : NSR [de-identified] : April 2023 (TTE): LVEF 55-60%, normal RV, moderate to severe AS

## 2023-05-18 NOTE — HISTORY OF PRESENT ILLNESS
[FreeTextEntry1] :  72y old  Female PMHx of chronic diastolic heart failure, aortic stenosis,  lupus, Lumbar Stenosis, asthma, active smoker who comes in to establish care with heart failure.\par \par The patient was recently admitted to [facility] in April 2023 for SOB and was treated for COPD exacerbation. She developed new onset Afib with RVR. She ultimately underwent NORTH and cardioversion. The TTE showed that her EF was 55% but she was noted to have moderate to severe AS.\par \par Since being home she does feel tired but overall is progress. She is taking her medications as prescribed. The patient does note that her right leg is more swollen than her left leg. She denies headaches, dizziness, chest pain, SOB at rest, palpitations, chest pain or orthopnea.

## 2023-06-07 ENCOUNTER — APPOINTMENT (OUTPATIENT)
Dept: ELECTROPHYSIOLOGY | Facility: CLINIC | Age: 73
End: 2023-06-07
Payer: MEDICARE

## 2023-06-07 ENCOUNTER — NON-APPOINTMENT (OUTPATIENT)
Age: 73
End: 2023-06-07

## 2023-06-07 VITALS
TEMPERATURE: 98.6 F | HEIGHT: 62 IN | DIASTOLIC BLOOD PRESSURE: 78 MMHG | WEIGHT: 138 LBS | OXYGEN SATURATION: 98 % | BODY MASS INDEX: 25.4 KG/M2 | SYSTOLIC BLOOD PRESSURE: 120 MMHG | HEART RATE: 72 BPM

## 2023-06-07 PROCEDURE — 99215 OFFICE O/P EST HI 40 MIN: CPT | Mod: 25

## 2023-06-07 PROCEDURE — 93000 ELECTROCARDIOGRAM COMPLETE: CPT

## 2023-06-07 NOTE — HISTORY OF PRESENT ILLNESS
[FreeTextEntry1] : 73 year old female patient with a history of Lupus, asthma/emphysema, who is admitted with multifocal PNA, found to have new AF with RVR. Echo notable for moderate to severe AS; general cardiology team following and will  work up in the outpatient setting. She underwent NORTH guided cardioversion 4/17/23 and has sustained sinus rhythm, currently on Amiodarone and Cardizem. \par \par Patient subsequently followed up with Dr. Noonan 5/16/23 and PYP scan recommended to r/o amyloidosis- insurance authorization was pending. She reported only taking Eliquis once daily and was notd to have LE edema R>L and was referred for LE dopplers which were negative for DVT.\par \par Evaluation with Dr. Person pending for EXPANDII trial. To be considered after HF medical therapy optimized. \par \par Today, reports she has been doing well since discharge. Notes improvement in LE edema, however still persists. HF f/u pending for 6/20/23. She notes improvement in breathing since discharge. \par \par ECG reveals maintaining SR. Tolerating Eliquis 5mg BID twice daily. Mild bleeding when straining having BM, now utilizing stool softeners. \par \par

## 2023-06-07 NOTE — REVIEW OF SYSTEMS
[Dyspnea on exertion] : dyspnea during exertion [Headache] : no headache [Feeling Fatigued] : not feeling fatigued [Palpitations] : no palpitations [Syncope] : no syncope [Dizziness] : no dizziness [Easy Bleeding] : no tendency for easy bleeding

## 2023-06-07 NOTE — DISCUSSION/SUMMARY
[EKG obtained to assist in diagnosis and management of assessed problem(s)] : EKG obtained to assist in diagnosis and management of assessed problem(s) [FreeTextEntry1] : 73 year old female patient with a history of Lupus, asthma/emphysema, who is admitted with multifocal PNA, found to have new AF with RVR. Echo notable for moderate to severe AS; general cardiology team following and will  work up in the outpatient setting. She underwent NORTH guided cardioversion 4/17/23 and has sustained sinus rhythm, currently on Amiodarone and Cardizem. \par \par Patient subsequently followed up with Dr. Noonan 5/16/23 and PYP scan recommended to r/o amyloidosis- insurance authorization was pending. She reported only taking Eliquis once daily and was notd to have LE edema R>L and was referred for LE dopplers which were negative for DVT.\par \par Evaluation with Dr. Person pending for EXPANDII trial. To be considered after HF medical therapy optimized. \par \par Today, reports she has been doing well since discharge. Notes improvement in LE edema, however still persists. HF f/u pending for 6/20/23. She notes improvement in breathing since discharge. \par \par ECG reveals maintaining SR. Tolerating Eliquis 5mg BID twice daily. Mild bleeding when straining having BM, now utilizing stool softeners. \par \par Recommendation: \par \par Mr. Awad has been doing well since discharge improvement with symptomatic improvement in SR with  medication optimization by HF team. Maintaining SR on amiodarone. Now compliant with Eliquis 5mg BID.\par We discussed long term rhythm control strategies for AF and the benefits of maintaining SR in patients with HFpEF. As initial strategy recommend continuing w/u for Expand trial. If proceeding with TAVR would continue amiodarone through procedure and recovery. Post TAVR, or if TAVR not indicated at this time, would wean amiodarone with close arrhythmia surveillance given this was first documented AF episode. If AF reoccurs would recommend catheter ablation for AF. To return for EP follow up in 2 months or sooner PRN.\par -Continue close HF f/u- She reported PYP scan was initially denied but subsequent peer to peer was performed. Will f/u with insurance dept to facilitate completion if approved.\par \par Odalis Hickey ANP-C \par

## 2023-06-07 NOTE — PHYSICAL EXAM
[No Acute Distress] : no acute distress [No Respiratory Distress] : no respiratory distress  [Normal Gait] : normal gait [Moves all extremities] : moves all extremities [Alert and Oriented] : alert and oriented

## 2023-07-05 ENCOUNTER — APPOINTMENT (OUTPATIENT)
Dept: HEART FAILURE | Facility: CLINIC | Age: 73
End: 2023-07-05

## 2023-07-08 ENCOUNTER — EMERGENCY (EMERGENCY)
Facility: HOSPITAL | Age: 73
LOS: 1 days | Discharge: HOME | End: 2023-07-08
Attending: EMERGENCY MEDICINE
Payer: MEDICARE

## 2023-07-08 VITALS
WEIGHT: 138.23 LBS | HEIGHT: 62 IN | SYSTOLIC BLOOD PRESSURE: 147 MMHG | DIASTOLIC BLOOD PRESSURE: 77 MMHG | RESPIRATION RATE: 18 BRPM | OXYGEN SATURATION: 93 % | TEMPERATURE: 98 F | HEART RATE: 67 BPM

## 2023-07-08 DIAGNOSIS — Z98.89 OTHER SPECIFIED POSTPROCEDURAL STATES: Chronic | ICD-10-CM

## 2023-07-08 PROCEDURE — 99284 EMERGENCY DEPT VISIT MOD MDM: CPT

## 2023-07-08 PROCEDURE — 93971 EXTREMITY STUDY: CPT | Mod: 26,RT

## 2023-07-08 RX ORDER — OXYCODONE HYDROCHLORIDE 5 MG/1
5 TABLET ORAL ONCE
Refills: 0 | Status: DISCONTINUED | OUTPATIENT
Start: 2023-07-08 | End: 2023-07-08

## 2023-07-08 RX ORDER — ACETAMINOPHEN 500 MG
1000 TABLET ORAL ONCE
Refills: 0 | Status: COMPLETED | OUTPATIENT
Start: 2023-07-08 | End: 2023-07-08

## 2023-07-08 RX ADMIN — OXYCODONE HYDROCHLORIDE 5 MILLIGRAM(S): 5 TABLET ORAL at 23:10

## 2023-07-08 NOTE — ED ADULT TRIAGE NOTE - CHIEF COMPLAINT QUOTE
right lower extremity swelling sent for dvt study 3 weeks ago, was negative. continued right lower extremity redness and swelling. "I feel like I have a fever but I never get a fever"

## 2023-07-08 NOTE — ED PROVIDER NOTE - ATTENDING CONTRIBUTION TO CARE
Dr. Tejada : I have personally seen and examined this patient at the bedside. I have fully participated in the care of this patient. I have reviewed all pertinent clinical information, including history, physical exam, plan and the Resident's note and agree except as noted.     73 year old female with PMHx afib on Eliquis and amiodarone, lupus, lumbar stenosis, asthma, sometimes smoker presenting with RLE swelling and pain over last 2 days. Patient states has had RLE swelling over last 4 weeks, seen here 5/16/23 had ultrasound done that was negative for DVT. Admits has been walking around frequently since then, has not been elevating her leg. 2 days ago developed progressively worsening pain over anterior RLE, worse with palpation and ambulation and maybe some mild redness to the site. Denies any injuries or falls.       Denies f/c/n/v/cp/sob/palpitations/cough/abd.pain/d/c/dysuria/hematuria.no  sick contacts/recent travel.    PE:  head; atraumatic normocephalic  eyes: perrla  Heart: rrr s1s2  lungs: ctab  abd: soft, nt nd + bs no rebound/guarding no cva ttp  le: no swelling no calf ttp mild erythema to rle and increased warmth neurovasculalry intact  back: no midline cervical/thoracic/lumbar ttp      -->will check labs repeat sono to ro dvt possibly early cellulitis abx

## 2023-07-08 NOTE — ED PROVIDER NOTE - NSFOLLOWUPINSTRUCTIONS_ED_ALL_ED_FT
- You may take Tylenol extra strength 2 tablets every 6 hours or Ibuprofen 600mg (3 tablets) every 6 hours as needed for aches, pains.   - Keep your right leg ELEVATED above your heart  - Apply ice to help with the swelling/pain  - Take your antibiotics as prescribed and finish the entire course  - Follow up with your primary doctor

## 2023-07-08 NOTE — ED PROVIDER NOTE - NS ED ROS FT
Gen: No fever, no change in activity level  ENT: No congestion, no rhinorrhea  Resp: No cough, no trouble breathing  Cardiovascular: No chest pain, no palpitation  Gastrointestinal: No nausea, no vomiting, no diarrhea  MS: RLE: (+) swelling,  (+) pain, (+) redness  Skin: No rashes  Neuro: No headache; no abnormal movements  Remainder negative, except as per the HPI

## 2023-07-08 NOTE — ED PROVIDER NOTE - OBJECTIVE STATEMENT
73 year old female with PMHx afib on Eliquis and amiodarone, lupus, lumbar stenosis, asthma, sometimes smoker presenting with RLE swelling and pain over last few days        worse with walking  negative for dvt 3 weeks ago    Denies chest pain, shortness of breath 73 year old female with PMHx afib on Eliquis and amiodarone, lupus, lumbar stenosis, asthma, sometimes smoker presenting with RLE swelling and pain over last 2 days. Patient states has had RLE swelling over last 4 weeks, seen here 5/16/23 had ultrasound done that was negative for DVT. Admits has been walking around frequently since then, has not been elevating her leg. 2 days ago developed progressively worsening pain over anterior RLE, worse with palpation and ambulation. Denies any injuries or falls. Denies fevers or chills.         worse with walking  negative for dvt 3 weeks ago    Denies chest pain, shortness of breath 73 year old female with PMHx afib on Eliquis and amiodarone, lupus, lumbar stenosis, asthma, sometimes smoker presenting with RLE swelling and pain over last 2 days. Patient states has had RLE swelling over last 4 weeks, seen here 5/16/23 had ultrasound done that was negative for DVT. Admits has been walking around frequently since then, has not been elevating her leg. 2 days ago developed progressively worsening pain over anterior RLE, worse with palpation and ambulation. Denies any injuries or falls. Denies fevers, chills, chest pain, shortness of breath.

## 2023-07-08 NOTE — ED PROVIDER NOTE - CLINICAL SUMMARY MEDICAL DECISION MAKING FREE TEXT BOX
73 year old female with afib on eliquis and amiodarone presenting for evaluation of RLE swelling, redness, and pain progressively worsened over last 2 days. Plan - pain control, labs, US RLE, XR RLE, reassess.

## 2023-07-08 NOTE — ED PROVIDER NOTE - PHYSICAL EXAMINATION
Gen: well appearing, no acute distress  Head: normocephalic, atraumatic  EENT: EOMI, moist mucous membranes, no scleral icterus, no JVD  Lung: no increased work of breathing, clear to auscultation bilaterally, no wheezing, rales, rhonchi, speaking in full sentences  CV: regular rate, regular rhythm, normal s1/s2, 2+ radial pulses bilaterally  Abd: soft, non-tender, non-distended, no rebound tenderness or guarding  MSK: RLE: (+) nonpitting edema around ankles with (+)ttp and (+) erythema noted to distal LE most notably anterior and posterior, NVIT, full ROM.   No visible deformities  Neuro: Awake, alert, no focal neurologic deficits  Skin: No obvious rash, no jaundice  Psych: normal affect, normal speech

## 2023-07-08 NOTE — ED PROVIDER NOTE - PATIENT PORTAL LINK FT
You can access the FollowMyHealth Patient Portal offered by [facility] by registering at the following website: [email or web address]. By joining [facility]’s FollowMyHealth portal, you will also be able to view your health information using other applications (apps) compatible with our system.

## 2023-07-08 NOTE — ED PROVIDER NOTE - PROGRESS NOTE DETAILS
Brianna Rodriguez, DO: Patient sitting up in stretcher, no acute distress. States pain has improved.     Labs reviewed, unremarkable, do not require emergent intervention. US without DVT. XR without obvious fracture/dislocation. Rx keflex sent to pharmacy for cellulitis. Advised to f/u with PCP, patient has appointment in 1-2 weeks. Stable for dc home. Supportive care advised.

## 2023-07-09 LAB
ALBUMIN SERPL ELPH-MCNC: 3.8 G/DL — SIGNIFICANT CHANGE UP (ref 3.3–5.2)
ALP SERPL-CCNC: 146 U/L — HIGH (ref 40–120)
ALT FLD-CCNC: 13 U/L — SIGNIFICANT CHANGE UP
ANION GAP SERPL CALC-SCNC: 14 MMOL/L — SIGNIFICANT CHANGE UP (ref 5–17)
APTT BLD: 68.3 SEC — HIGH (ref 27.5–35.5)
AST SERPL-CCNC: 19 U/L — SIGNIFICANT CHANGE UP
BASOPHILS # BLD AUTO: 0.1 K/UL — SIGNIFICANT CHANGE UP (ref 0–0.2)
BASOPHILS NFR BLD AUTO: 1.4 % — SIGNIFICANT CHANGE UP (ref 0–2)
BILIRUB SERPL-MCNC: 0.3 MG/DL — LOW (ref 0.4–2)
BUN SERPL-MCNC: 23.1 MG/DL — HIGH (ref 8–20)
CALCIUM SERPL-MCNC: 8.9 MG/DL — SIGNIFICANT CHANGE UP (ref 8.4–10.5)
CHLORIDE SERPL-SCNC: 99 MMOL/L — SIGNIFICANT CHANGE UP (ref 96–108)
CO2 SERPL-SCNC: 24 MMOL/L — SIGNIFICANT CHANGE UP (ref 22–29)
CREAT SERPL-MCNC: 0.99 MG/DL — SIGNIFICANT CHANGE UP (ref 0.5–1.3)
EGFR: 59 ML/MIN/1.73M2 — LOW
EOSINOPHIL # BLD AUTO: 0.39 K/UL — SIGNIFICANT CHANGE UP (ref 0–0.5)
EOSINOPHIL NFR BLD AUTO: 5.7 % — SIGNIFICANT CHANGE UP (ref 0–6)
GLUCOSE SERPL-MCNC: 97 MG/DL — SIGNIFICANT CHANGE UP (ref 70–99)
HCT VFR BLD CALC: 43.1 % — SIGNIFICANT CHANGE UP (ref 34.5–45)
HGB BLD-MCNC: 13.7 G/DL — SIGNIFICANT CHANGE UP (ref 11.5–15.5)
IMM GRANULOCYTES NFR BLD AUTO: 0.3 % — SIGNIFICANT CHANGE UP (ref 0–0.9)
INR BLD: 1.22 RATIO — HIGH (ref 0.88–1.16)
LYMPHOCYTES # BLD AUTO: 2.6 K/UL — SIGNIFICANT CHANGE UP (ref 1–3.3)
LYMPHOCYTES # BLD AUTO: 37.7 % — SIGNIFICANT CHANGE UP (ref 13–44)
MAGNESIUM SERPL-MCNC: 1.9 MG/DL — SIGNIFICANT CHANGE UP (ref 1.6–2.6)
MCHC RBC-ENTMCNC: 27.4 PG — SIGNIFICANT CHANGE UP (ref 27–34)
MCHC RBC-ENTMCNC: 31.8 GM/DL — LOW (ref 32–36)
MCV RBC AUTO: 86.2 FL — SIGNIFICANT CHANGE UP (ref 80–100)
MONOCYTES # BLD AUTO: 0.7 K/UL — SIGNIFICANT CHANGE UP (ref 0–0.9)
MONOCYTES NFR BLD AUTO: 10.1 % — SIGNIFICANT CHANGE UP (ref 2–14)
NEUTROPHILS # BLD AUTO: 3.09 K/UL — SIGNIFICANT CHANGE UP (ref 1.8–7.4)
NEUTROPHILS NFR BLD AUTO: 44.8 % — SIGNIFICANT CHANGE UP (ref 43–77)
PLATELET # BLD AUTO: 304 K/UL — SIGNIFICANT CHANGE UP (ref 150–400)
POTASSIUM SERPL-MCNC: 4.1 MMOL/L — SIGNIFICANT CHANGE UP (ref 3.5–5.3)
POTASSIUM SERPL-SCNC: 4.1 MMOL/L — SIGNIFICANT CHANGE UP (ref 3.5–5.3)
PROT SERPL-MCNC: 7.1 G/DL — SIGNIFICANT CHANGE UP (ref 6.6–8.7)
PROTHROM AB SERPL-ACNC: 14.2 SEC — HIGH (ref 10.5–13.4)
RBC # BLD: 5 M/UL — SIGNIFICANT CHANGE UP (ref 3.8–5.2)
RBC # FLD: 13.6 % — SIGNIFICANT CHANGE UP (ref 10.3–14.5)
SODIUM SERPL-SCNC: 137 MMOL/L — SIGNIFICANT CHANGE UP (ref 135–145)
WBC # BLD: 6.9 K/UL — SIGNIFICANT CHANGE UP (ref 3.8–10.5)
WBC # FLD AUTO: 6.9 K/UL — SIGNIFICANT CHANGE UP (ref 3.8–10.5)

## 2023-07-09 PROCEDURE — 73610 X-RAY EXAM OF ANKLE: CPT | Mod: 26,RT

## 2023-07-09 PROCEDURE — 36415 COLL VENOUS BLD VENIPUNCTURE: CPT

## 2023-07-09 PROCEDURE — 80053 COMPREHEN METABOLIC PANEL: CPT

## 2023-07-09 PROCEDURE — 73610 X-RAY EXAM OF ANKLE: CPT

## 2023-07-09 PROCEDURE — 85730 THROMBOPLASTIN TIME PARTIAL: CPT

## 2023-07-09 PROCEDURE — 85025 COMPLETE CBC W/AUTO DIFF WBC: CPT

## 2023-07-09 PROCEDURE — 99285 EMERGENCY DEPT VISIT HI MDM: CPT | Mod: 25

## 2023-07-09 PROCEDURE — 96374 THER/PROPH/DIAG INJ IV PUSH: CPT

## 2023-07-09 PROCEDURE — 83735 ASSAY OF MAGNESIUM: CPT

## 2023-07-09 PROCEDURE — 93971 EXTREMITY STUDY: CPT

## 2023-07-09 PROCEDURE — 85610 PROTHROMBIN TIME: CPT

## 2023-07-09 RX ORDER — CEPHALEXIN 500 MG
1 CAPSULE ORAL
Qty: 28 | Refills: 0
Start: 2023-07-09 | End: 2023-07-15

## 2023-07-09 RX ADMIN — Medication 400 MILLIGRAM(S): at 00:20

## 2023-07-09 NOTE — ED ADULT NURSE NOTE - OBJECTIVE STATEMENT
C/o right lower leg swelling and pain. RLE noted swollen. No open wounds, weeping of fluid. Remains ambulatory with steady gait.

## 2023-07-26 ENCOUNTER — APPOINTMENT (OUTPATIENT)
Dept: NUCLEAR MEDICINE | Facility: CLINIC | Age: 73
End: 2023-07-26
Payer: MEDICARE

## 2023-07-26 ENCOUNTER — OUTPATIENT (OUTPATIENT)
Dept: OUTPATIENT SERVICES | Facility: HOSPITAL | Age: 73
LOS: 1 days | End: 2023-07-26

## 2023-07-26 DIAGNOSIS — I35.0 NONRHEUMATIC AORTIC (VALVE) STENOSIS: ICD-10-CM

## 2023-07-26 DIAGNOSIS — Z98.89 OTHER SPECIFIED POSTPROCEDURAL STATES: Chronic | ICD-10-CM

## 2023-07-26 PROCEDURE — 78830 RP LOCLZJ TUM SPECT W/CT 1: CPT | Mod: 26

## 2023-07-27 ENCOUNTER — NON-APPOINTMENT (OUTPATIENT)
Age: 73
End: 2023-07-27

## 2023-08-02 ENCOUNTER — APPOINTMENT (OUTPATIENT)
Dept: HEART FAILURE | Facility: CLINIC | Age: 73
End: 2023-08-02
Payer: MEDICARE

## 2023-08-02 VITALS
WEIGHT: 137 LBS | HEIGHT: 62 IN | DIASTOLIC BLOOD PRESSURE: 76 MMHG | HEART RATE: 82 BPM | BODY MASS INDEX: 25.21 KG/M2 | OXYGEN SATURATION: 95 % | SYSTOLIC BLOOD PRESSURE: 118 MMHG | TEMPERATURE: 98.9 F

## 2023-08-02 DIAGNOSIS — M79.89 OTHER SPECIFIED SOFT TISSUE DISORDERS: ICD-10-CM

## 2023-08-02 PROCEDURE — 99214 OFFICE O/P EST MOD 30 MIN: CPT

## 2023-08-02 RX ORDER — ALPRAZOLAM 0.5 MG/1
0.5 TABLET ORAL
Refills: 0 | Status: ACTIVE | COMMUNITY

## 2023-08-02 RX ORDER — FLUTICASONE FUROATE, UMECLIDINIUM BROMIDE AND VILANTEROL TRIFENATATE 100; 62.5; 25 UG/1; UG/1; UG/1
100-62.5-25 POWDER RESPIRATORY (INHALATION) DAILY
Refills: 0 | Status: ACTIVE | COMMUNITY

## 2023-08-02 RX ORDER — FOLIC ACID 1 MG/1
1 TABLET ORAL DAILY
Refills: 0 | Status: ACTIVE | COMMUNITY

## 2023-08-02 RX ORDER — PANTOPRAZOLE 40 MG/1
40 TABLET, DELAYED RELEASE ORAL DAILY
Refills: 0 | Status: ACTIVE | COMMUNITY

## 2023-08-02 RX ORDER — HYDROXYCHLOROQUINE SULFATE 200 MG/1
200 TABLET, FILM COATED ORAL DAILY
Refills: 0 | Status: ACTIVE | COMMUNITY

## 2023-08-02 NOTE — ASSESSMENT
[FreeTextEntry1] : 73 year old female with chronic diastolic heart failure, aortic stenosis, lupus, Lumbar Stenosis, asthma, active smoker who comes in for follow-up of her HF.  # Chronic systolic heart failure: Patient is warm and well perfused. Her RLE edema has improved. - Continue Jardiance 10mg daily - Will start spironolactone 25 mg daily and repeat labs in 1-2 weeks (prefers to have them drawn with her PCP) - PYP scan negative for TTR amyloidosis, will reorder serum FLC and immunofixation -HF lifestyle modifications including daily weights, BP log, and low sodium diet reiterated. Pt given daily weight and BP log and verbalized understanding to notify office if >3lb/1 day or >5 lb/1 week weight gain  #Leg swelling: Improved LE doppler negative for DVT Continue HF GDMT as above  #Afib - c/w amiodarone and diltiazem - continue AC with Eliquis - follow-up with EP  #AS - patient is seeing Dr. Ibarra for EXPANDII trial (TAVR for moderate AS) - will continue to optimize medications as above

## 2023-08-02 NOTE — HISTORY OF PRESENT ILLNESS
[FreeTextEntry1] : 74 y/o Female PMHx of chronic diastolic heart failure, aortic stenosis, lupus, Lumbar Stenosis, asthma, active smoker who comes in for follow-up of her HF.  The patient was recently admitted to [facility] in April 2023 for SOB and was treated for COPD exacerbation. She developed new onset Afib with RVR. She ultimately underwent NORTH and cardioversion. The TTE showed that her EF was 55% but she was noted to have moderate to severe AS.  8/2/23: Since last seen on 5/16 she has been doing well. She does not have her list of medications with her, but thinks she has been taking Jardiance and denies any urinary symptoms or rash. She went back to the ED on 7/8 for RLE edema and repeat LE doppler was negative for DVT. She was prescribed antiobiotics for possible cellulitis which she states she finished a few days ago. She also believes her PCP prescribed her a medication for gout in her left hand. She denies headaches, dizziness, chest pain, SOB at rest, palpitations, or orthopnea.

## 2023-08-02 NOTE — CARDIOLOGY SUMMARY
[de-identified] : NSR [de-identified] : April 2023 (TTE): LVEF 55-60%, normal RV, moderate to severe AS

## 2023-08-02 NOTE — PHYSICAL EXAM
[No Acute Distress] : no acute distress [Normal Conjunctiva] : normal conjunctiva [Normal Venous Pressure] : normal venous pressure [No Carotid Bruit] : no carotid bruit [Normal S1, S2] : normal S1, S2 [No Murmur] : no murmur [Clear Lung Fields] : clear lung fields [Good Air Entry] : good air entry [Soft] : abdomen soft [Non Tender] : non-tender [No Masses/organomegaly] : no masses/organomegaly [Normal Gait] : normal gait [No Rash] : no rash [de-identified] : right leg 1+ edema, left leg no edema, warm bilaterally

## 2023-08-02 NOTE — HISTORY OF PRESENT ILLNESS
[FreeTextEntry1] : 73 year old female patient with a history of Lupus, asthma/emphysema, who is admitted with multifocal PNA, found to have new AF with RVR. Echo notable for moderate to severe AS; general cardiology team following and will  work up in the outpatient setting. She underwent NORTH guided cardioversion 4/17/23 and has sustained sinus rhythm, currently on Amiodarone and Cardizem.   Patient subsequently followed up with Dr. Noonan 5/16/23 and PYP scan recommended to r/o amyloidosis which was negative. She reported only taking Eliquis once daily and was notd to have LE edema R>L and was referred for LE dopplers which were negative for DVT. Subsequently increased Eliquis to 5mg BID which she was tolerating well last follow up.   Evaluation with Dr. Person pending for EXPANDII trial. To be considered after HF medical therapy optimized.   We discussed long term rhythm control strategies for AF and the benefits of maintaining SR in patients with HFpEF. As initial strategy recommend continuing w/u for Expand trial. If proceeding with TAVR would continue amiodarone through procedure and recovery. Post TAVR, or if TAVR not indicated at this time, would wean amiodarone with close arrhythmia surveillance given this was first documented AF episode. If AF reoccurs would recommend catheter ablation for AF.  INCOMPLETE NOTE NOT YET SEEN

## 2023-08-09 ENCOUNTER — APPOINTMENT (OUTPATIENT)
Dept: ELECTROPHYSIOLOGY | Facility: CLINIC | Age: 73
End: 2023-08-09

## 2023-09-02 NOTE — ED ADULT NURSE NOTE - CAS EDN DISCHARGE ASSESSMENT
80y Female s/p L iliofemoral bypass graft with vein on 8/31/23 complicated by EBL 2L blood loss s/p 6U pRBC, 1U plt, 3U FFP and requiring pressors, admitted to SICU for hemodynamic monitoring. Now off nataly ggt. S/p 1U pRBC on 9/1/23. Recovering appropriately.     - Podiatry plan: L foot partial hallux resection next week  - ok to dc ellis  - VT prophylaxis w/ SCD R leg  - trict I&O's  - Cont RICHELLE drain  - Analgesia and antiemetics as needed  - Regular Diet  - Dispo: transfer to floor when available    Vascular surgery  [id number]  80y Female s/p L iliofemoral bypass graft with vein on 8/31/23 complicated by EBL 2L blood loss s/p 6U pRBC, 1U plt, 3U FFP and requiring pressors, admitted to SICU for hemodynamic monitoring. Now off nataly ggt. S/p 1U pRBC on 9/1/23. Recovering appropriately.     - Podiatry plan: L foot partial hallux resection next week  - ok to dc ellis  - VT prophylaxis w/ SCD R leg  - trict I&O's  - Cont RICHELLE drain  - Analgesia and antiemetics as needed  - Recommend holding AC; plan for procedure with podiatry next week. DVT proph with SQH  - Regular Diet  - Dispo: transfer to floor when available    Vascular surgery  [id number] Alert and oriented to person, place and time

## 2023-09-13 ENCOUNTER — APPOINTMENT (OUTPATIENT)
Dept: HEART FAILURE | Facility: CLINIC | Age: 73
End: 2023-09-13
Payer: MEDICARE

## 2023-09-13 VITALS
BODY MASS INDEX: 26.31 KG/M2 | DIASTOLIC BLOOD PRESSURE: 66 MMHG | SYSTOLIC BLOOD PRESSURE: 110 MMHG | OXYGEN SATURATION: 94 % | WEIGHT: 143 LBS | HEIGHT: 62 IN | HEART RATE: 62 BPM

## 2023-09-13 PROCEDURE — 99215 OFFICE O/P EST HI 40 MIN: CPT

## 2023-09-13 RX ORDER — ALBUTEROL SULFATE 90 UG/1
108 (90 BASE) AEROSOL, METERED RESPIRATORY (INHALATION)
Refills: 0 | Status: DISCONTINUED | COMMUNITY
End: 2023-09-13

## 2023-09-13 RX ORDER — ATORVASTATIN CALCIUM 20 MG/1
20 TABLET, FILM COATED ORAL DAILY
Refills: 0 | Status: ACTIVE | COMMUNITY

## 2023-09-13 RX ORDER — MONTELUKAST SODIUM 10 MG/1
10 TABLET, FILM COATED ORAL DAILY
Refills: 0 | Status: DISCONTINUED | COMMUNITY
End: 2023-09-13

## 2023-09-13 RX ORDER — BUDESONIDE AND FORMOTEROL FUMARATE DIHYDRATE 160; 4.5 UG/1; UG/1
160-4.5 AEROSOL RESPIRATORY (INHALATION) DAILY
Refills: 0 | Status: DISCONTINUED | COMMUNITY
End: 2023-09-13

## 2023-10-20 ENCOUNTER — OUTPATIENT (OUTPATIENT)
Dept: OUTPATIENT SERVICES | Facility: HOSPITAL | Age: 73
LOS: 1 days | End: 2023-10-20
Payer: SUBSIDIZED

## 2023-10-20 ENCOUNTER — NON-APPOINTMENT (OUTPATIENT)
Age: 73
End: 2023-10-20

## 2023-10-20 ENCOUNTER — APPOINTMENT (OUTPATIENT)
Dept: CARDIOTHORACIC SURGERY | Facility: CLINIC | Age: 73
End: 2023-10-20
Payer: MEDICARE

## 2023-10-20 VITALS
RESPIRATION RATE: 14 BRPM | OXYGEN SATURATION: 95 % | WEIGHT: 143 LBS | HEART RATE: 65 BPM | BODY MASS INDEX: 26.31 KG/M2 | DIASTOLIC BLOOD PRESSURE: 69 MMHG | HEIGHT: 62 IN | SYSTOLIC BLOOD PRESSURE: 118 MMHG

## 2023-10-20 DIAGNOSIS — Z98.89 OTHER SPECIFIED POSTPROCEDURAL STATES: Chronic | ICD-10-CM

## 2023-10-20 DIAGNOSIS — I35.0 NONRHEUMATIC AORTIC (VALVE) STENOSIS: ICD-10-CM

## 2023-10-20 DIAGNOSIS — I48.91 UNSPECIFIED ATRIAL FIBRILLATION: ICD-10-CM

## 2023-10-20 PROCEDURE — 99213 OFFICE O/P EST LOW 20 MIN: CPT

## 2023-10-20 PROCEDURE — 93306 TTE W/DOPPLER COMPLETE: CPT

## 2023-10-20 PROCEDURE — 93306 TTE W/DOPPLER COMPLETE: CPT | Mod: 26

## 2023-10-23 VITALS — TEMPERATURE: 98 F

## 2023-11-15 ENCOUNTER — APPOINTMENT (OUTPATIENT)
Dept: HEART FAILURE | Facility: CLINIC | Age: 73
End: 2023-11-15
Payer: MEDICARE

## 2023-11-15 VITALS
HEART RATE: 77 BPM | DIASTOLIC BLOOD PRESSURE: 70 MMHG | OXYGEN SATURATION: 95 % | SYSTOLIC BLOOD PRESSURE: 122 MMHG | HEIGHT: 62 IN | WEIGHT: 140.5 LBS | BODY MASS INDEX: 25.86 KG/M2

## 2023-11-15 DIAGNOSIS — I48.91 UNSPECIFIED ATRIAL FIBRILLATION: ICD-10-CM

## 2023-11-15 PROCEDURE — 99214 OFFICE O/P EST MOD 30 MIN: CPT

## 2023-11-17 PROBLEM — I48.91 ATRIAL FIBRILLATION: Status: ACTIVE | Noted: 2023-05-05

## 2023-11-27 RX ORDER — CHLORHEXIDINE GLUCONATE 213 G/1000ML
1 SOLUTION TOPICAL ONCE
Refills: 0 | Status: DISCONTINUED | OUTPATIENT
Start: 2023-12-05 | End: 2023-12-19

## 2023-11-27 NOTE — H&P PST ADULT - HISTORY OF PRESENT ILLNESS
Department of Cardiology                                                                  [facility]/[place] [address]                                                            Telephone: [phone]. Fax:[phone]                                                                             Pre- Procedure Progress Note      HPI:    The patient is a 74 yo female who present for [facility] due to moderate aortic stenosis. Her medical history includes lupus, lumbar stenosis, asthma, prior smoking, pneumonia, and atrial fibrillation, for which she is taking Eliquis and had undergone cardioversion.  ?  On 04/17/2023, she was admitted to [facility] due to shortness of breath, cough, and congestion for a week. A CT chest revealed multifocal pneumonia, and she was found to be hypoxic at 87% on RA. She was treated with steroids and IV antibiotics and later transitioned to PO on discharge. She also experienced rapid atrial fibrillation, which was successfully treated with cardioversion, amiodarone, and Eliquis. The patient has been experiencing fatigue and shortness of breath upon exertion for the past few months but is able to ambulate independently without any assistance devices. She is independent with her daily activities and lives alone, with a NYHP class 2.  ?  Both transthoracic echocardiogram and NORTH both show moderate aortic valve stenosis. The studies were personally reviewed. Aortic valve area is reported at 0.85 cm. However, peak velocity is only 2.58 m/s, and peak and mean gradients are 26.6 and 17.6 mmHg respectively. This is more consistent with moderate aortic valve stenosis. NORTH confirms the same.  ?  The patient is now enrolled in the [employer] moderate aortic valve stenosis clinical trial. EXPAND II. She has followed with heart failure and has been optimized medically.  ?  She is now feeling extremely fatigued. She is not short of breath and reports having a 50 lb weight loss over the course of 2 years. Overall, she feels well, just has fatigue where she feels she can take frequent naps.    Symptoms: fatigue       Angina (Class):        Ischemic Symptoms:     Heart Failure: HFpEF, chronic diastolic           NYHA Class (within 2 weeks):       Echo:      Associated Risk Factors:        Frailty Assessment: (none/mild/mod/severe)       Cerebrovascular Disease: N/A       Chronic Lung Disease: N/A       Peripheral Arterial Disease: N/A       Chronic Kidney Disease (if yes, what is GFR): N/A       Uncontrolled Diabetes (if yes, what is HgbA1C or FBS): N/A       Poorly Controlled Hypertension (if yes, what is SBP): N/A       Morbid Obesity (if yes, what is BMI): N/A       History of Recent Ventricular Arrhythmia: N/A       Inability to Ambulate Safely: N/A       Need for Therapeutic Anticoagulation: N/A       Antiplatelet or Contrast Allergy: N/A    Antianginal Therapies:        Beta Blockers:         Calcium Channel Blockers: diltiazem       Long Acting Nitrates:        Ranexa:     	  MEDICATIONS:                    ROS: as stated above, otherwise negative    PHYSICAL EXAM:  Constitutional: A & O x 3  HEENT:   Normal oral mucosa, PERRL, EOMI	  Cardiovascular: Normal S1 S2, No JVD, *****No murmurs  Respiratory: Lungs clear to auscultation	****  Gastrointestinal:  Soft, Non-tender, + BS	  Skin: No rashes, No ecchymoses, No cyanosis  Neurologic: Non-focal  Extremities: Normal range of motion, no edema****  Vascular: Peripheral pulses palpable + bilaterally ****      T(C): --  HR: --  BP: --  RR: --  SpO2: --  Wt(kg): --      I&O's Summary      Daily     Daily     TELEMETRY: 	      ECG:  	    LABS:	 	                        Tnl:    Lipid Profile:   TC  TG  LDL  HDL    HgA1c:     proBNP:                                                                                    Department of Cardiology                                                                  [facility]/[place] [address]                                                            Telephone: [phone]. Fax:[phone]                                                                             Pre- Procedure Progress Note      HPI:    The patient is a 74 yo female who present for [facility] due to moderate aortic stenosis. Her medical history includes lupus, lumbar stenosis, asthma, prior smoking, pneumonia, and atrial fibrillation, for which she is taking Eliquis and had undergone cardioversion.  ?  On 04/17/2023, she was admitted to [facility] due to shortness of breath, cough, and congestion for a week. A CT chest revealed multifocal pneumonia, and she was found to be hypoxic at 87% on RA. She was treated with steroids and IV antibiotics and later transitioned to PO on discharge. She also experienced rapid atrial fibrillation, which was successfully treated with cardioversion, amiodarone, and Eliquis. The patient has been experiencing fatigue and shortness of breath upon exertion for the past few months but is able to ambulate independently without any assistance devices. She is independent with her daily activities and lives alone, with a NYHP class 2.  ?  Both transthoracic echocardiogram and NORTH both show moderate aortic valve stenosis. The studies were personally reviewed. Aortic valve area is reported at 0.85 cm. However, peak velocity is only 2.58 m/s, and peak and mean gradients are 26.6 and 17.6 mmHg respectively. This is more consistent with moderate aortic valve stenosis. NORTH confirms the same.  ?  The patient is now enrolled in the [employer] moderate aortic valve stenosis clinical trial. EXPAND II. She has followed with heart failure and has been optimized medically.  ?  She is now feeling extremely fatigued. She is not short of breath and reports having a 50 lb weight loss over the course of 2 years. Overall, she feels well, just has fatigue where she feels she can take frequent naps.    Symptoms: fatigue       Angina (Class):        Ischemic Symptoms:     Heart Failure: HFpEF, chronic diastolic           NYHA Class (within 2 weeks):       Echo:      Associated Risk Factors:        Frailty Assessment: (none/mild/mod/severe)       Cerebrovascular Disease: N/A       Chronic Lung Disease: N/A       Peripheral Arterial Disease: N/A       Chronic Kidney Disease (if yes, what is GFR): N/A       Uncontrolled Diabetes (if yes, what is HgbA1C or FBS): N/A       Poorly Controlled Hypertension (if yes, what is SBP): N/A       Morbid Obesity (if yes, what is BMI): N/A       History of Recent Ventricular Arrhythmia: N/A       Inability to Ambulate Safely: N/A       Need for Therapeutic Anticoagulation: N/A       Antiplatelet or Contrast Allergy: N/A    Antianginal Therapies:        Beta Blockers:         Calcium Channel Blockers: diltiazem       Long Acting Nitrates:        Ranexa:     	  MEDICATIONS:                    ROS: as stated above, otherwise negative    PHYSICAL EXAM:  Constitutional: A & O x 3  HEENT:   Normal oral mucosa, PERRL, EOMI	  Cardiovascular: Normal S1 S2, No JVD, *****No murmurs  Respiratory: Lungs clear to auscultation	****  Gastrointestinal:  Soft, Non-tender, + BS	  Skin: No rashes, No ecchymoses, No cyanosis  Neurologic: Non-focal  Extremities: Normal range of motion, no edema****  Vascular: Peripheral pulses palpable + bilaterally ****      T(C): --  HR: --  BP: --  RR: --  SpO2: --  Wt(kg): --      I&O's Summary      Daily     Daily     TELEMETRY: 	      ECG:  	    LABS:	 	                        Tnl:    Lipid Profile:   TC  TG  LDL  HDL    HgA1c:     proBNP:                                                                                    Department of Cardiology                                                                  [facility]/[place] [address]                                                            Telephone: [phone]. Fax:[phone]                                                                             Pre- Procedure Progress Note      HPI:    The patient is a 72 yo female who present for [facility] due to moderate aortic stenosis. Her medical history includes lupus, lumbar stenosis, asthma, prior smoking, pneumonia, and atrial fibrillation, for which she is taking Eliquis and had undergone cardioversion.  ?  On 04/17/2023, she was admitted to [facility] due to shortness of breath, cough, and congestion for a week. A CT chest revealed multifocal pneumonia, and she was found to be hypoxic at 87% on RA. She was treated with steroids and IV antibiotics and later transitioned to PO on discharge. She also experienced rapid atrial fibrillation, which was successfully treated with cardioversion, amiodarone, and Eliquis. The patient has been experiencing fatigue and shortness of breath upon exertion for the past few months but is able to ambulate independently without any assistance devices. She is independent with her daily activities and lives alone, with a NYHP class 2.  ?  Both transthoracic echocardiogram and NORTH both show moderate aortic valve stenosis. The studies were personally reviewed. Aortic valve area is reported at 0.85 cm. However, peak velocity is only 2.58 m/s, and peak and mean gradients are 26.6 and 17.6 mmHg respectively. This is more consistent with moderate aortic valve stenosis. NORTH confirms the same.  ?  The patient is now enrolled in the [employer] moderate aortic valve stenosis clinical trial. EXPAND II. She has followed with heart failure and has been optimized medically.  ?  She is now feeling extremely fatigued. She is not short of breath and reports having a 50 lb weight loss over the course of 2 years. Overall, she feels well, just has fatigue where she feels she can take frequent naps.    Symptoms: fatigue       Angina (Class):        Ischemic Symptoms:     Heart Failure: HFpEF, chronic diastolic           NYHA Class (within 2 weeks):       Echo:      Associated Risk Factors:        Frailty Assessment: (none/mild/mod/severe)       Cerebrovascular Disease: N/A       Chronic Lung Disease: N/A       Peripheral Arterial Disease: N/A       Chronic Kidney Disease (if yes, what is GFR): N/A       Uncontrolled Diabetes (if yes, what is HgbA1C or FBS): N/A       Poorly Controlled Hypertension (if yes, what is SBP): N/A       Morbid Obesity (if yes, what is BMI): N/A       History of Recent Ventricular Arrhythmia: N/A       Inability to Ambulate Safely: N/A       Need for Therapeutic Anticoagulation: N/A       Antiplatelet or Contrast Allergy: N/A    Antianginal Therapies:        Beta Blockers:         Calcium Channel Blockers: diltiazem       Long Acting Nitrates:        Ranexa:     	  MEDICATIONS:                    ROS: as stated above, otherwise negative    PHYSICAL EXAM:  Constitutional: A & O x 3  HEENT:   Normal oral mucosa, PERRL, EOMI	  Cardiovascular: Normal S1 S2, No JVD, *****No murmurs  Respiratory: Lungs clear to auscultation	****  Gastrointestinal:  Soft, Non-tender, + BS	  Skin: No rashes, No ecchymoses, No cyanosis  Neurologic: Non-focal  Extremities: Normal range of motion, no edema****  Vascular: Peripheral pulses palpable + bilaterally ****      T(C): --  HR: --  BP: --  RR: --  SpO2: --  Wt(kg): --      I&O's Summary      Daily     Daily     TELEMETRY: 	      ECG:  	    LABS:	 	                        Tnl:    Lipid Profile:   TC  TG  LDL  HDL    HgA1c:     proBNP:                                                                                    Department of Cardiology                                                                  [facility]/[place] [address]                                                            Telephone: [phone]. Fax:[phone]                                                                             Pre- Procedure Progress Note      HPI:    The patient is a 74 yo female who present for [facility] due to moderate aortic stenosis. Her medical history includes lupus, lumbar stenosis, asthma, prior smoking, pneumonia, and atrial fibrillation, for which she is taking Eliquis and had undergone cardioversion.  ?  On 04/17/2023, she was admitted to [facility] due to shortness of breath, cough, and congestion for a week. A CT chest revealed multifocal pneumonia, and she was found to be hypoxic at 87% on RA. She was treated with steroids and IV antibiotics and later transitioned to PO on discharge. She also experienced rapid atrial fibrillation, which was successfully treated with cardioversion, amiodarone, and Eliquis. The patient has been experiencing fatigue and shortness of breath upon exertion for the past few months but is able to ambulate independently without any assistance devices. She is independent with her daily activities and lives alone, with a NYHP class 2.  ?  Both transthoracic echocardiogram and NORTH both show moderate aortic valve stenosis. Aortic valve area is reported at 0.85 cm. However, peak velocity is only 2.58 m/s, and peak and mean gradients are 26.6 and 17.6 mmHg respectively. This is more consistent with moderate aortic valve stenosis. NORTH confirms the same.  ?  The patient is now enrolled in the [employer] moderate aortic valve stenosis clinical trial. EXPAND II. She has followed with heart failure and has been optimized medically.  ?      Symptoms: fatigue       Angina (Class): II       Ischemic Symptoms: SOB, fatigue    Heart Failure: HFpEF, chronic diastolic        Echo:    EF 70%  mod AS  DAHLIA 0.85      Associated Risk Factors:        Frailty Assessment: (none/mild/mod/severe)mild       Cerebrovascular Disease: N/A       Chronic Lung Disease: N/A       Peripheral Arterial Disease: N/A       Chronic Kidney Disease (if yes, what is GFR): N/A       Uncontrolled Diabetes (if yes, what is HgbA1C or FBS): N/A       Poorly Controlled Hypertension (if yes, what is SBP): N/A       Morbid Obesity (if yes, what is BMI): N/A       History of Recent Ventricular Arrhythmia: N/A       Inability to Ambulate Safely: N/A       Need for Therapeutic Anticoagulation: N/A       Antiplatelet or Contrast Allergy: N/A    Antianginal Therapies:        Beta Blockers:         Calcium Channel Blockers: diltiazem       Long Acting Nitrates:        Ranexa:         ROS: as stated above, otherwise negative    PHYSICAL EXAM:  Constitutional: A & O x 3  HEENT:   Normal oral mucosa, PERRL, EOMI	  Cardiovascular: Normal S1 S2, No JVD, + murmur  Respiratory: +rales, + wheezing  Gastrointestinal:  Soft, Non-tender, + BS	  Skin: No rashes, No ecchymoses, No cyanosis  Neurologic: Non-focal  Extremities: Normal range of motion, no edema  Vascular: Peripheral pulses palpable + bilaterally         TELEMETRY: 	NSR 70     ECG:  	NSR 72 bpm                                                                                         Department of Cardiology                                                                  [facility]/[place] [address]                                                            Telephone: [phone]. Fax:[phone]                                                                             Pre- Procedure Progress Note      HPI:    The patient is a 72 yo female who present for [facility] due to moderate aortic stenosis. Her medical history includes lupus, lumbar stenosis, asthma, prior smoking, pneumonia, and atrial fibrillation, for which she is taking Eliquis and had undergone cardioversion.  ?  On 04/17/2023, she was admitted to [facility] due to shortness of breath, cough, and congestion for a week. A CT chest revealed multifocal pneumonia, and she was found to be hypoxic at 87% on RA. She was treated with steroids and IV antibiotics and later transitioned to PO on discharge. She also experienced rapid atrial fibrillation, which was successfully treated with cardioversion, amiodarone, and Eliquis. The patient has been experiencing fatigue and shortness of breath upon exertion for the past few months but is able to ambulate independently without any assistance devices. She is independent with her daily activities and lives alone, with a NYHP class 2.  ?  Both transthoracic echocardiogram and NORTH both show moderate aortic valve stenosis. Aortic valve area is reported at 0.85 cm. However, peak velocity is only 2.58 m/s, and peak and mean gradients are 26.6 and 17.6 mmHg respectively. This is more consistent with moderate aortic valve stenosis. NORTH confirms the same.  ?  The patient is now enrolled in the [employer] moderate aortic valve stenosis clinical trial. EXPAND II. She has followed with heart failure and has been optimized medically.  ?      Symptoms: fatigue       Angina (Class): II       Ischemic Symptoms: SOB, fatigue    Heart Failure: HFpEF, chronic diastolic        Echo:    EF 70%  mod AS  DAHLIA 0.85      Associated Risk Factors:        Frailty Assessment: (none/mild/mod/severe)mild       Cerebrovascular Disease: N/A       Chronic Lung Disease: N/A       Peripheral Arterial Disease: N/A       Chronic Kidney Disease (if yes, what is GFR): N/A       Uncontrolled Diabetes (if yes, what is HgbA1C or FBS): N/A       Poorly Controlled Hypertension (if yes, what is SBP): N/A       Morbid Obesity (if yes, what is BMI): N/A       History of Recent Ventricular Arrhythmia: N/A       Inability to Ambulate Safely: N/A       Need for Therapeutic Anticoagulation: N/A       Antiplatelet or Contrast Allergy: N/A    Antianginal Therapies:        Beta Blockers:         Calcium Channel Blockers: diltiazem       Long Acting Nitrates:        Ranexa:         ROS: as stated above, otherwise negative    PHYSICAL EXAM:  Constitutional: A & O x 3  HEENT:   Normal oral mucosa, PERRL, EOMI	  Cardiovascular: Normal S1 S2, No JVD, + murmur  Respiratory: +rales, + wheezing  Gastrointestinal:  Soft, Non-tender, + BS	  Skin: No rashes, No ecchymoses, No cyanosis  Neurologic: Non-focal  Extremities: Normal range of motion, no edema  Vascular: Peripheral pulses palpable + bilaterally         TELEMETRY: 	NSR 70     ECG:  	NSR 72 bpm                                                                                         Department of Cardiology                                                                  [facility]/[place] [address]                                                            Telephone: [phone]. Fax:[phone]                                                                             Pre- Procedure Progress Note      HPI:    The patient is a 74 yo female who present for [facility] due to moderate aortic stenosis. Her medical history includes lupus, lumbar stenosis, asthma, prior smoking, pneumonia, and atrial fibrillation, for which she is taking Eliquis and had undergone cardioversion.  ?  On 04/17/2023, she was admitted to [facility] due to shortness of breath, cough, and congestion for a week. A CT chest revealed multifocal pneumonia, and she was found to be hypoxic at 87% on RA. She was treated with steroids and IV antibiotics and later transitioned to PO on discharge. She also experienced rapid atrial fibrillation, which was successfully treated with cardioversion, amiodarone, and Eliquis. The patient has been experiencing fatigue and shortness of breath upon exertion for the past few months but is able to ambulate independently without any assistance devices. She is independent with her daily activities and lives alone, with a NYHP class 2.  ?  Both transthoracic echocardiogram and NORTH both show moderate aortic valve stenosis. Aortic valve area is reported at 0.85 cm. However, peak velocity is only 2.58 m/s, and peak and mean gradients are 26.6 and 17.6 mmHg respectively. This is more consistent with moderate aortic valve stenosis. NORTH confirms the same.  ?  The patient is now enrolled in the [employer] moderate aortic valve stenosis clinical trial. EXPAND II. She has followed with heart failure and has been optimized medically.  ?      Symptoms: fatigue       Angina (Class): II       Ischemic Symptoms: SOB, fatigue    Heart Failure: HFpEF, chronic diastolic        Echo:    EF 70%  mod AS  DAHLIA 0.85      Associated Risk Factors:        Frailty Assessment: (none/mild/mod/severe)mild       Cerebrovascular Disease: N/A       Chronic Lung Disease: N/A       Peripheral Arterial Disease: N/A       Chronic Kidney Disease (if yes, what is GFR): N/A       Uncontrolled Diabetes (if yes, what is HgbA1C or FBS): N/A       Poorly Controlled Hypertension (if yes, what is SBP): N/A       Morbid Obesity (if yes, what is BMI): N/A       History of Recent Ventricular Arrhythmia: N/A       Inability to Ambulate Safely: N/A       Need for Therapeutic Anticoagulation: N/A       Antiplatelet or Contrast Allergy: N/A    Antianginal Therapies:        Beta Blockers:         Calcium Channel Blockers: diltiazem       Long Acting Nitrates:        Ranexa:         ROS: as stated above, otherwise negative    PHYSICAL EXAM:  Constitutional: A & O x 3  HEENT:   Normal oral mucosa, PERRL, EOMI	  Cardiovascular: Normal S1 S2, No JVD, + murmur  Respiratory: +rales, + wheezing  Gastrointestinal:  Soft, Non-tender, + BS	  Skin: No rashes, No ecchymoses, No cyanosis  Neurologic: Non-focal  Extremities: Normal range of motion, no edema  Vascular: Peripheral pulses palpable + bilaterally         TELEMETRY: 	NSR 70     ECG:  	NSR 72 bpm                                                                                         Department of Cardiology                                                                  [facility]/[place] [address]                                                            Telephone: [phone]. Fax:[phone]                                                                             Pre- Procedure Progress Note      HPI:    The patient is a 74 yo female who present for [facility] due to moderate aortic stenosis. Her medical history includes lupus, lumbar stenosis, asthma, prior smoking, pneumonia, and atrial fibrillation, for which she is taking Eliquis and had undergone cardioversion.  ?  On 04/17/2023, she was admitted to [facility] due to shortness of breath, cough, and congestion for a week. A CT chest revealed multifocal pneumonia, and she was found to be hypoxic at 87% on RA. She was treated with steroids and IV antibiotics and later transitioned to PO on discharge. She also experienced rapid atrial fibrillation, which was successfully treated with cardioversion, amiodarone, and Eliquis. The patient has been experiencing fatigue and shortness of breath upon exertion for the past few months but is able to ambulate independently without any assistance devices. She is independent with her daily activities and lives alone, with a NYHP class 2.  ?  Both transthoracic echocardiogram and NORTH both show moderate aortic valve stenosis. Aortic valve area is reported at 0.85 cm. However, peak velocity is only 2.58 m/s, and peak and mean gradients are 26.6 and 17.6 mmHg respectively. This is more consistent with moderate aortic valve stenosis. NORTH confirms the same.  ?  The patient is now enrolled in the [employer] moderate aortic valve stenosis clinical trial. EXPAND II. She has followed with heart failure and has been optimized medically.  ?      Symptoms: fatigue       Angina (Class): II       Ischemic Symptoms: SOB, fatigue    Heart Failure: HFpEF, chronic diastolic        Echo:    EF 70%  mod AS  DAHLIA 0.85      Associated Risk Factors:        Frailty Assessment: (none/mild/mod/severe)mild       Cerebrovascular Disease: N/A       Chronic Lung Disease: N/A       Peripheral Arterial Disease: N/A       Chronic Kidney Disease (if yes, what is GFR): N/A       Uncontrolled Diabetes (if yes, what is HgbA1C or FBS): N/A       Poorly Controlled Hypertension (if yes, what is SBP): N/A       Morbid Obesity (if yes, what is BMI): N/A       History of Recent Ventricular Arrhythmia: N/A       Inability to Ambulate Safely: N/A       Need for Therapeutic Anticoagulation: N/A       Antiplatelet or Contrast Allergy: N/A    Antianginal Therapies:        Beta Blockers:         Calcium Channel Blockers: diltiazem       Long Acting Nitrates:        Ranexa:         ROS: as stated above, otherwise negative    PHYSICAL EXAM:  Constitutional: A & O x 3  HEENT:   Normal oral mucosa, PERRL, EOMI	  Cardiovascular: Normal S1 S2, No JVD, + murmur  Respiratory: +rales, + wheezing  Gastrointestinal:  Soft, Non-tender, + BS	  Skin: No rashes, No ecchymoses, No cyanosis  Neurologic: Non-focal  Extremities: Normal range of motion, no edema  Vascular: Peripheral pulses palpable + bilaterally         TELEMETRY: 	NSR 70     ECG:  	NSR 72 bpm      LABS:                        13.5   8.49  )-----------( 295      ( 05 Dec 2023 09:00 )             42.1   12-05    138  |  98  |  21.5<H>  ----------------------------<  100<H>  4.1   |  27.0  |  0.87    Ca    8.6      05 Dec 2023 09:00  Mg     1.5     12-05    TPro  7.0  /  Alb  3.7  /  TBili  0.8  /  DBili  x   /  AST  18  /  ALT  19  /  AlkPhos  143<H>  12-05                                                                                         Department of Cardiology                                                                  [facility]/[place] [address]                                                            Telephone: [phone]. Fax:[phone]                                                                             Pre- Procedure Progress Note      HPI:    The patient is a 74 yo female who present for [facility] due to moderate aortic stenosis. Her medical history includes lupus, lumbar stenosis, asthma, prior smoking, pneumonia, and atrial fibrillation, for which she is taking Eliquis and had undergone cardioversion.  ?  On 04/17/2023, she was admitted to [facility] due to shortness of breath, cough, and congestion for a week. A CT chest revealed multifocal pneumonia, and she was found to be hypoxic at 87% on RA. She was treated with steroids and IV antibiotics and later transitioned to PO on discharge. She also experienced rapid atrial fibrillation, which was successfully treated with cardioversion, amiodarone, and Eliquis. The patient has been experiencing fatigue and shortness of breath upon exertion for the past few months but is able to ambulate independently without any assistance devices. She is independent with her daily activities and lives alone, with a NYHP class 2.  ?  Both transthoracic echocardiogram and NOTRH both show moderate aortic valve stenosis. Aortic valve area is reported at 0.85 cm. However, peak velocity is only 2.58 m/s, and peak and mean gradients are 26.6 and 17.6 mmHg respectively. This is more consistent with moderate aortic valve stenosis. NORTH confirms the same.  ?  The patient is now enrolled in the Med[employer] moderate aortic valve stenosis clinical trial. EXPAND II. She has followed with heart failure and has been optimized medically.  ?      Symptoms: fatigue       Angina (Class): II       Ischemic Symptoms: SOB, fatigue    Heart Failure: HFpEF, chronic diastolic        Echo:    EF 70%  mod AS  DAHLIA 0.85      Associated Risk Factors:        Frailty Assessment: (none/mild/mod/severe)mild       Cerebrovascular Disease: N/A       Chronic Lung Disease: N/A       Peripheral Arterial Disease: N/A       Chronic Kidney Disease (if yes, what is GFR): N/A       Uncontrolled Diabetes (if yes, what is HgbA1C or FBS): N/A       Poorly Controlled Hypertension (if yes, what is SBP): N/A       Morbid Obesity (if yes, what is BMI): N/A       History of Recent Ventricular Arrhythmia: N/A       Inability to Ambulate Safely: N/A       Need for Therapeutic Anticoagulation: N/A       Antiplatelet or Contrast Allergy: N/A    Antianginal Therapies:        Beta Blockers:         Calcium Channel Blockers: diltiazem       Long Acting Nitrates:        Ranexa:         ROS: as stated above, otherwise negative    PHYSICAL EXAM:  Constitutional: A & O x 3  HEENT:   Normal oral mucosa, PERRL, EOMI	  Cardiovascular: Normal S1 S2, No JVD, + murmur  Respiratory: +rales, + wheezing  Gastrointestinal:  Soft, Non-tender, + BS	  Skin: No rashes, No ecchymoses, No cyanosis  Neurologic: Non-focal  Extremities: Normal range of motion, no edema  Vascular: Peripheral pulses palpable + bilaterally         TELEMETRY: 	NSR 70     ECG:  	NSR 72 bpm      LABS:                        13.5   8.49  )-----------( 295      ( 05 Dec 2023 09:00 )             42.1   12-05    138  |  98  |  21.5<H>  ----------------------------<  100<H>  4.1   |  27.0  |  0.87    Ca    8.6      05 Dec 2023 09:00  Mg     1.5     12-05    TPro  7.0  /  Alb  3.7  /  TBili  0.8  /  DBili  x   /  AST  18  /  ALT  19  /  AlkPhos  143<H>  12-05                                                                                         Department of Cardiology                                                                  [facility]/[place] [address]                                                            Telephone: [phone]. Fax:[phone]                                                                             Pre- Procedure Progress Note      HPI:    The patient is a 72 yo female who present for [facility] due to moderate aortic stenosis. Her medical history includes lupus, lumbar stenosis, asthma, prior smoking, pneumonia, and atrial fibrillation, for which she is taking Eliquis and had undergone cardioversion.  ?  On 04/17/2023, she was admitted to [facility] due to shortness of breath, cough, and congestion for a week. A CT chest revealed multifocal pneumonia, and she was found to be hypoxic at 87% on RA. She was treated with steroids and IV antibiotics and later transitioned to PO on discharge. She also experienced rapid atrial fibrillation, which was successfully treated with cardioversion, amiodarone, and Eliquis. The patient has been experiencing fatigue and shortness of breath upon exertion for the past few months but is able to ambulate independently without any assistance devices. She is independent with her daily activities and lives alone, with a NYHP class 2.  ?  Both transthoracic echocardiogram and NORTH both show moderate aortic valve stenosis. Aortic valve area is reported at 0.85 cm. However, peak velocity is only 2.58 m/s, and peak and mean gradients are 26.6 and 17.6 mmHg respectively. This is more consistent with moderate aortic valve stenosis. NORTH confirms the same.  ?  The patient is now enrolled in the [employer] moderate aortic valve stenosis clinical trial. EXPAND II. She has followed with heart failure and has been optimized medically.  ?      Symptoms: fatigue       Angina (Class): II       Ischemic Symptoms: SOB, fatigue    Heart Failure: HFpEF, chronic diastolic        Echo:    EF 70%  mod AS  DAHLIA 0.85      Associated Risk Factors:        Frailty Assessment: (none/mild/mod/severe)mild       Cerebrovascular Disease: N/A       Chronic Lung Disease: N/A       Peripheral Arterial Disease: N/A       Chronic Kidney Disease (if yes, what is GFR): N/A       Uncontrolled Diabetes (if yes, what is HgbA1C or FBS): N/A       Poorly Controlled Hypertension (if yes, what is SBP): N/A       Morbid Obesity (if yes, what is BMI): N/A       History of Recent Ventricular Arrhythmia: N/A       Inability to Ambulate Safely: N/A       Need for Therapeutic Anticoagulation: N/A       Antiplatelet or Contrast Allergy: N/A    Antianginal Therapies:        Beta Blockers:         Calcium Channel Blockers: diltiazem       Long Acting Nitrates:        Ranexa:         ROS: as stated above, otherwise negative    PHYSICAL EXAM:  Constitutional: A & O x 3  HEENT:   Normal oral mucosa, PERRL, EOMI	  Cardiovascular: Normal S1 S2, No JVD, + murmur  Respiratory: +rales, + wheezing  Gastrointestinal:  Soft, Non-tender, + BS	  Skin: No rashes, No ecchymoses, No cyanosis  Neurologic: Non-focal  Extremities: Normal range of motion, no edema  Vascular: Peripheral pulses palpable + bilaterally         TELEMETRY: 	NSR 70     ECG:  	NSR 72 bpm      LABS:                        13.5   8.49  )-----------( 295      ( 05 Dec 2023 09:00 )             42.1   12-05    138  |  98  |  21.5<H>  ----------------------------<  100<H>  4.1   |  27.0  |  0.87    Ca    8.6      05 Dec 2023 09:00  Mg     1.5     12-05    TPro  7.0  /  Alb  3.7  /  TBili  0.8  /  DBili  x   /  AST  18  /  ALT  19  /  AlkPhos  143<H>  12-05

## 2023-11-27 NOTE — H&P PST ADULT - ASSESSMENT
Indication: 72 yo female, former smoker with moderate aortic stenosis and atrial fibrillation to undergo left heart catheterization.  She is in the Med[employer] moderate aortic valve stenosis clinical trial.  Expand II.      Risk Stratification:  ASA:  Mallampati:  Bleeding Risk:  Creatinine:  GFR:    Coronary anatomy:    Plan/Recommendations:   -plan for left heart catheterization  -preferred access: RRA ***  -patient seen and examined  -confirmed appropriate NPO duration  -ECG and Labs reviewed  -Aspirin 81mg po pre-cath***  -NS 250mL IV bolus pre-cath***  -procedure discussed with patient; risks and benefits explained, questions answered  -consent obtained by attending IC      Risks, benefits, and alternatives reviewed.  Risks including but not limited to MI, death, stroke, bleeding, infection, vessel injury, hematoma, renal failure, allergic reaction, urgent open heart surgery, restenosis and stent thrombosis were reviewed.  All questions answered.  Patient is agreeable to proceed.      Indication: 74 yo female, former smoker with moderate aortic stenosis and atrial fibrillation to undergo left heart catheterization.  She is in the [employer] moderate aortic valve stenosis clinical trial.  Expand II.      Risk Stratification:  ASA:  Mallampati:  Bleeding Risk:  Creatinine:  GFR:    Coronary anatomy:    Plan/Recommendations:   -plan for left heart catheterization  -preferred access: RRA ***  -patient seen and examined  -confirmed appropriate NPO duration  -ECG and Labs reviewed  -Aspirin 81mg po pre-cath***  -NS 250mL IV bolus pre-cath***  -procedure discussed with patient; risks and benefits explained, questions answered  -consent obtained by attending IC      Risks, benefits, and alternatives reviewed.  Risks including but not limited to MI, death, stroke, bleeding, infection, vessel injury, hematoma, renal failure, allergic reaction, urgent open heart surgery, restenosis and stent thrombosis were reviewed.  All questions answered.  Patient is agreeable to proceed.      Indication: 72 yo female, former smoker with moderate aortic stenosis and atrial fibrillation to undergo left heart catheterization.  She is in the [employer] moderate aortic valve stenosis clinical trial.  Expand II.      Risk Stratification:  ASA: 3  Mallampati: 2  Bleeding Risk:  Creatinine:  GFR:    Coronary anatomy: unknown    Plan/Recommendations:   -plan for left heart catheterization  -preferred access: RRA   -patient seen and examined  -confirmed appropriate NPO duration  -ECG and Labs reviewed  -No aspirin given d/t pt states she is allergic.  Dr. Palacio aware  -NS 250mL IV bolus pre-cath  -Duoneb given  -procedure discussed with patient; risks and benefits explained, questions answered  -consent obtained by attending IC      Risks, benefits, and alternatives reviewed.  Risks including but not limited to MI, death, stroke, bleeding, infection, vessel injury, hematoma, renal failure, allergic reaction, urgent open heart surgery, restenosis and stent thrombosis were reviewed.  All questions answered.  Patient is agreeable to proceed.      Indication: 74 yo female, former smoker with moderate aortic stenosis and atrial fibrillation to undergo left heart catheterization.  She is in the Med[employer] moderate aortic valve stenosis clinical trial.  Expand II.      Risk Stratification:  ASA: 3  Mallampati: 2  Bleeding Risk: 1.8%      Coronary anatomy: unknown    Plan/Recommendations:   -plan for left heart catheterization  -preferred access: RRA   -patient seen and examined  -confirmed appropriate NPO duration  -ECG and Labs reviewed  -No aspirin given d/t pt states she is allergic.  Dr. Palacio aware  -NS 250mL IV bolus pre-cath  -Duoneb given  -Magnesium ordered  -procedure discussed with patient; risks and benefits explained, questions answered  -consent obtained by attending IC      Risks, benefits, and alternatives reviewed.  Risks including but not limited to MI, death, stroke, bleeding, infection, vessel injury, hematoma, renal failure, allergic reaction, urgent open heart surgery, restenosis and stent thrombosis were reviewed.  All questions answered.  Patient is agreeable to proceed.      Indication: 72 yo female, former smoker with moderate aortic stenosis and atrial fibrillation to undergo left heart catheterization.  She is in the Med[employer] moderate aortic valve stenosis clinical trial.  Expand II.      Risk Stratification:  ASA: 3  Mallampati: 2  Bleeding Risk: 1.8%      Coronary anatomy: unknown    Plan/Recommendations:   -plan for left heart catheterization  -preferred access: RRA   -patient seen and examined  -confirmed appropriate NPO duration  -ECG and Labs reviewed  -No aspirin given d/t pt states she is allergic.  Dr. Palacio aware  -NS 250mL IV bolus pre-cath  -Duoneb given  -Magnesium ordered  -procedure discussed with patient; risks and benefits explained, questions answered  -consent obtained by attending IC      Risks, benefits, and alternatives reviewed.  Risks including but not limited to MI, death, stroke, bleeding, infection, vessel injury, hematoma, renal failure, allergic reaction, urgent open heart surgery, restenosis and stent thrombosis were reviewed.  All questions answered.  Patient is agreeable to proceed.

## 2023-11-29 ENCOUNTER — EMERGENCY (EMERGENCY)
Facility: HOSPITAL | Age: 73
LOS: 1 days | Discharge: HOME | End: 2023-11-29
Attending: STUDENT IN AN ORGANIZED HEALTH CARE EDUCATION/TRAINING PROGRAM
Payer: MEDICARE

## 2023-11-29 VITALS
HEIGHT: 62 IN | SYSTOLIC BLOOD PRESSURE: 113 MMHG | WEIGHT: 141.98 LBS | TEMPERATURE: 98 F | RESPIRATION RATE: 20 BRPM | HEART RATE: 100 BPM | DIASTOLIC BLOOD PRESSURE: 70 MMHG | OXYGEN SATURATION: 93 %

## 2023-11-29 DIAGNOSIS — Z98.89 OTHER SPECIFIED POSTPROCEDURAL STATES: Chronic | ICD-10-CM

## 2023-11-29 LAB
ALBUMIN SERPL ELPH-MCNC: 3.9 G/DL — SIGNIFICANT CHANGE UP (ref 3.3–5.2)
ALBUMIN SERPL ELPH-MCNC: 3.9 G/DL — SIGNIFICANT CHANGE UP (ref 3.3–5.2)
ALP SERPL-CCNC: 151 U/L — HIGH (ref 40–120)
ALP SERPL-CCNC: 151 U/L — HIGH (ref 40–120)
ALT FLD-CCNC: 26 U/L — SIGNIFICANT CHANGE UP
ALT FLD-CCNC: 26 U/L — SIGNIFICANT CHANGE UP
ANION GAP SERPL CALC-SCNC: 14 MMOL/L — SIGNIFICANT CHANGE UP (ref 5–17)
ANION GAP SERPL CALC-SCNC: 14 MMOL/L — SIGNIFICANT CHANGE UP (ref 5–17)
APPEARANCE UR: CLEAR — SIGNIFICANT CHANGE UP
APPEARANCE UR: CLEAR — SIGNIFICANT CHANGE UP
AST SERPL-CCNC: 21 U/L — SIGNIFICANT CHANGE UP
AST SERPL-CCNC: 21 U/L — SIGNIFICANT CHANGE UP
BACTERIA # UR AUTO: ABNORMAL /HPF
BACTERIA # UR AUTO: ABNORMAL /HPF
BILIRUB SERPL-MCNC: 0.9 MG/DL — SIGNIFICANT CHANGE UP (ref 0.4–2)
BILIRUB SERPL-MCNC: 0.9 MG/DL — SIGNIFICANT CHANGE UP (ref 0.4–2)
BILIRUB UR-MCNC: NEGATIVE — SIGNIFICANT CHANGE UP
BILIRUB UR-MCNC: NEGATIVE — SIGNIFICANT CHANGE UP
BUN SERPL-MCNC: 16.2 MG/DL — SIGNIFICANT CHANGE UP (ref 8–20)
BUN SERPL-MCNC: 16.2 MG/DL — SIGNIFICANT CHANGE UP (ref 8–20)
CALCIUM SERPL-MCNC: 9.1 MG/DL — SIGNIFICANT CHANGE UP (ref 8.4–10.5)
CALCIUM SERPL-MCNC: 9.1 MG/DL — SIGNIFICANT CHANGE UP (ref 8.4–10.5)
CAST: 0 /LPF — SIGNIFICANT CHANGE UP (ref 0–4)
CAST: 0 /LPF — SIGNIFICANT CHANGE UP (ref 0–4)
CHLORIDE SERPL-SCNC: 102 MMOL/L — SIGNIFICANT CHANGE UP (ref 96–108)
CHLORIDE SERPL-SCNC: 102 MMOL/L — SIGNIFICANT CHANGE UP (ref 96–108)
CO2 SERPL-SCNC: 25 MMOL/L — SIGNIFICANT CHANGE UP (ref 22–29)
CO2 SERPL-SCNC: 25 MMOL/L — SIGNIFICANT CHANGE UP (ref 22–29)
COLOR SPEC: YELLOW — SIGNIFICANT CHANGE UP
COLOR SPEC: YELLOW — SIGNIFICANT CHANGE UP
CREAT SERPL-MCNC: 0.7 MG/DL — SIGNIFICANT CHANGE UP (ref 0.5–1.3)
CREAT SERPL-MCNC: 0.7 MG/DL — SIGNIFICANT CHANGE UP (ref 0.5–1.3)
DIFF PNL FLD: NEGATIVE — SIGNIFICANT CHANGE UP
DIFF PNL FLD: NEGATIVE — SIGNIFICANT CHANGE UP
EGFR: 90 ML/MIN/1.73M2 — SIGNIFICANT CHANGE UP
EGFR: 90 ML/MIN/1.73M2 — SIGNIFICANT CHANGE UP
GLUCOSE SERPL-MCNC: 123 MG/DL — HIGH (ref 70–99)
GLUCOSE SERPL-MCNC: 123 MG/DL — HIGH (ref 70–99)
GLUCOSE UR QL: 500 MG/DL
GLUCOSE UR QL: 500 MG/DL
HCT VFR BLD CALC: 48.9 % — HIGH (ref 34.5–45)
HCT VFR BLD CALC: 48.9 % — HIGH (ref 34.5–45)
HGB BLD-MCNC: 16.2 G/DL — HIGH (ref 11.5–15.5)
HGB BLD-MCNC: 16.2 G/DL — HIGH (ref 11.5–15.5)
KETONES UR-MCNC: NEGATIVE MG/DL — SIGNIFICANT CHANGE UP
KETONES UR-MCNC: NEGATIVE MG/DL — SIGNIFICANT CHANGE UP
LEUKOCYTE ESTERASE UR-ACNC: ABNORMAL
LEUKOCYTE ESTERASE UR-ACNC: ABNORMAL
LIDOCAIN IGE QN: 8 U/L — LOW (ref 22–51)
LIDOCAIN IGE QN: 8 U/L — LOW (ref 22–51)
MCHC RBC-ENTMCNC: 28.1 PG — SIGNIFICANT CHANGE UP (ref 27–34)
MCHC RBC-ENTMCNC: 28.1 PG — SIGNIFICANT CHANGE UP (ref 27–34)
MCHC RBC-ENTMCNC: 33.1 GM/DL — SIGNIFICANT CHANGE UP (ref 32–36)
MCHC RBC-ENTMCNC: 33.1 GM/DL — SIGNIFICANT CHANGE UP (ref 32–36)
MCV RBC AUTO: 84.9 FL — SIGNIFICANT CHANGE UP (ref 80–100)
MCV RBC AUTO: 84.9 FL — SIGNIFICANT CHANGE UP (ref 80–100)
NITRITE UR-MCNC: NEGATIVE — SIGNIFICANT CHANGE UP
NITRITE UR-MCNC: NEGATIVE — SIGNIFICANT CHANGE UP
PH UR: 7 — SIGNIFICANT CHANGE UP (ref 5–8)
PH UR: 7 — SIGNIFICANT CHANGE UP (ref 5–8)
PLATELET # BLD AUTO: 344 K/UL — SIGNIFICANT CHANGE UP (ref 150–400)
PLATELET # BLD AUTO: 344 K/UL — SIGNIFICANT CHANGE UP (ref 150–400)
POTASSIUM SERPL-MCNC: 3.8 MMOL/L — SIGNIFICANT CHANGE UP (ref 3.5–5.3)
POTASSIUM SERPL-MCNC: 3.8 MMOL/L — SIGNIFICANT CHANGE UP (ref 3.5–5.3)
POTASSIUM SERPL-SCNC: 3.8 MMOL/L — SIGNIFICANT CHANGE UP (ref 3.5–5.3)
POTASSIUM SERPL-SCNC: 3.8 MMOL/L — SIGNIFICANT CHANGE UP (ref 3.5–5.3)
PROT SERPL-MCNC: 7 G/DL — SIGNIFICANT CHANGE UP (ref 6.6–8.7)
PROT SERPL-MCNC: 7 G/DL — SIGNIFICANT CHANGE UP (ref 6.6–8.7)
PROT UR-MCNC: SIGNIFICANT CHANGE UP MG/DL
PROT UR-MCNC: SIGNIFICANT CHANGE UP MG/DL
RBC # BLD: 5.76 M/UL — HIGH (ref 3.8–5.2)
RBC # BLD: 5.76 M/UL — HIGH (ref 3.8–5.2)
RBC # FLD: 14.2 % — SIGNIFICANT CHANGE UP (ref 10.3–14.5)
RBC # FLD: 14.2 % — SIGNIFICANT CHANGE UP (ref 10.3–14.5)
RBC CASTS # UR COMP ASSIST: 2 /HPF — SIGNIFICANT CHANGE UP (ref 0–4)
RBC CASTS # UR COMP ASSIST: 2 /HPF — SIGNIFICANT CHANGE UP (ref 0–4)
SODIUM SERPL-SCNC: 141 MMOL/L — SIGNIFICANT CHANGE UP (ref 135–145)
SODIUM SERPL-SCNC: 141 MMOL/L — SIGNIFICANT CHANGE UP (ref 135–145)
SP GR SPEC: 1.02 — SIGNIFICANT CHANGE UP (ref 1–1.03)
SP GR SPEC: 1.02 — SIGNIFICANT CHANGE UP (ref 1–1.03)
SQUAMOUS # UR AUTO: 3 /HPF — SIGNIFICANT CHANGE UP (ref 0–5)
SQUAMOUS # UR AUTO: 3 /HPF — SIGNIFICANT CHANGE UP (ref 0–5)
UROBILINOGEN FLD QL: 1 MG/DL — SIGNIFICANT CHANGE UP (ref 0.2–1)
UROBILINOGEN FLD QL: 1 MG/DL — SIGNIFICANT CHANGE UP (ref 0.2–1)
WBC # BLD: 11.24 K/UL — HIGH (ref 3.8–10.5)
WBC # BLD: 11.24 K/UL — HIGH (ref 3.8–10.5)
WBC # FLD AUTO: 11.24 K/UL — HIGH (ref 3.8–10.5)
WBC # FLD AUTO: 11.24 K/UL — HIGH (ref 3.8–10.5)
WBC UR QL: 2 /HPF — SIGNIFICANT CHANGE UP (ref 0–5)
WBC UR QL: 2 /HPF — SIGNIFICANT CHANGE UP (ref 0–5)

## 2023-11-29 PROCEDURE — 71045 X-RAY EXAM CHEST 1 VIEW: CPT

## 2023-11-29 PROCEDURE — 96374 THER/PROPH/DIAG INJ IV PUSH: CPT

## 2023-11-29 PROCEDURE — 99284 EMERGENCY DEPT VISIT MOD MDM: CPT | Mod: 25

## 2023-11-29 PROCEDURE — 81001 URINALYSIS AUTO W/SCOPE: CPT

## 2023-11-29 PROCEDURE — 85027 COMPLETE CBC AUTOMATED: CPT

## 2023-11-29 PROCEDURE — 36415 COLL VENOUS BLD VENIPUNCTURE: CPT

## 2023-11-29 PROCEDURE — 87086 URINE CULTURE/COLONY COUNT: CPT

## 2023-11-29 PROCEDURE — 83690 ASSAY OF LIPASE: CPT

## 2023-11-29 PROCEDURE — 80053 COMPREHEN METABOLIC PANEL: CPT

## 2023-11-29 PROCEDURE — 99284 EMERGENCY DEPT VISIT MOD MDM: CPT

## 2023-11-29 PROCEDURE — 71045 X-RAY EXAM CHEST 1 VIEW: CPT | Mod: 26

## 2023-11-29 RX ORDER — ALBUTEROL 90 UG/1
2 AEROSOL, METERED ORAL EVERY 6 HOURS
Refills: 0 | Status: DISCONTINUED | OUTPATIENT
Start: 2023-11-29 | End: 2023-12-07

## 2023-11-29 RX ORDER — SODIUM CHLORIDE 9 MG/ML
1000 INJECTION INTRAMUSCULAR; INTRAVENOUS; SUBCUTANEOUS ONCE
Refills: 0 | Status: COMPLETED | OUTPATIENT
Start: 2023-11-29 | End: 2023-11-29

## 2023-11-29 RX ORDER — ACETAMINOPHEN 500 MG
1000 TABLET ORAL ONCE
Refills: 0 | Status: COMPLETED | OUTPATIENT
Start: 2023-11-29 | End: 2023-11-29

## 2023-11-29 RX ADMIN — SODIUM CHLORIDE 1000 MILLILITER(S): 9 INJECTION INTRAMUSCULAR; INTRAVENOUS; SUBCUTANEOUS at 20:23

## 2023-11-29 RX ADMIN — Medication 400 MILLIGRAM(S): at 20:23

## 2023-11-29 NOTE — ED PROVIDER NOTE - OBJECTIVE STATEMENT
72 y/o female PMH of RA, Lupus, Asthma, COPD, A. Fib. Comes to ED due to constipation and rectal/pelvic pressure since 2 days ago, found to be tachycardic by EMS but 100HR at ED. Denies nausea, fever, abdominal pain, vomits, palpitations, HA

## 2023-11-29 NOTE — ED PROVIDER NOTE - CLINICAL SUMMARY MEDICAL DECISION MAKING FREE TEXT BOX
74 y/o female PMH of RA, Lupus, Asthma, COPD, A. Fib. Comes to ED due to constipation and rectal/pelvic pressure since 2 days ago, found to be tachycardic by EMS but 100HR at ED.    Plan:  1. CT abdomen/ pelvis w/ contrast to r/o SBO  -Chest Xray ->   2. Lab work:  -CBC  CMP  Lipase  UA->  3. Ofirmev, IVF, Albuterol  4. awaiting CT results for enema

## 2023-11-29 NOTE — ED PROVIDER NOTE - NS ED ROS FT
Gen: No fever, decrease in activity level  ENT: No congestion, no rhinorrhea  Resp: +cough, congested+ no trouble breathing  Cardiovascular: No chest pain, no palpitation  Gastrointestinal: No nausea, no vomiting, no diarrhea. +constipation  :  No change in urine output; no dysuria, no hematuria  MS: No joint or muscle pain  Skin: No rashes  Neuro: No headache; no abnormal movements  Remainder negative, except as per the HPI

## 2023-11-29 NOTE — ED ADULT NURSE NOTE - OBJECTIVE STATEMENT
PT is A&Ox4, Pt reports to ED with complaints of constipation, PT reports in ability to move bowels times 2 days, denies chest pain or shortness of breath, Abd is soft and non tender.

## 2023-11-29 NOTE — ED PROVIDER NOTE - ATTENDING CONTRIBUTION TO CARE
73y F w/ hx lupus, RA, asthma/COPD, Afib, presents for constipation. Says she has not had a bowel movement in 2 days. No fever or vomiting. Has been able to pass gas. Tried using an enema at home without relief. Currently taking oxycodone for chronic pain. On exam, pt in no acute distress. +Wheezing on exam, no accessory muscle use. Abdomen soft, mildly distended with mild diffuse tenderness. Treated with fluids and ofirmev. No acute findings on labs. Just prior to going to CT, pt had a large bowel movement. Reports improvement of symptoms. Pt now wanting to go home. On rpt exam, abdomen soft and nontender. Medically stable for discharge with outpatient f/u.

## 2023-11-29 NOTE — ED PROVIDER NOTE - PATIENT PORTAL LINK FT
You can access the FollowMyHealth Patient Portal offered by [facility] by registering at the following website: [email or web address]. By joining [employer]’s FollowMyHealth portal, you will also be able to view your health information using other applications (apps) compatible with our system.

## 2023-11-29 NOTE — ED PROVIDER NOTE - NSICDXPASTMEDICALHX_GEN_ALL_CORE_FT
PAST MEDICAL HISTORY:  Asthma     Chronic atrial fibrillation     Chronic systolic congestive heart failure     Constipation     COPD, mild

## 2023-11-29 NOTE — ED PROVIDER NOTE - PHYSICAL EXAMINATION
Gen: well appearing, + acute distress  Head: normocephalic, atraumatic  EENT: EOMI, PERRLA, +dry mucous membranes, no scleral icterus, no JVD  Lung: no increased work of breathing, coarse bilaterally lung sounds, +wheezing,   CV: regular rate, regular rhythm, normal s1/s2, 2+ radial pulses bilaterally  Abd: soft, non-tender, non-distended, no rebound tenderness or guarding; no CVA tenderness  MSK: No edema, no visible deformities, full range of motion in all 4 extremities  Neuro: Awake, alert, no focal neurologic deficits  Skin: No obvious rash, no jaundice  Psych: normal affect, normal speech

## 2023-11-29 NOTE — ED PROVIDER NOTE - CARE PROVIDER_API CALL
Micheal Carey  Gastroenterology  [address] [facility], [facility] [address]  [place] [address]  Phone: [phone]  Fax: [phone]  Follow Up Time:

## 2023-11-30 VITALS
HEART RATE: 86 BPM | TEMPERATURE: 97 F | RESPIRATION RATE: 18 BRPM | DIASTOLIC BLOOD PRESSURE: 72 MMHG | OXYGEN SATURATION: 95 % | SYSTOLIC BLOOD PRESSURE: 112 MMHG

## 2023-11-30 RX ORDER — POLYETHYLENE GLYCOL 3350 17 G/17G
17 POWDER, FOR SOLUTION ORAL ONCE
Refills: 0 | Status: DISCONTINUED | OUTPATIENT
Start: 2023-11-30 | End: 2023-12-07

## 2023-11-30 RX ORDER — SENNA PLUS 8.6 MG/1
2 TABLET ORAL
Qty: 40 | Refills: 0
Start: 2023-11-30 | End: 2023-12-19

## 2023-11-30 RX ORDER — POLYETHYLENE GLYCOL 3350 17 G/17G
17 POWDER, FOR SOLUTION ORAL
Qty: 1 | Refills: 0
Start: 2023-11-30

## 2023-12-04 RX ORDER — CHLORHEXIDINE GLUCONATE 213 G/1000ML
1 SOLUTION TOPICAL ONCE
Refills: 0 | Status: DISCONTINUED | OUTPATIENT
Start: 2023-12-05 | End: 2023-12-19

## 2023-12-04 NOTE — ED PROVIDER NOTE - ALLERGIC/IMMUNOLOGIC NEGATIVE STATEMENT, MLM
Decrease to estradiol 0.5mg daily and let's see how you do  
no dermatitis, no environmental allergies, no food allergies, no immunosuppressive disorder, and no pruritus.

## 2023-12-05 ENCOUNTER — TRANSCRIPTION ENCOUNTER (OUTPATIENT)
Age: 73
End: 2023-12-05

## 2023-12-05 ENCOUNTER — OUTPATIENT (OUTPATIENT)
Dept: OUTPATIENT SERVICES | Facility: HOSPITAL | Age: 73
LOS: 1 days | Discharge: HOME | End: 2023-12-05
Payer: SUBSIDIZED

## 2023-12-05 VITALS
RESPIRATION RATE: 17 BRPM | OXYGEN SATURATION: 94 % | SYSTOLIC BLOOD PRESSURE: 151 MMHG | WEIGHT: 140.21 LBS | TEMPERATURE: 98 F | HEIGHT: 62 IN | DIASTOLIC BLOOD PRESSURE: 84 MMHG | HEART RATE: 72 BPM

## 2023-12-05 VITALS — SYSTOLIC BLOOD PRESSURE: 121 MMHG | HEART RATE: 75 BPM | RESPIRATION RATE: 17 BRPM | DIASTOLIC BLOOD PRESSURE: 61 MMHG

## 2023-12-05 DIAGNOSIS — Z98.89 OTHER SPECIFIED POSTPROCEDURAL STATES: Chronic | ICD-10-CM

## 2023-12-05 DIAGNOSIS — I35.0 NONRHEUMATIC AORTIC (VALVE) STENOSIS: ICD-10-CM

## 2023-12-05 LAB
ALBUMIN SERPL ELPH-MCNC: 3.7 G/DL — SIGNIFICANT CHANGE UP (ref 3.3–5.2)
ALBUMIN SERPL ELPH-MCNC: 3.7 G/DL — SIGNIFICANT CHANGE UP (ref 3.3–5.2)
ALP SERPL-CCNC: 143 U/L — HIGH (ref 40–120)
ALP SERPL-CCNC: 143 U/L — HIGH (ref 40–120)
ALT FLD-CCNC: 19 U/L — SIGNIFICANT CHANGE UP
ALT FLD-CCNC: 19 U/L — SIGNIFICANT CHANGE UP
ANION GAP SERPL CALC-SCNC: 13 MMOL/L — SIGNIFICANT CHANGE UP (ref 5–17)
ANION GAP SERPL CALC-SCNC: 13 MMOL/L — SIGNIFICANT CHANGE UP (ref 5–17)
APTT BLD: 37.9 SEC — HIGH (ref 24.5–35.6)
APTT BLD: 37.9 SEC — HIGH (ref 24.5–35.6)
AST SERPL-CCNC: 18 U/L — SIGNIFICANT CHANGE UP
AST SERPL-CCNC: 18 U/L — SIGNIFICANT CHANGE UP
BASOPHILS # BLD AUTO: 0.05 K/UL — SIGNIFICANT CHANGE UP (ref 0–0.2)
BASOPHILS # BLD AUTO: 0.05 K/UL — SIGNIFICANT CHANGE UP (ref 0–0.2)
BASOPHILS NFR BLD AUTO: 0.6 % — SIGNIFICANT CHANGE UP (ref 0–2)
BASOPHILS NFR BLD AUTO: 0.6 % — SIGNIFICANT CHANGE UP (ref 0–2)
BILIRUB SERPL-MCNC: 0.8 MG/DL — SIGNIFICANT CHANGE UP (ref 0.4–2)
BILIRUB SERPL-MCNC: 0.8 MG/DL — SIGNIFICANT CHANGE UP (ref 0.4–2)
BUN SERPL-MCNC: 21.5 MG/DL — HIGH (ref 8–20)
BUN SERPL-MCNC: 21.5 MG/DL — HIGH (ref 8–20)
CALCIUM SERPL-MCNC: 8.6 MG/DL — SIGNIFICANT CHANGE UP (ref 8.4–10.5)
CALCIUM SERPL-MCNC: 8.6 MG/DL — SIGNIFICANT CHANGE UP (ref 8.4–10.5)
CHLORIDE SERPL-SCNC: 98 MMOL/L — SIGNIFICANT CHANGE UP (ref 96–108)
CHLORIDE SERPL-SCNC: 98 MMOL/L — SIGNIFICANT CHANGE UP (ref 96–108)
CO2 SERPL-SCNC: 27 MMOL/L — SIGNIFICANT CHANGE UP (ref 22–29)
CO2 SERPL-SCNC: 27 MMOL/L — SIGNIFICANT CHANGE UP (ref 22–29)
CREAT SERPL-MCNC: 0.87 MG/DL — SIGNIFICANT CHANGE UP (ref 0.5–1.3)
CREAT SERPL-MCNC: 0.87 MG/DL — SIGNIFICANT CHANGE UP (ref 0.5–1.3)
EGFR: 69 ML/MIN/1.73M2 — SIGNIFICANT CHANGE UP
EGFR: 69 ML/MIN/1.73M2 — SIGNIFICANT CHANGE UP
EOSINOPHIL # BLD AUTO: 0.28 K/UL — SIGNIFICANT CHANGE UP (ref 0–0.5)
EOSINOPHIL # BLD AUTO: 0.28 K/UL — SIGNIFICANT CHANGE UP (ref 0–0.5)
EOSINOPHIL NFR BLD AUTO: 3.3 % — SIGNIFICANT CHANGE UP (ref 0–6)
EOSINOPHIL NFR BLD AUTO: 3.3 % — SIGNIFICANT CHANGE UP (ref 0–6)
GLUCOSE SERPL-MCNC: 100 MG/DL — HIGH (ref 70–99)
GLUCOSE SERPL-MCNC: 100 MG/DL — HIGH (ref 70–99)
HCT VFR BLD CALC: 42.1 % — SIGNIFICANT CHANGE UP (ref 34.5–45)
HCT VFR BLD CALC: 42.1 % — SIGNIFICANT CHANGE UP (ref 34.5–45)
HGB BLD-MCNC: 13.5 G/DL — SIGNIFICANT CHANGE UP (ref 11.5–15.5)
HGB BLD-MCNC: 13.5 G/DL — SIGNIFICANT CHANGE UP (ref 11.5–15.5)
IMM GRANULOCYTES NFR BLD AUTO: 0.4 % — SIGNIFICANT CHANGE UP (ref 0–0.9)
IMM GRANULOCYTES NFR BLD AUTO: 0.4 % — SIGNIFICANT CHANGE UP (ref 0–0.9)
INR BLD: 1.04 RATIO — SIGNIFICANT CHANGE UP (ref 0.85–1.18)
INR BLD: 1.04 RATIO — SIGNIFICANT CHANGE UP (ref 0.85–1.18)
LYMPHOCYTES # BLD AUTO: 1.91 K/UL — SIGNIFICANT CHANGE UP (ref 1–3.3)
LYMPHOCYTES # BLD AUTO: 1.91 K/UL — SIGNIFICANT CHANGE UP (ref 1–3.3)
LYMPHOCYTES # BLD AUTO: 22.5 % — SIGNIFICANT CHANGE UP (ref 13–44)
LYMPHOCYTES # BLD AUTO: 22.5 % — SIGNIFICANT CHANGE UP (ref 13–44)
MAGNESIUM SERPL-MCNC: 1.5 MG/DL — LOW (ref 1.6–2.6)
MAGNESIUM SERPL-MCNC: 1.5 MG/DL — LOW (ref 1.6–2.6)
MCHC RBC-ENTMCNC: 27.6 PG — SIGNIFICANT CHANGE UP (ref 27–34)
MCHC RBC-ENTMCNC: 27.6 PG — SIGNIFICANT CHANGE UP (ref 27–34)
MCHC RBC-ENTMCNC: 32.1 GM/DL — SIGNIFICANT CHANGE UP (ref 32–36)
MCHC RBC-ENTMCNC: 32.1 GM/DL — SIGNIFICANT CHANGE UP (ref 32–36)
MCV RBC AUTO: 85.9 FL — SIGNIFICANT CHANGE UP (ref 80–100)
MCV RBC AUTO: 85.9 FL — SIGNIFICANT CHANGE UP (ref 80–100)
MONOCYTES # BLD AUTO: 0.77 K/UL — SIGNIFICANT CHANGE UP (ref 0–0.9)
MONOCYTES # BLD AUTO: 0.77 K/UL — SIGNIFICANT CHANGE UP (ref 0–0.9)
MONOCYTES NFR BLD AUTO: 9.1 % — SIGNIFICANT CHANGE UP (ref 2–14)
MONOCYTES NFR BLD AUTO: 9.1 % — SIGNIFICANT CHANGE UP (ref 2–14)
NEUTROPHILS # BLD AUTO: 5.45 K/UL — SIGNIFICANT CHANGE UP (ref 1.8–7.4)
NEUTROPHILS # BLD AUTO: 5.45 K/UL — SIGNIFICANT CHANGE UP (ref 1.8–7.4)
NEUTROPHILS NFR BLD AUTO: 64.1 % — SIGNIFICANT CHANGE UP (ref 43–77)
NEUTROPHILS NFR BLD AUTO: 64.1 % — SIGNIFICANT CHANGE UP (ref 43–77)
PLATELET # BLD AUTO: 295 K/UL — SIGNIFICANT CHANGE UP (ref 150–400)
PLATELET # BLD AUTO: 295 K/UL — SIGNIFICANT CHANGE UP (ref 150–400)
POTASSIUM SERPL-MCNC: 4.1 MMOL/L — SIGNIFICANT CHANGE UP (ref 3.5–5.3)
POTASSIUM SERPL-MCNC: 4.1 MMOL/L — SIGNIFICANT CHANGE UP (ref 3.5–5.3)
POTASSIUM SERPL-SCNC: 4.1 MMOL/L — SIGNIFICANT CHANGE UP (ref 3.5–5.3)
POTASSIUM SERPL-SCNC: 4.1 MMOL/L — SIGNIFICANT CHANGE UP (ref 3.5–5.3)
PROT SERPL-MCNC: 7 G/DL — SIGNIFICANT CHANGE UP (ref 6.6–8.7)
PROT SERPL-MCNC: 7 G/DL — SIGNIFICANT CHANGE UP (ref 6.6–8.7)
PROTHROM AB SERPL-ACNC: 11.5 SEC — SIGNIFICANT CHANGE UP (ref 9.5–13)
PROTHROM AB SERPL-ACNC: 11.5 SEC — SIGNIFICANT CHANGE UP (ref 9.5–13)
RBC # BLD: 4.9 M/UL — SIGNIFICANT CHANGE UP (ref 3.8–5.2)
RBC # BLD: 4.9 M/UL — SIGNIFICANT CHANGE UP (ref 3.8–5.2)
RBC # FLD: 13.9 % — SIGNIFICANT CHANGE UP (ref 10.3–14.5)
RBC # FLD: 13.9 % — SIGNIFICANT CHANGE UP (ref 10.3–14.5)
SODIUM SERPL-SCNC: 138 MMOL/L — SIGNIFICANT CHANGE UP (ref 135–145)
SODIUM SERPL-SCNC: 138 MMOL/L — SIGNIFICANT CHANGE UP (ref 135–145)
WBC # BLD: 8.49 K/UL — SIGNIFICANT CHANGE UP (ref 3.8–10.5)
WBC # BLD: 8.49 K/UL — SIGNIFICANT CHANGE UP (ref 3.8–10.5)
WBC # FLD AUTO: 8.49 K/UL — SIGNIFICANT CHANGE UP (ref 3.8–10.5)
WBC # FLD AUTO: 8.49 K/UL — SIGNIFICANT CHANGE UP (ref 3.8–10.5)

## 2023-12-05 PROCEDURE — C1894: CPT

## 2023-12-05 PROCEDURE — 80053 COMPREHEN METABOLIC PANEL: CPT

## 2023-12-05 PROCEDURE — 94640 AIRWAY INHALATION TREATMENT: CPT

## 2023-12-05 PROCEDURE — 85730 THROMBOPLASTIN TIME PARTIAL: CPT

## 2023-12-05 PROCEDURE — 99152 MOD SED SAME PHYS/QHP 5/>YRS: CPT

## 2023-12-05 PROCEDURE — C1887: CPT

## 2023-12-05 PROCEDURE — 93005 ELECTROCARDIOGRAM TRACING: CPT

## 2023-12-05 PROCEDURE — 93454 CORONARY ARTERY ANGIO S&I: CPT | Mod: 26

## 2023-12-05 PROCEDURE — 93010 ELECTROCARDIOGRAM REPORT: CPT

## 2023-12-05 PROCEDURE — 83735 ASSAY OF MAGNESIUM: CPT

## 2023-12-05 PROCEDURE — 85610 PROTHROMBIN TIME: CPT

## 2023-12-05 PROCEDURE — 85025 COMPLETE CBC W/AUTO DIFF WBC: CPT

## 2023-12-05 PROCEDURE — C1769: CPT

## 2023-12-05 PROCEDURE — 36415 COLL VENOUS BLD VENIPUNCTURE: CPT

## 2023-12-05 PROCEDURE — 93454 CORONARY ARTERY ANGIO S&I: CPT

## 2023-12-05 RX ORDER — MAGNESIUM SULFATE 500 MG/ML
2 VIAL (ML) INJECTION ONCE
Refills: 0 | Status: COMPLETED | OUTPATIENT
Start: 2023-12-05 | End: 2023-12-05

## 2023-12-05 RX ORDER — MAGNESIUM OXIDE 400 MG ORAL TABLET 241.3 MG
400 TABLET ORAL ONCE
Refills: 0 | Status: COMPLETED | OUTPATIENT
Start: 2023-12-05 | End: 2023-12-05

## 2023-12-05 RX ORDER — SODIUM CHLORIDE 9 MG/ML
250 INJECTION INTRAMUSCULAR; INTRAVENOUS; SUBCUTANEOUS ONCE
Refills: 0 | Status: COMPLETED | OUTPATIENT
Start: 2023-12-05 | End: 2023-12-05

## 2023-12-05 RX ORDER — IPRATROPIUM/ALBUTEROL SULFATE 18-103MCG
3 AEROSOL WITH ADAPTER (GRAM) INHALATION ONCE
Refills: 0 | Status: COMPLETED | OUTPATIENT
Start: 2023-12-05 | End: 2023-12-05

## 2023-12-05 RX ORDER — AMIODARONE HYDROCHLORIDE 400 MG/1
1 TABLET ORAL
Qty: 54 | Refills: 0
Start: 2023-12-05

## 2023-12-05 RX ADMIN — SODIUM CHLORIDE 250 MILLILITER(S): 9 INJECTION INTRAMUSCULAR; INTRAVENOUS; SUBCUTANEOUS at 11:39

## 2023-12-05 RX ADMIN — Medication 25 GRAM(S): at 11:39

## 2023-12-05 RX ADMIN — SODIUM CHLORIDE 250 MILLILITER(S): 9 INJECTION INTRAMUSCULAR; INTRAVENOUS; SUBCUTANEOUS at 09:44

## 2023-12-05 RX ADMIN — Medication 3 MILLILITER(S): at 09:27

## 2023-12-05 RX ADMIN — MAGNESIUM OXIDE 400 MG ORAL TABLET 400 MILLIGRAM(S): 241.3 TABLET ORAL at 11:39

## 2023-12-05 NOTE — DISCHARGE NOTE PROVIDER - HOSPITAL COURSE
73 year old female with known AS for LHC as part of work up for EXPAND II trial.  Now s/p LHC which revealed mild CAD.  No treatment required.

## 2023-12-05 NOTE — DISCHARGE NOTE PROVIDER - NSDCMRMEDTOKEN_GEN_ALL_CORE_FT
albuterol 2.5 mg/3 mL (0.083%) inhalation solution: 3 by nebulizer 4 times a day as needed for  shortness of breath and/or wheezing  albuterol 90 mcg/inh inhalation aerosol: 2 puff(s) inhaled every 4-6 hours as needed.  amiodarone 200 mg oral tablet: 1 tab(s) orally once a day  apixaban 5 mg oral tablet: 1 tab(s) orally 2 times a day  atorvastatin 20 mg oral tablet: 1 tab(s) orally once a day  dilTIAZem 180 mg/24 hours oral tablet, extended release: 1 tab(s) orally once a day  folic acid 1 mg oral tablet: 1 tab(s) orally once a day  hydroxychloroquine 200 mg oral tablet: 1 tab(s) orally once a day  montelukast 10 mg oral tablet: 1 tab(s) orally once a day  oxyCODONE 10 mg oral tablet: 1 tab(s) orally every 8 hours, As Needed  pantoprazole 40 mg oral delayed release tablet: 1 tab(s) orally once a day (before a meal)  spironolactone 25 mg oral tablet: 1 tab(s) orally once a day  Xanax 0.25 mg oral tablet: 1 orally 3 times a day as needed for  anxiety

## 2023-12-05 NOTE — PROGRESS NOTE ADULT - SUBJECTIVE AND OBJECTIVE BOX
Now s/p LHC via right radial artery with radial band in place. Procedure performed by Dr. Palacio.  Pt arrived to recovery in NAD and HDS.  RRA access site stable, no bleed/hematoma, distal pulse +.    Procedure results: S/P LHC which revealed Mild CAD in LAD/RCA.  No intervention required.     Medication received during procedure:  Versed: 1mg  Fentanyl: 25 mcg  Heparin: 3000 u  Omnipaque: 21 ml    Exam:   Neuro: A&O X.  WHITE=  CV: RRR  Lungs: CTA  Ext: + palp pulses.  Vascular access: Right radial band in place.  Site stable. No bleeding/hematoma/ecchymosis.  + cap refill     Plan:  -Formal cath report pending  -Post procedure management/monitoring per protocol  -Access site precautions  -Radial compression band removal at 1200  -Repeat ECG if any clinical indication or change on tele  -NS 250mL bolus post cath   -Continue current medical therapy  -F/U outpt in 1-2 weeks with Cardiologist Dr. Jeffery  -Discharge to home when criteria met

## 2023-12-05 NOTE — DISCHARGE NOTE NURSING/CASE MANAGEMENT/SOCIAL WORK - PATIENT PORTAL LINK FT
You can access the FollowMyHealth Patient Portal offered by [facility] by registering at the following website: [email or web address]. By joining [employer]’s FollowMyHealth portal, you will also be able to view your health information using other applications (apps) compatible with our system. You can access the FollowMyHealth Patient Portal offered by [facility] by registering at the following website: [email or web address]. By joining [employer]’s FollowMyHealth portal, you will also be able to view your health information using other applications (apps) compatible with our system.

## 2023-12-05 NOTE — DISCHARGE NOTE PROVIDER - NSDCCPCAREPLAN_GEN_ALL_CORE_FT
PRINCIPAL DISCHARGE DIAGNOSIS  Diagnosis: Aortic stenosis  Assessment and Plan of Treatment: cont work up for EXPAND II trial

## 2023-12-05 NOTE — DISCHARGE NOTE PROVIDER - CARE PROVIDER_API CALL
Ramsey Grant  Cardiovascular Disease  [address] [facility], [address] [address]  Phone: [phone]  Fax: [phone]  Follow Up Time:    Ramsey Grant  Cardiovascular Disease  [address] [facility], [address] [address]  Phone: [phone]  Fax: [phone]  Follow Up Time:

## 2023-12-05 NOTE — DISCHARGE NOTE NURSING/CASE MANAGEMENT/SOCIAL WORK - NSDCPEFALRISK_GEN_ALL_CORE
For information on Fall & Injury Prevention, visit: [email or web address] OR  [email or web address] OR  https://www.cdc.gov/steadi/patient.html For information on Fall & Injury Prevention, visit: [email or web address] OR  [email or web address] OR  https://www.cdc.gov/steadi/patient.html

## 2023-12-05 NOTE — DISCHARGE NOTE PROVIDER - CARE PROVIDERS DIRECT ADDRESSES
[email or web address]

## 2023-12-15 DIAGNOSIS — I50.33 ACUTE ON CHRONIC DIASTOLIC (CONGESTIVE) HEART FAILURE: ICD-10-CM

## 2023-12-20 NOTE — ED ADULT NURSE NOTE - CAS EDP DISCH TYPE
Anesthesia Pre Eval Note    Anesthesia ROS/Med Hx          Pulmonary Review:    Positive for asthma, well controlled    Neuro/Psych Review:       Positive for headaches  Additional Results:      ALLERGIES:   -- Honey   (Food Or Med) -- Fever   -- Shellfish Allergy   (Food Or Med) -- Fever   Last Labs        Component                Value               Date/Time                  WBC                      11.2 (H)            2023 0448            RBC                      4.84                2023 0448            HGB                      14.5                2023 0448            HCT                      41.8                2023 0448            MCV                      86.4                2023 0448            MCH                      30.0                2023 0448            MCHC                     34.7                2023 0448            RDW-CV                   12.1                2023 0448            Sodium                   139                 2023 0448            Potassium                3.8                 2023 0448            Chloride                 105                 2023 0448            Carbon Dioxide           23                  2023 0448            Glucose                  105 (H)             2023 0448            BUN                      10                  2023 0448            Creatinine               0.70                2023 0448            Glomerular Filtrati*     >90                 2023 0448            Calcium                  8.6                 2023 0448            PLT                      280                 2023 0448        Past Medical History:  No date: Asthma      Comment:  Per Pt  No date: Migraine      Comment:  Per pt  2020:  uterine contractions, antepartum  No date: Recurrent UTI  Past Surgical History:  10/08/2020: Iud mirena      Comment:  due to be removed 10/2025; NDC:  71563-260-77; Lot:                [id number]; Exp: 10/2022  08/29/Home

## 2024-01-08 ENCOUNTER — INPATIENT (INPATIENT)
Facility: HOSPITAL | Age: 74
LOS: 1 days | Discharge: HOME | DRG: 193 | End: 2024-01-10
Attending: HOSPITALIST | Admitting: STUDENT IN AN ORGANIZED HEALTH CARE EDUCATION/TRAINING PROGRAM
Payer: MEDICARE

## 2024-01-08 VITALS
DIASTOLIC BLOOD PRESSURE: 81 MMHG | SYSTOLIC BLOOD PRESSURE: 158 MMHG | TEMPERATURE: 100 F | OXYGEN SATURATION: 94 % | HEART RATE: 94 BPM | HEIGHT: 62 IN | RESPIRATION RATE: 18 BRPM

## 2024-01-08 DIAGNOSIS — Z98.89 OTHER SPECIFIED POSTPROCEDURAL STATES: Chronic | ICD-10-CM

## 2024-01-08 LAB
ALBUMIN SERPL ELPH-MCNC: 3.6 G/DL — SIGNIFICANT CHANGE UP (ref 3.3–5.2)
ALBUMIN SERPL ELPH-MCNC: 3.6 G/DL — SIGNIFICANT CHANGE UP (ref 3.3–5.2)
ALP SERPL-CCNC: 195 U/L — HIGH (ref 40–120)
ALP SERPL-CCNC: 195 U/L — HIGH (ref 40–120)
ALT FLD-CCNC: 43 U/L — HIGH
ALT FLD-CCNC: 43 U/L — HIGH
ANION GAP SERPL CALC-SCNC: 13 MMOL/L — SIGNIFICANT CHANGE UP (ref 5–17)
ANION GAP SERPL CALC-SCNC: 13 MMOL/L — SIGNIFICANT CHANGE UP (ref 5–17)
AST SERPL-CCNC: 40 U/L — HIGH
AST SERPL-CCNC: 40 U/L — HIGH
BASOPHILS # BLD AUTO: 0.02 K/UL — SIGNIFICANT CHANGE UP (ref 0–0.2)
BASOPHILS # BLD AUTO: 0.02 K/UL — SIGNIFICANT CHANGE UP (ref 0–0.2)
BASOPHILS NFR BLD AUTO: 0.4 % — SIGNIFICANT CHANGE UP (ref 0–2)
BASOPHILS NFR BLD AUTO: 0.4 % — SIGNIFICANT CHANGE UP (ref 0–2)
BILIRUB SERPL-MCNC: 0.6 MG/DL — SIGNIFICANT CHANGE UP (ref 0.4–2)
BILIRUB SERPL-MCNC: 0.6 MG/DL — SIGNIFICANT CHANGE UP (ref 0.4–2)
BUN SERPL-MCNC: 15.9 MG/DL — SIGNIFICANT CHANGE UP (ref 8–20)
BUN SERPL-MCNC: 15.9 MG/DL — SIGNIFICANT CHANGE UP (ref 8–20)
CALCIUM SERPL-MCNC: 8.4 MG/DL — SIGNIFICANT CHANGE UP (ref 8.4–10.5)
CALCIUM SERPL-MCNC: 8.4 MG/DL — SIGNIFICANT CHANGE UP (ref 8.4–10.5)
CHLORIDE SERPL-SCNC: 98 MMOL/L — SIGNIFICANT CHANGE UP (ref 96–108)
CHLORIDE SERPL-SCNC: 98 MMOL/L — SIGNIFICANT CHANGE UP (ref 96–108)
CO2 SERPL-SCNC: 25 MMOL/L — SIGNIFICANT CHANGE UP (ref 22–29)
CO2 SERPL-SCNC: 25 MMOL/L — SIGNIFICANT CHANGE UP (ref 22–29)
CREAT SERPL-MCNC: 0.59 MG/DL — SIGNIFICANT CHANGE UP (ref 0.5–1.3)
CREAT SERPL-MCNC: 0.59 MG/DL — SIGNIFICANT CHANGE UP (ref 0.5–1.3)
EGFR: 94 ML/MIN/1.73M2 — SIGNIFICANT CHANGE UP
EGFR: 94 ML/MIN/1.73M2 — SIGNIFICANT CHANGE UP
EOSINOPHIL # BLD AUTO: 0.04 K/UL — SIGNIFICANT CHANGE UP (ref 0–0.5)
EOSINOPHIL # BLD AUTO: 0.04 K/UL — SIGNIFICANT CHANGE UP (ref 0–0.5)
EOSINOPHIL NFR BLD AUTO: 0.8 % — SIGNIFICANT CHANGE UP (ref 0–6)
EOSINOPHIL NFR BLD AUTO: 0.8 % — SIGNIFICANT CHANGE UP (ref 0–6)
FLUAV AG NPH QL: DETECTED
FLUAV AG NPH QL: DETECTED
FLUBV AG NPH QL: SIGNIFICANT CHANGE UP
FLUBV AG NPH QL: SIGNIFICANT CHANGE UP
GLUCOSE SERPL-MCNC: 87 MG/DL — SIGNIFICANT CHANGE UP (ref 70–99)
GLUCOSE SERPL-MCNC: 87 MG/DL — SIGNIFICANT CHANGE UP (ref 70–99)
HCT VFR BLD CALC: 39.6 % — SIGNIFICANT CHANGE UP (ref 34.5–45)
HCT VFR BLD CALC: 39.6 % — SIGNIFICANT CHANGE UP (ref 34.5–45)
HGB BLD-MCNC: 13.7 G/DL — SIGNIFICANT CHANGE UP (ref 11.5–15.5)
HGB BLD-MCNC: 13.7 G/DL — SIGNIFICANT CHANGE UP (ref 11.5–15.5)
IMM GRANULOCYTES NFR BLD AUTO: 0.2 % — SIGNIFICANT CHANGE UP (ref 0–0.9)
IMM GRANULOCYTES NFR BLD AUTO: 0.2 % — SIGNIFICANT CHANGE UP (ref 0–0.9)
LYMPHOCYTES # BLD AUTO: 0.52 K/UL — LOW (ref 1–3.3)
LYMPHOCYTES # BLD AUTO: 0.52 K/UL — LOW (ref 1–3.3)
LYMPHOCYTES # BLD AUTO: 9.8 % — LOW (ref 13–44)
LYMPHOCYTES # BLD AUTO: 9.8 % — LOW (ref 13–44)
MAGNESIUM SERPL-MCNC: 1.6 MG/DL — SIGNIFICANT CHANGE UP (ref 1.6–2.6)
MAGNESIUM SERPL-MCNC: 1.6 MG/DL — SIGNIFICANT CHANGE UP (ref 1.6–2.6)
MCHC RBC-ENTMCNC: 29.5 PG — SIGNIFICANT CHANGE UP (ref 27–34)
MCHC RBC-ENTMCNC: 29.5 PG — SIGNIFICANT CHANGE UP (ref 27–34)
MCHC RBC-ENTMCNC: 34.6 GM/DL — SIGNIFICANT CHANGE UP (ref 32–36)
MCHC RBC-ENTMCNC: 34.6 GM/DL — SIGNIFICANT CHANGE UP (ref 32–36)
MCV RBC AUTO: 85.2 FL — SIGNIFICANT CHANGE UP (ref 80–100)
MCV RBC AUTO: 85.2 FL — SIGNIFICANT CHANGE UP (ref 80–100)
MONOCYTES # BLD AUTO: 0.38 K/UL — SIGNIFICANT CHANGE UP (ref 0–0.9)
MONOCYTES # BLD AUTO: 0.38 K/UL — SIGNIFICANT CHANGE UP (ref 0–0.9)
MONOCYTES NFR BLD AUTO: 7.1 % — SIGNIFICANT CHANGE UP (ref 2–14)
MONOCYTES NFR BLD AUTO: 7.1 % — SIGNIFICANT CHANGE UP (ref 2–14)
NEUTROPHILS # BLD AUTO: 4.35 K/UL — SIGNIFICANT CHANGE UP (ref 1.8–7.4)
NEUTROPHILS # BLD AUTO: 4.35 K/UL — SIGNIFICANT CHANGE UP (ref 1.8–7.4)
NEUTROPHILS NFR BLD AUTO: 81.7 % — HIGH (ref 43–77)
NEUTROPHILS NFR BLD AUTO: 81.7 % — HIGH (ref 43–77)
PLATELET # BLD AUTO: 192 K/UL — SIGNIFICANT CHANGE UP (ref 150–400)
PLATELET # BLD AUTO: 192 K/UL — SIGNIFICANT CHANGE UP (ref 150–400)
POTASSIUM SERPL-MCNC: 3.5 MMOL/L — SIGNIFICANT CHANGE UP (ref 3.5–5.3)
POTASSIUM SERPL-MCNC: 3.5 MMOL/L — SIGNIFICANT CHANGE UP (ref 3.5–5.3)
POTASSIUM SERPL-SCNC: 3.5 MMOL/L — SIGNIFICANT CHANGE UP (ref 3.5–5.3)
POTASSIUM SERPL-SCNC: 3.5 MMOL/L — SIGNIFICANT CHANGE UP (ref 3.5–5.3)
PROT SERPL-MCNC: 6.8 G/DL — SIGNIFICANT CHANGE UP (ref 6.6–8.7)
PROT SERPL-MCNC: 6.8 G/DL — SIGNIFICANT CHANGE UP (ref 6.6–8.7)
RBC # BLD: 4.65 M/UL — SIGNIFICANT CHANGE UP (ref 3.8–5.2)
RBC # BLD: 4.65 M/UL — SIGNIFICANT CHANGE UP (ref 3.8–5.2)
RBC # FLD: 13.7 % — SIGNIFICANT CHANGE UP (ref 10.3–14.5)
RBC # FLD: 13.7 % — SIGNIFICANT CHANGE UP (ref 10.3–14.5)
RSV RNA NPH QL NAA+NON-PROBE: SIGNIFICANT CHANGE UP
RSV RNA NPH QL NAA+NON-PROBE: SIGNIFICANT CHANGE UP
SARS-COV-2 RNA SPEC QL NAA+PROBE: SIGNIFICANT CHANGE UP
SARS-COV-2 RNA SPEC QL NAA+PROBE: SIGNIFICANT CHANGE UP
SODIUM SERPL-SCNC: 136 MMOL/L — SIGNIFICANT CHANGE UP (ref 135–145)
SODIUM SERPL-SCNC: 136 MMOL/L — SIGNIFICANT CHANGE UP (ref 135–145)
WBC # BLD: 5.32 K/UL — SIGNIFICANT CHANGE UP (ref 3.8–10.5)
WBC # BLD: 5.32 K/UL — SIGNIFICANT CHANGE UP (ref 3.8–10.5)
WBC # FLD AUTO: 5.32 K/UL — SIGNIFICANT CHANGE UP (ref 3.8–10.5)
WBC # FLD AUTO: 5.32 K/UL — SIGNIFICANT CHANGE UP (ref 3.8–10.5)

## 2024-01-08 PROCEDURE — 99285 EMERGENCY DEPT VISIT HI MDM: CPT

## 2024-01-08 PROCEDURE — 71046 X-RAY EXAM CHEST 2 VIEWS: CPT | Mod: 26

## 2024-01-08 PROCEDURE — 93010 ELECTROCARDIOGRAM REPORT: CPT

## 2024-01-08 RX ORDER — IPRATROPIUM/ALBUTEROL SULFATE 18-103MCG
3 AEROSOL WITH ADAPTER (GRAM) INHALATION ONCE
Refills: 0 | Status: COMPLETED | OUTPATIENT
Start: 2024-01-08 | End: 2024-01-08

## 2024-01-08 RX ADMIN — Medication 3 MILLILITER(S): at 22:46

## 2024-01-08 RX ADMIN — Medication 125 MILLIGRAM(S): at 22:45

## 2024-01-08 NOTE — ED ADULT TRIAGE NOTE - BP NONINVASIVE DIASTOLIC (MM HG)
Tc was contacted by [facility] to identify medication adherence barriers. Patient requested a callback from the pharmacy team for further assistance.    She was identified for pharmacy outreach based on prescription fill history of her Cholesterol medicine, Atorvastatin 20 mg. Last dispensed on 1/10/23.     The patient is taking the medication(s). She reports missing an average of 0 doses on a weekly basis.     The following barrier(s) to medication adherence were identified:   Knowledge gap   Inconvenience      Patient stated that she was not taking the medication for a while because she was taking it in the morning and felt it \"was not helping her\". Patient then stated that she spoke to her home care nurse and that the nurse suggested she take it at night. Patient stated that things seem to be better now that she takes the medication at night.    The following solution(s) for medication adherence were recommended:    Caregiver reminders     The following updates were made to medication list: none    No further follow-up is required at this time. Patient was given the pharmacy team phone number should future questions or concerns arise.     Arabella Berman [facility] Pharmacy Technician Specialist  [phone]     81

## 2024-01-08 NOTE — ED ADULT TRIAGE NOTE - AS TEMP SITE
Face to Face Supporting Documentation - Home Health    The encounter with this patient was in whole or in part the primary reason for home health admission.    Date of encounter:   Patient:                    MRN:                       YOB: 2019  Shaista Bocanegra  [id number]  1945     Home health to see patient for:  Speech Language Pathology evaluation and treatment    Skilled need for:  Recent Deterioration of Health Status CVA    Skilled nursing interventions to include:  Comment: SLP    Homebound status evidenced by:  Needs the assistance of another person in order to leave the home. Leaving home requires a considerable and taxing effort. There is a normal inability to leave the home.    Community Physician to provide follow up care: Bethany Crane M.D.     Optional Interventions? No      I certify the face to face encounter for this home health care referral meets the CMS requirements and the encounter/clinical assessment with the patient was, in whole, or in part, for the medical condition(s) listed above, which is the primary reason for home health care. Based on my clinical findings: the service(s) are medically necessary, support the need for home health care, and the homebound criteria are met.  I certify that this patient has had a face to face encounter by myself.  Denton Dupont M.D. - NPI: [id number]    
oral

## 2024-01-08 NOTE — ED PROVIDER NOTE - OBJECTIVE STATEMENT
74 y/o female PMH of RA, Lupus, Asthma, COPD, A. Fib presents with cough, congestion, fever, nausea, body aches for 3 days. Pt states she's been weak due to these symptoms and didn't take any meds for these symptoms today. Pt denies ha, loc, focal neuro deficits, cp/sob/palp, abd pain/n/d, urinary symptoms, recent travel

## 2024-01-08 NOTE — ED PROVIDER NOTE - PHYSICAL EXAMINATION
Const: Awake, alert and oriented. In no acute distress. Well appearing.  HEENT: NC/AT. Moist mucous membranes.  Eyes: No scleral icterus. EOMI.  Neck:. Soft and supple. Full ROM without pain.  Cardiac: Regular rate and regular rhythm. +S1/S2. Peripheral pulses 2+ and symmetric. No LE edema.  Resp: Speaking in full sentences. No evidence of respiratory distress. +exp wheeze  Abd: Soft, non-tender, non-distended. Normal bowel sounds in all 4 quadrants. No guarding or rebound.  Back: Spine midline and non-tender. No CVAT.  Skin: No rashes, abrasions or lacerations.  Lymph: No cervical lymphadenopathy.  Neuro: Awake, alert & oriented x 3. Moves all extremities symmetrically.

## 2024-01-08 NOTE — ED PROVIDER NOTE - CLINICAL SUMMARY MEDICAL DECISION MAKING FREE TEXT BOX
72 y/o female PMH of RA, Lupus, Asthma, COPD, A. Fib presents with cough, congestion, fever, nausea, body aches for 3 days. 74 y/o female PMH of RA, Lupus, Asthma, COPD, A. Fib presents with cough, congestion, fever, nausea, body aches for 3 days. 74 y/o female PMH of RA, Lupus, Asthma, COPD, A. Fib presents with cough, congestion, fever, nausea, body aches for 3 days. Pt found to be have flu A, pt started on tamiflu, hypoxic requiring supp O2, will admit for further management   ekg no acute ischemic changes, unchanged from prior ekg

## 2024-01-09 DIAGNOSIS — R09.02 HYPOXEMIA: ICD-10-CM

## 2024-01-09 PROBLEM — I50.22 CHRONIC SYSTOLIC (CONGESTIVE) HEART FAILURE: Chronic | Status: ACTIVE | Noted: 2023-11-29

## 2024-01-09 PROBLEM — K59.00 CONSTIPATION, UNSPECIFIED: Chronic | Status: ACTIVE | Noted: 2023-11-29

## 2024-01-09 PROBLEM — J44.9 CHRONIC OBSTRUCTIVE PULMONARY DISEASE, UNSPECIFIED: Chronic | Status: ACTIVE | Noted: 2023-11-29

## 2024-01-09 PROBLEM — J45.909 UNSPECIFIED ASTHMA, UNCOMPLICATED: Chronic | Status: ACTIVE | Noted: 2023-11-29

## 2024-01-09 PROBLEM — I48.20 CHRONIC ATRIAL FIBRILLATION, UNSPECIFIED: Chronic | Status: ACTIVE | Noted: 2023-11-29

## 2024-01-09 LAB
ALBUMIN SERPL ELPH-MCNC: 3.8 G/DL — SIGNIFICANT CHANGE UP (ref 3.3–5.2)
ALBUMIN SERPL ELPH-MCNC: 3.8 G/DL — SIGNIFICANT CHANGE UP (ref 3.3–5.2)
ALP SERPL-CCNC: 222 U/L — HIGH (ref 40–120)
ALP SERPL-CCNC: 222 U/L — HIGH (ref 40–120)
ALT FLD-CCNC: 42 U/L — HIGH
ALT FLD-CCNC: 42 U/L — HIGH
ANION GAP SERPL CALC-SCNC: 13 MMOL/L — SIGNIFICANT CHANGE UP (ref 5–17)
ANION GAP SERPL CALC-SCNC: 13 MMOL/L — SIGNIFICANT CHANGE UP (ref 5–17)
AST SERPL-CCNC: 37 U/L — HIGH
AST SERPL-CCNC: 37 U/L — HIGH
BASOPHILS # BLD AUTO: 0.05 K/UL — SIGNIFICANT CHANGE UP (ref 0–0.2)
BASOPHILS # BLD AUTO: 0.05 K/UL — SIGNIFICANT CHANGE UP (ref 0–0.2)
BASOPHILS NFR BLD AUTO: 1.8 % — SIGNIFICANT CHANGE UP (ref 0–2)
BASOPHILS NFR BLD AUTO: 1.8 % — SIGNIFICANT CHANGE UP (ref 0–2)
BILIRUB DIRECT SERPL-MCNC: 0.1 MG/DL — SIGNIFICANT CHANGE UP (ref 0–0.3)
BILIRUB DIRECT SERPL-MCNC: 0.1 MG/DL — SIGNIFICANT CHANGE UP (ref 0–0.3)
BILIRUB INDIRECT FLD-MCNC: 0.4 MG/DL — SIGNIFICANT CHANGE UP (ref 0.2–1)
BILIRUB INDIRECT FLD-MCNC: 0.4 MG/DL — SIGNIFICANT CHANGE UP (ref 0.2–1)
BILIRUB SERPL-MCNC: 0.5 MG/DL — SIGNIFICANT CHANGE UP (ref 0.4–2)
BILIRUB SERPL-MCNC: 0.5 MG/DL — SIGNIFICANT CHANGE UP (ref 0.4–2)
BUN SERPL-MCNC: 14.4 MG/DL — SIGNIFICANT CHANGE UP (ref 8–20)
BUN SERPL-MCNC: 14.4 MG/DL — SIGNIFICANT CHANGE UP (ref 8–20)
CALCIUM SERPL-MCNC: 8.7 MG/DL — SIGNIFICANT CHANGE UP (ref 8.4–10.5)
CALCIUM SERPL-MCNC: 8.7 MG/DL — SIGNIFICANT CHANGE UP (ref 8.4–10.5)
CHLORIDE SERPL-SCNC: 98 MMOL/L — SIGNIFICANT CHANGE UP (ref 96–108)
CHLORIDE SERPL-SCNC: 98 MMOL/L — SIGNIFICANT CHANGE UP (ref 96–108)
CO2 SERPL-SCNC: 24 MMOL/L — SIGNIFICANT CHANGE UP (ref 22–29)
CO2 SERPL-SCNC: 24 MMOL/L — SIGNIFICANT CHANGE UP (ref 22–29)
CREAT SERPL-MCNC: 0.55 MG/DL — SIGNIFICANT CHANGE UP (ref 0.5–1.3)
CREAT SERPL-MCNC: 0.55 MG/DL — SIGNIFICANT CHANGE UP (ref 0.5–1.3)
EGFR: 96 ML/MIN/1.73M2 — SIGNIFICANT CHANGE UP
EGFR: 96 ML/MIN/1.73M2 — SIGNIFICANT CHANGE UP
EOSINOPHIL # BLD AUTO: 0 K/UL — SIGNIFICANT CHANGE UP (ref 0–0.5)
EOSINOPHIL # BLD AUTO: 0 K/UL — SIGNIFICANT CHANGE UP (ref 0–0.5)
EOSINOPHIL NFR BLD AUTO: 0 % — SIGNIFICANT CHANGE UP (ref 0–6)
EOSINOPHIL NFR BLD AUTO: 0 % — SIGNIFICANT CHANGE UP (ref 0–6)
GIANT PLATELETS BLD QL SMEAR: PRESENT — SIGNIFICANT CHANGE UP
GIANT PLATELETS BLD QL SMEAR: PRESENT — SIGNIFICANT CHANGE UP
GLUCOSE BLDC GLUCOMTR-MCNC: 133 MG/DL — HIGH (ref 70–99)
GLUCOSE BLDC GLUCOMTR-MCNC: 133 MG/DL — HIGH (ref 70–99)
GLUCOSE BLDC GLUCOMTR-MCNC: 160 MG/DL — HIGH (ref 70–99)
GLUCOSE BLDC GLUCOMTR-MCNC: 160 MG/DL — HIGH (ref 70–99)
GLUCOSE BLDC GLUCOMTR-MCNC: 192 MG/DL — HIGH (ref 70–99)
GLUCOSE BLDC GLUCOMTR-MCNC: 192 MG/DL — HIGH (ref 70–99)
GLUCOSE SERPL-MCNC: 128 MG/DL — HIGH (ref 70–99)
GLUCOSE SERPL-MCNC: 128 MG/DL — HIGH (ref 70–99)
HCT VFR BLD CALC: 41.9 % — SIGNIFICANT CHANGE UP (ref 34.5–45)
HCT VFR BLD CALC: 41.9 % — SIGNIFICANT CHANGE UP (ref 34.5–45)
HGB BLD-MCNC: 14 G/DL — SIGNIFICANT CHANGE UP (ref 11.5–15.5)
HGB BLD-MCNC: 14 G/DL — SIGNIFICANT CHANGE UP (ref 11.5–15.5)
LYMPHOCYTES # BLD AUTO: 0.21 K/UL — LOW (ref 1–3.3)
LYMPHOCYTES # BLD AUTO: 0.21 K/UL — LOW (ref 1–3.3)
LYMPHOCYTES # BLD AUTO: 7.9 % — LOW (ref 13–44)
LYMPHOCYTES # BLD AUTO: 7.9 % — LOW (ref 13–44)
MANUAL SMEAR VERIFICATION: SIGNIFICANT CHANGE UP
MANUAL SMEAR VERIFICATION: SIGNIFICANT CHANGE UP
MCHC RBC-ENTMCNC: 28.7 PG — SIGNIFICANT CHANGE UP (ref 27–34)
MCHC RBC-ENTMCNC: 28.7 PG — SIGNIFICANT CHANGE UP (ref 27–34)
MCHC RBC-ENTMCNC: 33.4 GM/DL — SIGNIFICANT CHANGE UP (ref 32–36)
MCHC RBC-ENTMCNC: 33.4 GM/DL — SIGNIFICANT CHANGE UP (ref 32–36)
MCV RBC AUTO: 86 FL — SIGNIFICANT CHANGE UP (ref 80–100)
MCV RBC AUTO: 86 FL — SIGNIFICANT CHANGE UP (ref 80–100)
MONOCYTES # BLD AUTO: 0.02 K/UL — SIGNIFICANT CHANGE UP (ref 0–0.9)
MONOCYTES # BLD AUTO: 0.02 K/UL — SIGNIFICANT CHANGE UP (ref 0–0.9)
MONOCYTES NFR BLD AUTO: 0.9 % — LOW (ref 2–14)
MONOCYTES NFR BLD AUTO: 0.9 % — LOW (ref 2–14)
NEUTROPHILS # BLD AUTO: 2.29 K/UL — SIGNIFICANT CHANGE UP (ref 1.8–7.4)
NEUTROPHILS # BLD AUTO: 2.29 K/UL — SIGNIFICANT CHANGE UP (ref 1.8–7.4)
NEUTROPHILS NFR BLD AUTO: 82.3 % — HIGH (ref 43–77)
NEUTROPHILS NFR BLD AUTO: 82.3 % — HIGH (ref 43–77)
NEUTS BAND # BLD: 1.8 % — SIGNIFICANT CHANGE UP (ref 0–8)
NEUTS BAND # BLD: 1.8 % — SIGNIFICANT CHANGE UP (ref 0–8)
PLAT MORPH BLD: NORMAL — SIGNIFICANT CHANGE UP
PLAT MORPH BLD: NORMAL — SIGNIFICANT CHANGE UP
PLATELET # BLD AUTO: 183 K/UL — SIGNIFICANT CHANGE UP (ref 150–400)
PLATELET # BLD AUTO: 183 K/UL — SIGNIFICANT CHANGE UP (ref 150–400)
POTASSIUM SERPL-MCNC: 3.9 MMOL/L — SIGNIFICANT CHANGE UP (ref 3.5–5.3)
POTASSIUM SERPL-MCNC: 3.9 MMOL/L — SIGNIFICANT CHANGE UP (ref 3.5–5.3)
POTASSIUM SERPL-SCNC: 3.9 MMOL/L — SIGNIFICANT CHANGE UP (ref 3.5–5.3)
POTASSIUM SERPL-SCNC: 3.9 MMOL/L — SIGNIFICANT CHANGE UP (ref 3.5–5.3)
PROT SERPL-MCNC: 8 G/DL — SIGNIFICANT CHANGE UP (ref 6.6–8.7)
PROT SERPL-MCNC: 8 G/DL — SIGNIFICANT CHANGE UP (ref 6.6–8.7)
RBC # BLD: 4.87 M/UL — SIGNIFICANT CHANGE UP (ref 3.8–5.2)
RBC # BLD: 4.87 M/UL — SIGNIFICANT CHANGE UP (ref 3.8–5.2)
RBC # FLD: 13.6 % — SIGNIFICANT CHANGE UP (ref 10.3–14.5)
RBC # FLD: 13.6 % — SIGNIFICANT CHANGE UP (ref 10.3–14.5)
RBC BLD AUTO: NORMAL — SIGNIFICANT CHANGE UP
RBC BLD AUTO: NORMAL — SIGNIFICANT CHANGE UP
SODIUM SERPL-SCNC: 135 MMOL/L — SIGNIFICANT CHANGE UP (ref 135–145)
SODIUM SERPL-SCNC: 135 MMOL/L — SIGNIFICANT CHANGE UP (ref 135–145)
VARIANT LYMPHS # BLD: 5.3 % — SIGNIFICANT CHANGE UP (ref 0–6)
VARIANT LYMPHS # BLD: 5.3 % — SIGNIFICANT CHANGE UP (ref 0–6)
WBC # BLD: 2.72 K/UL — LOW (ref 3.8–10.5)
WBC # BLD: 2.72 K/UL — LOW (ref 3.8–10.5)
WBC # FLD AUTO: 2.72 K/UL — LOW (ref 3.8–10.5)
WBC # FLD AUTO: 2.72 K/UL — LOW (ref 3.8–10.5)

## 2024-01-09 PROCEDURE — 99223 1ST HOSP IP/OBS HIGH 75: CPT

## 2024-01-09 PROCEDURE — 99497 ADVNCD CARE PLAN 30 MIN: CPT | Mod: 25

## 2024-01-09 RX ORDER — BUDESONIDE AND FORMOTEROL FUMARATE DIHYDRATE 160; 4.5 UG/1; UG/1
2 AEROSOL RESPIRATORY (INHALATION)
Refills: 0 | Status: DISCONTINUED | OUTPATIENT
Start: 2024-01-09 | End: 2024-01-10

## 2024-01-09 RX ORDER — LANOLIN ALCOHOL/MO/W.PET/CERES
3 CREAM (GRAM) TOPICAL AT BEDTIME
Refills: 0 | Status: DISCONTINUED | OUTPATIENT
Start: 2024-01-09 | End: 2024-01-10

## 2024-01-09 RX ORDER — ALPRAZOLAM 0.25 MG
1 TABLET ORAL THREE TIMES A DAY
Refills: 0 | Status: DISCONTINUED | OUTPATIENT
Start: 2024-01-09 | End: 2024-01-10

## 2024-01-09 RX ORDER — ZOLPIDEM TARTRATE 10 MG/1
5 TABLET ORAL AT BEDTIME
Refills: 0 | Status: DISCONTINUED | OUTPATIENT
Start: 2024-01-09 | End: 2024-01-10

## 2024-01-09 RX ORDER — APIXABAN 2.5 MG/1
5 TABLET, FILM COATED ORAL EVERY 12 HOURS
Refills: 0 | Status: DISCONTINUED | OUTPATIENT
Start: 2024-01-09 | End: 2024-01-10

## 2024-01-09 RX ORDER — HYDROXYCHLOROQUINE SULFATE 200 MG
200 TABLET ORAL DAILY
Refills: 0 | Status: DISCONTINUED | OUTPATIENT
Start: 2024-01-09 | End: 2024-01-10

## 2024-01-09 RX ORDER — DEXTROSE 50 % IN WATER 50 %
15 SYRINGE (ML) INTRAVENOUS ONCE
Refills: 0 | Status: DISCONTINUED | OUTPATIENT
Start: 2024-01-09 | End: 2024-01-10

## 2024-01-09 RX ORDER — DEXTROSE 50 % IN WATER 50 %
12.5 SYRINGE (ML) INTRAVENOUS ONCE
Refills: 0 | Status: DISCONTINUED | OUTPATIENT
Start: 2024-01-09 | End: 2024-01-10

## 2024-01-09 RX ORDER — INSULIN LISPRO 100/ML
VIAL (ML) SUBCUTANEOUS
Refills: 0 | Status: DISCONTINUED | OUTPATIENT
Start: 2024-01-09 | End: 2024-01-10

## 2024-01-09 RX ORDER — MAGNESIUM SULFATE 500 MG/ML
2 VIAL (ML) INJECTION ONCE
Refills: 0 | Status: COMPLETED | OUTPATIENT
Start: 2024-01-09 | End: 2024-01-09

## 2024-01-09 RX ORDER — GLUCAGON INJECTION, SOLUTION 0.5 MG/.1ML
1 INJECTION, SOLUTION SUBCUTANEOUS ONCE
Refills: 0 | Status: DISCONTINUED | OUTPATIENT
Start: 2024-01-09 | End: 2024-01-10

## 2024-01-09 RX ORDER — SODIUM CHLORIDE 9 MG/ML
1000 INJECTION, SOLUTION INTRAVENOUS
Refills: 0 | Status: DISCONTINUED | OUTPATIENT
Start: 2024-01-09 | End: 2024-01-10

## 2024-01-09 RX ORDER — ALBUTEROL 90 UG/1
2 AEROSOL, METERED ORAL EVERY 6 HOURS
Refills: 0 | Status: DISCONTINUED | OUTPATIENT
Start: 2024-01-09 | End: 2024-01-10

## 2024-01-09 RX ORDER — PANTOPRAZOLE SODIUM 20 MG/1
40 TABLET, DELAYED RELEASE ORAL
Refills: 0 | Status: DISCONTINUED | OUTPATIENT
Start: 2024-01-09 | End: 2024-01-10

## 2024-01-09 RX ORDER — ONDANSETRON 8 MG/1
4 TABLET, FILM COATED ORAL EVERY 8 HOURS
Refills: 0 | Status: DISCONTINUED | OUTPATIENT
Start: 2024-01-09 | End: 2024-01-10

## 2024-01-09 RX ORDER — OXYCODONE HYDROCHLORIDE 5 MG/1
10 TABLET ORAL EVERY 8 HOURS
Refills: 0 | Status: DISCONTINUED | OUTPATIENT
Start: 2024-01-09 | End: 2024-01-09

## 2024-01-09 RX ORDER — DILTIAZEM HCL 120 MG
180 CAPSULE, EXT RELEASE 24 HR ORAL DAILY
Refills: 0 | Status: DISCONTINUED | OUTPATIENT
Start: 2024-01-09 | End: 2024-01-10

## 2024-01-09 RX ORDER — ATORVASTATIN CALCIUM 80 MG/1
20 TABLET, FILM COATED ORAL AT BEDTIME
Refills: 0 | Status: DISCONTINUED | OUTPATIENT
Start: 2024-01-09 | End: 2024-01-10

## 2024-01-09 RX ORDER — DEXTROSE 50 % IN WATER 50 %
25 SYRINGE (ML) INTRAVENOUS ONCE
Refills: 0 | Status: DISCONTINUED | OUTPATIENT
Start: 2024-01-09 | End: 2024-01-10

## 2024-01-09 RX ORDER — FOLIC ACID 0.8 MG
1 TABLET ORAL
Qty: 0 | Refills: 0 | DISCHARGE

## 2024-01-09 RX ORDER — OXYCODONE HYDROCHLORIDE 5 MG/1
10 TABLET ORAL EVERY 6 HOURS
Refills: 0 | Status: DISCONTINUED | OUTPATIENT
Start: 2024-01-09 | End: 2024-01-10

## 2024-01-09 RX ORDER — ACETAMINOPHEN 500 MG
650 TABLET ORAL EVERY 6 HOURS
Refills: 0 | Status: DISCONTINUED | OUTPATIENT
Start: 2024-01-09 | End: 2024-01-10

## 2024-01-09 RX ORDER — SPIRONOLACTONE 25 MG/1
25 TABLET, FILM COATED ORAL DAILY
Refills: 0 | Status: DISCONTINUED | OUTPATIENT
Start: 2024-01-09 | End: 2024-01-10

## 2024-01-09 RX ORDER — TIOTROPIUM BROMIDE 18 UG/1
2 CAPSULE ORAL; RESPIRATORY (INHALATION) DAILY
Refills: 0 | Status: DISCONTINUED | OUTPATIENT
Start: 2024-01-09 | End: 2024-01-10

## 2024-01-09 RX ADMIN — OXYCODONE HYDROCHLORIDE 10 MILLIGRAM(S): 5 TABLET ORAL at 09:02

## 2024-01-09 RX ADMIN — Medication 2 GRAM(S): at 01:30

## 2024-01-09 RX ADMIN — ZOLPIDEM TARTRATE 5 MILLIGRAM(S): 10 TABLET ORAL at 22:52

## 2024-01-09 RX ADMIN — Medication 40 MILLIGRAM(S): at 17:18

## 2024-01-09 RX ADMIN — Medication 2: at 17:04

## 2024-01-09 RX ADMIN — TIOTROPIUM BROMIDE 2 PUFF(S): 18 CAPSULE ORAL; RESPIRATORY (INHALATION) at 09:03

## 2024-01-09 RX ADMIN — OXYCODONE HYDROCHLORIDE 10 MILLIGRAM(S): 5 TABLET ORAL at 17:11

## 2024-01-09 RX ADMIN — ATORVASTATIN CALCIUM 20 MILLIGRAM(S): 80 TABLET, FILM COATED ORAL at 21:11

## 2024-01-09 RX ADMIN — Medication 75 MILLIGRAM(S): at 01:01

## 2024-01-09 RX ADMIN — Medication 1 MILLIGRAM(S): at 21:11

## 2024-01-09 RX ADMIN — BUDESONIDE AND FORMOTEROL FUMARATE DIHYDRATE 2 PUFF(S): 160; 4.5 AEROSOL RESPIRATORY (INHALATION) at 21:08

## 2024-01-09 RX ADMIN — BUDESONIDE AND FORMOTEROL FUMARATE DIHYDRATE 2 PUFF(S): 160; 4.5 AEROSOL RESPIRATORY (INHALATION) at 09:02

## 2024-01-09 RX ADMIN — Medication 75 MILLIGRAM(S): at 17:11

## 2024-01-09 RX ADMIN — APIXABAN 5 MILLIGRAM(S): 2.5 TABLET, FILM COATED ORAL at 17:11

## 2024-01-09 RX ADMIN — Medication 200 MILLIGRAM(S): at 12:07

## 2024-01-09 RX ADMIN — Medication 150 GRAM(S): at 01:00

## 2024-01-09 NOTE — H&P ADULT - HISTORY OF PRESENT ILLNESS
74 yo female with pmhx of Asthma/COPD, RA, Lupus, A. fib presenting to the ED with complaint of flu-like symptoms including fever, cough, congestion, nausea, and body aches x 3 days. 72 yo female with pmhx of Asthma/COPD, RA, Lupus, A. fib presenting to the ED with complaint of flu-like symptoms including fever, cough, congestion, nausea, and body aches x 3 days. 72 yo female with pmhx of Asthma/COPD, RA, Lupus, A. fib, T2DM presenting to the ED with complaint of flu-like symptoms including fever, cough, congestion, nausea, and body aches x 3 days. Patient says her body aches are significantly worse than her chronic pain that she has. She denies any sick contacts. She had one fever at home prior to coming into the ED. Her only complaint currently is fatigue and body aches. She tested positive for Flu A in the ED and was hypoxic to 84% on room air requiring nasal cannula.

## 2024-01-09 NOTE — H&P ADULT - NSHPLABSRESULTS_GEN_ALL_CORE
13.7   5.32  )-----------( 192      ( 08 Jan 2024 22:38 )             39.6     08 Jan 2024 22:38    136    |  98     |  15.9   ----------------------------<  87     3.5     |  25.0   |  0.59     Ca    8.4        08 Jan 2024 22:38  Mg     1.6       08 Jan 2024 22:38    TPro  6.8    /  Alb  3.6    /  TBili  0.6    /  DBili  x      /  AST  40     /  ALT  43     /  AlkPhos  195    08 Jan 2024 22:38      CAPILLARY BLOOD GLUCOSE        LIVER FUNCTIONS - ( 08 Jan 2024 22:38 )  Alb: 3.6 g/dL / Pro: 6.8 g/dL / ALK PHOS: 195 U/L / ALT: 43 U/L / AST: 40 U/L / GGT: x           Urinalysis Basic - ( 08 Jan 2024 22:38 )    Color: x / Appearance: x / SG: x / pH: x  Gluc: 87 mg/dL / Ketone: x  / Bili: x / Urobili: x   Blood: x / Protein: x / Nitrite: x   Leuk Esterase: x / RBC: x / WBC x   Sq Epi: x / Non Sq Epi: x / Bacteria: x

## 2024-01-09 NOTE — H&P ADULT - CONVERSATION DETAILS
Discussed the treatment options in the event that patient were to go into cardiac arrest including chest compressions and intubation. Explain in detail what this entails. Patient is unsure what her goals are, she has not thought about this in the past. She expresses that resuscitation does sound intense and she is unsure if she would want that, however, she is too overwhelmed to sign a MOLST at this time as she is uncertain. She would like more time to think about this decision. I explained that she may take as long as she needs, but she will be Full code so long as there is no MOLST completed.  Verbalized understanding and consent.

## 2024-01-09 NOTE — ED ADULT NURSE NOTE - NSICDXPASTMEDICALHX_GEN_ALL_CORE_FT
PAST MEDICAL HISTORY:  Asthma     Asthma     Autoimmune disease     Breast lump Benign    Chronic atrial fibrillation     Chronic systolic congestive heart failure     Constipation     COPD, mild     Hashimoto's disease     High cholesterol     Lupus     Myocardial infarct     Osteoporosis

## 2024-01-09 NOTE — ED ADULT NURSE NOTE - NSFALLUNIVINTERV_ED_ALL_ED
Bed/Stretcher in lowest position, wheels locked, appropriate side rails in place/Call bell, personal items and telephone in reach/Instruct patient to call for assistance before getting out of bed/chair/stretcher/Non-slip footwear applied when patient is off stretcher/[place] to call system/Physically safe environment - no spills, clutter or unnecessary equipment/Purposeful proactive rounding/Room/bathroom lighting operational, light cord in reach Bed/Stretcher in lowest position, wheels locked, appropriate side rails in place/Call bell, personal items and telephone in reach/Instruct patient to call for assistance before getting out of bed/chair/stretcher/Non-slip footwear applied when patient is off stretcher/[place] to call system/Physically safe environment - no spills, clutter or unnecessary equipment/Purposeful proactive rounding/Room/bathroom lighting operational, light cord in reach

## 2024-01-09 NOTE — ED ADULT NURSE REASSESSMENT NOTE - NS ED NURSE REASSESS COMMENT FT1
Pt alert and oriented x 3, resting on stretcher, no c/o of pain or discomfort. Currently on 3-4 L NC tolerating well. +Flu A.  Safety precautions in place. Will continue to monitor.

## 2024-01-09 NOTE — H&P ADULT - ASSESSMENT
74 yo female with pmhx of Asthma/COPD, RA, Lupus, A. fib, T2DM presenting to the ED with complaint of flu-like symptoms including fever, cough, congestion, nausea, and body aches x 3 days.    Acute hypoxic Respiratory Failure 2/2 Influenza  -Admit to medicine  -Flu A positive  -Droplet precautions  -Supportive care- Tylenol, Albuterol, Tessalon Perles  -Continue Solumedrol 40 mg Q12 for now and taper   -Supplemental O2 to maintain SpO2 > 90%, avoid hyperoxia especially in patient with COPD  -Tamiflu ordered    Transaminitis   -? Due to infection  -Monitor CMP for now  -Hepatitis panel    COPD/Asthma      RA, Lupus      A. fib      T2DM      DVTppx: Eliquis 72 yo female with pmhx of Asthma/COPD, RA, Lupus, A. fib, T2DM presenting to the ED with complaint of flu-like symptoms including fever, cough, congestion, nausea, and body aches x 3 days.    Acute hypoxic Respiratory Failure 2/2 Influenza  -Admit to medicine  -Flu A positive  -Droplet precautions  -Supportive care- Tylenol, Albuterol, Tessalon Perles  -Continue Solumedrol 40 mg Q12 for now and taper   -Supplemental O2 to maintain SpO2 > 90%, avoid hyperoxia especially in patient with COPD  -Tamiflu ordered    Transaminitis   -? Due to infection  -Monitor CMP for now  -Hepatitis panel    COPD/Asthma      RA, Lupus      A. fib      T2DM      DVTppx: Eliquis 72 yo female with pmhx of Asthma/COPD, RA, Lupus, A. fib, T2DM presenting to the ED with complaint of flu-like symptoms including fever, cough, congestion, nausea, and body aches x 3 days.    Acute hypoxic Respiratory Failure 2/2 Influenza  -Admit to medicine  -Flu A positive  -Droplet precautions  -Supportive care- Tylenol, Albuterol, Tessalon Perles  -Continue Solumedrol 40 mg Q12 for now and taper   -Supplemental O2 to maintain SpO2 > 90%, avoid hyperoxia especially in patient with COPD  -Tamiflu ordered    Transaminitis   -? Due to infection  -Monitor CMP for now  -Hepatitis panel    COPD/Asthma  -Symbicort/Spiriva as sub for Trelegy    RA, Lupus  -Continue Hydroxychloroquine 200 mg     A. fib  -Continue Cardizem 180 mg ER  -Continue Eliquis 5 mg BID    T2DM  -Hold PO meds while hospitalized  -ISS/Accuchecks/A1c  -Carb controlled diet    DVTppx: Eliquis  GOC: Full Code 74 yo female with pmhx of Asthma/COPD, RA, Lupus, A. fib, T2DM presenting to the ED with complaint of flu-like symptoms including fever, cough, congestion, nausea, and body aches x 3 days.    Acute hypoxic Respiratory Failure 2/2 Influenza  -Admit to medicine  -Flu A positive  -Droplet precautions  -Supportive care- Tylenol, Albuterol, Tessalon Perles  -Continue Solumedrol 40 mg Q12 for now and taper   -Supplemental O2 to maintain SpO2 > 90%, avoid hyperoxia especially in patient with COPD  -Tamiflu ordered    Transaminitis   -? Due to infection  -Monitor CMP for now  -Hepatitis panel    COPD/Asthma  -Symbicort/Spiriva as sub for Trelegy    RA, Lupus  -Continue Hydroxychloroquine 200 mg     A. fib  -Continue Cardizem 180 mg ER  -Continue Eliquis 5 mg BID    T2DM  -Hold PO meds while hospitalized  -ISS/Accuchecks/A1c  -Carb controlled diet    DVTppx: Eliquis  GOC: Full Code

## 2024-01-09 NOTE — ED ADULT NURSE NOTE - NSICDXFAMILYHX_GEN_ALL_CORE_FT
ADMIT
FAMILY HISTORY:  Aunt  Still living? Unknown  Family history of cancer, Age at diagnosis: Age Unknown

## 2024-01-09 NOTE — ED ADULT NURSE NOTE - OBJECTIVE STATEMENT
pt A&Ox4, c/o cough, sob, chills/fevers and body aches x3 days, hx copd and asthma, resp even and unlabored no distress noted

## 2024-01-09 NOTE — PROGRESS NOTE ADULT - SUBJECTIVE AND OBJECTIVE BOX
DUKE HOUSER    [id number]    73y      Female    CC: sob    INTERVAL HPI/OVERNIGHT EVENTS: pt seen and examined. admitted o/n with flu, hypoxia     REVIEW OF SYSTEMS:    CONSTITUTIONAL: No weight loss  RESPIRATORY: No wheezing, hemoptysis  CARDIOVASCULAR: No chest pain, palpitations  GASTROINTESTINAL: No abdominal or epigastric pain. No nausea, vomiting  NEUROLOGICAL: No headaches    Vital Signs Last 24 Hrs  T(C): 37.2 (09 Jan 2024 16:05), Max: 37.8 (08 Jan 2024 19:49)  T(F): 99 (09 Jan 2024 16:05), Max: 100 (08 Jan 2024 19:49)  HR: 82 (09 Jan 2024 16:05) (72 - 94)  BP: 127/70 (09 Jan 2024 16:05) (106/60 - 158/81)  BP(mean): 96 (09 Jan 2024 05:29) (72 - 96)  RR: 20 (09 Jan 2024 16:05) (18 - 20)  SpO2: 92% (09 Jan 2024 16:05) (84% - 98%)    Parameters below as of 09 Jan 2024 16:05  Patient On (Oxygen Delivery Method): nasal cannula  O2 Flow (L/min): 2      PHYSICAL EXAM:    GENERAL: NAD  CHEST/LUNG: course sounds b/l  HEART: S1S2+, Regular rate and rhythm  ABDOMEN: Soft, Nontender, Nondistended; Bowel sounds present  SKIN: warm, dry  NEURO: Awake, alert  PSYCH: calm, cooperative     LABS:                        14.0   2.72  )-----------( 183      ( 09 Jan 2024 07:02 )             41.9     01-09    135  |  98  |  14.4  ----------------------------<  128<H>  3.9   |  24.0  |  0.55    Ca    8.7      09 Jan 2024 07:02  Mg     1.6     01-08    TPro  8.0  /  Alb  3.8  /  TBili  0.5  /  DBili  0.1  /  AST  37<H>  /  ALT  42<H>  /  AlkPhos  222<H>  01-09      Urinalysis Basic - ( 09 Jan 2024 07:02 )    Color: x / Appearance: x / SG: x / pH: x  Gluc: 128 mg/dL / Ketone: x  / Bili: x / Urobili: x   Blood: x / Protein: x / Nitrite: x   Leuk Esterase: x / RBC: x / WBC x   Sq Epi: x / Non Sq Epi: x / Bacteria: x          MEDICATIONS  (STANDING):  apixaban 5 milliGRAM(s) Oral every 12 hours  atorvastatin 20 milliGRAM(s) Oral at bedtime  budesonide  80 MICROgram(s)/formoterol 4.5 MICROgram(s) Inhaler 2 Puff(s) Inhalation two times a day  dextrose 5%. 1000 milliLiter(s) (50 mL/Hr) IV Continuous <Continuous>  dextrose 5%. 1000 milliLiter(s) (100 mL/Hr) IV Continuous <Continuous>  dextrose 50% Injectable 25 Gram(s) IV Push once  dextrose 50% Injectable 12.5 Gram(s) IV Push once  dextrose 50% Injectable 25 Gram(s) IV Push once  diltiazem    milliGRAM(s) Oral daily  glucagon  Injectable 1 milliGRAM(s) IntraMuscular once  hydroxychloroquine 200 milliGRAM(s) Oral daily  insulin lispro (ADMELOG) corrective regimen sliding scale   SubCutaneous three times a day before meals  methylPREDNISolone sodium succinate Injectable 40 milliGRAM(s) IV Push every 12 hours  oseltamivir 75 milliGRAM(s) Oral two times a day  pantoprazole    Tablet 40 milliGRAM(s) Oral before breakfast  spironolactone 25 milliGRAM(s) Oral daily  tiotropium 2.5 MICROgram(s) Inhaler 2 Puff(s) Inhalation daily    MEDICATIONS  (PRN):  acetaminophen     Tablet .. 650 milliGRAM(s) Oral every 6 hours PRN Temp greater or equal to 38C (100.4F), Mild Pain (1 - 3)  albuterol    90 MICROgram(s) HFA Inhaler 2 Puff(s) Inhalation every 6 hours PRN Shortness of Breath and/or Wheezing  ALPRAZolam 1 milliGRAM(s) Oral three times a day PRN for anxiety  aluminum hydroxide/magnesium hydroxide/simethicone Suspension 30 milliLiter(s) Oral every 4 hours PRN Dyspepsia  benzonatate 100 milliGRAM(s) Oral three times a day PRN Cough  dextrose Oral Gel 15 Gram(s) Oral once PRN Blood Glucose LESS THAN 70 milliGRAM(s)/deciliter  melatonin 3 milliGRAM(s) Oral at bedtime PRN Insomnia  ondansetron Injectable 4 milliGRAM(s) IV Push every 8 hours PRN Nausea and/or Vomiting  oxyCODONE    IR 10 milliGRAM(s) Oral every 6 hours PRN Moderate to severe Pain  zolpidem 5 milliGRAM(s) Oral at bedtime PRN Insomnia  zolpidem 5 milliGRAM(s) Oral at bedtime PRN Insomnia      RADIOLOGY & ADDITIONAL TESTS:   I attempt to reach patient. Called patient @ [phone] and was unable to leave message on machine for patient to return call during normal business hours of 8:30 AM and 5 PM @ [phone] option 2. DUKE HOUSER    [id number]    73y      Female    CC: sob    INTERVAL HPI/OVERNIGHT EVENTS: pt seen and examined. admitted o/n with flu, hypoxia     REVIEW OF SYSTEMS:    CONSTITUTIONAL: No weight loss  RESPIRATORY: No wheezing, hemoptysis  CARDIOVASCULAR: No chest pain, palpitations  GASTROINTESTINAL: No abdominal or epigastric pain. No nausea, vomiting  NEUROLOGICAL: No headaches    Vital Signs Last 24 Hrs  T(C): 37.2 (09 Jan 2024 16:05), Max: 37.8 (08 Jan 2024 19:49)  T(F): 99 (09 Jan 2024 16:05), Max: 100 (08 Jan 2024 19:49)  HR: 82 (09 Jan 2024 16:05) (72 - 94)  BP: 127/70 (09 Jan 2024 16:05) (106/60 - 158/81)  BP(mean): 96 (09 Jan 2024 05:29) (72 - 96)  RR: 20 (09 Jan 2024 16:05) (18 - 20)  SpO2: 92% (09 Jan 2024 16:05) (84% - 98%)    Parameters below as of 09 Jan 2024 16:05  Patient On (Oxygen Delivery Method): nasal cannula  O2 Flow (L/min): 2      PHYSICAL EXAM:    GENERAL: NAD  CHEST/LUNG: course sounds b/l  HEART: S1S2+, Regular rate and rhythm  ABDOMEN: Soft, Nontender, Nondistended; Bowel sounds present  SKIN: warm, dry  NEURO: Awake, alert  PSYCH: calm, cooperative     LABS:                        14.0   2.72  )-----------( 183      ( 09 Jan 2024 07:02 )             41.9     01-09    135  |  98  |  14.4  ----------------------------<  128<H>  3.9   |  24.0  |  0.55    Ca    8.7      09 Jan 2024 07:02  Mg     1.6     01-08    TPro  8.0  /  Alb  3.8  /  TBili  0.5  /  DBili  0.1  /  AST  37<H>  /  ALT  42<H>  /  AlkPhos  222<H>  01-09      Urinalysis Basic - ( 09 Jan 2024 07:02 )    Color: x / Appearance: x / SG: x / pH: x  Gluc: 128 mg/dL / Ketone: x  / Bili: x / Urobili: x   Blood: x / Protein: x / Nitrite: x   Leuk Esterase: x / RBC: x / WBC x   Sq Epi: x / Non Sq Epi: x / Bacteria: x          MEDICATIONS  (STANDING):  apixaban 5 milliGRAM(s) Oral every 12 hours  atorvastatin 20 milliGRAM(s) Oral at bedtime  budesonide  80 MICROgram(s)/formoterol 4.5 MICROgram(s) Inhaler 2 Puff(s) Inhalation two times a day  dextrose 5%. 1000 milliLiter(s) (50 mL/Hr) IV Continuous <Continuous>  dextrose 5%. 1000 milliLiter(s) (100 mL/Hr) IV Continuous <Continuous>  dextrose 50% Injectable 25 Gram(s) IV Push once  dextrose 50% Injectable 12.5 Gram(s) IV Push once  dextrose 50% Injectable 25 Gram(s) IV Push once  diltiazem    milliGRAM(s) Oral daily  glucagon  Injectable 1 milliGRAM(s) IntraMuscular once  hydroxychloroquine 200 milliGRAM(s) Oral daily  insulin lispro (ADMELOG) corrective regimen sliding scale   SubCutaneous three times a day before meals  methylPREDNISolone sodium succinate Injectable 40 milliGRAM(s) IV Push every 12 hours  oseltamivir 75 milliGRAM(s) Oral two times a day  pantoprazole    Tablet 40 milliGRAM(s) Oral before breakfast  spironolactone 25 milliGRAM(s) Oral daily  tiotropium 2.5 MICROgram(s) Inhaler 2 Puff(s) Inhalation daily    MEDICATIONS  (PRN):  acetaminophen     Tablet .. 650 milliGRAM(s) Oral every 6 hours PRN Temp greater or equal to 38C (100.4F), Mild Pain (1 - 3)  albuterol    90 MICROgram(s) HFA Inhaler 2 Puff(s) Inhalation every 6 hours PRN Shortness of Breath and/or Wheezing  ALPRAZolam 1 milliGRAM(s) Oral three times a day PRN for anxiety  aluminum hydroxide/magnesium hydroxide/simethicone Suspension 30 milliLiter(s) Oral every 4 hours PRN Dyspepsia  benzonatate 100 milliGRAM(s) Oral three times a day PRN Cough  dextrose Oral Gel 15 Gram(s) Oral once PRN Blood Glucose LESS THAN 70 milliGRAM(s)/deciliter  melatonin 3 milliGRAM(s) Oral at bedtime PRN Insomnia  ondansetron Injectable 4 milliGRAM(s) IV Push every 8 hours PRN Nausea and/or Vomiting  oxyCODONE    IR 10 milliGRAM(s) Oral every 6 hours PRN Moderate to severe Pain  zolpidem 5 milliGRAM(s) Oral at bedtime PRN Insomnia  zolpidem 5 milliGRAM(s) Oral at bedtime PRN Insomnia      RADIOLOGY & ADDITIONAL TESTS:

## 2024-01-09 NOTE — PROGRESS NOTE ADULT - ASSESSMENT
73y/oF PMH asthma/COPD, RA, Lupus, afib, dm, presenting to ER with flu-like symptoms including fever, cough, congestion, nausea and body aches x3 days, admitted with acute hypoxic respiratory failure 2/2 Influenza     Acute hypoxic respiratory failure 2/2 Influenza A   COPD/Asthma   -cont supportive care   -cont tamiflu   -cont steroids taper   -IS   -wean supplemental o2 as tolerated   -cont inhalers     Transaminitis   -likely due to viral infection   -f/u hepatitis panel   -f/u am cmp     RA   Lupus   -cont hydroxychloroquine    chronic afib   -cont cardizem, eliquis     DM2   -holding home PO meds   -f/u a1c  -ISS     vte ppx: eliquis

## 2024-01-09 NOTE — H&P ADULT - SOCIAL HISTORY: TOBACCO USE
Former, quit a few months ago but smoked since age 11; states she was never a heavy smoker, doesn't finish cigarettes but cant quantify

## 2024-01-09 NOTE — ED ADULT NURSE NOTE - DRUG PRE-SCREENING (DAST -1)
[FreeTextEntry6] :  Thirteen month old male here for follow up. Needs second influenza vaccine and Varicella. Has been doing well. Had been exposed to RSV (from her brother). Had some cough and congestion but has recovered. Now  putting fingers in mouth. Statement Selected

## 2024-01-09 NOTE — H&P ADULT - NSHPPHYSICALEXAM_GEN_ALL_CORE
Vital Signs Last 24 Hrs  T(C): 36.6 (09 Jan 2024 05:29), Max: 37.8 (08 Jan 2024 19:49)  T(F): 97.8 (09 Jan 2024 05:29), Max: 100 (08 Jan 2024 19:49)  HR: 84 (09 Jan 2024 05:29) (84 - 94)  BP: 126/81 (09 Jan 2024 05:29) (106/60 - 158/81)  BP(mean): 96 (09 Jan 2024 05:29) (72 - 96)  RR: 20 (09 Jan 2024 05:29) (18 - 20)  SpO2: 98% (09 Jan 2024 05:29) (84% - 98%)    Parameters below as of 09 Jan 2024 05:29  Patient On (Oxygen Delivery Method): nasal cannula  O2 Flow (L/min): 2      General: Age-appearing, in no acute distress  Head: Normocephalic, atraumatic  ENMT: EOMI, neck supple  Cardiovascular: +S1, S2; Regular rate and rhythm, no murmurs, rubs, gallops  Respiratory: Coarse breath sounds, no respiratory distress, no accessory muscle use  Gastrointestinal: Abdomen soft, non-tender, +BS in all 4 quadrants  Extremities: No clubbing, cyanosis, or edema  Vascular: 2+ pulses, cap refill < 2 seconds  Neuro: Non-focal, AAOx4, sensation intact BL  Musculoskeletal: Normal tone, no deformities  Skin: Warm, dry; no acute rash seen  Psych: Appropriate, cooperative

## 2024-01-10 ENCOUNTER — TRANSCRIPTION ENCOUNTER (OUTPATIENT)
Age: 74
End: 2024-01-10

## 2024-01-10 VITALS
RESPIRATION RATE: 18 BRPM | HEART RATE: 69 BPM | OXYGEN SATURATION: 93 % | DIASTOLIC BLOOD PRESSURE: 72 MMHG | SYSTOLIC BLOOD PRESSURE: 119 MMHG | TEMPERATURE: 98 F

## 2024-01-10 LAB
A1C WITH ESTIMATED AVERAGE GLUCOSE RESULT: 6.2 % — HIGH (ref 4–5.6)
A1C WITH ESTIMATED AVERAGE GLUCOSE RESULT: 6.2 % — HIGH (ref 4–5.6)
ALBUMIN SERPL ELPH-MCNC: 3.3 G/DL — SIGNIFICANT CHANGE UP (ref 3.3–5.2)
ALBUMIN SERPL ELPH-MCNC: 3.3 G/DL — SIGNIFICANT CHANGE UP (ref 3.3–5.2)
ALP SERPL-CCNC: 171 U/L — HIGH (ref 40–120)
ALP SERPL-CCNC: 171 U/L — HIGH (ref 40–120)
ALT FLD-CCNC: 34 U/L — HIGH
ALT FLD-CCNC: 34 U/L — HIGH
ANION GAP SERPL CALC-SCNC: 11 MMOL/L — SIGNIFICANT CHANGE UP (ref 5–17)
ANION GAP SERPL CALC-SCNC: 11 MMOL/L — SIGNIFICANT CHANGE UP (ref 5–17)
AST SERPL-CCNC: 26 U/L — SIGNIFICANT CHANGE UP
AST SERPL-CCNC: 26 U/L — SIGNIFICANT CHANGE UP
BASOPHILS # BLD AUTO: 0.01 K/UL — SIGNIFICANT CHANGE UP (ref 0–0.2)
BASOPHILS # BLD AUTO: 0.01 K/UL — SIGNIFICANT CHANGE UP (ref 0–0.2)
BASOPHILS NFR BLD AUTO: 0.1 % — SIGNIFICANT CHANGE UP (ref 0–2)
BASOPHILS NFR BLD AUTO: 0.1 % — SIGNIFICANT CHANGE UP (ref 0–2)
BILIRUB SERPL-MCNC: 0.3 MG/DL — LOW (ref 0.4–2)
BILIRUB SERPL-MCNC: 0.3 MG/DL — LOW (ref 0.4–2)
BUN SERPL-MCNC: 24.9 MG/DL — HIGH (ref 8–20)
BUN SERPL-MCNC: 24.9 MG/DL — HIGH (ref 8–20)
CALCIUM SERPL-MCNC: 7.4 MG/DL — LOW (ref 8.4–10.5)
CALCIUM SERPL-MCNC: 7.4 MG/DL — LOW (ref 8.4–10.5)
CHLORIDE SERPL-SCNC: 99 MMOL/L — SIGNIFICANT CHANGE UP (ref 96–108)
CHLORIDE SERPL-SCNC: 99 MMOL/L — SIGNIFICANT CHANGE UP (ref 96–108)
CO2 SERPL-SCNC: 27 MMOL/L — SIGNIFICANT CHANGE UP (ref 22–29)
CO2 SERPL-SCNC: 27 MMOL/L — SIGNIFICANT CHANGE UP (ref 22–29)
CREAT SERPL-MCNC: 0.68 MG/DL — SIGNIFICANT CHANGE UP (ref 0.5–1.3)
CREAT SERPL-MCNC: 0.68 MG/DL — SIGNIFICANT CHANGE UP (ref 0.5–1.3)
EGFR: 91 ML/MIN/1.73M2 — SIGNIFICANT CHANGE UP
EGFR: 91 ML/MIN/1.73M2 — SIGNIFICANT CHANGE UP
EOSINOPHIL # BLD AUTO: 0 K/UL — SIGNIFICANT CHANGE UP (ref 0–0.5)
EOSINOPHIL # BLD AUTO: 0 K/UL — SIGNIFICANT CHANGE UP (ref 0–0.5)
EOSINOPHIL NFR BLD AUTO: 0 % — SIGNIFICANT CHANGE UP (ref 0–6)
EOSINOPHIL NFR BLD AUTO: 0 % — SIGNIFICANT CHANGE UP (ref 0–6)
ESTIMATED AVERAGE GLUCOSE: 131 MG/DL — HIGH (ref 68–114)
ESTIMATED AVERAGE GLUCOSE: 131 MG/DL — HIGH (ref 68–114)
GLUCOSE BLDC GLUCOMTR-MCNC: 100 MG/DL — HIGH (ref 70–99)
GLUCOSE BLDC GLUCOMTR-MCNC: 100 MG/DL — HIGH (ref 70–99)
GLUCOSE BLDC GLUCOMTR-MCNC: 242 MG/DL — HIGH (ref 70–99)
GLUCOSE BLDC GLUCOMTR-MCNC: 242 MG/DL — HIGH (ref 70–99)
GLUCOSE SERPL-MCNC: 130 MG/DL — HIGH (ref 70–99)
GLUCOSE SERPL-MCNC: 130 MG/DL — HIGH (ref 70–99)
HAV IGM SER-ACNC: SIGNIFICANT CHANGE UP
HAV IGM SER-ACNC: SIGNIFICANT CHANGE UP
HBV CORE IGM SER-ACNC: SIGNIFICANT CHANGE UP
HBV CORE IGM SER-ACNC: SIGNIFICANT CHANGE UP
HBV SURFACE AG SER-ACNC: SIGNIFICANT CHANGE UP
HBV SURFACE AG SER-ACNC: SIGNIFICANT CHANGE UP
HCT VFR BLD CALC: 40.2 % — SIGNIFICANT CHANGE UP (ref 34.5–45)
HCT VFR BLD CALC: 40.2 % — SIGNIFICANT CHANGE UP (ref 34.5–45)
HCV AB S/CO SERPL IA: 0.17 S/CO — SIGNIFICANT CHANGE UP (ref 0–0.99)
HCV AB S/CO SERPL IA: 0.17 S/CO — SIGNIFICANT CHANGE UP (ref 0–0.99)
HCV AB SERPL-IMP: SIGNIFICANT CHANGE UP
HCV AB SERPL-IMP: SIGNIFICANT CHANGE UP
HGB BLD-MCNC: 13.2 G/DL — SIGNIFICANT CHANGE UP (ref 11.5–15.5)
HGB BLD-MCNC: 13.2 G/DL — SIGNIFICANT CHANGE UP (ref 11.5–15.5)
IMM GRANULOCYTES NFR BLD AUTO: 0.4 % — SIGNIFICANT CHANGE UP (ref 0–0.9)
IMM GRANULOCYTES NFR BLD AUTO: 0.4 % — SIGNIFICANT CHANGE UP (ref 0–0.9)
LYMPHOCYTES # BLD AUTO: 0.77 K/UL — LOW (ref 1–3.3)
LYMPHOCYTES # BLD AUTO: 0.77 K/UL — LOW (ref 1–3.3)
LYMPHOCYTES # BLD AUTO: 11.5 % — LOW (ref 13–44)
LYMPHOCYTES # BLD AUTO: 11.5 % — LOW (ref 13–44)
MAGNESIUM SERPL-MCNC: 1.8 MG/DL — SIGNIFICANT CHANGE UP (ref 1.6–2.6)
MAGNESIUM SERPL-MCNC: 1.8 MG/DL — SIGNIFICANT CHANGE UP (ref 1.6–2.6)
MCHC RBC-ENTMCNC: 28.3 PG — SIGNIFICANT CHANGE UP (ref 27–34)
MCHC RBC-ENTMCNC: 28.3 PG — SIGNIFICANT CHANGE UP (ref 27–34)
MCHC RBC-ENTMCNC: 32.8 GM/DL — SIGNIFICANT CHANGE UP (ref 32–36)
MCHC RBC-ENTMCNC: 32.8 GM/DL — SIGNIFICANT CHANGE UP (ref 32–36)
MCV RBC AUTO: 86.1 FL — SIGNIFICANT CHANGE UP (ref 80–100)
MCV RBC AUTO: 86.1 FL — SIGNIFICANT CHANGE UP (ref 80–100)
MONOCYTES # BLD AUTO: 0.38 K/UL — SIGNIFICANT CHANGE UP (ref 0–0.9)
MONOCYTES # BLD AUTO: 0.38 K/UL — SIGNIFICANT CHANGE UP (ref 0–0.9)
MONOCYTES NFR BLD AUTO: 5.7 % — SIGNIFICANT CHANGE UP (ref 2–14)
MONOCYTES NFR BLD AUTO: 5.7 % — SIGNIFICANT CHANGE UP (ref 2–14)
NEUTROPHILS # BLD AUTO: 5.49 K/UL — SIGNIFICANT CHANGE UP (ref 1.8–7.4)
NEUTROPHILS # BLD AUTO: 5.49 K/UL — SIGNIFICANT CHANGE UP (ref 1.8–7.4)
NEUTROPHILS NFR BLD AUTO: 82.3 % — HIGH (ref 43–77)
NEUTROPHILS NFR BLD AUTO: 82.3 % — HIGH (ref 43–77)
PHOSPHATE SERPL-MCNC: 3.3 MG/DL — SIGNIFICANT CHANGE UP (ref 2.4–4.7)
PHOSPHATE SERPL-MCNC: 3.3 MG/DL — SIGNIFICANT CHANGE UP (ref 2.4–4.7)
PLATELET # BLD AUTO: 223 K/UL — SIGNIFICANT CHANGE UP (ref 150–400)
PLATELET # BLD AUTO: 223 K/UL — SIGNIFICANT CHANGE UP (ref 150–400)
POTASSIUM SERPL-MCNC: 4.3 MMOL/L — SIGNIFICANT CHANGE UP (ref 3.5–5.3)
POTASSIUM SERPL-MCNC: 4.3 MMOL/L — SIGNIFICANT CHANGE UP (ref 3.5–5.3)
POTASSIUM SERPL-SCNC: 4.3 MMOL/L — SIGNIFICANT CHANGE UP (ref 3.5–5.3)
POTASSIUM SERPL-SCNC: 4.3 MMOL/L — SIGNIFICANT CHANGE UP (ref 3.5–5.3)
PROT SERPL-MCNC: 6.5 G/DL — LOW (ref 6.6–8.7)
PROT SERPL-MCNC: 6.5 G/DL — LOW (ref 6.6–8.7)
RBC # BLD: 4.67 M/UL — SIGNIFICANT CHANGE UP (ref 3.8–5.2)
RBC # BLD: 4.67 M/UL — SIGNIFICANT CHANGE UP (ref 3.8–5.2)
RBC # FLD: 13.5 % — SIGNIFICANT CHANGE UP (ref 10.3–14.5)
RBC # FLD: 13.5 % — SIGNIFICANT CHANGE UP (ref 10.3–14.5)
SODIUM SERPL-SCNC: 137 MMOL/L — SIGNIFICANT CHANGE UP (ref 135–145)
SODIUM SERPL-SCNC: 137 MMOL/L — SIGNIFICANT CHANGE UP (ref 135–145)
WBC # BLD: 6.68 K/UL — SIGNIFICANT CHANGE UP (ref 3.8–10.5)
WBC # BLD: 6.68 K/UL — SIGNIFICANT CHANGE UP (ref 3.8–10.5)
WBC # FLD AUTO: 6.68 K/UL — SIGNIFICANT CHANGE UP (ref 3.8–10.5)
WBC # FLD AUTO: 6.68 K/UL — SIGNIFICANT CHANGE UP (ref 3.8–10.5)

## 2024-01-10 PROCEDURE — 96365 THER/PROPH/DIAG IV INF INIT: CPT

## 2024-01-10 PROCEDURE — 83036 HEMOGLOBIN GLYCOSYLATED A1C: CPT

## 2024-01-10 PROCEDURE — 84100 ASSAY OF PHOSPHORUS: CPT

## 2024-01-10 PROCEDURE — 99285 EMERGENCY DEPT VISIT HI MDM: CPT | Mod: 25

## 2024-01-10 PROCEDURE — 94640 AIRWAY INHALATION TREATMENT: CPT

## 2024-01-10 PROCEDURE — 99239 HOSP IP/OBS DSCHRG MGMT >30: CPT

## 2024-01-10 PROCEDURE — 93005 ELECTROCARDIOGRAM TRACING: CPT

## 2024-01-10 PROCEDURE — 87637 SARSCOV2&INF A&B&RSV AMP PRB: CPT

## 2024-01-10 PROCEDURE — 82962 GLUCOSE BLOOD TEST: CPT

## 2024-01-10 PROCEDURE — 36415 COLL VENOUS BLD VENIPUNCTURE: CPT

## 2024-01-10 PROCEDURE — 71046 X-RAY EXAM CHEST 2 VIEWS: CPT

## 2024-01-10 PROCEDURE — 83735 ASSAY OF MAGNESIUM: CPT

## 2024-01-10 PROCEDURE — 80053 COMPREHEN METABOLIC PANEL: CPT

## 2024-01-10 PROCEDURE — 96375 TX/PRO/DX INJ NEW DRUG ADDON: CPT

## 2024-01-10 PROCEDURE — 80048 BASIC METABOLIC PNL TOTAL CA: CPT

## 2024-01-10 PROCEDURE — 80076 HEPATIC FUNCTION PANEL: CPT

## 2024-01-10 PROCEDURE — 85025 COMPLETE CBC W/AUTO DIFF WBC: CPT

## 2024-01-10 PROCEDURE — 80074 ACUTE HEPATITIS PANEL: CPT

## 2024-01-10 RX ADMIN — Medication 75 MILLIGRAM(S): at 06:24

## 2024-01-10 RX ADMIN — SPIRONOLACTONE 25 MILLIGRAM(S): 25 TABLET, FILM COATED ORAL at 11:21

## 2024-01-10 RX ADMIN — BUDESONIDE AND FORMOTEROL FUMARATE DIHYDRATE 2 PUFF(S): 160; 4.5 AEROSOL RESPIRATORY (INHALATION) at 08:40

## 2024-01-10 RX ADMIN — Medication 4: at 11:21

## 2024-01-10 RX ADMIN — APIXABAN 5 MILLIGRAM(S): 2.5 TABLET, FILM COATED ORAL at 06:25

## 2024-01-10 RX ADMIN — OXYCODONE HYDROCHLORIDE 10 MILLIGRAM(S): 5 TABLET ORAL at 00:51

## 2024-01-10 RX ADMIN — Medication 40 MILLIGRAM(S): at 17:39

## 2024-01-10 RX ADMIN — Medication 40 MILLIGRAM(S): at 06:25

## 2024-01-10 RX ADMIN — OXYCODONE HYDROCHLORIDE 10 MILLIGRAM(S): 5 TABLET ORAL at 08:39

## 2024-01-10 RX ADMIN — APIXABAN 5 MILLIGRAM(S): 2.5 TABLET, FILM COATED ORAL at 17:39

## 2024-01-10 RX ADMIN — Medication 1 MILLIGRAM(S): at 11:21

## 2024-01-10 RX ADMIN — Medication 200 MILLIGRAM(S): at 11:21

## 2024-01-10 RX ADMIN — ZOLPIDEM TARTRATE 5 MILLIGRAM(S): 10 TABLET ORAL at 00:51

## 2024-01-10 RX ADMIN — OXYCODONE HYDROCHLORIDE 10 MILLIGRAM(S): 5 TABLET ORAL at 15:08

## 2024-01-10 RX ADMIN — Medication 75 MILLIGRAM(S): at 17:39

## 2024-01-10 RX ADMIN — OXYCODONE HYDROCHLORIDE 10 MILLIGRAM(S): 5 TABLET ORAL at 16:08

## 2024-01-10 RX ADMIN — Medication 180 MILLIGRAM(S): at 06:24

## 2024-01-10 RX ADMIN — OXYCODONE HYDROCHLORIDE 10 MILLIGRAM(S): 5 TABLET ORAL at 09:39

## 2024-01-10 RX ADMIN — PANTOPRAZOLE SODIUM 40 MILLIGRAM(S): 20 TABLET, DELAYED RELEASE ORAL at 06:25

## 2024-01-10 RX ADMIN — TIOTROPIUM BROMIDE 2 PUFF(S): 18 CAPSULE ORAL; RESPIRATORY (INHALATION) at 08:39

## 2024-01-10 NOTE — DISCHARGE NOTE PROVIDER - NSDCMRMEDTOKEN_GEN_ALL_CORE_FT
albuterol 2.5 mg/3 mL (0.083%) inhalation solution: 3 by nebulizer 4 times a day as needed for  shortness of breath and/or wheezing  albuterol 90 mcg/inh inhalation aerosol: 2 puff(s) inhaled every 4-6 hours as needed.  Ambien 10 mg oral tablet: 1 tab(s) orally once a day (at bedtime) as needed for  insomnia  apixaban 5 mg oral tablet: 1 tab(s) orally 2 times a day  atorvastatin 20 mg oral tablet: 1 tab(s) orally once a day  benzonatate 100 mg oral capsule: 1 cap(s) orally 3 times a day as needed for Cough  dilTIAZem 180 mg/24 hours oral tablet, extended release: 1 tab(s) orally once a day  hydroxychloroquine 200 mg oral tablet: 1 tab(s) orally once a day  Jardiance 10 mg oral tablet: 1 tab(s) orally once a day  Myrbetriq 50 mg oral tablet, extended release: 1 tab(s) orally once a day  oseltamivir 75 mg oral capsule: 1 cap(s) orally 2 times a day  oxyCODONE 10 mg oral tablet: 1 tab(s) orally every 8 hours, As Needed  pantoprazole 40 mg oral delayed release tablet: 1 tab(s) orally once a day (before a meal)  predniSONE 10 mg oral tablet: 4 tab(s) orally once a day x3days, then 3tabs PO daily x3days, then 2tabs PO daily x3days, then 1tab PO daily x3days  spironolactone 25 mg oral tablet: 1 tab(s) orally once a day  Trelegy Ellipta 100 mcg-62.5 mcg-25 mcg/inh inhalation powder: 1 puff(s) inhaled once a day  Xanax 0.25 mg oral tablet: 1 orally 3 times a day as needed for  anxiety

## 2024-01-10 NOTE — DISCHARGE NOTE PROVIDER - PROVIDER TOKENS
PROVIDER:[TOKEN:[[id number]]],FREE:[LAST:[Primary doctor],PHONE:[(   )    -],FAX:[(   )    -]],FREE:[LAST:[Pulmonary],PHONE:[(   )    -],FAX:[(   )    -]] PROVIDER:[TOKEN:[[id number]]],FREE:[LAST:[Primary doctor],PHONE:[(   )    -],FAX:[(   )    -]],FREE:[LAST:[Pulmonary],PHONE:[(   )    -],FAX:[(   )    -]]

## 2024-01-10 NOTE — DISCHARGE NOTE PROVIDER - HOSPITAL COURSE
74y/o F PMH asthma/COPD, RA, Lupus, afib, dm, presenting to ER with flu-like symptoms including fever, cough, congestion, nausea and body aches x3 days, admitted with acute hypoxic respiratory failure 2/2 Influenza.  Patient treated with Tamiflu and IV steroids.  Patient placed on O2 supplementation.  Patient improved.  O2 weaned.   Patient stable for discharge to home with outpatient follow up with primary doctor.    Vital Signs Last 24 Hrs  T(C): 36.8 (10 Jd 2024 09:49), Max: 37.2 (09 Jan 2024 16:05)  T(F): 98.2 (10 Jd 2024 09:49), Max: 99 (09 Jan 2024 16:05)  HR: 78 (10 Jd 2024 09:49) (68 - 84)  BP: 121/67 (10 Jd 2024 09:49) (102/65 - 134/83)  BP(mean): --  RR: 18 (10 Jd 2024 09:49) (18 - 20)  SpO2: 92% (10 Jd 2024 09:49) (92% - 97%)    Parameters below as of 10 Jd 2024 09:49  Patient On (Oxygen Delivery Method): nasal cannula  O2 Flow (L/min): 2      PHYSICAL EXAM:  GENERAL: NAD  HEAD:  Atraumatic, Normocephalic  NECK: Supple, No JVD, Normal thyroid  NERVOUS SYSTEM:  Alert & Oriented X3, Good concentration; Motor Strength 5/5 B/L upper and lower extremities  CHEST/LUNG: Coarse BS bilaterally  HEART: Regular rate and rhythm; No murmurs, rubs, or gallops  ABDOMEN: Soft, Nontender, Nondistended; Bowel sounds present  EXTREMITIES:  2+ Peripheral Pulses, No clubbing, cyanosis, or edema  SKIN: No rashes or lesions     74y/o F PMH asthma/COPD, RA, Lupus, afib, dm, presenting to ER with flu-like symptoms including fever, cough, congestion, nausea and body aches x3 days, admitted with acute hypoxic respiratory failure 2/2 Influenza.  Patient treated with Tamiflu and IV steroids.  Patient placed on O2 supplementation.  Patient improved.  O2 weaned.   Patient stable for discharge to home with outpatient follow up with primary doctor.    # Acute respiratory failure with hypoxia  # influenza A infection  # acute asthma exacerbation

## 2024-01-10 NOTE — DISCHARGE NOTE NURSING/CASE MANAGEMENT/SOCIAL WORK - NSDCPEFALRISK_GEN_ALL_CORE
For information on Fall & Injury Prevention, visit: [email or web address] OR  [email or web address] OR  https://www.cdc.gov/steadi/patient.html For information on Fall & Injury Prevention, visit: [email or web address] OR  [email or web address] OR  https://www.cdc.gov/steadi/patient.html

## 2024-01-10 NOTE — DISCHARGE NOTE PROVIDER - NSDCCPCAREPLAN_GEN_ALL_CORE_FT
PRINCIPAL DISCHARGE DIAGNOSIS  Diagnosis: Influenza A  Assessment and Plan of Treatment: Complete steroid taper.  Complete Tamiflu.  Follow up with primary doctor and pulmonary.      SECONDARY DISCHARGE DIAGNOSES  Diagnosis: Chronic obstructive pulmonary disease (COPD)  Assessment and Plan of Treatment: Continue current medications as prescribed.  Follow up with primary doctor and pulmonary.    Diagnosis: Asthma  Assessment and Plan of Treatment: Continue current medications as prescribed.  Follow up with primary doctor and pulmonary.    Diagnosis: Afib  Assessment and Plan of Treatment: Continue current medications as prescribed.  Follow up with primary doctor and cardiology.    Diagnosis: Lupus  Assessment and Plan of Treatment: Continue current medications.

## 2024-01-10 NOTE — DISCHARGE NOTE PROVIDER - CARE PROVIDER_API CALL
Paul Palacio  Interventional Cardiology  [address], [address]  [place] [address]  Phone: [phone]  Fax: [phone]  Follow Up Time:     Primary doctor,   Phone: (   )    -  Fax: (   )    -  Follow Up Time:     Pulmonary,   Phone: (   )    -  Fax: (   )    -  Follow Up Time:    Paul Palacio  Interventional Cardiology  [address], [address]  [place] [address]  Phone: [phone]  Fax: [phone]  Follow Up Time:     Primary doctor,   Phone: (   )    -  Fax: (   )    -  Follow Up Time:     Pulmonary,   Phone: (   )    -  Fax: (   )    -  Follow Up Time:

## 2024-01-10 NOTE — DISCHARGE NOTE NURSING/CASE MANAGEMENT/SOCIAL WORK - PATIENT PORTAL LINK FT
You can access the FollowMyHealth Patient Portal offered by [facility] by registering at the following website: [email or web address]. By joining [facility]’s FollowMyHealth portal, you will also be able to view your health information using other applications (apps) compatible with our system. You can access the FollowMyHealth Patient Portal offered by [facility] by registering at the following website: [email or web address]. By joining [facility]’s FollowMyHealth portal, you will also be able to view your health information using other applications (apps) compatible with our system.

## 2024-02-10 NOTE — PATIENT PROFILE ADULT - NSPRONUTRITIONRISK_GEN_A_NUR
Patient is a 50 year old male with hx of diverticulosis (1 flare Oct 2023) presenting with 3 days of worsening low abdominal pain associated with non-bloody diarrhea, found to have acute diverticulitis with phlegmon/early abscess.  Patient had colonoscopy 4 weeks ago as follow up after resolution of first diverticulitis flare in October 2023 which revealed polyp and diverticulosis.     # acute sigmoid diverticulitis with phlegmon  - clear liquid diet  - IVF LR @100  - pain control  - IV cipro, flagyl  - surgery consult  - GI consult  - zofran PRN    # hyponatremia, hypochloremia  - IVF LR @ 100, recheck lytes in AM    # elevated BP likely 2/2 pain, monitor    # insomnia  - family to bring home Lunesta    Diet: clear  Activity: OOB  DVT PPX: not indicated  GI PPX: not indicated  CHG:  not indicated  Code status: FULL CODE  Dispo: acute from home   No indicators present

## 2024-03-14 NOTE — ED ADULT NURSE NOTE - NS PRO PASSIVE SMOKE EXP
[address] [facility]          Phone: [phone] Fax: [phone]    Physical Therapy Daily Treatment Note    Date: 10/14/2022  Patient Name: Sergio Acosta  MRN: [id number]     :   1948    Referring Physician: Cherry Cruz MD     PCP: Parminder Fishman MD    Medical Diagnosis: Gait disturbance [R26.9]    No data recorded  Insurance: Payor: Selina Guevara / Plan: MEDICARE PART A AND B / Product Type: *No Product type* /   Insurance ID: [id number] - (Medicare)  Restrictions/Precautions:  Dementia, poor ST memory, Safety deficits  Visit# / total visits:  3/18  Pain level: 0/10   Time In:  1400  Time Out:  0356    Subjective:  Pt with no c/o. When asked, pt denies dizziness or falls. Pt's  did not stay with pt during PT session. OBJECTIVE:     Functional Activities:   Transfers (sit to stand ):  2x10 from mat table  Timed Up & Go:  15.06 seconds. >/=12 sec is indicative of a fall risk.   Dynamic Gait Index:    Gait Testing:   Gait Deviations (firm surface/linoleum): Yee gait speed, equal stride length, no LOB or lateral sway noted  Assistive Device Used: None  Steps: NT    Strength Testing: NT  ROM: WFL    Exercises:  Exercise/Equipment Resistance/Repetitions Other comments     Romberg 1 min on floor  2 min on 3\" foam EO/EC Increased sway on foam, with grasping // bars with EC     Tandem stance 1 min on floor  2 min on 3\" foam Increased LOB on foam     Tandem gait fwd/bkwd   [place]    Balance activity on green discs   Bouncing/catching yellow  ball    Ambulation with horizontal/ vertical head turns 220 feet x 2 No LOB noted     Ambulation with intermittent speed changes 220 feet x 2 No LOB noted     Picking up cones from the floor 8 cones 5 feet apart Mild dizziness after 4 cones that resolved once seated2      STS  2x10 from low blocks                                                 Other Therapeutic Activities:  NT    Home Exercise Program: Chief complaint:   Chief Complaint   Patient presents with    Consultation     new patient/ establish care/ SOB         Vitals:  Visit Vitals  BP (!) 147/85   Pulse (!) 55   Ht 5' 3\" (1.6 m)   Wt 74.6 kg (164 lb 6.4 oz)   BMI 29.12 kg/m²       HISTORY OF PRESENT ILLNESS     HPI  This is a 76 year old female with PMHx of breast cancer s/p radiation, hx of meningioma (following with neurosurgery), hypertension who presents to clinic as a referral by her pcp for a newly reduced EF 31%. Her EKG today demonstrated sinus bradycardia with LBBB.     Coronary risk factors: HTN, no DM or HLD. Former smoker, quit 50 years ago. Nondrinker. No known family hx of CAD.     She exercises three times a week on a health rider for ten minutes. She doesn't walk a lot.     Diet consists of eggs, cisneros. She tries to stay away from fried foods. She doesn't like dessert but occasionally has some. She doesn't eat a lot of vegetables.     She notices she has been more short of breath since December when her son gave her RSV. She says she walks up and down her stairs and sometimes has no issues, other times feels short of breath. She sleeps on a recliner due to her hip, she doesn't have issues laying flat. Denied weight gain. She did have swelling in her legs about a week ago (this has been ongoing for many years) but she doesn't have swelling now.     2023  Chol 202       Dr. Austin referred her to cardiology.       CALCIUM SCORING:     Left main coronary artery (LMCA): 0   Left anterior descending artery (LAD): 635   Circumflex artery: 527   Right coronary artery (RCA): 33   Other: 5     TOTAL AGATSTON SCORE: 1200   Other significant problems:  There are no problems to display for this patient.      PAST MEDICAL, FAMILY AND SOCIAL HISTORY     Medications:  Current Outpatient Medications   Medication Sig Dispense Refill    OXcarbazepine (TRILEPTAL) 300 MG tablet Take 450 mg by mouth in the morning and 450 mg in the evening.       NA    Manual Treatments:  NA    Modalities:  NA    Comments:  Pt demonstrates no imbalance with ambulation and head turns. Pt scored high on the DGI, but increased time needed on the TUG indicating a fall risk. Pt with mild dizziness with repetitive bending over, that resolved quickly.  not present for session, but updated with pt's progress when he returned.      Time-in Time-out Total Time   37565  Ther Ex      [id number]  Neuro Re-ed        92803  Ther Activities   0564 [id number] 12   97072  Manual Therapy       87004  E-stim       98876  Ultrasound            Session   45       Treatment/Activity Tolerance:  [x] Patient tolerated treatment well [] Patient limited by fatigue  [] Patient limited by pain  [] Patient limited by other medical complications  [] Other:     Prognosis: [x] Good [] Fair  [] Poor    Patient Requires Follow-up: [x] Yes  [] No    Plan:   [x] Continue per plan of care [] Alter current plan (see comments)  [] Plan of care initiated [] Hold pending MD visit [] Discharge  Plan for Next Session:        Electronically signed by:    Ana Cristina Cook PT, DPT  License [id number] prednisolone-gatifloxacin-bromfenac (PRED-GATI-BROM) 1-0.5-0.075 % ophth susp Place 1 drop into left eye 3 times daily. RPh: A compounded product from [facility], [place]      lisinopril-hydroCHLOROthiazide (PRINZIDE,ZESTORETIC) 20-25 MG per tablet Take 1 tablet by mouth nightly.       NON FORMULARY Take 200 mg by mouth daily.       No current facility-administered medications for this visit.       Allergies:  ALLERGIES:  No Known Allergies    Past Medical  History/Surgeries:  Past Medical History:   Diagnosis Date    Arthritis     Congestive cardiac failure (CMD)     Coronary artery disease     Essential (primary) hypertension     Heart murmur     High cholesterol     Malignant neoplasm (CMD)     left breast, ovarian    Thyroid condition     Tinnitus        Past Surgical History:   Procedure Laterality Date    Breast surgery Left     lumpectomy    Eye surgery Right 02/2019    cataract extraction with ioli    Eye surgery Left pending 3/2019    cataract extraction wit ioli    Hysterectomy      Joint replacement Bilateral     hip replacement    Joint replacement Right     knee replacement    Parathyroidectomy         Family History:  Family History   Problem Relation Age of Onset    Heart disease Mother     Cancer Mother         bowel    COPD Father        Social History:  Social History     Tobacco Use    Smoking status: Never    Smokeless tobacco: Never   Substance Use Topics    Alcohol use: No       REVIEW OF SYSTEMS     Review of Systems    PHYSICAL EXAM     Physical Exam    ASSESSMENT/PLAN     ***   Appearance: Normal appearance.   HENT:      Head: Normocephalic and atraumatic.   Cardiovascular:      Rate and Rhythm: Normal rate and regular rhythm.      Pulses: Normal pulses.      Heart sounds: Normal heart sounds. No murmur heard.  Pulmonary:      Effort: Pulmonary effort is normal.      Comments: Bibasilar crackles   Abdominal:      General: Abdomen is flat. Bowel sounds are normal.   Musculoskeletal:         General: Normal range of motion.      Right lower leg: No edema.      Left lower leg: No edema.   Skin:     General: Skin is warm and dry.   Neurological:      General: No focal deficit present.      Mental Status: She is alert and oriented to person, place, and time.         ASSESSMENT/PLAN     Atherosclerosis of native coronary artery of native heart without angina pectoris  CACS in 2013 total was 1200. (LM 0, , Lcx 527, RCA 33). Recommended daily aspirin 81 mg OTC. LDL goal is < 55. Ordered lipid panel prior to initiation of therapy.     Mixed hyperlipidemia  LDL was 121 in 2023. LDL goal is < 55. Ordered lipid panel prior to initiation of therapy.     Heart failure with reduced left ventricular function, NYHA class 2 (CMD)  Limited TTE today in the office demonstrated LVEF 22%. This is new for her. Initiated entresto, jardiance and lasix 20 mg daily. Cannot start beta blocker as patient is bradycardic at baseline. Will plan for LHC on 3/20/24. Ordered CMP, CBC, and pro-BNP. She was counseled on a low sodium diet and importance of recording daily weights. She will increase her lasix dose if she gains >3 lbs in one day.    Shortness of breath  Likely cause of the shortness of breath is heart failure, however given her history of RSV in the past, will obtain CT chest with contrast to evaluate lung parenchyma.       Thalia Amado MD  Cardiovascular Fellow PGY-V   Pager [phone]     I have seen and examined the patient with Dr. Amado and have discussed the findings and management plan with Dr. Amado  and with the patient and I agree with the above documentation.     No

## 2024-03-16 ENCOUNTER — EMERGENCY (EMERGENCY)
Facility: HOSPITAL | Age: 74
LOS: 1 days | Discharge: HOME | End: 2024-03-16
Attending: STUDENT IN AN ORGANIZED HEALTH CARE EDUCATION/TRAINING PROGRAM
Payer: MEDICARE

## 2024-03-16 VITALS
DIASTOLIC BLOOD PRESSURE: 62 MMHG | HEART RATE: 70 BPM | OXYGEN SATURATION: 95 % | TEMPERATURE: 98 F | SYSTOLIC BLOOD PRESSURE: 116 MMHG | RESPIRATION RATE: 16 BRPM

## 2024-03-16 VITALS
SYSTOLIC BLOOD PRESSURE: 125 MMHG | HEART RATE: 79 BPM | OXYGEN SATURATION: 90 % | DIASTOLIC BLOOD PRESSURE: 67 MMHG | TEMPERATURE: 98 F | WEIGHT: 148.59 LBS | RESPIRATION RATE: 17 BRPM

## 2024-03-16 DIAGNOSIS — Z98.89 OTHER SPECIFIED POSTPROCEDURAL STATES: Chronic | ICD-10-CM

## 2024-03-16 LAB
APPEARANCE UR: CLEAR — SIGNIFICANT CHANGE UP
BACTERIA # UR AUTO: NEGATIVE /HPF — SIGNIFICANT CHANGE UP
BILIRUB UR-MCNC: NEGATIVE — SIGNIFICANT CHANGE UP
CAST: 0 /LPF — SIGNIFICANT CHANGE UP (ref 0–4)
COLOR SPEC: YELLOW — SIGNIFICANT CHANGE UP
DIFF PNL FLD: ABNORMAL
GLUCOSE UR QL: NEGATIVE MG/DL — SIGNIFICANT CHANGE UP
KETONES UR-MCNC: NEGATIVE MG/DL — SIGNIFICANT CHANGE UP
LEUKOCYTE ESTERASE UR-ACNC: ABNORMAL
NITRITE UR-MCNC: NEGATIVE — SIGNIFICANT CHANGE UP
PH UR: 7.5 — SIGNIFICANT CHANGE UP (ref 5–8)
PROT UR-MCNC: NEGATIVE MG/DL — SIGNIFICANT CHANGE UP
RBC CASTS # UR COMP ASSIST: 8 /HPF — HIGH (ref 0–4)
SP GR SPEC: 1.01 — SIGNIFICANT CHANGE UP (ref 1–1.03)
SQUAMOUS # UR AUTO: 1 /HPF — SIGNIFICANT CHANGE UP (ref 0–5)
UROBILINOGEN FLD QL: 1 MG/DL — SIGNIFICANT CHANGE UP (ref 0.2–1)
WBC UR QL: 3 /HPF — SIGNIFICANT CHANGE UP (ref 0–5)

## 2024-03-16 PROCEDURE — 76856 US EXAM PELVIC COMPLETE: CPT | Mod: 26

## 2024-03-16 PROCEDURE — 99284 EMERGENCY DEPT VISIT MOD MDM: CPT | Mod: 25

## 2024-03-16 PROCEDURE — 76856 US EXAM PELVIC COMPLETE: CPT

## 2024-03-16 PROCEDURE — 81001 URINALYSIS AUTO W/SCOPE: CPT

## 2024-03-16 PROCEDURE — 99284 EMERGENCY DEPT VISIT MOD MDM: CPT

## 2024-03-16 RX ORDER — FLUCONAZOLE 150 MG/1
150 TABLET ORAL ONCE
Refills: 0 | Status: COMPLETED | OUTPATIENT
Start: 2024-03-16 | End: 2024-03-16

## 2024-03-16 RX ADMIN — FLUCONAZOLE 150 MILLIGRAM(S): 150 TABLET ORAL at 20:42

## 2024-03-16 NOTE — ED ADULT NURSE NOTE - NS ED NURSE DC INFO COMPLEXITY
D/C instructions not given/Straightforward: Basic instructions, no meds, no home treatment/Patient asked questions/Returned Demonstration/Verbalized Understanding

## 2024-03-16 NOTE — ED PROVIDER NOTE - PROVIDER TOKENS
PROVIDER:[TOKEN:[[id number]],FOLLOWUP:[1-3 Days]],PROVIDER:[TOKEN:[[id number]],FOLLOWUP:[1-3 Days]]

## 2024-03-16 NOTE — ED PROVIDER NOTE - CARE PROVIDER_API CALL
Kiah Coffey  Obstetrics and Gynecology  [address] [place] [address]  Phone: [phone]  Fax: [phone]  Follow Up Time: 1-3 Days    Delano Wheeler  Gynecologic Oncology  [address] [place] [address]  Phone: [phone]  Fax: [phone]  Follow Up Time: 1-3 Days

## 2024-03-16 NOTE — ED ADULT NURSE REASSESSMENT NOTE - NS ED NURSE REASSESS COMMENT FT1
assumed care of this patient at 1920 hours. discussed all medical information with the off going nurse. This patient is resting comfortably in bed no apparent distress noted at this time. Air breathing circulation WNL. Denies pain. This nurse will continue to monitor.

## 2024-03-16 NOTE — ED PROVIDER NOTE - PROGRESS NOTE DETAILS
jorge: Pt signed out to me by dr. sharma pending UA and US. UA not c/w UTI. US concerning for endometrial thickening. Results discussed with patient and informed that this is highly ocncerning for endometrial cancer and she needs close f/up with gyn or gyn/onc for endometrial biopsy. pt understands. will d/c.

## 2024-03-16 NOTE — ED PROVIDER NOTE - PATIENT PORTAL LINK FT
You can access the FollowMyHealth Patient Portal offered by [facility] by registering at the following website: [email or web address]. By joining [facility]’s FollowMyHealth portal, you will also be able to view your health information using other applications (apps) compatible with our system.

## 2024-03-16 NOTE — ED PROVIDER NOTE - OBJECTIVE STATEMENT
73-year-old female comes in complaining of vaginal bleeding x 2 days states she had vaginal discharge first and then developed some bleeding she found to be foul-smelling.  Denies nausea, vomiting, diarrhea denies fever denies abdominal pain.  Denies history of hysterectomy.

## 2024-03-16 NOTE — ED PROVIDER NOTE - CLINICAL SUMMARY MEDICAL DECISION MAKING FREE TEXT BOX
73-year-old female comes in complaining of vaginal bleeding x 2 days states she had vaginal discharge first and then developed some bleeding she found to be foul-smelling.  Denies nausea, vomiting, diarrhea denies fever denies abdominal pain.  Denies history of hysterectomy.    Vital signs are stable.  Saturation is 95% on room air abdomen is soft nontender no masses are palpated.  No masses palpated in the lower quadrants.  Vaginal exam erythema of the labia minora and majora.  Blood from the introitus.  No masses palpated.  Discussed importance of getting an endometrial biopsy with the patient as soon as possible.  Will start with a sonogram and a urinalysis.  Patient states she recently had blood work done.

## 2024-03-16 NOTE — ED PROVIDER NOTE - CARE PROVIDERS DIRECT ADDRESSES
,DirectAddress_Unknown,[email or web address]

## 2024-03-16 NOTE — ED ADULT NURSE NOTE - OBJECTIVE STATEMENT
pt AOx4, breathing even and unlabored. pt states she's having brown discharge since last night, pain-. unable to see provider today and came to ED for assessment. safety maintained.

## 2024-03-16 NOTE — ED ADULT NURSE NOTE - NSFALLUNIVINTERV_ED_ALL_ED
Bed/Stretcher in lowest position, wheels locked, appropriate side rails in place/Call bell, personal items and telephone in reach/Instruct patient to call for assistance before getting out of bed/chair/stretcher/Non-slip footwear applied when patient is off stretcher/[place] to call system/Physically safe environment - no spills, clutter or unnecessary equipment/Purposeful proactive rounding/Room/bathroom lighting operational, light cord in reach

## 2024-03-16 NOTE — ED ADULT TRIAGE NOTE - CHIEF COMPLAINT QUOTE
vaginal bleeding x 1 week, denies urinary symptoms/sob/abd pain. pt found to have room air sat of 90%, hx COPD/hypoxemia. denies resp issues.

## 2024-03-22 ENCOUNTER — APPOINTMENT (OUTPATIENT)
Dept: GYNECOLOGIC ONCOLOGY | Facility: CLINIC | Age: 74
End: 2024-03-22
Payer: MEDICARE

## 2024-03-22 VITALS
DIASTOLIC BLOOD PRESSURE: 87 MMHG | BODY MASS INDEX: 26.5 KG/M2 | RESPIRATION RATE: 16 BRPM | HEART RATE: 89 BPM | OXYGEN SATURATION: 95 % | HEIGHT: 62 IN | SYSTOLIC BLOOD PRESSURE: 137 MMHG | WEIGHT: 144 LBS

## 2024-03-22 PROCEDURE — 99204 OFFICE O/P NEW MOD 45 MIN: CPT | Mod: 25

## 2024-03-22 PROCEDURE — 58100 BIOPSY OF UTERUS LINING: CPT | Mod: 59

## 2024-03-22 NOTE — PROCEDURE
[Endometrial Biopsy] : an endometrial biopsy [Postmenopausal Bleeding] : postmenopausal bleeding [Patient] : the patient [Written consent] : written consent was obtained prior to the procedure and is detailed in the patient's record [None] : none [Betadine] : betadine [Yes] : the specimen was sent to pathology [No Complications] : none [Tolerated Well] : the patient tolerated the procedure well [0] : 0 [FreeTextEntry1] : Sterile speculum inserted, cervix visualized. Single tooth tenaculum placed on anterior lip of cervix. Cervix prepped with Betadine x3. Endometrial pipelle inserted into uterus, Uterus sounded to 7cm. 360 degree circumferential sampling performed x2. Purulent fluid/blood and scant tissue noted. Tenaculum removed. Good hemostasis noted. Pt tolerated procedure well

## 2024-03-22 NOTE — HISTORY OF PRESENT ILLNESS
[FreeTextEntry1] : Ms. Phillips is a 74 yo  w/ extensive PMHx ho presents today as a hospital follow-up for PMB.  Pt presented to ED at [facility] (3/16/24) with complaints of PMB. H/H wnl. TVUS demonstrated thickened ES (findings below). Pt otherwise reports feeling relatively well today.   Pt reports she noted having malodorous vaginal discharge approximately 2 weeks ago. She then subsequently noted vaginal bleeding and presented to [facility]. She has had no further bleeding since that time. Denies abdominal pain/bloating/distension, pelvic discomfort, changes in normal bowel/urinary habits, nausea/vomiting, unintentional weight loss/gain, and all other associated signs and symptoms.  Pt performs all ADLs, was working as a  until last year. Daughter lives with her, [facility] Continues to have daily tobacco use. Extensive PMX including aortic stenosis for which she is undergoing evaluation for clincal trial for TAVR placement.  TVUS (3/26/24). FINDINGS: Evaluation was limited secondary to overlying bowel gas. Patient refused transvaginal exam, which also limits evaluation. Uterus: 7.0 cm x 4.5 cm x 4.0 cm. No myometrial mass. Endometrium: 19 mm. Thickened for the patient's age suggesting underlying malignancy. Fluid is also seen within the endometrial cavity, likely representing blood products.  Right ovary: Not seen secondary to overlying bowel gas. Left ovary: Not seen secondary to overlying bowel gas.  Fluid: None.  IMPRESSION:  Thickened endometrium, measuring 1.9 cm, suggesting underlying malignancy. Fluid is also seen within the endometrial cavity, likely representing blood products. GYN consult recommended.  Pt with recent hospitalization for acute hypoxemia secondary to flu (1/10/2024)  ECHO (2023) EF 55% w/ mod to severe aortic stenosis  HCM  Pap: Unaware of last pap smear Mammo: Decades ago C-scope: Never; FOBT neg DEXA: Never  Ob Hx: ; CSx3 (9 grandkids) Gyn Hx: Last saw gynecologist ?with last pregnancy ~40yrs ago; Denies history of abnormal pap smears;  Med Hx: Chronic diastolic heart failure, Aortic stenosis being considered for EXPAND 2 trial for aortic valve, HTN, Asthma, Afib, Anxiety, COPD, Gerd, Lupus, H/o MI Surg Hx: CSx3; Breast lumpectomy Meds: Eliquis, Amiodarone, Spironalactone, Diltiazem, Hydroxychloroquine, Jardiance, Lipitor,  Allergies: Sulfa drugs, Naproxen, ASA Fam Hx: Denies history of breast/ovarian/uterine cancer Soc Hx: Denies EtOH/illicit drug use; Active tobacco use

## 2024-03-22 NOTE — ASSESSMENT
[FreeTextEntry1] : Ms. Phillips is a 72 yo  w/ extensive PMHx ho presents today as a hospital follow-up for PMB. EMB performed today without issue. Moderate concern for endometrial malignancy. Will have pt follow-up in 2 wks to discuss results and make management plan. Given medical co-morbidities, pt will need danni-operative clearance if surgery anticipated.

## 2024-03-22 NOTE — REVIEW OF SYSTEMS
[Vaginal Discharge] : vaginal discharge [Negative] : Musculoskeletal [Abn Vag Bleeding] : abnormal vaginal bleeding [FreeTextEntry4] : Malodorous yellow/brown-tinged vaginal discharged; one epsiode of bright red blood, none further

## 2024-03-22 NOTE — PLAN
[TextEntry] : - EMB performed today without issue - Mod concern for endometrial malignancy - RTO in 2 wks to discuss pathology results and make management plan

## 2024-03-22 NOTE — CHIEF COMPLAINT
[FreeTextEntry1] : [facility] [place]. [phone] [address] [place] [address]  CC: NOE

## 2024-03-22 NOTE — PHYSICAL EXAM
[Chaperone Present] : A chaperone was present in the examining room during all aspects of the physical examination [Normal] : No focal neurologic defects observed [Restricted in physically strenuous activity but ambulatory and able to carry out work of a light or sedentary nature] : Status 1- Restricted in physically strenuous activity but ambulatory and able to carry out work of a light or sedentary nature, e.g., light house work, office work [de-identified] : Bilateral inspiratory wheezes, LLL >RLL [FreeTextEntry1] : NAD [de-identified] : Soft, non-tender, conscious guarding [de-identified] : SSE: Atrophic external female genitalia, atrophic vaginal mucosa, yellowish fluid in posterior fornix; grossly normal appearing cervix, EMB performed without issue BME: extremely limited secondary to pt guarding/poor tolerance; small 8wk size anteverted uterus, no palapble adnexal masses RVE: smooth rectovaginal septum

## 2024-04-12 ENCOUNTER — APPOINTMENT (OUTPATIENT)
Dept: GYNECOLOGIC ONCOLOGY | Facility: CLINIC | Age: 74
End: 2024-04-12
Payer: MEDICARE

## 2024-04-12 VITALS
HEART RATE: 69 BPM | OXYGEN SATURATION: 95 % | SYSTOLIC BLOOD PRESSURE: 119 MMHG | BODY MASS INDEX: 25.58 KG/M2 | HEIGHT: 62 IN | WEIGHT: 139 LBS | RESPIRATION RATE: 16 BRPM | DIASTOLIC BLOOD PRESSURE: 69 MMHG

## 2024-04-12 LAB — CORE LAB BIOPSY: NORMAL

## 2024-04-12 PROCEDURE — 99213 OFFICE O/P EST LOW 20 MIN: CPT

## 2024-04-12 NOTE — HISTORY OF PRESENT ILLNESS
[FreeTextEntry1] : Ms. Phillips is a 74 yo  w/ extensive PMHx ho presents today as follow-up to discuss EMB biopsy results for PMB.  Overall, pt reports feeling moderate well. She recently visited her PCP and was given intravaginal ?antibiotic x7days. She feels her symptoms have completely resolved and she is no longer having malodorous discharge. Denies any further episodes of vaginal bleeding. Pt reports that she is undergoing ?aortic stenosis dilation today.  Final Diagnosis 1.  Endometrium (biopsy):      -   No endometrial glands are identified; inadequate for evaluation.      -   Mostly acute inflammation and blood.      -   Very scant fragments of benign endocervical tissue with

## 2024-04-12 NOTE — REASON FOR VISIT
[FreeTextEntry1] : [facility] [place]. [phone] [address] [place] [address]  CC: Follow-up

## 2024-04-12 NOTE — DISCUSSION/SUMMARY
[Reviewed Clinical Lab Test(s)] : Results of clinical tests were reviewed. [Discuss Alternatives/Risks/Benefits w/Patient] : All alternatives, risks, and benefits were discussed with the patient/family and all questions were answered.  Patient expressed good understanding and appreciates the importance of follow up as recommended. [Pre Op] : The differential diagnosis was discussed in detail. The indications, risks, benefits and alternatives were discussed. [unfilled] expressed an understanding of the treatment rationale and her questions were answered to her apparent satisfaction.

## 2024-04-12 NOTE — PLAN
[TextEntry] : - EMB insufficient for clinical diagnosis - Recommend sampling with hysteroscopy D&C - Pt will need danni-operative clearance w/ PST, PCP and Cardiology - Or  will reach out with date and time

## 2024-04-12 NOTE — ASSESSMENT
[FreeTextEntry1] : Ms. Phillips is a 74 yo  w/ extensive PMHx ho presents today as follow-up to discuss EMB biopsy results for PMB. Endometrial sampling insufficient, pt will need to go to OR for hysteroscopy D&C.  The natural history of, risks, prognosis and standard of care treatment options for PMB bleeding reviewed. Given concern for underlying malignancy, recommend more comprehensive sampling. Pt will need danni-operative clearance from PCP and Cardiology prior to procedure given underlying cardiac disease.

## 2024-04-20 ENCOUNTER — OUTPATIENT (OUTPATIENT)
Dept: OUTPATIENT SERVICES | Facility: HOSPITAL | Age: 74
LOS: 1 days | End: 2024-04-20
Payer: MEDICARE

## 2024-04-20 VITALS
RESPIRATION RATE: 16 BRPM | TEMPERATURE: 98 F | WEIGHT: 147.71 LBS | DIASTOLIC BLOOD PRESSURE: 64 MMHG | HEART RATE: 84 BPM | HEIGHT: 62 IN | SYSTOLIC BLOOD PRESSURE: 112 MMHG | OXYGEN SATURATION: 95 %

## 2024-04-20 DIAGNOSIS — M06.9 RHEUMATOID ARTHRITIS, UNSPECIFIED: ICD-10-CM

## 2024-04-20 DIAGNOSIS — M32.9 SYSTEMIC LUPUS ERYTHEMATOSUS, UNSPECIFIED: ICD-10-CM

## 2024-04-20 DIAGNOSIS — Z98.89 OTHER SPECIFIED POSTPROCEDURAL STATES: Chronic | ICD-10-CM

## 2024-04-20 DIAGNOSIS — N95.0 POSTMENOPAUSAL BLEEDING: ICD-10-CM

## 2024-04-20 DIAGNOSIS — J45.909 UNSPECIFIED ASTHMA, UNCOMPLICATED: ICD-10-CM

## 2024-04-20 DIAGNOSIS — N95.9 UNSPECIFIED MENOPAUSAL AND PERIMENOPAUSAL DISORDER: ICD-10-CM

## 2024-04-20 DIAGNOSIS — J44.9 CHRONIC OBSTRUCTIVE PULMONARY DISEASE, UNSPECIFIED: ICD-10-CM

## 2024-04-20 DIAGNOSIS — Z01.818 ENCOUNTER FOR OTHER PREPROCEDURAL EXAMINATION: ICD-10-CM

## 2024-04-20 LAB
A1C WITH ESTIMATED AVERAGE GLUCOSE RESULT: 5.8 % — HIGH (ref 4–5.6)
ANION GAP SERPL CALC-SCNC: 9 MMOL/L — SIGNIFICANT CHANGE UP (ref 5–17)
BASOPHILS # BLD AUTO: 0.06 K/UL — SIGNIFICANT CHANGE UP (ref 0–0.2)
BASOPHILS NFR BLD AUTO: 0.9 % — SIGNIFICANT CHANGE UP (ref 0–2)
BLD GP AB SCN SERPL QL: SIGNIFICANT CHANGE UP
BUN SERPL-MCNC: 17.4 MG/DL — SIGNIFICANT CHANGE UP (ref 8–20)
CALCIUM SERPL-MCNC: 8.6 MG/DL — SIGNIFICANT CHANGE UP (ref 8.4–10.5)
CHLORIDE SERPL-SCNC: 101 MMOL/L — SIGNIFICANT CHANGE UP (ref 96–108)
CO2 SERPL-SCNC: 30 MMOL/L — HIGH (ref 22–29)
CREAT SERPL-MCNC: 0.76 MG/DL — SIGNIFICANT CHANGE UP (ref 0.5–1.3)
EGFR: 82 ML/MIN/1.73M2 — SIGNIFICANT CHANGE UP
EOSINOPHIL # BLD AUTO: 0.74 K/UL — HIGH (ref 0–0.5)
EOSINOPHIL NFR BLD AUTO: 11.6 % — HIGH (ref 0–6)
ESTIMATED AVERAGE GLUCOSE: 120 MG/DL — HIGH (ref 68–114)
GLUCOSE SERPL-MCNC: 86 MG/DL — SIGNIFICANT CHANGE UP (ref 70–99)
HCT VFR BLD CALC: 38.7 % — SIGNIFICANT CHANGE UP (ref 34.5–45)
HGB BLD-MCNC: 12.2 G/DL — SIGNIFICANT CHANGE UP (ref 11.5–15.5)
IMM GRANULOCYTES NFR BLD AUTO: 0.2 % — SIGNIFICANT CHANGE UP (ref 0–0.9)
LYMPHOCYTES # BLD AUTO: 2.05 K/UL — SIGNIFICANT CHANGE UP (ref 1–3.3)
LYMPHOCYTES # BLD AUTO: 32 % — SIGNIFICANT CHANGE UP (ref 13–44)
MCHC RBC-ENTMCNC: 28.2 PG — SIGNIFICANT CHANGE UP (ref 27–34)
MCHC RBC-ENTMCNC: 31.5 GM/DL — LOW (ref 32–36)
MCV RBC AUTO: 89.4 FL — SIGNIFICANT CHANGE UP (ref 80–100)
MONOCYTES # BLD AUTO: 0.55 K/UL — SIGNIFICANT CHANGE UP (ref 0–0.9)
MONOCYTES NFR BLD AUTO: 8.6 % — SIGNIFICANT CHANGE UP (ref 2–14)
NEUTROPHILS # BLD AUTO: 2.99 K/UL — SIGNIFICANT CHANGE UP (ref 1.8–7.4)
NEUTROPHILS NFR BLD AUTO: 46.7 % — SIGNIFICANT CHANGE UP (ref 43–77)
PLATELET # BLD AUTO: 244 K/UL — SIGNIFICANT CHANGE UP (ref 150–400)
POTASSIUM SERPL-MCNC: 4.6 MMOL/L — SIGNIFICANT CHANGE UP (ref 3.5–5.3)
POTASSIUM SERPL-SCNC: 4.6 MMOL/L — SIGNIFICANT CHANGE UP (ref 3.5–5.3)
RBC # BLD: 4.33 M/UL — SIGNIFICANT CHANGE UP (ref 3.8–5.2)
RBC # FLD: 13.6 % — SIGNIFICANT CHANGE UP (ref 10.3–14.5)
SODIUM SERPL-SCNC: 139 MMOL/L — SIGNIFICANT CHANGE UP (ref 135–145)
WBC # BLD: 6.4 K/UL — SIGNIFICANT CHANGE UP (ref 3.8–10.5)
WBC # FLD AUTO: 6.4 K/UL — SIGNIFICANT CHANGE UP (ref 3.8–10.5)

## 2024-04-20 PROCEDURE — 93005 ELECTROCARDIOGRAM TRACING: CPT

## 2024-04-20 PROCEDURE — 93010 ELECTROCARDIOGRAM REPORT: CPT

## 2024-04-20 PROCEDURE — G0463: CPT

## 2024-04-20 RX ORDER — SPIRONOLACTONE 25 MG/1
1 TABLET, FILM COATED ORAL
Refills: 0 | DISCHARGE

## 2024-04-20 NOTE — H&P PST ADULT - ASSESSMENT
CAPRINI SCORE    AGE RELATED RISK FACTORS                                                             [ ] Age 41-60 years                                            (1 Point)  [ ] Age: 61-74 years                                           (2 Points)                 [ ] Age= 75 years                                                (3 Points)             DISEASE RELATED RISK FACTORS                                                       [ ] Edema in the lower extremities                 (1 Point)                     [ ] Varicose veins                                               (1 Point)                                 [ ] BMI > 25 Kg/m2                                            (1 Point)                                  [ ] Serious infection (ie PNA)                            (1 Point)                     [ ] Lung disease ( COPD, Emphysema)            (1 Point)                                                                          [ ] Acute myocardial infarction                         (1 Point)                  [ ] Congestive heart failure (in the previous month)  (1 Point)         [ ] Inflammatory bowel disease                            (1 Point)                  [ ] Central venous access, PICC or Port               (2 points)       (within the last month)                                                                [ ] Stroke (in the previous month)                        (5 Points)    [ ] Previous or present malignancy                       (2 points)                                                                                                                                                         HEMATOLOGY RELATED FACTORS                                                         [ ] Prior episodes of VTE                                     (3 Points)                     [ ] Positive family history for VTE                      (3 Points)                  [ ] Prothrombin 36905 A                                     (3 Points)                     [ ] Factor V Leiden                                                (3 Points)                        [ ] Lupus anticoagulants                                      (3 Points)                                                           [ ] Anticardiolipin antibodies                              (3 Points)                                                       [ ] High homocysteine in the blood                   (3 Points)                                             [ ] Other congenital or acquired thrombophilia      (3 Points)                                                [ ] Heparin induced thrombocytopenia                  (3 Points)                                        MOBILITY RELATED FACTORS  [ ] Bed rest                                                         (1 Point)  [ ] Plaster cast                                                    (2 points)  [ ] Bed bound for more than 72 hours           (2 Points)    GENDER SPECIFIC FACTORS  [ ] Pregnancy or had a baby within the last month   (1 Point)  [ ] Post-partum < 6 weeks                                   (1 Point)  [ ] Hormonal therapy  or oral contraception   (1 Point)  [ ] History of pregnancy complications              (1 point)  [ ] Unexplained or recurrent              (1 Point)    OTHER RISK FACTORS                                           (1 Point)  [ ] BMI >40, smoking, diabetes requiring insulin, chemotherapy  blood transfusions and length of surgery over 2 hours    SURGERY RELATED RISK FACTORS  [ ]  Section within the last month     (1 Point)  [ ] Minor surgery                                                  (1 Point)  [ ] Arthroscopic surgery                                       (2 Points)  [ ] Planned major surgery lasting more            (2 Points)      than 45 minutes     [ ] Elective hip or knee joint replacement       (5 points)       surgery                                                TRAUMA RELATED RISK FACTORS  [ ] Fracture of the hip, pelvis, or leg                       (5 Points)  [ ] Spinal cord injury resulting in paralysis             (5 points)       (in the previous month)    [ ] Paralysis  (less than 1 month)                             (5 Points)  [ ] Multiple Trauma within 1 month                        (5 Points)    Total Score [        ]    Caprini Score 0-2: Low Risk, NO VTE prophylaxis required for most patients, encourage ambulation  Caprini Score 3-6: Moderate Risk , pharmacologic VTE prophylaxis is indicated for most patients (in the absence of contraindications)  Caprini Score Greater than or =7: High risk, pharmocologic VTE prophylaxis indicated for most patients (in the absence of contraindications)                              OPIOID RISK TOOL    ARTEMIO EACH BOX THAT APPLIES AND ADD TOTALS AT THE END    FAMILY HISTORY OF SUBSTANCE ABUSE                 FEMALE         MALE                                                Alcohol                             [  ]1 pt          [  ]3pts                                               Illegal Durgs                     [  ]2 pts        [  ]3pts                                               Rx Drugs                           [  ]4 pts        [  ]4 pts    PERSONAL HISTORY OF SUBSTANCE ABUSE                                                                                          Alcohol                             [  ]3 pts       [  ]3 pts                                               Illegal Drugs                     [  ]4 pts        [  ]4 pts                                               Rx Drugs                           [  ]5 pts        [  ]5 pts    AGE BETWEEN 16-45 YEARS                                      [  ]1 pt         [  ]1 pt    HISTORY OF PREADOLESCENT   SEXUAL ABUSE                                                             [  ]3 pts        [  ]0pts    PSYCHOLOGICAL DISEASE                     ADD, OCD, Bipolar, Schizophrenia        [  ]2 pts         [  ]2 pts                      Depression                                               [  ]1 pt           [  ]1 pt           SCORING TOTAL   (add numbers and type here)              (***)                                     A score of 3 or lower indicated LOW risk for future opioid abuse  A score of 4 to 7 indicated moderate risk for future opioid abuse  A score of 8 or higher indicates a high risk for opioid abuse     71 y/o F pt with significant PMHx of Lupus, Lumbar Stenosis, asthma/COPD, current smoker, AFib, T2DM. Patient states she recently had an endometrial bx for PMB, there was note enough sample and needs to have the D&C. She also reported she recently saw her PCP was prescribed an intravaginal cream for 7 days she had a malodorous discharge and vaginal bleeding. Reports after using the medication her symptoms have completely resolved.  Denies any further episodes of vaginal bleeding. She is scheduled for a Pelvic Exam under anesthesia, hysteroscopy D&C 24 with Daniel Mera. Medical and cardiac clearance pending    Of note: patient states she is not diabetic and does not take Jardiance  She is also non compliant with prescribed inhaler (Trelegy) and nebulizer treatment (on PE she was wheezing)      CAPRINI SCORE    AGE RELATED RISK FACTORS                                                             [ ] Age 41-60 years                                            (1 Point)  [ ] Age: 61-74 years                                           (2 Points)                 [x ] Age= 75 years                                                (3 Points)             DISEASE RELATED RISK FACTORS                                                       [ ] Edema in the lower extremities                 (1 Point)                     [ ] Varicose veins                                               (1 Point)                                 [ ] BMI > 25 Kg/m2                                            (1 Point)                                  [ ] Serious infection (ie PNA)                            (1 Point)                     [x ] Lung disease ( COPD, Emphysema)            (1 Point)                                                                          [ ] Acute myocardial infarction                         (1 Point)                  [ ] Congestive heart failure (in the previous month)  (1 Point)         [ ] Inflammatory bowel disease                            (1 Point)                  [ ] Central venous access, PICC or Port               (2 points)       (within the last month)                                                                [ ] Stroke (in the previous month)                        (5 Points)    [ ] Previous or present malignancy                       (2 points)                                                                                                                                                         HEMATOLOGY RELATED FACTORS                                                         [ ] Prior episodes of VTE                                     (3 Points)                     [ ] Positive family history for VTE                      (3 Points)                  [ ] Prothrombin 66949 A                                     (3 Points)                     [ ] Factor V Leiden                                                (3 Points)                        [x ] Lupus anticoagulants                                      (3 Points)                                                           [ ] Anticardiolipin antibodies                              (3 Points)                                                       [ ] High homocysteine in the blood                   (3 Points)                                             [ ] Other congenital or acquired thrombophilia      (3 Points)                                                [ ] Heparin induced thrombocytopenia                  (3 Points)                                        MOBILITY RELATED FACTORS  [ ] Bed rest                                                         (1 Point)  [ ] Plaster cast                                                    (2 points)  [ ] Bed bound for more than 72 hours           (2 Points)    GENDER SPECIFIC FACTORS  [ ] Pregnancy or had a baby within the last month   (1 Point)  [ ] Post-partum < 6 weeks                                   (1 Point)  [ ] Hormonal therapy  or oral contraception   (1 Point)  [ ] History of pregnancy complications              (1 point)  [ ] Unexplained or recurrent              (1 Point)    OTHER RISK FACTORS                                           (1 Point)  [x ] BMI >40, smoking, diabetes requiring insulin, chemotherapy  blood transfusions and length of surgery over 2 hours    SURGERY RELATED RISK FACTORS  [ ]  Section within the last month     (1 Point)  [ ] Minor surgery                                                  (1 Point)  [x ] Arthroscopic surgery                                       (2 Points)  [ ] Planned major surgery lasting more            (2 Points)      than 45 minutes     [ ] Elective hip or knee joint replacement       (5 points)       surgery                                                TRAUMA RELATED RISK FACTORS  [ ] Fracture of the hip, pelvis, or leg                       (5 Points)  [ ] Spinal cord injury resulting in paralysis             (5 points)       (in the previous month)    [ ] Paralysis  (less than 1 month)                             (5 Points)  [ ] Multiple Trauma within 1 month                        (5 Points)    Total Score [    10    ]    Caprini Score 0-2: Low Risk, NO VTE prophylaxis required for most patients, encourage ambulation  Caprini Score 3-6: Moderate Risk , pharmacologic VTE prophylaxis is indicated for most patients (in the absence of contraindications)  Caprini Score Greater than or =7: High risk, pharmocologic VTE prophylaxis indicated for most patients (in the absence of contraindications)      OPIOID RISK TOOL    ARTEMIO EACH BOX THAT APPLIES AND ADD TOTALS AT THE END    FAMILY HISTORY OF SUBSTANCE ABUSE                 FEMALE         MALE                                                Alcohol                             [  ]1 pt          [  ]3pts                                               Illegal Durgs                     [  ]2 pts        [  ]3pts                                               Rx Drugs                           [  ]4 pts        [  ]4 pts    PERSONAL HISTORY OF SUBSTANCE ABUSE                                                                                          Alcohol                             [  ]3 pts       [  ]3 pts                                               Illegal Drugs                     [  ]4 pts        [  ]4 pts                                               Rx Drugs                           [  ]5 pts        [  ]5 pts    AGE BETWEEN 16-45 YEARS                                      [  ]1 pt         [  ]1 pt    HISTORY OF PREADOLESCENT   SEXUAL ABUSE                                                             [  ]3 pts        [  ]0pts    PSYCHOLOGICAL DISEASE                     ADD, OCD, Bipolar, Schizophrenia        [  ]2 pts         [  ]2 pts                      Depression                                               [  ]1 pt           [  ]1 pt           SCORING TOTAL   (add numbers and type here)              (*0**)                                     A score of 3 or lower indicated LOW risk for future opioid abuse  A score of 4 to 7 indicated moderate risk for future opioid abuse  A score of 8 or higher indicates a high risk for opioid abuse

## 2024-04-20 NOTE — H&P PST ADULT - HISTORY OF PRESENT ILLNESS
Ms. Phillips is a 72 yo  w/ extensive PMHx ho presents today as follow-up to discuss EMB biopsy results for PMB.    Overall, pt reports feeling moderate well. She recently visited her PCP and was given intravaginal ?antibiotic x7days. She feels her symptoms have completely resolved and she is no longer having malodorous discharge. Denies any further episodes of vaginal bleeding. Pt reports that she is undergoing ?aortic stenosis dilation today.      69 y/o F pt with significant PMHx of Lupus, Lumbar Stenosis, asthma, prior  smoker presents to ER for progressive shortness of breath, cough and congestion  x1 week. States progressive shortness of breath for weeks,  Found to hypoxic  87%RA. Pt states she was having runny nose,post nasal drip. seen by PCP given  nasal saline, Progressive sob last 3 days,using nebs/inh more w/o much  improvement. Ed found to so2 87% RA,CTA chest multifocal PNA. Negative viral  panel.Pt denies chest pain,fever,chill,n/v/d/dysuria,headache,joints pain.    69 y/o F pt with significant PMHx of Lupus, Lumbar Stenosis, asthma/COPD, current smoker, AFib, T2DM.      Overall, pt reports feeling moderate well. She recently visited her PCP and was given intravaginal ?antibiotic x7days. She feels her symptoms have completely resolved and she is no longer having malodorous discharge. Denies any further episodes of vaginal bleeding. Pt reports that she is undergoing ?aortic stenosis dilation today.       69 y/o F pt with significant PMHx of Lupus, Lumbar Stenosis, asthma/COPD, current smoker, AFib, T2DM. Patient states she recently had an endometrial bx for PMB, there was note enough sample and needs to have the D&C. She also reported she recently saw her PCP was prescribed an intravaginal cream for 7 days she had a malodorous discharge and vaginal bleeding. Reports after using the medication her symptoms have completely resolved.  Denies any further episodes of vaginal bleeding. She is scheduled for a Pelvic Exam under anesthesia, hysteroscopy D&C 4/29/24 with Daniel Mera. Medical and cardiac clearance pending

## 2024-04-20 NOTE — H&P PST ADULT - MUSCULOSKELETAL
normal/ROM intact/normal gait/strength 5/5 bilateral upper extremities/strength 5/5 bilateral lower extremities negative ROM intact/normal gait/strength 5/5 bilateral upper extremities/strength 5/5 bilateral lower extremities/back exam details…

## 2024-04-20 NOTE — H&P PST ADULT - PROBLEM SELECTOR PLAN 4
Asthma/COPD follow up with pulmonary  non-compliant with prescribed inhaler and nebulizer treatments

## 2024-04-20 NOTE — H&P PST ADULT - GENITOURINARY COMMENTS
Problem: Falls - Risk of:  Goal: Will remain free from falls  Description: Will remain free from falls  Outcome: Ongoing  Goal: Absence of physical injury  Description: Absence of physical injury  Outcome: Ongoing   Pt is a fall risk. Explained fall risk precautions to pt and rationale behind their use to keep the patient safe. Belongings are in reach. Pt encouraged to notify staff for any and all assistance. Staff present in tx suite throughout entirety of pts treatment to monitor and protect from falls. Assistance provided when ambulating to restroom utilizing Stay With Me. Problem: KNOWLEDGE DEFICIT  Goal: Patient/S.O. demonstrates understanding of disease process, treatment plan, medications, and discharge instructions. Outcome: Ongoing     Patient's hemoglobin this AM: No results for input(s): HGB in the last 72 hours. Patient's platelet count this AM: No results for input(s): PLT in the last 72 hours. Pt seen and assessed at [facility]. Seen at [facility] today for {PRBC/Platelet transfusion} per standing orders for above lab values. Blood products transfused per [facility] policy. Pt tolerated transfusion well and without incident. Pt verbalizes understanding of discharge instructions. Discharged {ambulatory/via wheelchair} to *** with ***. not assessed

## 2024-04-25 ENCOUNTER — APPOINTMENT (OUTPATIENT)
Dept: CARDIOLOGY | Facility: CLINIC | Age: 74
End: 2024-04-25
Payer: MEDICARE

## 2024-04-25 ENCOUNTER — NON-APPOINTMENT (OUTPATIENT)
Age: 74
End: 2024-04-25

## 2024-04-25 VITALS
WEIGHT: 140 LBS | HEART RATE: 82 BPM | SYSTOLIC BLOOD PRESSURE: 100 MMHG | OXYGEN SATURATION: 4 % | HEIGHT: 62 IN | DIASTOLIC BLOOD PRESSURE: 58 MMHG | BODY MASS INDEX: 25.76 KG/M2

## 2024-04-25 DIAGNOSIS — N95.0 POSTMENOPAUSAL BLEEDING: ICD-10-CM

## 2024-04-25 DIAGNOSIS — I50.32 CHRONIC DIASTOLIC (CONGESTIVE) HEART FAILURE: ICD-10-CM

## 2024-04-25 DIAGNOSIS — F17.200 NICOTINE DEPENDENCE, UNSPECIFIED, UNCOMPLICATED: ICD-10-CM

## 2024-04-25 DIAGNOSIS — I35.0 NONRHEUMATIC AORTIC (VALVE) STENOSIS: ICD-10-CM

## 2024-04-25 DIAGNOSIS — Z71.6 TOBACCO ABUSE COUNSELING: ICD-10-CM

## 2024-04-25 DIAGNOSIS — I48.0 PAROXYSMAL ATRIAL FIBRILLATION: ICD-10-CM

## 2024-04-25 DIAGNOSIS — I10 ESSENTIAL (PRIMARY) HYPERTENSION: ICD-10-CM

## 2024-04-25 PROCEDURE — G2211 COMPLEX E/M VISIT ADD ON: CPT

## 2024-04-25 PROCEDURE — 99205 OFFICE O/P NEW HI 60 MIN: CPT

## 2024-04-25 RX ORDER — APIXABAN 5 MG/1
5 TABLET, FILM COATED ORAL
Qty: 180 | Refills: 3 | Status: ACTIVE | COMMUNITY
Start: 1900-01-01 | End: 1900-01-01

## 2024-04-25 RX ORDER — AMIODARONE HYDROCHLORIDE 200 MG/1
200 TABLET ORAL DAILY
Qty: 90 | Refills: 3 | Status: ACTIVE | COMMUNITY
Start: 2023-05-16 | End: 1900-01-01

## 2024-04-25 RX ORDER — SPIRONOLACTONE 25 MG/1
25 TABLET ORAL DAILY
Qty: 90 | Refills: 3 | Status: ACTIVE | COMMUNITY
Start: 2023-08-02 | End: 1900-01-01

## 2024-04-25 RX ORDER — EMPAGLIFLOZIN 10 MG/1
10 TABLET, FILM COATED ORAL
Qty: 90 | Refills: 3 | Status: ACTIVE | COMMUNITY
Start: 2023-05-16 | End: 1900-01-01

## 2024-04-25 NOTE — DISCUSSION/SUMMARY
[FreeTextEntry1] : 73F h/o chronic active smoker, asthma/COPD?, HFpEF, pAfib (on Eliquis and amiodarone), moderate AS (considering for EXPAND II trial), SLE (on Plaquenil), had recent postmenopausal bleeding endometrial biopsy insufficient now planning for hysteroscopy D&C on 4/29/24 with Dr. Sanchez, presents for preoperative cardiac evaluation.   Overall stable, EKG remains in sinus on chronic Amiodarone use, need pulmonary evaluation for PFT given unclear history of asthma/COPD on empiric inhaler use.    1. Preop. cardiac risk eval.  -currently optimized, no cardiac contraindication for upcoming GYN procedure under anesthesia -can hold Eliquis x72hrs, no need for LMWH bridging as no prior CVA or embolic event -also need to hold Jardiance x72hrs   2. Moderate AS on NORTH DAHLIA 1.0 cm2 in 4/2023 -repeat TTE on next visit, need CTS follow up for eventual TAVR  3. Chronic HFpEF -euvolemic, restart spironolactone 25mg and continue Jardiance   4. pAfib -on chronic Amiodarone 200mg once daily, need pulmonary eval. for PFT -continue Eliquis, if refractory bleeding can consider LAAO -also need to clarify home diltiazem dose  5. HLD -on low dose statin  6. Asthma vs. COPD -need pulmonary eval. on inhalers -need smoking cessation    Follow up in 2 months with same day Echo.

## 2024-04-25 NOTE — HISTORY OF PRESENT ILLNESS
[FreeTextEntry1] : 73F h/o chronic active smoker, asthma/COPD?, HFpEF, pAfib (on Eliquis and amiodarone), moderate AS (considering for EXPAND II trial), SLE (on Plaquenil), had recent postmenopausal bleeding endometrial biopsy insufficient now planning for hysteroscopy D&C on 24 with Dr. Sanchez, presents for preoperative cardiac evaluation.   Denies prior CVA, walking daily without chest discomfort, denies bleeding or fall with Eliquis use. No longer on Jardiance and spironolactone as did not get renewal. Denies chest pain or syncope, has chronic dyspnea placed on Trelegy by PCP but has no pulmonary evaluation.   Prior surgeries: , breast   Smoking few cigarettes per week only when stressed No alcohol use

## 2024-04-25 NOTE — CARDIOLOGY SUMMARY
[de-identified] : 4/25/24- Sinus 72, normal axis, nonspecific T-wave, QTc 429 [de-identified] : April 2023 ([facility]): LVEF 55-60%, normal RV, moderate to severe AS, DAHLIA 1.04 cm2 [de-identified] :  PYP scan 7/27/23: negative

## 2024-04-26 NOTE — ASU PATIENT PROFILE, ADULT - DOES PATIENT HAVE ADVANCE DIRECTIVE
Patient calling regarding rx for semaglutide. States she is going to order from [facility].     Writer noted that there was already an order pending approval from PCP.    Jonelle Arevalo RN on 4/3/2023 at 4:27 PM      
No

## 2024-04-29 ENCOUNTER — RESULT REVIEW (OUTPATIENT)
Age: 74
End: 2024-04-29

## 2024-04-29 ENCOUNTER — TRANSCRIPTION ENCOUNTER (OUTPATIENT)
Age: 74
End: 2024-04-29

## 2024-04-29 ENCOUNTER — OUTPATIENT (OUTPATIENT)
Dept: INPATIENT UNIT | Facility: HOSPITAL | Age: 74
LOS: 1 days | End: 2024-04-29
Payer: MEDICARE

## 2024-04-29 VITALS
WEIGHT: 147.71 LBS | SYSTOLIC BLOOD PRESSURE: 90 MMHG | DIASTOLIC BLOOD PRESSURE: 52 MMHG | RESPIRATION RATE: 16 BRPM | HEIGHT: 62 IN | HEART RATE: 58 BPM | OXYGEN SATURATION: 97 % | TEMPERATURE: 98 F

## 2024-04-29 VITALS
SYSTOLIC BLOOD PRESSURE: 136 MMHG | HEART RATE: 72 BPM | DIASTOLIC BLOOD PRESSURE: 67 MMHG | OXYGEN SATURATION: 98 % | RESPIRATION RATE: 15 BRPM

## 2024-04-29 DIAGNOSIS — N95.0 POSTMENOPAUSAL BLEEDING: ICD-10-CM

## 2024-04-29 DIAGNOSIS — Z98.89 OTHER SPECIFIED POSTPROCEDURAL STATES: Chronic | ICD-10-CM

## 2024-04-29 LAB — GLUCOSE BLDC GLUCOMTR-MCNC: 93 MG/DL — SIGNIFICANT CHANGE UP (ref 70–99)

## 2024-04-29 PROCEDURE — 82962 GLUCOSE BLOOD TEST: CPT

## 2024-04-29 PROCEDURE — 88305 TISSUE EXAM BY PATHOLOGIST: CPT | Mod: 26

## 2024-04-29 PROCEDURE — 58558 HYSTEROSCOPY BIOPSY: CPT

## 2024-04-29 PROCEDURE — 88305 TISSUE EXAM BY PATHOLOGIST: CPT

## 2024-04-29 PROCEDURE — 58558 HYSTEROSCOPY BIOPSY: CPT | Mod: GC

## 2024-04-29 RX ORDER — SODIUM CHLORIDE 9 MG/ML
3 INJECTION INTRAMUSCULAR; INTRAVENOUS; SUBCUTANEOUS ONCE
Refills: 0 | Status: DISCONTINUED | OUTPATIENT
Start: 2024-04-29 | End: 2024-04-29

## 2024-04-29 RX ORDER — OXYCODONE HYDROCHLORIDE 5 MG/1
1 TABLET ORAL
Qty: 0 | Refills: 0 | DISCHARGE

## 2024-04-29 RX ORDER — ALBUTEROL 90 UG/1
3 AEROSOL, METERED ORAL
Refills: 0 | DISCHARGE

## 2024-04-29 RX ORDER — ACETAMINOPHEN 500 MG
975 TABLET ORAL ONCE
Refills: 0 | Status: COMPLETED | OUTPATIENT
Start: 2024-04-29 | End: 2024-04-29

## 2024-04-29 RX ORDER — DILTIAZEM HCL 120 MG
1 CAPSULE, EXT RELEASE 24 HR ORAL
Refills: 0 | DISCHARGE

## 2024-04-29 RX ORDER — ALPRAZOLAM 0.25 MG
1 TABLET ORAL
Refills: 0 | DISCHARGE

## 2024-04-29 RX ORDER — MIRABEGRON 50 MG/1
1 TABLET, EXTENDED RELEASE ORAL
Refills: 0 | DISCHARGE

## 2024-04-29 RX ORDER — ATORVASTATIN CALCIUM 80 MG/1
1 TABLET, FILM COATED ORAL
Refills: 0 | DISCHARGE

## 2024-04-29 RX ORDER — ZOLPIDEM TARTRATE 10 MG/1
1 TABLET ORAL
Refills: 0 | DISCHARGE

## 2024-04-29 RX ORDER — EMPAGLIFLOZIN 10 MG/1
1 TABLET, FILM COATED ORAL
Refills: 0 | DISCHARGE

## 2024-04-29 RX ORDER — FLUTICASONE FUROATE, UMECLIDINIUM BROMIDE AND VILANTEROL TRIFENATATE 200; 62.5; 25 UG/1; UG/1; UG/1
1 POWDER RESPIRATORY (INHALATION)
Refills: 0 | DISCHARGE

## 2024-04-29 RX ADMIN — Medication 975 MILLIGRAM(S): at 07:00

## 2024-04-29 NOTE — ASU PREOP CHECKLIST - ALLERGIES REVIEWED
Discharge Instructions       PATIENT ID: Yemi Camilo  MRN: [id number]   YOB: 1929    DATE OF ADMISSION: 5/6/2021  1:42 PM    DATE OF DISCHARGE: 5/20/2021    PRIMARY CARE PROVIDER: Guru Garcia MD     ATTENDING PHYSICIAN: Kandis Cheadle, MD  DISCHARGING PROVIDER: Rhoda Alfaro MD    To contact this individual call [phone] and ask the  to page. If unavailable ask to be transferred the Adult Hospitalist Department. DISCHARGE DIAGNOSES   # Left leg wound s/p L leg arteriogram w balloon angioplasty and debridement  # Concern for pneumonia  # Hypotension  # HFrEF c/b severe pulmonary HTN w/o exacerbation   # Acute kidney injury   # Acute ischemic CVA  # Acute on chronic anemia  # Reactive airway disease  # Chronic atrial fibrillation  # Anxiety  # Glaucoma  # Deconditioning    CONSULTATIONS: IP CONSULT TO GENERAL SURGERY  IP CONSULT TO ORTHOPEDIC SURGERY  IP CONSULT TO PALLIATIVE CARE - PROVIDER  IP CONSULT TO NEUROLOGY  IP CONSULT TO GASTROENTEROLOGY  IP CONSULT TO HOSPITALIST  IP CONSULT TO CARDIOLOGY    PROCEDURES/SURGERIES: Procedure(s) with comments:  DEBRIDEMENT LEFT LOWER LEG WOUND.   LEFT LEG ARTERIOGRAM, LEFT FEMORAL AND POPLITEAL ANGIOPLASTY - Right groin utilized as puncture site for left leg arteriogram    PENDING TEST RESULTS:   At the time of discharge the following test results are still pending: None     FOLLOW UP APPOINTMENTS:   Follow-up Information     Follow up With Specialties Details Why [facility] home hospice today   Call today  will be admitted by [address] at 15:00PM            ADDITIONAL CARE RECOMMENDATIONS: None     DIET: Regular with added supplement with each meal     ACTIVITY: As tolerated     WOUND CARE: Clean with saline; apply mepitel one; wound gel and saline moist roll gauze; cover with abd pads and stretch gauze    EQUIPMENT needed: None       Radiology      DISCHARGE MEDICATIONS:   See Medication Reconciliation Form    · It is important that you take the medication exactly as they are prescribed. · Keep your medication in the bottles provided by the pharmacist and keep a list of the medication names, dosages, and times to be taken in your wallet. · Do not take other medications without consulting your doctor. NOTIFY YOUR PHYSICIAN FOR ANY OF THE FOLLOWING:   Fever over 101 degrees for 24 hours. Chest pain, shortness of breath, fever, chills, nausea, vomiting, diarrhea, change in mentation, falling, weakness, bleeding. Severe pain or pain not relieved by medications. Or, any other signs or symptoms that you may have questions about.       DISPOSITION:    Home With:   OT  PT  HH  RN       SNF/Inpatient Rehab/LTAC    Independent/assisted living   x Hospice: Home hospice     Other:     CDMP Checked:   Yes ***     PROBLEM LIST Updated:  Yes ***       Signed:   Ean Zapata MD  5/20/2021  11:24 AM done

## 2024-04-29 NOTE — BRIEF OPERATIVE NOTE - NSICDXBRIEFPROCEDURE_GEN_ALL_CORE_FT
PROCEDURES:  Hysteroscopy with dilation and curettage of uterus 29-Apr-2024 08:29:59  Hailey Thompson  Exam under anesthesia, pelvic 29-Apr-2024 08:31:19  Hailey Thompson

## 2024-04-29 NOTE — ASU DISCHARGE PLAN (ADULT/PEDIATRIC) - CARE PROVIDER_API CALL
Leah Sanchez  Gynecologic Oncology  [address] [place] [address]  Phone: [phone]  Fax: [phone]  Follow Up Time:

## 2024-04-29 NOTE — ASU DISCHARGE PLAN (ADULT/PEDIATRIC) - NS MD DC FALL RISK RISK
For information on Fall & Injury Prevention, visit: [email or web address] OR  [email or web address] OR  https://www.cdc.gov/steadi/patient.html

## 2024-04-29 NOTE — ASU DISCHARGE PLAN (ADULT/PEDIATRIC) - ASU DC SPECIAL INSTRUCTIONSFT
Follow-up with Dr. Sanchez in two weeks to review pathology report.    Please contact your provider for any pain uncontrolled by medication, excessive bleeding or Fever>100.4  Please take home pain medications as prescribed for any pain from procedure.    May walk and climb stairs as often as you'd like, no vigorous activity, do not lift anything greater than 10lbs, nothing per vagina x 2-4 weeks.

## 2024-04-29 NOTE — BRIEF OPERATIVE NOTE - OPERATION/FINDINGS
exam under anesthesia revealed an atrophic vagina and 7cm uterus; hysteroscopy revealed an atrophic endometrium with blood clots

## 2024-04-30 LAB — SURGICAL PATHOLOGY STUDY: SIGNIFICANT CHANGE UP

## 2024-05-07 NOTE — ED PROVIDER NOTE - GENITOURINARY EXTERNAL GENERAL. FEMALE
----- Message from Dona Iyer sent at 5/7/2024  2:15 PM CDT -----  Regarding: clarification  Contact: [phone]  Amilcar from [facility] is calling about medication that was sent to pharmacy cloBAZam (ONFI) 10 mg Tab and clonazePAM (KLONOPIN) 2 MG Tab. Calling for clarification on both medication. Asking for a call back   ERYTHEMA

## 2024-05-13 NOTE — H&P ADULT - NEGATIVE GENERAL SYMPTOMS
Patient seen on 5/10.   note requesting patient ok to return as of 5/13.    Mom asking for this ASAP.  Mom asking for it to be posted to [facility].   no chills/no anorexia/no fever

## 2024-05-20 ENCOUNTER — APPOINTMENT (OUTPATIENT)
Dept: GYNECOLOGIC ONCOLOGY | Facility: CLINIC | Age: 74
End: 2024-05-20
Payer: MEDICARE

## 2024-05-20 VITALS
HEIGHT: 62 IN | DIASTOLIC BLOOD PRESSURE: 76 MMHG | SYSTOLIC BLOOD PRESSURE: 144 MMHG | RESPIRATION RATE: 16 BRPM | TEMPERATURE: 98.2 F | BODY MASS INDEX: 25.76 KG/M2 | OXYGEN SATURATION: 94 % | HEART RATE: 71 BPM | WEIGHT: 140 LBS

## 2024-05-20 PROCEDURE — 99212 OFFICE O/P EST SF 10 MIN: CPT

## 2024-05-20 NOTE — ASSESSMENT
[FreeTextEntry1] : Ms. Phillips is a 72 yo  w/ extensive PMHx now POD# 21 (24) s/p hysteroscopy D&C who presents today for post-op visit. Pt overall reports feeling well with minimal complaints. Recovering well from surgery, meeting all early post-operative milestones. Final pathology negative for malignancy, plasma cells c/f chronic endometritis. Will have pt follow-up in 3-4wks for final post-operative check.

## 2024-05-20 NOTE — PLAN
[TextEntry] : - Recovering well from surgery, meeting early post-operative milestones - Reviewed benign final pathology with demonstration of polypoid tissue without atypia/hyperplasia and chronic endometritis - Will have pt follow-up in 3-4wks for final post-operative appointment (RTO sooner as needed)

## 2024-05-20 NOTE — HISTORY OF PRESENT ILLNESS
[FreeTextEntry1] : Ms. Phillips is a 73 yo  w/ extensive PMHx now POD# 21 (24) s/p hysteroscopy D&C who presents today for post-op visit. Pt overall reports feeling well with minimal complaints. She's had no further abnormal discharge. She reports rare intermittent spotting. Denies abdominal pain/bloating/distension, pelvic discomfort, changes in normal bowel/urinary habits, nausea/vomiting, unintentional weight loss/gain, and all other associated signs and symptoms.  Final Pathology Endometrial curettings, biopsy: Inactive endometrium and fragments of polypoid endometrium, negative for hyperplasia negative for atypia.  See note. Also fragments of negative cervical tissue  Note: Plasma cells are present in endometrial stroma, consistent with chronic endometritis.

## 2024-05-20 NOTE — REASON FOR VISIT
[FreeTextEntry1] : [facility]. [phone] [address] [place] [address]  CC: Post-op follow-up [Family Member] : family member

## 2024-05-28 ENCOUNTER — APPOINTMENT (OUTPATIENT)
Dept: CARDIOLOGY | Facility: CLINIC | Age: 74
End: 2024-05-28

## 2024-06-04 NOTE — PATIENT PROFILE ADULT. - NSTOBACCO TYPE_GEN_A_CORE_RD
TRANSFER - OUT REPORT:    Verbal report given to SALAZAR Casanova on Shantel Mitchell  being transferred to [facility] for routine post-op       Report consisted of patient's Situation, Background, Assessment and   Recommendations(SBAR).     Information from the following report(s) Nurse Handoff Report, Surgery Report, Intake/Output, MAR, and Cardiac Rhythm nsr  was reviewed with the receiving nurse.           Lines:   Peripheral IV 06/03/24 Left;Posterior Forearm (Active)   Site Assessment Clean, dry & intact 06/04/24 1503   Line Status Flushed 06/04/24 1503   Line Care Connections checked and tightened 06/04/24 1503   Phlebitis Assessment No symptoms 06/04/24 1503   Infiltration Assessment 0 06/04/24 1503   Alcohol Cap Used Yes 06/04/24 1503   Dressing Status Clean, dry & intact 06/04/24 1503   Dressing Type Transparent 06/04/24 1503        Opportunity for questions and clarification was provided.      Patient transported with:  Registered Nurse         Cigarettes

## 2024-06-14 ENCOUNTER — APPOINTMENT (OUTPATIENT)
Dept: GYNECOLOGIC ONCOLOGY | Facility: CLINIC | Age: 74
End: 2024-06-14

## 2024-09-19 NOTE — ED ADULT NURSE NOTE - NSFALLRSKHARMRISK_ED_ALL_ED
Patient  requesting a call back about leg pain that is getting worse. Appointment was scheduled for  10/21/24. Patient would like to be seen sooner, or a prescription be sent to the Pharmacy. Phone #[phone].  
Will see next week    
no

## 2024-09-20 NOTE — ED PROVIDER NOTE - NSTIMEPROVIDERCAREINITIATE_GEN_ER
Subjective     Looks comfortable. Still no BM.    Objective     I/O's    Intake/Output Summary (Last 24 hours) at 9/20/2024 1138  Last data filed at 9/20/2024 0954  Gross per 24 hour   Intake --   Output 1675 ml   Net -1675 ml       Last Recorded Vitals  Blood pressure 132/85, pulse 67, temperature 97.9 °F (36.6 °C), temperature source Oral, resp. rate 17, height 5' 11\" (1.803 m), weight 77 kg (169 lb 12.1 oz), SpO2 96%.  Body mass index is 23.68 kg/m².  Review of Systems   Constitutional: Negative.    HENT: Negative.     Eyes: Negative.    Respiratory: Negative.     Cardiovascular: Negative.    Gastrointestinal:  Positive for abdominal pain and constipation.   Genitourinary: Negative.    Musculoskeletal: Negative.    Skin: Negative.    Neurological: Negative.    Endo/Heme/Allergies: Negative.    Psychiatric/Behavioral: Negative.       Physical Exam  Vitals and nursing note reviewed.   Constitutional:       Appearance: Normal appearance. He is normal weight.   HENT:      Head: Normocephalic and atraumatic.      Nose: Nose normal.      Mouth/Throat:      Mouth: Mucous membranes are dry.      Pharynx: Oropharynx is clear.      Neck: Normal range of motion and neck supple.   Eyes:      Extraocular Movements: Extraocular movements intact.      Conjunctiva/sclera: Conjunctivae normal.      Pupils: Pupils are equal, round, and reactive to light.   Cardiovascular:      Rate and Rhythm: Normal rate and regular rhythm.   Pulmonary:      Effort: Pulmonary effort is normal.      Breath sounds: Normal breath sounds.   Abdominal:      General: Bowel sounds are normal.      Tenderness: There is abdominal tenderness (periumbilical).   Musculoskeletal:         General: Normal range of motion.   Skin:     General: Skin is warm and dry.      Findings: Erythema (mild periumbilical) present.   Neurological:      General: No focal deficit present.      Mental Status: He is alert and oriented to person, place, and time.   Psychiatric:          Mood and Affect: Mood normal.         Behavior: Behavior normal.         Thought Content: Thought content normal.         Judgment: Judgment normal.         Labs   Last WBC:    WBC (K/mcL)   Date Value   09/19/2024 10.1       LAST RBC:    RBC (mil/mcL)   Date Value   09/19/2024 4.07 (L)     LAST HCT:    HCT (%)   Date Value   09/19/2024 40.2     LAST HGB:    HGB (g/dL)   Date Value   09/19/2024 13.4     LAST PLT:    PLT (K/mcL)   Date Value   09/19/2024 433     LAST SODIUM:  Sodium (mmol/L)   Date Value   09/19/2024 136     LAST POTASSIUM:    Potassium (mmol/L)   Date Value   09/19/2024 3.6     LAST CHLORIDE:    Chloride (mmol/L)   Date Value   09/19/2024 109     LAST GLUCOSE:    Glucose (mg/dL)   Date Value   09/19/2024 102 (H)     LAST CALCIUM:  Calcium (mg/dL)   Date Value   09/19/2024 8.7     LAST CO2:    Carbon Dioxide (mmol/L)   Date Value   09/19/2024 22     LAST BUN:  BUN (mg/dL)   Date Value   09/19/2024 7     LAST CREATININE:    Creatinine (mg/dL)   Date Value   09/19/2024 0.58 (L)     LAST MALBCR:  No results found for: \"MALBCR\"  LAST TROPONIN:  No results found for: \"TROP\"    LAST MICRO:  No results found for: \"MICRO\"    Imaging  LAST EKG:    Encounter Date: 09/01/24   Electrocardiogram 12-Lead   Result Value    Ventricular Rate EKG/Min (BPM) 61    Atrial Rate (BPM) 61    TN-Interval (MSEC) 164    QRS-Interval (MSEC) 86    QT-Interval (MSEC) 446    QTc 449    P Axis (Degrees) 50    R Axis (Degrees) 55    T Axis (Degrees) 24    REPORT TEXT      Normal sinus rhythm  Possible  Left atrial enlargement  Borderline ECG  When compared with ECG of  28-JUL-2021 20:53,  Vent. rate  has decreased  BY  63 BPM  Non-specific change in ST segment in  Anterior leads  Nonspecific T wave abnormality, improved in  Inferior leads  T wave inversion no longer evident in  Anterolateral leads  Confirmed by CHECO MA DO ([id number]) on 9/3/2024 4:01:58 PM         Assessment & Plan   Principal Problem:    Abdominal pain  Active  Problems:    Constipation    Vomiting      Off opioids completely.  Continue Relistor.  Continue Reglan TID with meals.  Miralax BID.  Continue IV Zosyn.  Resume home medications.  D/w the patient and Dr. Mckenzie in great details.    Smoking Cessation  Ready to quit: Not Answered  Counseling given: Not Answered  Tobacco comments: avoid smoking day of surgery.  Patient verbalized understanding.       DVT Prophylaxis  SCD    Code Status    Code Status: Full Resuscitation    Primary Care Physician  Sherwin Mcgowan MD      20-Apr-2018 21:53

## 2024-10-10 NOTE — ED STATDOCS - CONDITION AT DISCHARGE:
Allergies reviewed.  H&P note has been confirmed for the patient. Procedural consent has been signed.  Staff has reviewed the patient's labs.  The patient has denied she is pregnant. Improved

## 2024-11-20 NOTE — ED ADULT TRIAGE NOTE - CHIEF COMPLAINT QUOTE
"I saw my PMD [facility] and they gave me antibiotics for pneumonia based on my hx. I was supposed to have a chest x-ray but didn't have one.  Today I feel worse and I was dry heaving today. I did take one dose of Levaquin and was able to keep it down." yes
